# Patient Record
Sex: MALE | Race: WHITE | NOT HISPANIC OR LATINO | Employment: OTHER | URBAN - NONMETROPOLITAN AREA
[De-identification: names, ages, dates, MRNs, and addresses within clinical notes are randomized per-mention and may not be internally consistent; named-entity substitution may affect disease eponyms.]

---

## 2017-01-06 ENCOUNTER — APPOINTMENT (OUTPATIENT)
Dept: CT IMAGING | Facility: HOSPITAL | Age: 82
End: 2017-01-06

## 2017-01-06 ENCOUNTER — HOSPITAL ENCOUNTER (INPATIENT)
Facility: HOSPITAL | Age: 82
LOS: 1 days | Discharge: HOME | End: 2017-01-07
Attending: EMERGENCY MEDICINE | Admitting: INTERNAL MEDICINE

## 2017-01-06 ENCOUNTER — LAB (OUTPATIENT)
Dept: LAB | Facility: HOSPITAL | Age: 82
End: 2017-01-06

## 2017-01-06 ENCOUNTER — TRANSCRIBE ORDERS (OUTPATIENT)
Dept: CT IMAGING | Facility: HOSPITAL | Age: 82
End: 2017-01-06

## 2017-01-06 ENCOUNTER — HOSPITAL ENCOUNTER (OUTPATIENT)
Dept: GENERAL RADIOLOGY | Facility: HOSPITAL | Age: 82
Discharge: HOME | End: 2017-01-06
Admitting: NURSE PRACTITIONER

## 2017-01-06 DIAGNOSIS — R06.02 SHORTNESS OF BREATH: ICD-10-CM

## 2017-01-06 DIAGNOSIS — R53.83 OTHER FATIGUE: Primary | ICD-10-CM

## 2017-01-06 DIAGNOSIS — D53.9 ANEMIA ASSOCIATED WITH NUTRITIONAL DEFICIENCY: Primary | ICD-10-CM

## 2017-01-06 DIAGNOSIS — R53.83 OTHER FATIGUE: ICD-10-CM

## 2017-01-06 LAB
ABO GROUP BLD: NORMAL
ALBUMIN SERPL-MCNC: 4.3 G/DL (ref 3.5–5)
ALBUMIN SERPL-MCNC: 4.3 G/DL (ref 3.5–5)
ALBUMIN/GLOB SERPL: 1.1 G/DL (ref 1.1–2.5)
ALBUMIN/GLOB SERPL: 1.2 G/DL (ref 1.1–2.5)
ALP SERPL-CCNC: 60 U/L (ref 24–120)
ALP SERPL-CCNC: 61 U/L (ref 24–120)
ALT SERPL W P-5'-P-CCNC: 25 U/L (ref 0–54)
ALT SERPL W P-5'-P-CCNC: 32 U/L (ref 0–54)
AMYLASE SERPL-CCNC: 68 U/L (ref 30–110)
ANION GAP SERPL CALCULATED.3IONS-SCNC: 13 MMOL/L (ref 4–13)
ANION GAP SERPL CALCULATED.3IONS-SCNC: 13 MMOL/L (ref 4–13)
ANION GAP SERPL CALCULATED.3IONS-SCNC: 14 MMOL/L (ref 4–13)
APTT PPP: 28.1 SECONDS (ref 24.1–34.8)
AST SERPL-CCNC: 22 U/L (ref 7–45)
AST SERPL-CCNC: 27 U/L (ref 7–45)
AUTO MIXED CELLS #: 0.8 10*3/UL (ref 0.1–2.6)
AUTO MIXED CELLS %: 8.1 % (ref 0.1–24)
BACTERIA UR QL AUTO: ABNORMAL /HPF
BASOPHILS # BLD AUTO: 0.04 10*3/MM3 (ref 0–0.2)
BASOPHILS NFR BLD AUTO: 0.4 % (ref 0–2)
BILIRUB SERPL-MCNC: 0.3 MG/DL (ref 0.1–1)
BILIRUB SERPL-MCNC: 0.3 MG/DL (ref 0.1–1)
BILIRUB UR QL STRIP: NEGATIVE
BLD GP AB SCN SERPL QL: NEGATIVE
BUN BLD-MCNC: 42 MG/DL (ref 5–21)
BUN BLD-MCNC: 44 MG/DL (ref 5–21)
BUN BLD-MCNC: 46 MG/DL (ref 5–21)
BUN/CREAT SERPL: 19.4
BUN/CREAT SERPL: 21.3 (ref 7–25)
BUN/CREAT SERPL: 22.7 (ref 7–25)
CALCIUM SPEC-SCNC: 8.5 MG/DL (ref 8.4–10.4)
CALCIUM SPEC-SCNC: 8.6 MG/DL (ref 8.4–10.4)
CALCIUM SPEC-SCNC: 8.7 MG/DL (ref 8.4–10.4)
CHLORIDE SERPL-SCNC: 105 MMOL/L (ref 98–110)
CHLORIDE SERPL-SCNC: 106 MMOL/L (ref 98–110)
CHLORIDE SERPL-SCNC: 107 MMOL/L (ref 98–110)
CLARITY UR: CLEAR
CO2 SERPL-SCNC: 22 MMOL/L (ref 24–31)
CO2 SERPL-SCNC: 22 MMOL/L (ref 24–31)
CO2 SERPL-SCNC: 23 MMOL/L (ref 24–31)
COLOR UR: YELLOW
CREAT BLD-MCNC: 1.94 MG/DL (ref 0.5–1.4)
CREAT BLD-MCNC: 2.16 MG/DL (ref 0.5–1.4)
CREAT BLD-MCNC: 2.17 MG/DL (ref 0.5–1.4)
CRP SERPL-MCNC: <0.5 MG/DL (ref 0–0.99)
DEPRECATED RDW RBC AUTO: 42 FL (ref 40–54)
EOSINOPHIL # BLD AUTO: 0.04 10*3/MM3 (ref 0–0.7)
EOSINOPHIL NFR BLD AUTO: 0.4 % (ref 0–4)
ERYTHROCYTE [DISTWIDTH] IN BLOOD BY AUTOMATED COUNT: 15.2 % (ref 12–15)
ERYTHROCYTE [DISTWIDTH] IN BLOOD BY AUTOMATED COUNT: 15.9 % (ref 12–15)
FLUAV AG NPH QL: NEGATIVE
FLUBV AG NPH QL IA: NEGATIVE
FOLATE SERPL-MCNC: >20 NG/ML
GFR SERPL CREATININE-BSD FRML MDRD: 29 ML/MIN/1.73
GFR SERPL CREATININE-BSD FRML MDRD: 29 ML/MIN/1.73
GFR SERPL CREATININE-BSD FRML MDRD: 33 ML/MIN/1.73
GLOBULIN UR ELPH-MCNC: 3.6 GM/DL
GLOBULIN UR ELPH-MCNC: 3.8 GM/DL
GLUCOSE BLD-MCNC: 116 MG/DL (ref 70–100)
GLUCOSE BLD-MCNC: 127 MG/DL (ref 70–100)
GLUCOSE BLD-MCNC: 181 MG/DL (ref 70–100)
GLUCOSE BLDC GLUCOMTR-MCNC: 98 MG/DL (ref 70–130)
GLUCOSE UR STRIP-MCNC: NEGATIVE MG/DL
HCT VFR BLD AUTO: 20.5 % (ref 40–52)
HCT VFR BLD AUTO: 21.6 % (ref 40–52)
HGB BLD-MCNC: 6.2 G/DL (ref 14–18)
HGB BLD-MCNC: 6.6 G/DL (ref 14–18)
HGB UR QL STRIP.AUTO: ABNORMAL
HYALINE CASTS UR QL AUTO: ABNORMAL /LPF
IMM GRANULOCYTES # BLD: 0.02 10*3/MM3 (ref 0–0.03)
IMM GRANULOCYTES NFR BLD: 0.2 % (ref 0–5)
INR PPP: 1.11 (ref 0.91–1.09)
IRON 24H UR-MRATE: <10 MCG/DL (ref 42–180)
IRON SATN MFR SERPL: ABNORMAL % (ref 20–45)
KETONES UR QL STRIP: NEGATIVE
LEUKOCYTE ESTERASE UR QL STRIP.AUTO: ABNORMAL
LIPASE SERPL-CCNC: 251 U/L (ref 23–203)
LYMPHOCYTES # BLD AUTO: 1.5 10*3/MM3 (ref 0.72–4.86)
LYMPHOCYTES # BLD AUTO: 2 10*3/MM3 (ref 0.8–7)
LYMPHOCYTES NFR BLD AUTO: 16.5 % (ref 15–45)
LYMPHOCYTES NFR BLD AUTO: 20.8 % (ref 15–45)
MCH RBC QN AUTO: 21.5 PG (ref 28–32)
MCH RBC QN AUTO: 22.1 PG (ref 28–32)
MCHC RBC AUTO-ENTMCNC: 30.2 G/DL (ref 33–36)
MCHC RBC AUTO-ENTMCNC: 30.6 G/DL (ref 33–36)
MCV RBC AUTO: 71.2 FL (ref 82–95)
MCV RBC AUTO: 72.2 FL (ref 82–95)
MONOCYTES # BLD AUTO: 0.53 10*3/MM3 (ref 0.19–1.3)
MONOCYTES NFR BLD AUTO: 5.8 % (ref 4–12)
NEUTROPHILS # BLD AUTO: 6.6 10*3/MM3 (ref 1.5–8.3)
NEUTROPHILS # BLD AUTO: 6.98 10*3/MM3 (ref 1.87–8.4)
NEUTROPHILS NFR BLD AUTO: 71.1 % (ref 39–78)
NEUTROPHILS NFR BLD AUTO: 76.7 % (ref 39–78)
NITRITE UR QL STRIP: NEGATIVE
NT-PROBNP SERPL-MCNC: 327 PG/ML (ref 0–1800)
PH UR STRIP.AUTO: 5.5 [PH] (ref 5–8)
PLATELET # BLD AUTO: 349 10*3/MM3 (ref 130–400)
PLATELET # BLD AUTO: 365 10*3/MM3 (ref 130–400)
PMV BLD AUTO: 10.1 FL (ref 6–12)
PMV BLD AUTO: 8.5 FL (ref 6–12)
POTASSIUM BLD-SCNC: 4.8 MMOL/L (ref 3.5–5.3)
POTASSIUM BLD-SCNC: 5.3 MMOL/L (ref 3.5–5.3)
POTASSIUM BLD-SCNC: 5.6 MMOL/L (ref 3.5–5.3)
PROT SERPL-MCNC: 7.9 G/DL (ref 6.3–8.7)
PROT SERPL-MCNC: 8.1 G/DL (ref 6.3–8.7)
PROT UR QL STRIP: NEGATIVE
PROTHROMBIN TIME: 14.7 SECONDS (ref 11.9–14.6)
RBC # BLD AUTO: 2.88 10*6/MM3 (ref 4.8–5.9)
RBC # BLD AUTO: 2.99 10*6/MM3 (ref 4.2–5.4)
RBC # UR: ABNORMAL /HPF
REF LAB TEST METHOD: ABNORMAL
RETICS #: 0.03 10*6/MM3 (ref 0.02–0.13)
RETICS/RBC NFR AUTO: 0.89 % (ref 0.6–1.8)
RH BLD: POSITIVE
SODIUM BLD-SCNC: 141 MMOL/L (ref 135–145)
SODIUM BLD-SCNC: 141 MMOL/L (ref 135–145)
SODIUM BLD-SCNC: 143 MMOL/L (ref 135–145)
SP GR UR STRIP: 1.01 (ref 1–1.03)
SQUAMOUS #/AREA URNS HPF: ABNORMAL /HPF
T4 FREE SERPL-MCNC: 0.96 NG/DL (ref 0.78–2.19)
TIBC SERPL-MCNC: 437 MCG/DL (ref 225–420)
TROPONIN I SERPL-MCNC: 0.01 NG/ML (ref 0–0.07)
TSH SERPL DL<=0.05 MIU/L-ACNC: 2.35 MIU/ML (ref 0.47–4.68)
UROBILINOGEN UR QL STRIP: ABNORMAL
VIT B12 BLD-MCNC: 315 PG/ML (ref 239–931)
WBC NRBC COR # BLD: 9.11 10*3/MM3 (ref 4.8–10.8)
WBC NRBC COR # BLD: 9.4 10*3/MM3 (ref 4.8–10.8)
WBC UR QL AUTO: ABNORMAL /HPF

## 2017-01-06 PROCEDURE — 86900 BLOOD TYPING SEROLOGIC ABO: CPT

## 2017-01-06 PROCEDURE — P9016 RBC LEUKOCYTES REDUCED: HCPCS

## 2017-01-06 PROCEDURE — 84443 ASSAY THYROID STIM HORMONE: CPT | Performed by: EMERGENCY MEDICINE

## 2017-01-06 PROCEDURE — 85060 BLOOD SMEAR INTERPRETATION: CPT | Performed by: INTERNAL MEDICINE

## 2017-01-06 PROCEDURE — 80053 COMPREHEN METABOLIC PANEL: CPT | Performed by: EMERGENCY MEDICINE

## 2017-01-06 PROCEDURE — 84484 ASSAY OF TROPONIN QUANT: CPT

## 2017-01-06 PROCEDURE — 85025 COMPLETE CBC W/AUTO DIFF WBC: CPT | Performed by: EMERGENCY MEDICINE

## 2017-01-06 PROCEDURE — 83550 IRON BINDING TEST: CPT | Performed by: INTERNAL MEDICINE

## 2017-01-06 PROCEDURE — 82150 ASSAY OF AMYLASE: CPT | Performed by: EMERGENCY MEDICINE

## 2017-01-06 PROCEDURE — 86920 COMPATIBILITY TEST SPIN: CPT

## 2017-01-06 PROCEDURE — 99285 EMERGENCY DEPT VISIT HI MDM: CPT

## 2017-01-06 PROCEDURE — 36430 TRANSFUSION BLD/BLD COMPNT: CPT

## 2017-01-06 PROCEDURE — 85045 AUTOMATED RETICULOCYTE COUNT: CPT | Performed by: INTERNAL MEDICINE

## 2017-01-06 PROCEDURE — 83010 ASSAY OF HAPTOGLOBIN QUANT: CPT | Performed by: INTERNAL MEDICINE

## 2017-01-06 PROCEDURE — 86901 BLOOD TYPING SEROLOGIC RH(D): CPT

## 2017-01-06 PROCEDURE — 83690 ASSAY OF LIPASE: CPT | Performed by: EMERGENCY MEDICINE

## 2017-01-06 PROCEDURE — 82607 VITAMIN B-12: CPT | Performed by: INTERNAL MEDICINE

## 2017-01-06 PROCEDURE — 93010 ELECTROCARDIOGRAM REPORT: CPT | Performed by: INTERNAL MEDICINE

## 2017-01-06 PROCEDURE — 84439 ASSAY OF FREE THYROXINE: CPT | Performed by: EMERGENCY MEDICINE

## 2017-01-06 PROCEDURE — 74176 CT ABD & PELVIS W/O CONTRAST: CPT

## 2017-01-06 PROCEDURE — 82962 GLUCOSE BLOOD TEST: CPT

## 2017-01-06 PROCEDURE — 82746 ASSAY OF FOLIC ACID SERUM: CPT | Performed by: INTERNAL MEDICINE

## 2017-01-06 PROCEDURE — 93005 ELECTROCARDIOGRAM TRACING: CPT | Performed by: EMERGENCY MEDICINE

## 2017-01-06 PROCEDURE — 85610 PROTHROMBIN TIME: CPT | Performed by: EMERGENCY MEDICINE

## 2017-01-06 PROCEDURE — 30233N1 TRANSFUSION OF NONAUTOLOGOUS RED BLOOD CELLS INTO PERIPHERAL VEIN, PERCUTANEOUS APPROACH: ICD-10-PCS | Performed by: INTERNAL MEDICINE

## 2017-01-06 PROCEDURE — 83540 ASSAY OF IRON: CPT | Performed by: INTERNAL MEDICINE

## 2017-01-06 PROCEDURE — 86850 RBC ANTIBODY SCREEN: CPT

## 2017-01-06 PROCEDURE — 85730 THROMBOPLASTIN TIME PARTIAL: CPT | Performed by: EMERGENCY MEDICINE

## 2017-01-06 RX ORDER — DEXTROSE MONOHYDRATE 25 G/50ML
25 INJECTION, SOLUTION INTRAVENOUS AS NEEDED
Status: DISCONTINUED | OUTPATIENT
Start: 2017-01-06 | End: 2017-01-07 | Stop reason: HOSPADM

## 2017-01-06 RX ORDER — NICOTINE POLACRILEX 4 MG
15 LOZENGE BUCCAL AS NEEDED
Status: DISCONTINUED | OUTPATIENT
Start: 2017-01-06 | End: 2017-01-07 | Stop reason: HOSPADM

## 2017-01-06 RX ORDER — SODIUM CHLORIDE 0.9 % (FLUSH) 0.9 %
1-10 SYRINGE (ML) INJECTION AS NEEDED
Status: DISCONTINUED | OUTPATIENT
Start: 2017-01-06 | End: 2017-01-07 | Stop reason: HOSPADM

## 2017-01-06 RX ORDER — PANTOPRAZOLE SODIUM 40 MG/10ML
80 INJECTION, POWDER, LYOPHILIZED, FOR SOLUTION INTRAVENOUS ONCE
Status: COMPLETED | OUTPATIENT
Start: 2017-01-06 | End: 2017-01-06

## 2017-01-06 RX ADMIN — SODIUM CHLORIDE 500 ML: 9 INJECTION, SOLUTION INTRAVENOUS at 22:07

## 2017-01-06 RX ADMIN — PANTOPRAZOLE SODIUM 40 MG: 40 INJECTION, POWDER, FOR SOLUTION INTRAVENOUS at 22:12

## 2017-01-06 RX ADMIN — PANTOPRAZOLE SODIUM 80 MG: 40 INJECTION, POWDER, FOR SOLUTION INTRAVENOUS at 21:52

## 2017-01-06 NOTE — ED PROVIDER NOTES
Subjective   HPI Comments: 87-year-old gentleman presents from facility with significant other at bedside.  He presents from us from a clinic where he was evaluated by a nurse practitioner at which time they noted a hemoglobin of 6 and was requested to be seen in the emergency room.    Apparently for the past several days he feels weak and tired the end of his work day.    No chest pain no shortness of breath has good appetite no nausea no vomiting no diarrhea no abdominal pain.  He has not noticed any blood in the stools.        Patient is a 87 y.o. male presenting with fatigue.   History provided by:  Patient   used: No    Fatigue   Severity:  Severe  Onset quality:  Gradual  Timing:  Constant  Progression:  Worsening  Associated symptoms: fatigue        Review of Systems   Constitutional: Positive for fatigue.   HENT: Negative.    Eyes: Negative.    Respiratory: Negative.    Cardiovascular: Negative.    Gastrointestinal: Negative.    Endocrine: Negative.    Genitourinary: Negative.    Musculoskeletal: Negative.    Skin: Negative.    Allergic/Immunologic: Negative.    Neurological: Negative.    Hematological: Negative.    Psychiatric/Behavioral: Negative.    All other systems reviewed and are negative.      Past Medical History   Diagnosis Date   • Cancer      PROSTATE   • Diabetes mellitus    • Hypertension        No Known Allergies    Past Surgical History   Procedure Laterality Date   • Cholecystectomy     • Prostatectomy         History reviewed. No pertinent family history.    Social History     Social History   • Marital status: Single     Spouse name: N/A   • Number of children: N/A   • Years of education: N/A     Social History Main Topics   • Smoking status: Never Smoker   • Smokeless tobacco: None   • Alcohol use No   • Drug use: No   • Sexual activity: Not Asked     Other Topics Concern   • None     Social History Narrative   • None           Objective   Physical Exam    Constitutional: He appears well-developed and well-nourished.   HENT:   Head: Normocephalic and atraumatic.   Eyes: EOM are normal. Pupils are equal, round, and reactive to light.   Neck: Normal range of motion. Neck supple.   Pulmonary/Chest: Effort normal and breath sounds normal.   Abdominal: Soft. Bowel sounds are normal.   Musculoskeletal: Normal range of motion.   Neurological: He is alert. He has normal reflexes.   Skin: Skin is warm and dry.   Psychiatric: He has a normal mood and affect. His behavior is normal. Judgment and thought content normal.   Nursing note and vitals reviewed.      Procedures         ED Course  ED Course   Comment By Time   Hemoglobin at the clinic was 6.6 hematocrit was 22 Bereket WILLS MD 01/06 1734   D/W with Dr. Gonzalez.  Plan to admit to Hospitalist and will consult in AM. Bereket WILLS MD 01/06 1753   EKG at 1748 sinus rate no PVCs no ectopic beats at a rate of 70/m.  No acute ST changes. Bereket WILLS MD 01/06 1814   Discussed with Dr. Flores patient will be admitted to their service to the ICU I started patient on Protonix IV push followed by IV drip.  Await blood transfusion. Bereket WILLS MD 01/06 1814                  MDM  Number of Diagnoses or Management Options  Anemia associated with nutritional deficiency: new and requires workup     Amount and/or Complexity of Data Reviewed  Clinical lab tests: reviewed and ordered  Tests in the radiology section of CPT®: reviewed and ordered    Risk of Complications, Morbidity, and/or Mortality  Presenting problems: high  Diagnostic procedures: high  Management options: high    Critical Care  Total time providing critical care: 30-74 minutes    Patient Progress  Patient progress: stable      Final diagnoses:   Anemia associated with nutritional deficiency            Bereket WILLS MD  01/06/17 [id number]

## 2017-01-06 NOTE — IP AVS SNAPSHOT
AFTER VISIT SUMMARY             Maikel Mathews           About your hospitalization     You were admitted on:  January 6, 2017 You last received care in the:  [facility]       Procedures & Surgeries         Medications    If you or your caregiver advised us that you are currently taking a medication and that medication is marked below as “Resume”, this simply indicates that we have reviewed those medications to make sure our new therapy recommendations do not interfere.  If you have concerns about medications other than those new ones which we are prescribing today, please consult the physician who prescribed them (or your primary physician).  Our review of your home medications is not meant to indicate that we are directing their use.             Your Medications      START taking these medications     ferrous sulfate 325 (65 FE) MG EC tablet   Take 1 tablet by mouth 3 (Three) Times a Day With Meals.           pneumococcal polysaccharide 23-valent 25 MCG/0.5ML vaccine   Inject 0.5 mL into the shoulder, thigh, or buttocks 1 (One) Time for 1 dose.   Commonly known as:  PNEUMOVAX-23           vitamin C 500 MG tablet   Take 1 tablet by mouth Daily.   Commonly known as:  ASCORBIC ACID             CONTINUE taking these medications     AMLODIPINE BESYLATE PO   Take 10 mg by mouth Daily.   Last time this was given:  1/7/2017  3:17 PM           GLIPIZIDE PO   Take 5 mg by mouth Daily.   Last time this was given:  1/7/2017  3:17 PM           LOSARTAN POTASSIUM PO   Take 100 mg by mouth Daily.   Last time this was given:  1/7/2017  3:17 PM           METFORMIN HCL PO   Take 500 mg by mouth 2 (Two) Times a Day.                Where to Get Your Medications      These medications were sent to [facility] - [place], [place] - [address] - [phone]  - [phone]   [address] [place] [address]     Phone:  [phone]     ferrous sulfate 325 (65 FE) MG EC tablet    pneumococcal  polysaccharide 23-valent 25 MCG/0.5ML vaccine         Information about where to get these medications is not yet available     ! Ask your nurse or doctor about these medications     vitamin C 500 MG tablet                  Your Medications      Your Medication List           Morning Noon Evening Bedtime As Needed    AMLODIPINE BESYLATE PO   Take 10 mg by mouth Daily.                                   ferrous sulfate 325 (65 FE) MG EC tablet   Take 1 tablet by mouth 3 (Three) Times a Day With Meals.                                         GLIPIZIDE PO   Take 5 mg by mouth Daily.                                   LOSARTAN POTASSIUM PO   Take 100 mg by mouth Daily.                                   METFORMIN HCL PO   Take 500 mg by mouth 2 (Two) Times a Day.                                      pneumococcal polysaccharide 23-valent 25 MCG/0.5ML vaccine   Inject 0.5 mL into the shoulder, thigh, or buttocks 1 (One) Time for 1 dose.   Commonly known as:  PNEUMOVAX-23                                vitamin C 500 MG tablet   Take 1 tablet by mouth Daily.   Commonly known as:  ASCORBIC ACID                                            Instructions for After Discharge        Activity Instructions     Activity as Tolerated                 Diet Instructions     Diet: Consistent Carbohydrate, Cardiac; Thin Liquids, No Restrictions       Discharge Diet:   Consistent Carbohydrate  Cardiac      Fluid Consistency:  Thin Liquids, No Restrictions             Discharge References/Attachments     ANEMIA, NONSPECIFIC (ENGLISH)    BLOOD TRANSFUSION, CARE AFTER (ENGLISH)    IRON TABLETS, CAPSULES, EXTENDED-RELEASE TABLETS (ENGLISH)    ASCORBIC ACID, VITAMIN C CAPSULES AND TABLETS, EXTENDED RELEASE (ENGLISH)       Follow-ups for After Discharge        Follow-up Information     Follow up with David Barajas MD Follow up in 1 week(s).    Specialty:  Internal Medicine    Why:  Patient to call for appointment, will need repeat H/H day  before appointment    Contact information:    [address] [facility] [address]  [facility] [address]  [phone]          Follow up with Joaquín Neff MD. Schedule an appointment as soon as possible for a visit in 1 week(s).    Specialty:  Gastroenterology    Contact information:    [facility] [address]  [facility] [address]  [phone]        Referrals and Follow-ups to Schedule     Hemoglobin & Hematocrit, Blood    2017 (Approximate)    Results to Dr Barajas, obtain prior to office visit             [employer] Signup     [employer] allows you to send messages to your doctor, view your test results, renew your prescriptions, schedule appointments, and more. To sign up, go to "[employer]" and click on the Sign Up Now link in the New User? box. Enter your [employer] Activation Code exactly as it appears below along with the last four digits of your Social Security Number and your Date of Birth () to complete the sign-up process. If you do not sign up before the expiration date, you must request a new code.    [employer] Activation Code: [email or web address]  Expires: 2017  6:30 PM    If you have questions, you can email [email or web address] or call [phone] to talk to our [employer] staff. Remember, [employer] is NOT to be used for urgent needs. For medical emergencies, dial 911.           Summary of Your Hospitalization        Reason for Hospitalization     Your primary diagnosis was:  Not on File    Your diagnoses also included:  Anemia Associated With Nutritional Deficiency      Care Providers     Provider Service Role Specialty    Yifan Flores MD -- Attending Provider Hospitalist    Yifan Flores MD -- Consulting Physician  Hospitalist     Jen Gonzalez MD -- Consulting Physician  Gastroenterology       Your Allergies  Date Reviewed: 2017    No active allergies      Pending Labs     Order Current Status    Haptoglobin In process    Peripheral Blood Smear In process    T4  In process      Patient Belongings Returned     Document Return of Belongings Flowsheet     Were the patient bedside belongings sent home?   --   Belongings Retrieved from Security & Sent Home   --    Belongings Sent to Safe   --   Medications Retrieved from Pharmacy & Sent Home   --              More Information      Anemia, Nonspecific  Anemia is a condition in which the concentration of red blood cells or hemoglobin in the blood is below normal. Hemoglobin is a substance in red blood cells that carries oxygen to the tissues of the body. Anemia results in not enough oxygen reaching these tissues.   CAUSES   Common causes of anemia include:   · Excessive bleeding. Bleeding may be internal or external. This includes excessive bleeding from periods (in women) or from the intestine.    · Poor nutrition.    · Chronic kidney, thyroid, and liver disease.   · Bone marrow disorders that decrease red blood cell production.  · Cancer and treatments for cancer.  · HIV, AIDS, and their treatments.  · Spleen problems that increase red blood cell destruction.  · Blood disorders.  · Excess destruction of red blood cells due to infection, medicines, and autoimmune disorders.  SIGNS AND SYMPTOMS   · Minor weakness.    · Dizziness.    · Headache.  · Palpitations.    · Shortness of breath, especially with exercise.    · Paleness.  · Cold sensitivity.  · Indigestion.  · Nausea.  · Difficulty sleeping.  · Difficulty concentrating.  Symptoms may occur suddenly or they may develop slowly.   DIAGNOSIS   Additional blood tests are often needed. These help your health care provider determine the best treatment. Your health care provider will check your stool for blood and look for other causes of blood loss.   TREATMENT   Treatment varies depending on the cause of the anemia. Treatment can include:   · Supplements of iron, vitamin B12, or folic acid.    · Hormone medicines.    · A blood transfusion. This may be needed if blood loss is  severe.    · Hospitalization. This may be needed if there is significant continual blood loss.    · Dietary changes.  · Spleen removal.  HOME CARE INSTRUCTIONS  Keep all follow-up appointments. It often takes many weeks to correct anemia, and having your health care provider check on your condition and your response to treatment is very important.  SEEK IMMEDIATE MEDICAL CARE IF:   · You develop extreme weakness, shortness of breath, or chest pain.    · You become dizzy or have trouble concentrating.  · You develop heavy vaginal bleeding.    · You develop a rash.    · You have bloody or black, tarry stools.    · You faint.    · You vomit up blood.    · You vomit repeatedly.    · You have abdominal pain.  · You have a fever or persistent symptoms for more than 2-3 days.    · You have a fever and your symptoms suddenly get worse.    · You are dehydrated.    MAKE SURE YOU:  · Understand these instructions.  · Will watch your condition.  · Will get help right away if you are not doing well or get worse.     This information is not intended to replace advice given to you by your health care provider. Make sure you discuss any questions you have with your health care provider.     Document Released: 01/25/2006 Document Revised: 08/20/2014 Document Reviewed: 06/13/2014  [employer] Interactive Patient Education ©2016 [employer]          Blood Transfusion, Care After  Refer to this sheet in the next few weeks. These instructions provide you with information about caring for yourself after your procedure. Your health care provider may also give you more specific instructions. Your treatment has been planned according to current medical practices, but problems sometimes occur. Call your health care provider if you have any problems or questions after your procedure.  WHAT TO EXPECT AFTER THE PROCEDURE  After your procedure, it is common to have:  · Bruising and soreness at the IV site.  · Chills or fever.  · Headache.  HOME  CARE INSTRUCTIONS  · Take medicines only as directed by your health care provider. Ask your health care provider if you can take an over-the-counter pain reliever in case you have a fever or headache a day or two after your transfusion.  · Return to your normal activities as directed by your health care provider.  SEEK MEDICAL CARE IF:   · You develop redness or irritation at your IV site.  · You have persistent fever, chills, or headache.  · Your urine is darker than normal.  · Your urine turns pink, red, or brown.    · The white part of your eye turns yellow (jaundice).    · You feel weak after doing your normal activities.    SEEK IMMEDIATE MEDICAL CARE IF:   · You have trouble breathing.  · You have fever and chills along with:    Anxiety.    Chest or back pain.    Flushed skin.    Clammy skin.    A rapid heartbeat.    Nausea.     This information is not intended to replace advice given to you by your health care provider. Make sure you discuss any questions you have with your health care provider.     Document Released: 01/08/2016 Document Reviewed: 01/08/2016  [employer] Interactive Patient Education ©2016 [employer] Inc.          Iron tablets, capsules, extended-release tablets  What is this medicine?  IRON (AHY valente) replaces iron that is essential to healthy red blood cells. Iron is used to treat iron deficiency anemia. Anemia may cause problems like tiredness, shortness of breath, or slowed growth in children. Only take iron if your doctor has told you to. Do not treat yourself with iron if you are feeling tired. Most healthy people get enough iron in their diets, particularly if they eat cereals, meat, poultry, and fish.  This medicine may be used for other purposes; ask your health care provider or pharmacist if you have questions.  What should I tell my health care provider before I take this medicine?  They need to know if you have any of these conditions:  -frequently drink alcohol  -bowel  disease  -hemolytic anemia  -iron overload (hemochromatosis, hemosiderosis)  -liver disease  -problems with swallowing  -stomach ulcer or other stomach problems  -an unusual or allergic reaction to iron, other medicines, foods, dyes, or preservatives  -pregnant or trying to get pregnant  -breast-feeding  How should I use this medicine?  Take this medicine by mouth with a glass of water or fruit juice. Follow the directions on the prescription label. Swallow whole. Do not crush or chew. Take this medicine in an upright or sitting position. Try to take any bedtime doses at least 10 minutes before lying down. You may take this medicine with food. Take your medicine at regular intervals. Do not take your medicine more often than directed. Do not stop taking except on your doctor's advice.  Talk to your pediatrician regarding the use of this medicine in children. While this drug may be prescribed for selected conditions, precautions do apply.  Overdosage: If you think you have taken too much of this medicine contact a poison control center or emergency room at once.  NOTE: This medicine is only for you. Do not share this medicine with others.  What if I miss a dose?  If you miss a dose, take it as soon as you can. If it is almost time for your next dose, take only that dose. Do not take double or extra doses.  What may interact with this medicine?  If you are taking this iron product, you should not take iron in any other medicine or dietary supplement.  This medicine may also interact with the following medications:  -alendronate  -antacids  -cefdinir  -chloramphenicol  -cholestyramine  -deferoxamine  -dimercaprol  -etidronate  -medicines for stomach ulcers or other stomach problems  -pancreatic enzymes  -quinolone antibiotics (examples: Cipro, Floxin, Levaquin, Tequin and others)  -risedronate  -tetracycline antibiotics (examples: doxycycline, tetracycline, minocycline, and others)  -thyroid hormones  This list may not  describe all possible interactions. Give your health care provider a list of all the medicines, herbs, non-prescription drugs, or dietary supplements you use. Also tell them if you smoke, drink alcohol, or use illegal drugs. Some items may interact with your medicine.  What should I watch for while using this medicine?  Use iron supplements only as directed by your health care professional. You will need important blood work while you are taking this medicine. It may take 3 to 6 months of therapy to treat low iron levels. Pregnant women should follow the dose and length of iron treatment as directed by their doctors.  Do not use iron longer than prescribed, and do not take a higher dose than recommended. Long-term use may cause excess iron to build-up in the body.  Do not take iron with antacids. If you need to take an antacid, take it 2 hours after a dose of iron.  What side effects may I notice from receiving this medicine?  Side effects that you should report to your doctor or health care professional as soon as possible:  -allergic reactions like skin rash, itching or hives, swelling of the face, lips, or tongue  -blue lips, nails, or palms  -dark colored stools (this may be due to the iron, but can indicate a more serious condition)  -drowsiness  -pain with or difficulty swallowing  -pale or clammy skin  -seizures  -stomach pain  -unusually weak or tired  -vomiting  -weak, fast, or irregular heartbeat  Side effects that usually do not require medical attention (report to your doctor or health care professional if they continue or are bothersome):  -constipation  -indigestion  -nausea or stomach upset  This list may not describe all possible side effects. Call your doctor for medical advice about side effects. You may report side effects to FDA at 7-140-FDA-4537.  Where should I keep my medicine?  Keep out of the reach of children. Even small amounts of iron can be harmful to a child.  Store at room temperature  between 15 and 30 degrees C (59 and 86 degrees F). Keep container tightly closed. Throw away any unused medicine after the expiration date.  NOTE: This sheet is a summary. It may not cover all possible information. If you have questions about this medicine, talk to your doctor, pharmacist, or health care provider.     © 2016, Elsevier/Gold Standard. (2009-05-05 17:03:41)          Ascorbic Acid, Vitamin C capsules and tablets, extended release  What is this medicine?  ASCORBIC ACID (a SKOR bik AS id) is a naturally occurring form of vitamin C. It is used to treat or prevent low levels of vitamin C and to treat scurvy.  This medicine may be used for other purposes; ask your health care provider or pharmacist if you have questions.  What should I tell my health care provider before I take this medicine?  They need to know if you have any of the following conditions:  -anemia  -diabetes  -glucose-6-phosphate dehydrogenase (G6PD) deficiency  -kidney stones  -low sodium diet  -an unusual or allergic reaction to ascorbic acid, tartrazine, other medicines, foods, dyes, or preservatives  -pregnant or trying to get pregnant  -breast-feeding  How should I use this medicine?  Take this medicine by mouth with a glass of water. Follow the directions on the package or prescription label. Do not cut, crush or chew this medicine. You may take this medicine with or without food. If it upsets your stomach take it with food. Take your medicine at regular intervals. Do not take your medicine more often than directed.  Talk to your pediatrician regarding the use of this medicine in children. While this drug may be prescribed for selected conditions, precautions do apply.  Overdosage: If you think you have taken too much of this medicine contact a poison control center or emergency room at once.  NOTE: This medicine is only for you. Do not share this medicine with others.  What if I miss a dose?  If you miss a dose, take it as soon as you  can. If it is almost time for your next dose, take only that dose. Do not take double or extra doses.  What may interact with this medicine?  -deferoxamine  -iron supplements  This list may not describe all possible interactions. Give your health care provider a list of all the medicines, herbs, non-prescription drugs, or dietary supplements you use. Also tell them if you smoke, drink alcohol, or use illegal drugs. Some items may interact with your medicine.  What should I watch for while using this medicine?  Follow a good diet. Taking a vitamin supplement does not replace the need for a balanced diet. Some foods that have vitamin C naturally are citrus fruits, green peppers, broccoli, cabbage, and tomatoes.  If you are diabetic very high doses of ascorbic acid can interfere with tests for sugar in the urine. Talk to your doctor or yumiko care professional if you check your urine glucose levels.  What side effects may I notice from receiving this medicine?  Side effects that you should report to your doctor or health care professional as soon as possible:  -allergic reactions like skin rash, itching or hives, swelling of the face, lips, or tongue  -breathing problems  -diarrhea with headache or nausea  -flushing or redness of skin  -pain in lower back, side, or stomach  -pain on swallowing  Side effects that usually do not require medical attention (report to your doctor or health care professional if they continue or are bothersome):  -bad taste in the mouth  -stomach upset  This list may not describe all possible side effects. Call your doctor for medical advice about side effects. You may report side effects to FDA at 3-879-FDA-5765.  Where should I keep my medicine?  Keep out of the reach of children.  Store at room temperature between 15 and 30 degrees C (59 and 86 degrees F) or as directed on the package label. Protect from heat and moisture. Throw away any unused medicine after the expiration date.  NOTE:  This sheet is a summary. It may not cover all possible information. If you have questions about this medicine, talk to your doctor, pharmacist, or health care provider.     © 2016, Elsevier/Gold Standard. (2013-04-24 15:12:02)            SYMPTOMS OF A STROKE    Call 911 or have someone take you to the Emergency Department if you have any of the following:    · Sudden numbness or weakness of your face, arm or leg especially on one side of the body  · Sudden confusion, diffiiculty speaking or trouble understanding   · Changes in your vision or loss of sight in one eye  · Sudden severe headache with no known cause  · sudden dizziness, trouble walking, loss of balance or coordination    It is important to seek emergency care right away if you have further stroke symptoms. If you get emergency help quickly, the powerful clot-dissolving medicines can reduce the disabilities caused by a stroke.     For more information:    American Stroke Association  8-922-4-STROKE  www.strokeassociation.org           IF YOU SMOKE OR USE TOBACCO PLEASE READ THE FOLLOWING:    Why is smoking bad for me?  Smoking increases the risk of heart disease, lung disease, vascular disease, stroke, and cancer.     If you smoke, STOP!    If you would like more information on quitting smoking, please visit the [employer] website: [email or web address]   This link will provide additional resources including the QUIT line and the Beat the Pack support groups.     For more information:    American Cancer Society  ([phone]    American Heart Association  1-443.461.4531               YOU ARE THE MOST IMPORTANT FACTOR IN YOUR RECOVERY.     Follow all instructions carefully.     I have reviewed my discharge instructions with my nurse, including the following information, if applicable:     Information about my illness and diagnosis   Follow up appointments (including lab draws)   Wound Care   Equipment  Needs   Medications (new and continuing) along with side effects   Preventative information such as vaccines and smoking cessations   Diet   Pain   I know when to contact my Doctor's office or seek emergency care      I want my nurse to describe the side effects of my medications: YES NO   If the answer is no, I understand the side effects of my medications: YES NO   My nurse described the side effects of my medications in a way that I could understand: YES NO   I have taken my personal belongings and my own medications with me at discharge: YES NO            I have received this information and my questions have been answered. I have discussed any concerns I see with this plan with the nurse or physician. I understand these instructions.    Signature of Patient or Responsible Person: _____________________________________    Date: _________________  Time: __________________    Signature of Healthcare Provider: _______________________________________  Date: _________________  Time: __________________

## 2017-01-06 NOTE — Clinical Note
Level of Care: Critical Care [6]   Diagnosis: Anemia associated with nutritional deficiency [980770]

## 2017-01-07 VITALS
HEIGHT: 69 IN | SYSTOLIC BLOOD PRESSURE: 161 MMHG | DIASTOLIC BLOOD PRESSURE: 71 MMHG | BODY MASS INDEX: 19.91 KG/M2 | HEART RATE: 79 BPM | TEMPERATURE: 97.6 F | RESPIRATION RATE: 16 BRPM | WEIGHT: 134.4 LBS | OXYGEN SATURATION: 98 %

## 2017-01-07 LAB
ALBUMIN SERPL-MCNC: 3.8 G/DL (ref 3.5–5)
ALBUMIN/GLOB SERPL: 1.2 G/DL (ref 1.1–2.5)
ALP SERPL-CCNC: 56 U/L (ref 24–120)
ALT SERPL W P-5'-P-CCNC: 27 U/L (ref 0–54)
ANION GAP SERPL CALCULATED.3IONS-SCNC: 13 MMOL/L (ref 4–13)
AST SERPL-CCNC: 23 U/L (ref 7–45)
BASOPHILS # BLD AUTO: 0.03 10*3/MM3 (ref 0–0.2)
BASOPHILS NFR BLD AUTO: 0.4 % (ref 0–2)
BILIRUB SERPL-MCNC: 1.5 MG/DL (ref 0.1–1)
BUN BLD-MCNC: 39 MG/DL (ref 5–21)
BUN/CREAT SERPL: 21.8 (ref 7–25)
CALCIUM SPEC-SCNC: 8.5 MG/DL (ref 8.4–10.4)
CHLORIDE SERPL-SCNC: 111 MMOL/L (ref 98–110)
CO2 SERPL-SCNC: 21 MMOL/L (ref 24–31)
CREAT BLD-MCNC: 1.79 MG/DL (ref 0.5–1.4)
DEPRECATED RDW RBC AUTO: 53.2 FL (ref 40–54)
EOSINOPHIL # BLD AUTO: 0.12 10*3/MM3 (ref 0–0.7)
EOSINOPHIL NFR BLD AUTO: 1.7 % (ref 0–4)
ERYTHROCYTE [DISTWIDTH] IN BLOOD BY AUTOMATED COUNT: 19.6 % (ref 12–15)
GFR SERPL CREATININE-BSD FRML MDRD: 36 ML/MIN/1.73
GLOBULIN UR ELPH-MCNC: 3.3 GM/DL
GLUCOSE BLD-MCNC: 92 MG/DL (ref 70–100)
GLUCOSE BLDC GLUCOMTR-MCNC: 101 MG/DL (ref 70–130)
GLUCOSE BLDC GLUCOMTR-MCNC: 171 MG/DL (ref 70–130)
HCT VFR BLD AUTO: 25.1 % (ref 40–52)
HGB BLD-MCNC: 8 G/DL (ref 14–18)
IMM GRANULOCYTES # BLD: 0.01 10*3/MM3 (ref 0–0.03)
IMM GRANULOCYTES NFR BLD: 0.1 % (ref 0–5)
LYMPHOCYTES # BLD AUTO: 1.72 10*3/MM3 (ref 0.72–4.86)
LYMPHOCYTES NFR BLD AUTO: 24.5 % (ref 15–45)
MCH RBC QN AUTO: 24 PG (ref 28–32)
MCHC RBC AUTO-ENTMCNC: 31.9 G/DL (ref 33–36)
MCV RBC AUTO: 75.4 FL (ref 82–95)
MONOCYTES # BLD AUTO: 0.66 10*3/MM3 (ref 0.19–1.3)
MONOCYTES NFR BLD AUTO: 9.4 % (ref 4–12)
NEUTROPHILS # BLD AUTO: 4.48 10*3/MM3 (ref 1.87–8.4)
NEUTROPHILS NFR BLD AUTO: 63.9 % (ref 39–78)
PLATELET # BLD AUTO: 285 10*3/MM3 (ref 130–400)
PMV BLD AUTO: 10.1 FL (ref 6–12)
POTASSIUM BLD-SCNC: 4.3 MMOL/L (ref 3.5–5.3)
PROT SERPL-MCNC: 7.1 G/DL (ref 6.3–8.7)
RBC # BLD AUTO: 3.33 10*6/MM3 (ref 4.8–5.9)
SODIUM BLD-SCNC: 145 MMOL/L (ref 135–145)
WBC NRBC COR # BLD: 7.02 10*3/MM3 (ref 4.8–10.8)

## 2017-01-07 PROCEDURE — 99221 1ST HOSP IP/OBS SF/LOW 40: CPT | Performed by: INTERNAL MEDICINE

## 2017-01-07 PROCEDURE — 85025 COMPLETE CBC W/AUTO DIFF WBC: CPT | Performed by: INTERNAL MEDICINE

## 2017-01-07 PROCEDURE — 82962 GLUCOSE BLOOD TEST: CPT

## 2017-01-07 PROCEDURE — 25010000002 HYDRALAZINE PER 20 MG: Performed by: INTERNAL MEDICINE

## 2017-01-07 PROCEDURE — 25010000002 IRON SUCROSE PER 1 MG: Performed by: NURSE PRACTITIONER

## 2017-01-07 PROCEDURE — 80053 COMPREHEN METABOLIC PANEL: CPT | Performed by: INTERNAL MEDICINE

## 2017-01-07 RX ORDER — GLIPIZIDE 5 MG/1
5 TABLET ORAL DAILY
Status: DISCONTINUED | OUTPATIENT
Start: 2017-01-07 | End: 2017-01-07 | Stop reason: HOSPADM

## 2017-01-07 RX ORDER — AMLODIPINE BESYLATE 10 MG/1
10 TABLET ORAL DAILY
Status: DISCONTINUED | OUTPATIENT
Start: 2017-01-07 | End: 2017-01-07 | Stop reason: HOSPADM

## 2017-01-07 RX ORDER — LOSARTAN POTASSIUM 50 MG/1
100 TABLET ORAL DAILY
Status: DISCONTINUED | OUTPATIENT
Start: 2017-01-07 | End: 2017-01-07 | Stop reason: HOSPADM

## 2017-01-07 RX ORDER — SODIUM CHLORIDE 9 MG/ML
75 INJECTION, SOLUTION INTRAVENOUS CONTINUOUS
Status: DISCONTINUED | OUTPATIENT
Start: 2017-01-07 | End: 2017-01-07 | Stop reason: HOSPADM

## 2017-01-07 RX ORDER — ASCORBIC ACID 500 MG
500 TABLET ORAL DAILY
Start: 2017-01-07 | End: 2017-04-20

## 2017-01-07 RX ORDER — PANTOPRAZOLE SODIUM 40 MG/1
40 TABLET, DELAYED RELEASE ORAL
Status: DISCONTINUED | OUTPATIENT
Start: 2017-01-08 | End: 2017-01-07 | Stop reason: HOSPADM

## 2017-01-07 RX ORDER — HYDRALAZINE HYDROCHLORIDE 20 MG/ML
10 INJECTION INTRAMUSCULAR; INTRAVENOUS EVERY 4 HOURS PRN
Status: DISCONTINUED | OUTPATIENT
Start: 2017-01-07 | End: 2017-01-07 | Stop reason: HOSPADM

## 2017-01-07 RX ORDER — LANOLIN ALCOHOL/MO/W.PET/CERES
325 CREAM (GRAM) TOPICAL
Qty: 90 TABLET | Refills: 11 | Status: SHIPPED | OUTPATIENT
Start: 2017-01-07 | End: 2017-08-04 | Stop reason: ALTCHOICE

## 2017-01-07 RX ORDER — PANTOPRAZOLE SODIUM 40 MG/10ML
40 INJECTION, POWDER, LYOPHILIZED, FOR SOLUTION INTRAVENOUS
Status: DISCONTINUED | OUTPATIENT
Start: 2017-01-07 | End: 2017-01-07

## 2017-01-07 RX ADMIN — IRON SUCROSE 300 MG: 20 INJECTION, SOLUTION INTRAVENOUS at 08:39

## 2017-01-07 RX ADMIN — LOSARTAN POTASSIUM 100 MG: 50 TABLET ORAL at 15:17

## 2017-01-07 RX ADMIN — HYDRALAZINE HYDROCHLORIDE 10 MG: 20 INJECTION INTRAMUSCULAR; INTRAVENOUS at 00:51

## 2017-01-07 RX ADMIN — SODIUM CHLORIDE 75 ML/HR: 9 INJECTION, SOLUTION INTRAVENOUS at 08:42

## 2017-01-07 RX ADMIN — HYDRALAZINE HYDROCHLORIDE 10 MG: 20 INJECTION INTRAMUSCULAR; INTRAVENOUS at 06:52

## 2017-01-07 RX ADMIN — GLIPIZIDE 5 MG: 5 TABLET ORAL at 15:17

## 2017-01-07 RX ADMIN — AMLODIPINE BESYLATE 10 MG: 10 TABLET ORAL at 15:17

## 2017-01-07 RX ADMIN — SODIUM CHLORIDE 75 ML/HR: 9 INJECTION, SOLUTION INTRAVENOUS at 14:44

## 2017-01-07 NOTE — H&P
PRIMARY CARE PROVIDER: David Barajas MD    CHIEF COMPLAINT: Weakness.     HISTORY OF PRESENT ILLNESS: Briefly, the patient is a very pleasant 87-year-old gentleman. 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 He notes in the past 3-4 weeks he has been getting more short of air. He notes that after about 5 minutes of farming he has to sit down and rest.  Going up stairs, he becomes fatigued and has some shortness of air. He denies any chest pain. He notes he finally presented to the clinic today and had labs drawn and was found to have hemoglobin of 6.6. He was told to come to the ER for evaluation. The patient does note past medical history of diverticulosis with diverticular bleed 6-7 years ago. He is not on any NSAIDs. He denies use of aspirin consistently. He denies any black stools. He notes when he wipes his stools look normal. He denies any nausea, vomiting, or abdominal pain. He does note that his appetite has been down. His weight has been consistent, which is about 140 pounds, although his wife notes he may have lost some weight recently.     PAST MEDICAL HISTORY:  1.  Type 2 diabetes.   2.  Diverticulosis.   3.  Hypertension.     PAST SURGICAL HISTORY: Prostatectomy.     SOCIAL HISTORY: He is a nonsmoker, nondrinker. No illicit drugs. He has been  3 times. Currently a farmer.    FAMILY HISTORY: His father had lung cancer. Mother had heart issues. Brother had multiple myeloma. Another brother had coronary artery disease.     ALLERGIES: No known drug allergies.     OUTPATIENT MEDICATIONS: Reviewed and reconciled.    REVIEW OF SYSTEMS: Twelve systems reviewed and all negative, excluding those in HPI.     PHYSICAL EXAMINATION:  VITAL SIGNS: Temperature 98.2, pulse 97, blood pressure 139/72, respiratory rate 20, saturations 98% on room air.   GENERAL: Pleasant, jovial, oriented x4. Not toxic. Chronically high functioning appearing.  HEENT: Normocephalic, atraumatic. Pupils equally  round and reactive to light. Poor dentition. No thrush.  NECK: Supple. JVP appropriate. No carotid bruits. No masses.  No meningismus.  CARDIOVASCULAR: Normal rate and rhythm. No murmurs, rubs or gallops.   LUNGS: Clear to auscultation bilaterally. No wheezes, rales or rhonchi.   ABDOMEN: Soft, nontender, nondistended. No rebound or guarding. No hepatosplenomegaly. Bowel sounds present in all four quadrants with no peritoneal signs.   EXTREMITIES: No clubbing, cyanosis or edema.   MUSCULOSKELETAL: Strength 5 out of 5 in bilateral upper and lower extremities. No gross arthritic deformities.  NEUROLOGIC: Cranial nerves II-XII intact. DTRs 2+ in bilateral upper and lower extremities.   SKIN: No bruising or ecchymosis. Has multiple basal keratosis.  LYMPHATIC: No cervical or axillary adenopathy.   PSYCHIATRIC: Appropriate and cooperative.   SKIN: No bruising, ecchymosis or lacerations.     DIAGNOSTIC DATA: Sodium 141, potassium 5.3, chloride 105, CO2 of 22, BUN 46, creatinine 2.16. Hemoglobin 6.2, hematocrit 20.5, platelets 349,000. Glucose 148. Chest x-ray showed minimal linear infiltrate in lung base, likely atelectasis.     PROBLEM LIST:  1.  Acute anemia, unsure of GI loss.   2.  Hyperkalemia.   3.  Probable acute kidney injury.   4.  Chronic diabetes.   5.  Chronic hypertension.       ASSESSMENT: This is a gentleman who is 87 years old, who has had subacute fatigue with minimal weight loss, who presents with anemia. He does not appear toxic and has not had any profound amount of bleeding that he has noticed. This is not similar to his history of diverticular bleed.     PLAN:   1.  Dr. Jen Gonzalez has been notified by the ER. She will see patient in a.m.   2.  We will place the patient on Protonix drip.   3.  Will check iron studies to include a reticulocyte count due to history of bone marrow issues in his family.   4.  We will check stool occult.  5.  We will place patient on SCDs.   6.  We will place patient in  ICU due to advanced age and anemia.   7.  We will transfuse blood.  8.  Will check serial hemoglobins and hematocrits.  9.  Will reassess patient in a.m.         cc:          JOSELO Borjas/[id number]  D:  01/06/2017 19:46:04(Eastern Time)  T:  01/06/2017 22:04:23(Eastern Time)  Voice ID:  [id number]/Document ID:  [id number]

## 2017-01-07 NOTE — PLAN OF CARE
Problem: Nutrition, Imbalanced: Inadequate Oral Intake (Adult)  Goal: Improved Oral Intake  Outcome: Ongoing (interventions implemented as appropriate)  Goal: Prevent Further Weight Loss  Outcome: Ongoing (interventions implemented as appropriate)    Problem: Fall Risk (Adult)  Goal: Identify Related Risk Factors and Signs and Symptoms  Outcome: Ongoing (interventions implemented as appropriate)  Goal: Absence of Falls  Outcome: Ongoing (interventions implemented as appropriate)    Problem: Patient Care Overview (Adult)  Goal: Plan of Care Review  Outcome: Ongoing (interventions implemented as appropriate)    01/07/17 1552   Coping/Psychosocial Response Interventions   Plan Of Care Reviewed With patient;spouse   Patient Care Overview   Progress improving   Outcome Evaluation   Outcome Summary/Follow up Plan Pt. denies pain. Fluid therapy maintained. Continuing to monitor levels. Safety maintained.        Goal: Adult Individualization and Mutuality  Outcome: Ongoing (interventions implemented as appropriate)  Goal: Discharge Needs Assessment  Outcome: Ongoing (interventions implemented as appropriate)

## 2017-01-07 NOTE — PROGRESS NOTES
"    [facility] Medicine Services  INPATIENT PROGRESS NOTE    Length of Stay: 1  Date of Admission: 1/6/2017  Primary Care Physician: David Barajas MD    Subjective   Chief Complaint: \"feeling great\"     HPI   Awake and alert, states \"feeling 100% better\".  No complaints, wants to go home but understands need to stay.  No overt bleeding.  Condition stable.     Review of Systems   All pertinent negatives and positives are as above. All other systems have been reviewed and are negative unless otherwise stated.     Objective    Temp:  [97.7 °F (36.5 °C)-99.2 °F (37.3 °C)] 99.2 °F (37.3 °C)  Heart Rate:  [] 86  Resp:  [18-23] 18  BP: (128-180)/(58-91) 128/60    Physical Exam   Constitutional: He is oriented to person, place, and time. He appears well-developed and well-nourished. No distress.   HENT:   Head: Normocephalic and atraumatic.   Eyes: Conjunctivae and EOM are normal. Pupils are equal, round, and reactive to light. No scleral icterus.   Neck: Normal range of motion. Neck supple. No JVD present. No tracheal deviation present.   Cardiovascular: Normal rate, regular rhythm, normal heart sounds and intact distal pulses.  Exam reveals no gallop.    No murmur heard.  Pulmonary/Chest: Effort normal and breath sounds normal. No respiratory distress. He has no wheezes. He has no rales.   Abdominal: Soft. Bowel sounds are normal. He exhibits no distension. There is no tenderness. There is no guarding.   Musculoskeletal: Normal range of motion. He exhibits no edema.   Neurological: He is alert and oriented to person, place, and time.   No obvious deficits noted.   Skin: Skin is warm and dry. No rash noted. He is not diaphoretic. No erythema. No pallor.   Psychiatric: He has a normal mood and affect. His behavior is normal.   Vitals reviewed.      Results Review:  Recent Results (from the past 12 hour(s))   Protime-INR    Collection Time: 01/06/17  8:08 PM   Result Value Ref " Range    Protime 14.7 (H) 11.9 - 14.6 Seconds    INR 1.11 (H) 0.91 - 1.09   TSH    Collection Time: 01/06/17  8:08 PM   Result Value Ref Range    TSH 2.350 0.470 - 4.680 mIU/mL   T4, Free    Collection Time: 01/06/17  8:08 PM   Result Value Ref Range    Free T4 0.96 0.78 - 2.19 ng/dL   aPTT    Collection Time: 01/06/17  8:08 PM   Result Value Ref Range    PTT 28.1 24.1 - 34.8 seconds   Iron Profile    Collection Time: 01/06/17  8:08 PM   Result Value Ref Range    Iron <10 (L) 42 - 180 mcg/dL    TIBC 437 (H) 225 - 420 mcg/dL    Iron Saturation  20 - 45 %   Vitamin B12    Collection Time: 01/06/17  8:08 PM   Result Value Ref Range    Vitamin B-12 315 239 - 931 pg/mL   Folate    Collection Time: 01/06/17  8:08 PM   Result Value Ref Range    Folate >20.00 >2.75 ng/mL   Basic Metabolic Panel    Collection Time: 01/06/17  8:38 PM   Result Value Ref Range    Glucose 116 (H) 70 - 100 mg/dL    BUN 44 (H) 5 - 21 mg/dL    Creatinine 1.94 (H) 0.50 - 1.40 mg/dL    Sodium 141 135 - 145 mmol/L    Potassium 4.8 3.5 - 5.3 mmol/L    Chloride 106 98 - 110 mmol/L    CO2 22.0 (L) 24.0 - 31.0 mmol/L    Calcium 8.5 8.4 - 10.4 mg/dL    eGFR Non African Amer 33 (L) >60 mL/min/1.73    BUN/Creatinine Ratio 22.7 7.0 - 25.0    Anion Gap 13.0 4.0 - 13.0 mmol/L   POC Glucose Fingerstick    Collection Time: 01/06/17 11:32 PM   Result Value Ref Range    Glucose 98 70 - 130 mg/dL   CBC Auto Differential    Collection Time: 01/07/17  2:32 AM   Result Value Ref Range    WBC 7.02 4.80 - 10.80 10*3/mm3    RBC 3.33 (L) 4.80 - 5.90 10*6/mm3    Hemoglobin 8.0 (L) 14.0 - 18.0 g/dL    Hematocrit 25.1 (L) 40.0 - 52.0 %    MCV 75.4 (L) 82.0 - 95.0 fL    MCH 24.0 (L) 28.0 - 32.0 pg    MCHC 31.9 (L) 33.0 - 36.0 g/dL    RDW 19.6 (H) 12.0 - 15.0 %    RDW-SD 53.2 40.0 - 54.0 fl    MPV 10.1 6.0 - 12.0 fL    Platelets 285 130 - 400 10*3/mm3    Neutrophil % 63.9 39.0 - 78.0 %    Lymphocyte % 24.5 15.0 - 45.0 %    Monocyte % 9.4 4.0 - 12.0 %    Eosinophil % 1.7 0.0 -  4.0 %    Basophil % 0.4 0.0 - 2.0 %    Immature Grans % 0.1 0.0 - 5.0 %    Neutrophils, Absolute 4.48 1.87 - 8.40 10*3/mm3    Lymphocytes, Absolute 1.72 0.72 - 4.86 10*3/mm3    Monocytes, Absolute 0.66 0.19 - 1.30 10*3/mm3    Eosinophils, Absolute 0.12 0.00 - 0.70 10*3/mm3    Basophils, Absolute 0.03 0.00 - 0.20 10*3/mm3    Immature Grans, Absolute 0.01 0.00 - 0.03 10*3/mm3   Comprehensive Metabolic Panel    Collection Time: 01/07/17  2:32 AM   Result Value Ref Range    Glucose 92 70 - 100 mg/dL    BUN 39 (H) 5 - 21 mg/dL    Creatinine 1.79 (H) 0.50 - 1.40 mg/dL    Sodium 145 135 - 145 mmol/L    Potassium 4.3 3.5 - 5.3 mmol/L    Chloride 111 (H) 98 - 110 mmol/L    CO2 21.0 (L) 24.0 - 31.0 mmol/L    Calcium 8.5 8.4 - 10.4 mg/dL    Total Protein 7.1 6.3 - 8.7 g/dL    Albumin 3.80 3.50 - 5.00 g/dL    ALT (SGPT) 27 0 - 54 U/L    AST (SGOT) 23 7 - 45 U/L    Alkaline Phosphatase 56 24 - 120 U/L    Total Bilirubin 1.5 (H) 0.1 - 1.0 mg/dL    eGFR Non African Amer 36 (L) >60 mL/min/1.73    Globulin 3.3 gm/dL    A/G Ratio 1.2 1.1 - 2.5 g/dL    BUN/Creatinine Ratio 21.8 7.0 - 25.0    Anion Gap 13.0 4.0 - 13.0 mmol/L   POC Glucose Fingerstick    Collection Time: 01/07/17  4:34 AM   Result Value Ref Range    Glucose 101 70 - 130 mg/dL       Radiology Data:    Imaging Results (last 24 hours)     Procedure Component Value Units Date/Time    CT Abdomen Pelvis Without Contrast [[id number]] Collected:  01/06/17 1922     Updated:  01/06/17 1930    Narrative:       CT ABDOMEN AND PELVIS without contrast 1/6/2017 6:52 PM CST     HISTORY: Abnormal labs, abdominal pain     COMPARISON: None      DLP: 403 mGy cm     In order to have a CT radiation dose as low as reasonably achievable,  Automated Exposure Control was utilized for adjustment of the mA and/or  KV according to patient size.     TECHNIQUE: Noncontrast enhanced images of the abdomen and pelvis  obtained without oral contrast.      FINDINGS:   The lung bases and base of the heart  are unremarkable.      LIVER: No focal liver lesion.      BILIARY SYSTEM: The gallbladder is surgically absent.      PANCREAS: No focal pancreatic lesion.      SPLEEN: Calcification suggest old granulomatous change.      KIDNEYS AND ADRENALS: Bilateral kidneys and adrenal glands are  unremarkable.  The ureters are decompressed and normal in appearance.     RETROPERITONEUM: No mass, lymphadenopathy or hemorrhage.      GI TRACT: Diverticulosis without definite diverticulitis. Some of the  sigmoid colon herniates into the LEFT inguinal hernia. No evidence of  appendicitis. The appendix is likely absent.     OTHER: Some scattered mesenteric lymph nodes are noted, none of which  meet significance by size criteria. As previously mentioned there is an  inguinal hernia on the LEFT containing a loop of large bowel at its  proximal opening. No acute surrounding soft tissue abnormality on this  examination. Degenerative changes are noted in the spine.          PELVIS: No mass lesion, fluid collection or significant lymphadenopathy  is seen in the pelvis. The urinary bladder is normal in appearance.       Impression:       1. No definite acute intra-abdominal or pelvic process.  2. Small LEFT inguinal hernia with a loop of sigmoid colon extending  into the most proximal aspect of the hernia. No obstruction.  3. Scattered mesenteric lymph nodes are likely reactive. None of these  are significant by size criteria.  4. Degenerative changes and osteopenia in the spine and pelvis.        This report was finalized on 01/06/2017 19:28 by Dr. Jesus Ruiz MD.            Intake/Output Summary (Last 24 hours) at 01/07/17 0755  Last data filed at 01/07/17 0644   Gross per 24 hour   Intake 1035.68 ml   Output    975 ml   Net  60.68 ml       No Known Allergies    Scheduled meds:     insulin lispro 2-7 Units Subcutaneous 4x Daily AC & at Bedtime       PRN meds:  •  dextrose  •  dextrose  •  glucagon (human recombinant)  •  hydrALAZINE  •   pneumococcal polysaccharide 23-valent  •  sodium chloride    Assessment/Plan     Active Problems:    Anemia associated with nutritional deficiency    Assessment:   1. Acute anemia, unsure of GI loss.    2. Hyperkalemia.    3. Probable acute kidney injury.    4. Chronic diabetes.    5. Chronic hypertension.      Plan:  1. GI consulted  2. S/P 2 units PRBC   3. Stool occult blood ordered  4. Sliding scale insulin coverage  5. RN to obtain home med dosages  6. Resume Protonix IV q 24 hours  7. NS at 75ml/hr  8. Venofer 300mg IV daily x 3 days  9. Ok to move to floor     Discharge Plannin-3 days    Hailey Kan, APRN   17   7:55 AM    Patient seen and examined independently. Agree with history and physical. Patient denies any bowel movements. No abdominal pain. No leg swelling.

## 2017-01-07 NOTE — PLAN OF CARE
Problem: Nutrition, Imbalanced: Inadequate Oral Intake (Adult)  Goal: Identify Related Risk Factors and Signs and Symptoms  Outcome: Outcome(s) achieved Date Met:  01/07/17

## 2017-01-07 NOTE — CONSULTS
[facility]  Inpatient Consult Note  Today's date:  01/07/17    Maikel Mathews  8/17/1929           Referring Provider: Yifan Flores MD  Primary Physician: David Barajas MD   Primary Gastroenterologist: Joaquín Neff MD    Date of Admission: 1/6/2017  Date of Service:  01/07/17    Reason for Consultation/Chief Complaint: significant anemia    History of present illness:  this is a very pleasant 87-year-old gentleman who has been in his usual state of health until approximately 3 weeks ago.  That time, he started developing mild shortness of air that became progressive.  He also began feeling weak and fatigued.  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  It is for this reason he sought medical evaluation.  Blood work was done which found him to be anemic.  His hemoglobin was 6.6.    From a symptom point of view the patient denies seeing any melena or hematochezia.  There has been no hematemesis.  He denies any pyrosis, dysphagia, or odynophagia.  He denies any abdominal pain.  He denies any constipation or diarrhea.  There have been no changes in his bowel pattern.    His last endoscopy was done 03/27/2013.  This showed a Schatzki's ring which was dilated up to 54 [place].  His last colonoscopy was done 04/29/2010.  This showed a small descending colon AVM.  He also had internal hemorrhoids and mild diverticulosis.    Past Medical History   Diagnosis Date   • Cancer      PROSTATE   • Diabetes mellitus    • Hypertension        Past Surgical History   Procedure Laterality Date   • Cholecystectomy     • Prostatectomy          No Known Allergies    Prescriptions Prior to Admission   Medication Sig Dispense Refill Last Dose   • AMLODIPINE BESYLATE PO Take  by mouth.   1/5/2017 at Unknown time   • GLIPIZIDE PO Take 5 mg by mouth Daily.   1/5/2017 at Unknown time   • LOSARTAN POTASSIUM PO Take 100 mg by mouth Daily.    1/5/2017 at Unknown time   • METFORMIN HCL PO Take 500 mg by mouth 2 (Two) Times a Day.   1/5/2017 at Unknown time       Hospital Medications (active)       Dose Frequency Start End    dextrose (D50W) solution 25 g 25 g As Needed 1/6/2017     Sig - Route: Infuse 50 mL into a venous catheter As Needed for low blood sugar. - Intravenous    dextrose (GLUTOSE) oral gel 15 g 15 g As Needed 1/6/2017     Sig - Route: Take 15 g by mouth As Needed for low blood sugar. - Oral    glucagon (human recombinant) (GLUCAGEN DIAGNOSTIC) injection 1 mg 1 mg Once As Needed 1/6/2017     Sig - Route: Inject 1 mg under the skin 1 (One) Time As Needed (hypoglycemia). - Subcutaneous    hydrALAZINE (APRESOLINE) injection 10 mg 10 mg Every 4 Hours PRN 1/7/2017     Sig - Route: Infuse 0.5 mL into a venous catheter Every 4 (Four) Hours As Needed for high blood pressure. - Intravenous    insulin lispro (humaLOG) injection 2-7 Units 2-7 Units 4 Times Daily Before Meals & Nightly 1/6/2017     Sig - Route: Inject 2-7 Units under the skin 4 (Four) Times a Day Before Meals & at Bedtime. - Subcutaneous    iron sucrose (VENOFER) 300 mg in sodium chloride 0.9 % 100 mL IVPB 300 mg Every 24 Hours 1/7/2017 1/10/2017    Sig - Route: Infuse 300 mg into a venous catheter Daily. - Intravenous    pantoprazole (PROTONIX) infusion 40 mg/100 mL 0.9% NS IVPB 40 mg Once 1/6/2017 1/6/2017    Sig - Route: Infuse 100 mL into a venous catheter 1 (One) Time. - Intravenous    pantoprazole (PROTONIX) injection 40 mg 40 mg Every Early Morning 1/7/2017     Sig - Route: Infuse 10 mL into a venous catheter Every Morning. - Intravenous    pantoprazole (PROTONIX) injection 80 mg 80 mg Once 1/6/2017 1/6/2017    Sig - Route: Infuse 20 mL into a venous catheter 1 (One) Time. - Intravenous    pneumococcal polysaccharide 23-valent (PNEUMOVAX-23) vaccine 0.5 mL 0.5 mL During Hospitalization 1/6/2017     Sig - Route: Inject 0.5 mL into the shoulder, thigh, or buttocks During  Hospitalization for immunization. - Intramuscular    sodium chloride 0.9 % bolus 500 mL 500 mL Once 1/6/2017 1/6/2017    Sig - Route: Infuse 500 mL into a venous catheter 1 (One) Time. - Intravenous    sodium chloride 0.9 % flush 1-10 mL 1-10 mL As Needed 1/6/2017     Sig - Route: Infuse 1-10 mL into a venous catheter As Needed for line care. - Intravenous    sodium chloride 0.9 % infusion 75 mL/hr Continuous 1/7/2017     Sig - Route: Infuse 75 mL/hr into a venous catheter Continuous. - Intravenous    insulin detemir (LEVEMIR) injection 1 Units (Discontinued) 1 Units Nightly 1/6/2017 1/6/2017    Sig - Route: Inject 1 Units under the skin Every Night. - Subcutaneous    insulin lispro (humaLOG) injection 1 Units (Discontinued) 1 Units Once 1/6/2017 1/6/2017    Sig - Route: Inject 1 Units under the skin 1 (One) Time. - Subcutaneous          Social History   Substance Use Topics   • Smoking status: Never Smoker   • Smokeless tobacco: Not on file   • Alcohol use No        Past Family History:  History reviewed. No pertinent family history.    Review of Systems:  Review of Systems   Constitutional: Positive for fatigue (prior to admission). Negative for unexpected weight change.   HENT: Negative for hearing loss and voice change.    Eyes: Negative for visual disturbance.   Respiratory: Positive for shortness of breath (Prior to admission).    Cardiovascular: Negative for chest pain and palpitations.   Gastrointestinal:        See HPI   Endocrine: Negative for cold intolerance and heat intolerance.   Genitourinary: Negative for dysuria and hematuria.   Musculoskeletal: Negative for joint swelling.   Skin: Negative for rash.   Neurological: Negative for seizures.   Hematological: Does not bruise/bleed easily.   Psychiatric/Behavioral: The patient is not nervous/anxious.        Physical Exam:  Temp:  [97.7 °F (36.5 °C)-99.2 °F (37.3 °C)] 99.2 °F (37.3 °C)  Heart Rate:  [] 92  Resp:  [18-23] 18  BP: (128-180)/(58-91)  153/74  Body mass index is 19.85 kg/(m^2).    Intake/Output Summary (Last 24 hours) at 01/07/17 1241  Last data filed at 01/07/17 1200   Gross per 24 hour   Intake 1035.68 ml   Output   1525 ml   Net -489.32 ml     I/O this shift:  In: -   Out: 550 [Urine:550]  Physical Exam   Constitutional: He is oriented to person, place, and time. He appears well-developed.   He appears younger than his stated age.   HENT:   Head: Normocephalic.   Eyes: EOM are normal. No scleral icterus.   Conjunctiva are slightly pale   Neck: No thyromegaly present.   Cardiovascular: Normal rate and regular rhythm.    Pulmonary/Chest: Breath sounds normal. He exhibits no tenderness.   Abdominal: Soft. Bowel sounds are normal. He exhibits no distension and no mass. There is no tenderness. There is no rebound and no guarding.   Musculoskeletal: Normal range of motion.   Neurological: He is alert and oriented to person, place, and time.   Skin: No rash noted.   Psychiatric: He has a normal mood and affect. His behavior is normal.   Vitals reviewed.      Results Review:  Lab Results (last 24 hours)     Procedure Component Value Units Date/Time    POC Troponin, Rapid [[id number]]  (Normal) Collected:  01/06/17 1748    Specimen:  Blood Updated:  01/06/17 1800     Troponin I 0.01 ng/mL       Serial Number: [id number]    : [id number]       CBC & Differential [[id number]] Collected:  01/06/17 1737    Specimen:  Blood Updated:  01/06/17 1807    Narrative:       The following orders were created for panel order CBC & Differential.  Procedure                               Abnormality         Status                     ---------                               -----------         ------                     CBC Auto Differential[[id number]]         Abnormal            Final result                 Please view results for these tests on the individual orders.    CBC Auto Differential [[id number]]  (Abnormal) Collected:  01/06/17 1737    Specimen:  Blood Updated:   01/06/17 1807     WBC 9.11 10*3/mm3      RBC 2.88 (L) 10*6/mm3      Hemoglobin 6.2 (C) g/dL      Hematocrit 20.5 (C) %      MCV 71.2 (L) fL      MCH 21.5 (L) pg      MCHC 30.2 (L) g/dL      RDW 15.9 (H) %      RDW-SD 42.0 fl      MPV 10.1 fL      Platelets 349 10*3/mm3      Neutrophil % 76.7 %      Lymphocyte % 16.5 %      Monocyte % 5.8 %      Eosinophil % 0.4 %      Basophil % 0.4 %      Immature Grans % 0.2 %      Neutrophils, Absolute 6.98 10*3/mm3      Lymphocytes, Absolute 1.50 10*3/mm3      Monocytes, Absolute 0.53 10*3/mm3      Eosinophils, Absolute 0.04 10*3/mm3      Basophils, Absolute 0.04 10*3/mm3      Immature Grans, Absolute 0.02 10*3/mm3     Comprehensive Metabolic Panel [[id number]]  (Abnormal) Collected:  01/06/17 1737    Specimen:  Blood Updated:  01/06/17 1814     Glucose 181 (H) mg/dL      BUN 46 (H) mg/dL      Creatinine 2.16 (H) mg/dL      Sodium 141 mmol/L      Potassium 5.3 mmol/L      Chloride 105 mmol/L      CO2 22.0 (L) mmol/L      Calcium 8.7 mg/dL      Total Protein 7.9 g/dL      Albumin 4.30 g/dL      ALT (SGPT) 32 U/L      AST (SGOT) 27 U/L      Alkaline Phosphatase 61 U/L      Total Bilirubin 0.3 mg/dL      eGFR Non African Amer 29 (L) mL/min/1.73      Globulin 3.6 gm/dL      A/G Ratio 1.2 g/dL      BUN/Creatinine Ratio 21.3      Anion Gap 14.0 (H) mmol/L     Narrative:       The MDRD GFR formula is only valid for adults with stable renal function between ages 18 and 70.    Amylase [[id number]]  (Normal) Collected:  01/06/17 1737    Specimen:  Blood Updated:  01/06/17 1814     Amylase 68 U/L     Lipase [[id number]]  (Abnormal) Collected:  01/06/17 1737    Specimen:  Blood Updated:  01/06/17 1814     Lipase 251 (H) U/L     Peripheral Blood Smear [[id number]] Collected:  01/06/17 1737    Specimen:  Blood Updated:  01/06/17 1927    Reticulocytes [[id number]]  (Normal) Collected:  01/06/17 1737    Specimen:  Blood Updated:  01/06/17 1930     Reticulocyte % 0.89 %      Reticulocyte Absolute 0.0259  10*6/mm3     Haptoglobin [[id number]] Collected:  01/06/17 2008    Specimen:  Blood Updated:  01/06/17 2014    T4 [[id number]] Collected:  01/06/17 2008    Specimen:  Blood Updated:  01/06/17 2014    Protime-INR [[id number]]  (Abnormal) Collected:  01/06/17 2008    Specimen:  Blood Updated:  01/06/17 2028     Protime 14.7 (H) Seconds      INR 1.11 (H)     aPTT [[id number]]  (Normal) Collected:  01/06/17 2008    Specimen:  Blood Updated:  01/06/17 2028     PTT 28.1 seconds     Basic Metabolic Panel [[id number]]  (Abnormal) Collected:  01/06/17 2038    Specimen:  Blood Updated:  01/06/17 2047     Glucose 116 (H) mg/dL      BUN 44 (H) mg/dL      Creatinine 1.94 (H) mg/dL      Sodium 141 mmol/L      Potassium 4.8 mmol/L      Chloride 106 mmol/L      CO2 22.0 (L) mmol/L      Calcium 8.5 mg/dL      eGFR Non African Amer 33 (L) mL/min/1.73      BUN/Creatinine Ratio 22.7      Anion Gap 13.0 mmol/L     Narrative:       The MDRD GFR formula is only valid for adults with stable renal function between ages 18 and 70.    Iron Profile [[id number]]  (Abnormal) Collected:  01/06/17 2008    Specimen:  Blood Updated:  01/06/17 2053     Iron <10 (L) mcg/dL      TIBC 437 (H) mcg/dL      Iron Saturation -- %       Unable to calculate.       T4, Free [[id number]]  (Normal) Collected:  01/06/17 2008    Specimen:  Blood Updated:  01/06/17 2057     Free T4 0.96 ng/dL     TSH [[id number]]  (Normal) Collected:  01/06/17 2008    Specimen:  Blood Updated:  01/06/17 2111     TSH 2.350 mIU/mL     Vitamin B12 [[id number]]  (Normal) Collected:  01/06/17 2008    Specimen:  Blood Updated:  01/06/17 2146     Vitamin B-12 315 pg/mL     Folate [[id number]] Collected:  01/06/17 2008    Specimen:  Blood Updated:  01/06/17 2146     Folate >20.00 ng/mL     POC Glucose Fingerstick [[id number]]  (Normal) Collected:  01/06/17 2332    Specimen:  Blood Updated:  01/06/17 2346     Glucose 98 mg/dL       : [id number] Rob Andrews SMeter ID: [id number]       CBC & Differential  [[id number]] Collected:  01/07/17 0232    Specimen:  Blood Updated:  01/07/17 0259    Narrative:       The following orders were created for panel order CBC & Differential.  Procedure                               Abnormality         Status                     ---------                               -----------         ------                     CBC Auto Differential[[id number]]         Abnormal            Final result                 Please view results for these tests on the individual orders.    CBC Auto Differential [[id number]]  (Abnormal) Collected:  01/07/17 0232    Specimen:  Blood Updated:  01/07/17 0259     WBC 7.02 10*3/mm3      RBC 3.33 (L) 10*6/mm3      Hemoglobin 8.0 (L) g/dL      Hematocrit 25.1 (L) %      MCV 75.4 (L) fL      MCH 24.0 (L) pg      MCHC 31.9 (L) g/dL      RDW 19.6 (H) %      RDW-SD 53.2 fl      MPV 10.1 fL      Platelets 285 10*3/mm3      Neutrophil % 63.9 %      Lymphocyte % 24.5 %      Monocyte % 9.4 %      Eosinophil % 1.7 %      Basophil % 0.4 %      Immature Grans % 0.1 %      Neutrophils, Absolute 4.48 10*3/mm3      Lymphocytes, Absolute 1.72 10*3/mm3      Monocytes, Absolute 0.66 10*3/mm3      Eosinophils, Absolute 0.12 10*3/mm3      Basophils, Absolute 0.03 10*3/mm3      Immature Grans, Absolute 0.01 10*3/mm3     Comprehensive Metabolic Panel [[id number]]  (Abnormal) Collected:  01/07/17 0232    Specimen:  Blood Updated:  01/07/17 0300     Glucose 92 mg/dL      BUN 39 (H) mg/dL      Creatinine 1.79 (H) mg/dL      Sodium 145 mmol/L      Potassium 4.3 mmol/L      Chloride 111 (H) mmol/L      CO2 21.0 (L) mmol/L      Calcium 8.5 mg/dL      Total Protein 7.1 g/dL      Albumin 3.80 g/dL      ALT (SGPT) 27 U/L      AST (SGOT) 23 U/L      Alkaline Phosphatase 56 U/L      Total Bilirubin 1.5 (H) mg/dL      eGFR Non African Amer 36 (L) mL/min/1.73      Globulin 3.3 gm/dL      A/G Ratio 1.2 g/dL      BUN/Creatinine Ratio 21.8      Anion Gap 13.0 mmol/L     Narrative:       The MDRD GFR formula  is only valid for adults with stable renal function between ages 18 and 70.    POC Glucose Fingerstick [[id number]]  (Normal) Collected:  01/07/17 0434    Specimen:  Blood Updated:  01/07/17 0445     Glucose 101 mg/dL       : [id number] Rob Andrews SMeter ID: [id number]             Radiology Review:  Imaging Results (last 72 hours)     Procedure Component Value Units Date/Time    CT Abdomen Pelvis Without Contrast [[id number]] Collected:  01/06/17 1922     Updated:  01/06/17 1930    Narrative:       CT ABDOMEN AND PELVIS without contrast 1/6/2017 6:52 PM CST     HISTORY: Abnormal labs, abdominal pain     COMPARISON: None      DLP: 403 mGy cm     In order to have a CT radiation dose as low as reasonably achievable,  Automated Exposure Control was utilized for adjustment of the mA and/or  KV according to patient size.     TECHNIQUE: Noncontrast enhanced images of the abdomen and pelvis  obtained without oral contrast.      FINDINGS:   The lung bases and base of the heart are unremarkable.      LIVER: No focal liver lesion.      BILIARY SYSTEM: The gallbladder is surgically absent.      PANCREAS: No focal pancreatic lesion.      SPLEEN: Calcification suggest old granulomatous change.      KIDNEYS AND ADRENALS: Bilateral kidneys and adrenal glands are  unremarkable.  The ureters are decompressed and normal in appearance.     RETROPERITONEUM: No mass, lymphadenopathy or hemorrhage.      GI TRACT: Diverticulosis without definite diverticulitis. Some of the  sigmoid colon herniates into the LEFT inguinal hernia. No evidence of  appendicitis. The appendix is likely absent.     OTHER: Some scattered mesenteric lymph nodes are noted, none of which  meet significance by size criteria. As previously mentioned there is an  inguinal hernia on the LEFT containing a loop of large bowel at its  proximal opening. No acute surrounding soft tissue abnormality on this  examination. Degenerative changes are noted in the spine.           PELVIS: No mass lesion, fluid collection or significant lymphadenopathy  is seen in the pelvis. The urinary bladder is normal in appearance.       Impression:       1. No definite acute intra-abdominal or pelvic process.  2. Small LEFT inguinal hernia with a loop of sigmoid colon extending  into the most proximal aspect of the hernia. No obstruction.  3. Scattered mesenteric lymph nodes are likely reactive. None of these  are significant by size criteria.  4. Degenerative changes and osteopenia in the spine and pelvis.        This report was finalized on 01/06/2017 19:28 by Dr. Jesus Ruiz MD.          Impression/Plan:  Patient Active Problem List   Diagnosis Code   • Anemia associated with nutritional deficiency D53.9     Microcytic iron deficiency anemia  This has become a progressive problem over the last 3 weeks.  He has not had a significant acute GI blood loss, but more of a chronic GI blood loss.  He has a known AVM seen in the ascending colon.  I reviewed photos from his colonoscopy and confirmed this finding.  He denies seeing blood out of any orifice.  I took this opportunity to review colonoscopy once again with him.  After discussion of the risks, benefits, and alternatives he elects to do nothing.  Given his age I feel that this is reasonable.  He very well could have chronic ongoing GI blood loss from the known AVM.  Management is iron replacement therapy and monitoring of hemoglobin some intermittent basis.  Transfusions may be needed intermittently.  He understands that we might be missing an underlying colon cancer.  He makes it very clear to me, however, that he does not wish to have any type of surgery done even if colon cancer were to be found.  He has no symptoms referable to his upper GI tract.    I recommend to continue with a regular diet.  Protonix can be changed to oral.  He can be discharged with outpatient follow-up.  He sees Dr. Neff in our office.  Should he change his mind any  point about proceeding with diagnostic testing, arrangements can be made through our office.     Jen Gonzalez MD  [facility]  01/07/17  12:41 PM    EMR Dragon/Transcription disclaimer:  Much of this encounter note is an electronic transcription/translation of spoken language to printed text. The electronic translation of spoken language may permit erroneous, or at times, nonsensical words or phrases to be inadvertently transcribed; although I have reviewed the note for such errors, some may still exist.

## 2017-01-07 NOTE — PLAN OF CARE
Problem: Nutrition, Imbalanced: Inadequate Oral Intake (Adult)  Goal: Improved Oral Intake  Outcome: Ongoing (interventions implemented as appropriate)  Goal: Prevent Further Weight Loss  Outcome: Ongoing (interventions implemented as appropriate)    Problem: Fall Risk (Adult)  Goal: Identify Related Risk Factors and Signs and Symptoms  Outcome: Ongoing (interventions implemented as appropriate)  Goal: Absence of Falls  Outcome: Ongoing (interventions implemented as appropriate)    Problem: Patient Care Overview (Adult)  Goal: Plan of Care Review  Outcome: Ongoing (interventions implemented as appropriate)  Goal: Adult Individualization and Mutuality  Outcome: Ongoing (interventions implemented as appropriate)  Goal: Discharge Needs Assessment  Outcome: Ongoing (interventions implemented as appropriate)

## 2017-01-07 NOTE — PROGRESS NOTES
Discharge Planning Assessment   Flo     Patient Name: Maikel Mathews  MRN: [id number]  Today's Date: 1/7/2017    Admit Date: 1/6/2017          Discharge Needs Assessment       01/07/17 1147    Living Environment    Lives With spouse    Discharge Needs Assessment    Equipment Currently Used at Home glucometer    Transportation Available car;family or friend will provide    Discharge Planning Comments --   SW will follow pt's progress to assist in discharge disposition.            Discharge Plan     None        Discharge Placement     No information found                Demographic Summary     None            Functional Status     None            Psychosocial     None            Abuse/Neglect     None            Legal     None            Substance Abuse     None            Patient Forms     None          MARY GRACE Argueta

## 2017-01-07 NOTE — DISCHARGE SUMMARY
[facility] Medicine Services  DISCHARGE SUMMARY       Date of Admission: 1/6/2017  Date of Discharge:  1/7/2017  Primary Care Physician: David Barajas MD    Discharge Diagnoses:  Hospital Problem List     Anemia associated with nutritional deficiency          Procedures Performed: None    Pertinent Test Results:   Lab Results (last 72 hours)     Procedure Component Value Units Date/Time    POC Troponin, Rapid [[id number]]  (Normal) Collected:  01/06/17 1748    Specimen:  Blood Updated:  01/06/17 1800     Troponin I 0.01 ng/mL       Serial Number: [id number]    : [id number]       CBC Auto Differential [[id number]]  (Abnormal) Collected:  01/06/17 1737    Specimen:  Blood Updated:  01/06/17 1807     WBC 9.11 10*3/mm3      RBC 2.88 (L) 10*6/mm3      Hemoglobin 6.2 (C) g/dL      Hematocrit 20.5 (C) %      MCV 71.2 (L) fL      MCH 21.5 (L) pg      MCHC 30.2 (L) g/dL      RDW 15.9 (H) %      RDW-SD 42.0 fl      MPV 10.1 fL      Platelets 349 10*3/mm3      Neutrophil % 76.7 %      Lymphocyte % 16.5 %      Monocyte % 5.8 %      Eosinophil % 0.4 %      Basophil % 0.4 %      Immature Grans % 0.2 %      Neutrophils, Absolute 6.98 10*3/mm3      Lymphocytes, Absolute 1.50 10*3/mm3      Monocytes, Absolute 0.53 10*3/mm3      Eosinophils, Absolute 0.04 10*3/mm3      Basophils, Absolute 0.04 10*3/mm3      Immature Grans, Absolute 0.02 10*3/mm3     Comprehensive Metabolic Panel [[id number]]  (Abnormal) Collected:  01/06/17 1737    Specimen:  Blood Updated:  01/06/17 1814     Glucose 181 (H) mg/dL      BUN 46 (H) mg/dL      Creatinine 2.16 (H) mg/dL      Sodium 141 mmol/L      Potassium 5.3 mmol/L      Chloride 105 mmol/L      CO2 22.0 (L) mmol/L      Calcium 8.7 mg/dL      Total Protein 7.9 g/dL      Albumin 4.30 g/dL      ALT (SGPT) 32 U/L      AST (SGOT) 27 U/L      Alkaline Phosphatase 61 U/L      Total Bilirubin 0.3 mg/dL      eGFR Non African Amer 29 (L) mL/min/1.73      Globulin 3.6 gm/dL       A/G Ratio 1.2 g/dL      BUN/Creatinine Ratio 21.3      Anion Gap 14.0 (H) mmol/L     Narrative:       The MDRD GFR formula is only valid for adults with stable renal function between ages 18 and 70.    Amylase [[id number]]  (Normal) Collected:  01/06/17 1737    Specimen:  Blood Updated:  01/06/17 1814     Amylase 68 U/L     Lipase [[id number]]  (Abnormal) Collected:  01/06/17 1737    Specimen:  Blood Updated:  01/06/17 1814     Lipase 251 (H) U/L     Peripheral Blood Smear [[id number]] Collected:  01/06/17 1737    Specimen:  Blood Updated:  01/06/17 1927    Reticulocytes [[id number]]  (Normal) Collected:  01/06/17 1737    Specimen:  Blood Updated:  01/06/17 1930     Reticulocyte % 0.89 %      Reticulocyte Absolute 0.0259 10*6/mm3     Haptoglobin [[id number]] Collected:  01/06/17 2008    Specimen:  Blood Updated:  01/06/17 2014    T4 [[id number]] Collected:  01/06/17 2008    Specimen:  Blood Updated:  01/06/17 2014    Protime-INR [[id number]]  (Abnormal) Collected:  01/06/17 2008    Specimen:  Blood Updated:  01/06/17 2028     Protime 14.7 (H) Seconds      INR 1.11 (H)     aPTT [[id number]]  (Normal) Collected:  01/06/17 2008    Specimen:  Blood Updated:  01/06/17 2028     PTT 28.1 seconds     Basic Metabolic Panel [[id number]]  (Abnormal) Collected:  01/06/17 2038    Specimen:  Blood Updated:  01/06/17 2047     Glucose 116 (H) mg/dL      BUN 44 (H) mg/dL      Creatinine 1.94 (H) mg/dL      Sodium 141 mmol/L      Potassium 4.8 mmol/L      Chloride 106 mmol/L      CO2 22.0 (L) mmol/L      Calcium 8.5 mg/dL      eGFR Non African Amer 33 (L) mL/min/1.73      BUN/Creatinine Ratio 22.7      Anion Gap 13.0 mmol/L     Narrative:       The MDRD GFR formula is only valid for adults with stable renal function between ages 18 and 70.    Iron Profile [[id number]]  (Abnormal) Collected:  01/06/17 2008    Specimen:  Blood Updated:  01/06/17 2053     Iron <10 (L) mcg/dL      TIBC 437 (H) mcg/dL      Iron Saturation -- %       Unable to calculate.        T4, Free [[id number]]  (Normal) Collected:  01/06/17 2008    Specimen:  Blood Updated:  01/06/17 2057     Free T4 0.96 ng/dL     TSH [[id number]]  (Normal) Collected:  01/06/17 2008    Specimen:  Blood Updated:  01/06/17 2111     TSH 2.350 mIU/mL     Vitamin B12 [[id number]]  (Normal) Collected:  01/06/17 2008    Specimen:  Blood Updated:  01/06/17 2146     Vitamin B-12 315 pg/mL     Folate [[id number]] Collected:  01/06/17 2008    Specimen:  Blood Updated:  01/06/17 2146     Folate >20.00 ng/mL     POC Glucose Fingerstick [[id number]]  (Normal) Collected:  01/06/17 2332    Specimen:  Blood Updated:  01/06/17 2346     Glucose 98 mg/dL       : [id number] Rob Andrews SMeter ID: [id number]       CBC Auto Differential [[id number]]  (Abnormal) Collected:  01/07/17 0232    Specimen:  Blood Updated:  01/07/17 0259     WBC 7.02 10*3/mm3      RBC 3.33 (L) 10*6/mm3      Hemoglobin 8.0 (L) g/dL      Hematocrit 25.1 (L) %      MCV 75.4 (L) fL      MCH 24.0 (L) pg      MCHC 31.9 (L) g/dL      RDW 19.6 (H) %      RDW-SD 53.2 fl      MPV 10.1 fL      Platelets 285 10*3/mm3      Neutrophil % 63.9 %      Lymphocyte % 24.5 %      Monocyte % 9.4 %      Eosinophil % 1.7 %      Basophil % 0.4 %      Immature Grans % 0.1 %      Neutrophils, Absolute 4.48 10*3/mm3      Lymphocytes, Absolute 1.72 10*3/mm3      Monocytes, Absolute 0.66 10*3/mm3      Eosinophils, Absolute 0.12 10*3/mm3      Basophils, Absolute 0.03 10*3/mm3      Immature Grans, Absolute 0.01 10*3/mm3     Comprehensive Metabolic Panel [[id number]]  (Abnormal) Collected:  01/07/17 0232    Specimen:  Blood Updated:  01/07/17 0300     Glucose 92 mg/dL      BUN 39 (H) mg/dL      Creatinine 1.79 (H) mg/dL      Sodium 145 mmol/L      Potassium 4.3 mmol/L      Chloride 111 (H) mmol/L      CO2 21.0 (L) mmol/L      Calcium 8.5 mg/dL      Total Protein 7.1 g/dL      Albumin 3.80 g/dL      ALT (SGPT) 27 U/L      AST (SGOT) 23 U/L      Alkaline Phosphatase 56 U/L      Total Bilirubin 1.5  (H) mg/dL      eGFR Non African Amer 36 (L) mL/min/1.73      Globulin 3.3 gm/dL      A/G Ratio 1.2 g/dL      BUN/Creatinine Ratio 21.8      Anion Gap 13.0 mmol/L     Narrative:       The MDRD GFR formula is only valid for adults with stable renal function between ages 18 and 70.    POC Glucose Fingerstick [[id number]]  (Normal) Collected:  01/07/17 0434    Specimen:  Blood Updated:  01/07/17 0445     Glucose 101 mg/dL       : [id number] Rob Andrews SMeter ID: [id number]               Consults: Yaneth Gonzalez MD gastroenterology    Chief Complaint on Day of Discharge: None    Hospital Course  Patient is a 87 y.o. male presented with shortness of breathing and progressive weakness.  Patient states he noticed symptoms starting approximately 3 weeks ago.  He he is a farmer and continues to work daily and due to inability to continuing his normal fashion he felt he needed to be evaluated.  He did present yesterday to the emergency department for evaluation.  His hemoglobin was noted to be 6.6.  He did receive 2 units of packed red blood cells. He also has profound iron deficiency with a level of 10 and iron saturation not measurable. He states he feels like a new man since receiving the blood products. He also received 300 mg of Venofer IV.  The patient was seen and evaluated by Dr. Gonzalze.  She discussed need for colonoscopy as last was in 2010 at that time he had a small descending colon AVM, internal hemorrhoids and mild diverticulosis.  He states he does not want to have another procedure.  She informed him of the possibility of colon cancer causing slow GI bleed and he voices understanding of this, stating at his age he will not treat it did have it.  I did instruct him to follow-up with his primary care physician, Dr. David Barajas next week with repeat evaluation of his hemoglobin and hematocrit.  He also will need to start oral iron replacement, trial of 3 times a day and monitor for GI upset.  I do recommend 2  "more outpatient infusions with Venofer 300 mg.  Patient will be discharged home in stable condition per private vehicle.       Physical Exam on Discharge:  Visit Vitals   • /71 (BP Location: Left arm, Patient Position: Lying)   • Pulse 79   • Temp 97.6 °F (36.4 °C) (Oral)   • Resp 16   • Ht 69\" (175.3 cm)   • Wt 134 lb 6.4 oz (61 kg)   • SpO2 98%   • BMI 19.85 kg/m2     Physical Exam   Constitutional: He is oriented to person, place, and time. He appears well-developed and well-nourished. No distress.   HENT:   Head: Normocephalic and atraumatic.   Eyes: Conjunctivae and EOM are normal. Pupils are equal, round, and reactive to light. No scleral icterus.   Neck: Normal range of motion. Neck supple. No JVD present. No tracheal deviation present.   Cardiovascular: Normal rate, regular rhythm, normal heart sounds and intact distal pulses.  Exam reveals no gallop.    No murmur heard.  Pulmonary/Chest: Effort normal and breath sounds normal. No respiratory distress. He has no wheezes. He has no rales.   Abdominal: Soft. Bowel sounds are normal. He exhibits no distension. There is no tenderness. There is no guarding.   Musculoskeletal: Normal range of motion. He exhibits no edema.   Neurological: He is alert and oriented to person, place, and time.   No obvious deficits noted.   Skin: Skin is warm and dry. No rash noted. He is not diaphoretic. No erythema. No pallor.   Psychiatric: He has a normal mood and affect. His behavior is normal.   Vitals reviewed.      Discharge Disposition:  Home or Self Care    Discharge Medications:   Maikel Mathews   Home Medication Instructions [place]:[id number]    Printed on:01/07/17 8733   Medication Information                      AMLODIPINE BESYLATE PO  Take 10 mg by mouth Daily.             ferrous sulfate 325 (65 FE) MG EC tablet  Take 1 tablet by mouth 3 (Three) Times a Day With Meals.             GLIPIZIDE PO  Take 5 mg by mouth Daily.             LOSARTAN POTASSIUM PO  Take " 100 mg by mouth Daily.             METFORMIN HCL PO  Take 500 mg by mouth 2 (Two) Times a Day.             pneumococcal polysaccharide 23-valent (PNEUMOVAX-23) 25 MCG/0.5ML vaccine  Inject 0.5 mL into the shoulder, thigh, or buttocks 1 (One) Time for 1 dose.             vitamin C (ASCORBIC ACID) 500 MG tablet  Take 1 tablet by mouth Daily.                 Discharge Diet:   Diet Instructions     Diet: Consistent Carbohydrate, Cardiac; Thin Liquids, No Restrictions       Discharge Diet:   Consistent Carbohydrate  Cardiac      Fluid Consistency:  Thin Liquids, No Restrictions                 Discharge Care Plan / Instructions: will need H/H evaluation prior to f/u appointment with PCP    Activity at Discharge:   Activity Instructions     Activity as Tolerated                     Follow-up Appointments: Patient to call for appointment next    Test Results Pending at Discharge: none     Plan discussed with Yifan Flores MD.     Time spent in face-to-face evaluation, chart review, planning and education 45 minutes.    Hailey Kan, CHITO  01/07/17  4:48 PM

## 2017-01-08 LAB
HAPTOGLOB SERPL-MCNC: 181 MG/DL (ref 34–200)
T4 SERPL-MCNC: 5.7 UG/DL (ref 4.5–12)

## 2017-01-08 NOTE — PROGRESS NOTES
Malnutrition Severity Assessment    Patient Name:  Maikel Mathews  YOB: 1929  MRN: [id number]  Admit Date:  1/6/2017    Patient meets criteria for : Mild malnutrition    Comments:  If in agreement please cosign.    Pt reports recent weight loss due to decreased oral intake and weakness. Pt reports weight loss over the past 1-2 weeks, provided pt with Iron rich sources of foods and encourage pt to consume vitamin C to help with absorption of iron. Also encouraged pt to drink nutritional supplement such as Glucerna or Ensure daily 1-2 times.      Malnutrition Type: Acute Illness/Injury Malnutrition    Weight Status         Most Recent Value    BMI  -- [BMI 19.8]    %IBW  Mild (<90%)    Weight Loss  Mod (1-2% / 1 wk)      Energy Intake Status         Most Recent Value    Energy Intake  Mild (<75% / 5d)              Electronically signed by:  Zunilda Salcido  01/07/17 6:38 PM

## 2017-01-09 LAB
BACTERIA SPEC AEROBE CULT: ABNORMAL
CYTOLOGIST CVX/VAG CYTO: NORMAL
PATH INTERP BLD-IMP: NORMAL

## 2017-01-10 LAB
ABO + RH BLD: NORMAL
BH BB BLOOD EXPIRATION DATE: NORMAL
BH BB BLOOD TYPE BARCODE: 6200
BH BB DISPENSE STATUS: NORMAL
BH BB PRODUCT CODE: NORMAL
BH BB UNIT NUMBER: NORMAL
CROSSMATCH INTERPRETATION: NORMAL
UNIT  ABO: NORMAL
UNIT  RH: NORMAL

## 2017-01-12 ENCOUNTER — OFFICE VISIT (OUTPATIENT)
Dept: GASTROENTEROLOGY | Facility: CLINIC | Age: 82
End: 2017-01-12

## 2017-01-12 ENCOUNTER — APPOINTMENT (OUTPATIENT)
Dept: LAB | Facility: HOSPITAL | Age: 82
End: 2017-01-12

## 2017-01-12 ENCOUNTER — TELEPHONE (OUTPATIENT)
Dept: GASTROENTEROLOGY | Facility: CLINIC | Age: 82
End: 2017-01-12

## 2017-01-12 VITALS
WEIGHT: 141 LBS | HEART RATE: 88 BPM | TEMPERATURE: 97.1 F | HEIGHT: 70 IN | SYSTOLIC BLOOD PRESSURE: 146 MMHG | DIASTOLIC BLOOD PRESSURE: 70 MMHG | BODY MASS INDEX: 20.19 KG/M2

## 2017-01-12 DIAGNOSIS — D50.0 IRON DEFICIENCY ANEMIA DUE TO CHRONIC BLOOD LOSS: Primary | ICD-10-CM

## 2017-01-12 LAB
HCT VFR BLD AUTO: 26.7 % (ref 40–52)
HGB BLD-MCNC: 8.2 G/DL (ref 14–18)

## 2017-01-12 PROCEDURE — 99213 OFFICE O/P EST LOW 20 MIN: CPT | Performed by: NURSE PRACTITIONER

## 2017-01-12 PROCEDURE — 85014 HEMATOCRIT: CPT | Performed by: NURSE PRACTITIONER

## 2017-01-12 PROCEDURE — 36415 COLL VENOUS BLD VENIPUNCTURE: CPT | Performed by: NURSE PRACTITIONER

## 2017-01-12 PROCEDURE — 85018 HEMOGLOBIN: CPT | Performed by: NURSE PRACTITIONER

## 2017-01-12 RX ORDER — METFORMIN HYDROCHLORIDE 500 MG/1
TABLET, EXTENDED RELEASE ORAL
Refills: 5 | COMMUNITY
Start: 2016-11-30 | End: 2017-04-20

## 2017-01-12 NOTE — MR AVS SNAPSHOT
Maikel Mathews   2017 10:45 AM   Office Visit    Dept Phone:  [phone]   Encounter #:  [id number]    Provider:  CHITO Michaud   Department:  [facility]                Your Full Care Plan              Your Updated Medication List          This list is accurate as of: 17 11:09 AM.  Always use your most recent med list.                AMLODIPINE BESYLATE PO       ferrous sulfate 325 (65 FE) MG EC tablet   Take 1 tablet by mouth 3 (Three) Times a Day With Meals.       GLIPIZIDE PO       LOSARTAN POTASSIUM PO       * METFORMIN HCL PO       * metFORMIN  MG 24 hr tablet   Commonly known as:  GLUCOPHAGE-XR       vitamin C 500 MG tablet   Commonly known as:  ASCORBIC ACID   Take 1 tablet by mouth Daily.       * Notice:  This list has 2 medication(s) that are the same as other medications prescribed for you. Read the directions carefully, and ask your doctor or other care provider to review them with you.            We Performed the Following     Hemoglobin & Hematocrit, Blood       You Were Diagnosed With        Codes Comments    Iron deficiency anemia due to chronic blood loss    -  Primary ICD-10-CM: D50.0  ICD-9-CM: 280.0       Instructions     None    Patient Instructions History      Upcoming Appointments     Visit Type Date Time Department    OFFICE VISIT 2017 10:45 AM [facility]      MyChart Signup     [facility] Get[employer] allows you to send messages to your doctor, view your test results, renew your prescriptions, schedule appointments, and more. To sign up, go to [employer] and click on the Sign Up Now link in the New User? box. Enter your Get[employer] Activation Code exactly as it appears below along with the last four digits of your Social Security Number and your Date of Birth () to complete the sign-up process. If you do not sign up before the expiration date, you must request a new code.    Getable  "Activation Code: [id number]  Expires: 1/21/2017  6:30 PM    If you have questions, you can email [email or web address] or call [phone] to talk to our MyChart staff. Remember, [employer] is NOT to be used for urgent needs. For medical emergencies, dial 911.               Other Info from Your Visit           Allergies     No Known Allergies      Reason for Visit     GI Problem anemia      Vital Signs     Blood Pressure Pulse Temperature Height Weight Body Mass Index    146/70 (BP Location: Left arm, Patient Position: Sitting, Cuff Size: Adult) 88 97.1 °F (36.2 °C) 70\" (177.8 cm) 141 lb (64 kg) 20.23 kg/m2    Smoking Status                   Never Smoker           Problems and Diagnoses Noted     Iron deficiency anemia due to chronic blood loss    -  Primary        "

## 2017-01-12 NOTE — PROGRESS NOTES
[facility] GASTROENTEROLOGY - OFFICE NOTE    1/12/2017    Maikel Mathews   8/17/1929    Chief Complaint   Patient presents with   • GI Problem     anemia         HISTORY OF PRESENT ILLNESS    Maikel Mathews is a 87 y.o. male presents  with  fe def anemia.  Has had anemia x yrs. Hospitalized 1/6/17 with significant anemia. hgb was 6.2 on admission ,  Got 2 u prbc , hgb 8.0 prior to discharge. He was very weak.  No acitve gi bleeding was noted. No constipation or diarrhea. No brb pr or black stool. No n/v. No abdominal pain. No nsaids, has taken asa in past on prn basis but none recently.  . No fever. No chest pain. Had some sob. Was  Started on fe supplement at that time. Dr Gonzalez/millicent did see him. He did not want to pursue  Colonoscopy exam because would not have surgery if a colon cancer were to be found.  He has no ugi symptoms.  He does have known avm disease, and h/o colon polyps. Last cscope was 4/2010, noting diverticulosis, small avm, internal hemorrhoids. cscope was done then for anemia. Last egd 3/2013, noting incomplete schatzki's ring.  Has never seen a hematologist.     Past Medical History   Diagnosis Date   • AVM (arteriovenous malformation)    • Cancer      PROSTATE   • Colon polyp    • Diabetes mellitus    • Diverticulosis    • Epigastric pain    • GERD (gastroesophageal reflux disease)    • Hematochezia    • Hemorrhoids    • Hyperlipidemia    • Hypertension    • Prostate CA    • Prostate hypertrophy    • Rotator cuff syndrome        Past Surgical History   Procedure Laterality Date   • Cholecystectomy     • Prostatectomy     • Appendectomy     • Prostatectomy     • Colonoscopy  04/29/2010   • Endoscopy  03/27/2013       Outpatient Prescriptions Marked as Taking for the 1/12/17 encounter (Office Visit) with CHITO Michaud   Medication Sig Dispense Refill   • AMLODIPINE BESYLATE PO Take 10 mg by mouth Daily.     • ferrous sulfate 325 (65 FE) MG EC tablet Take 1 tablet by mouth 3 (Three) Times a  Day With Meals. 90 tablet 11   • GLIPIZIDE PO Take 5 mg by mouth Daily.     • LOSARTAN POTASSIUM PO Take 100 mg by mouth Daily.     • METFORMIN HCL PO Take 500 mg by mouth 2 (Two) Times a Day.     • vitamin C (ASCORBIC ACID) 500 MG tablet Take 1 tablet by mouth Daily.         No Known Allergies    Social History     Social History   • Marital status: Single     Spouse name: N/A   • Number of children: N/A   • Years of education: N/A     Occupational History   • Not on file.     Social History Main Topics   • Smoking status: Never Smoker   • Smokeless tobacco: Not on file   • Alcohol use No   • Drug use: No   • Sexual activity: Not on file     Other Topics Concern   • Not on file     Social History Narrative       History reviewed. No pertinent family history.    Review of Systems   Constitutional: Positive for fatigue (improving ). Negative for appetite change, chills, fever and unexpected weight change.   HENT: Negative for sore throat and trouble swallowing.    Respiratory: Negative for cough, chest tightness, shortness of breath and wheezing.    Cardiovascular: Negative for chest pain and palpitations.   Gastrointestinal: Negative for abdominal distention, abdominal pain, anal bleeding, blood in stool, constipation, diarrhea, nausea, rectal pain and vomiting.        As mentioned in hpi   Endocrine: Negative for cold intolerance and heat intolerance.   Genitourinary: Negative for difficulty urinating, dysuria and hematuria.   Musculoskeletal: Negative for arthralgias and back pain.   Skin: Negative for color change and rash.   Neurological: Positive for weakness (improving ). Negative for dizziness, tremors, seizures, syncope, light-headedness and headaches.   Hematological: Negative for adenopathy. Does not bruise/bleed easily.   Psychiatric/Behavioral: Negative for confusion. The patient is not nervous/anxious.        Vitals:    01/12/17 1038   BP: 146/70   BP Location: Left arm   Patient Position: Sitting  "  Cuff Size: Adult   Pulse: 88   Temp: 97.1 °F (36.2 °C)   Weight: 141 lb (64 kg)   Height: 70\" (177.8 cm)      Body mass index is 20.23 kg/(m^2).    Physical Exam    Results for orders placed or performed during the hospital encounter of 01/06/17   Comprehensive Metabolic Panel   Result Value Ref Range    Glucose 181 (H) 70 - 100 mg/dL    BUN 46 (H) 5 - 21 mg/dL    Creatinine 2.16 (H) 0.50 - 1.40 mg/dL    Sodium 141 135 - 145 mmol/L    Potassium 5.3 3.5 - 5.3 mmol/L    Chloride 105 98 - 110 mmol/L    CO2 22.0 (L) 24.0 - 31.0 mmol/L    Calcium 8.7 8.4 - 10.4 mg/dL    Total Protein 7.9 6.3 - 8.7 g/dL    Albumin 4.30 3.50 - 5.00 g/dL    ALT (SGPT) 32 0 - 54 U/L    AST (SGOT) 27 7 - 45 U/L    Alkaline Phosphatase 61 24 - 120 U/L    Total Bilirubin 0.3 0.1 - 1.0 mg/dL    eGFR Non African Amer 29 (L) >60 mL/min/1.73    Globulin 3.6 gm/dL    A/G Ratio 1.2 1.1 - 2.5 g/dL    BUN/Creatinine Ratio 21.3 7.0 - 25.0    Anion Gap 14.0 (H) 4.0 - 13.0 mmol/L   Amylase   Result Value Ref Range    Amylase 68 30 - 110 U/L   Lipase   Result Value Ref Range    Lipase 251 (H) 23 - 203 U/L   CBC Auto Differential   Result Value Ref Range    WBC 9.11 4.80 - 10.80 10*3/mm3    RBC 2.88 (L) 4.80 - 5.90 10*6/mm3    Hemoglobin 6.2 (C) 14.0 - 18.0 g/dL    Hematocrit 20.5 (C) 40.0 - 52.0 %    MCV 71.2 (L) 82.0 - 95.0 fL    MCH 21.5 (L) 28.0 - 32.0 pg    MCHC 30.2 (L) 33.0 - 36.0 g/dL    RDW 15.9 (H) 12.0 - 15.0 %    RDW-SD 42.0 40.0 - 54.0 fl    MPV 10.1 6.0 - 12.0 fL    Platelets 349 130 - 400 10*3/mm3    Neutrophil % 76.7 39.0 - 78.0 %    Lymphocyte % 16.5 15.0 - 45.0 %    Monocyte % 5.8 4.0 - 12.0 %    Eosinophil % 0.4 0.0 - 4.0 %    Basophil % 0.4 0.0 - 2.0 %    Immature Grans % 0.2 0.0 - 5.0 %    Neutrophils, Absolute 6.98 1.87 - 8.40 10*3/mm3    Lymphocytes, Absolute 1.50 0.72 - 4.86 10*3/mm3    Monocytes, Absolute 0.53 0.19 - 1.30 10*3/mm3    Eosinophils, Absolute 0.04 0.00 - 0.70 10*3/mm3    Basophils, Absolute 0.04 0.00 - 0.20 10*3/mm3 "    Immature Grans, Absolute 0.02 0.00 - 0.03 10*3/mm3   Protime-INR   Result Value Ref Range    Protime 14.7 (H) 11.9 - 14.6 Seconds    INR 1.11 (H) 0.91 - 1.09   TSH   Result Value Ref Range    TSH 2.350 0.470 - 4.680 mIU/mL   T4, Free   Result Value Ref Range    Free T4 0.96 0.78 - 2.19 ng/dL   T4   Result Value Ref Range    T4, Total 5.7 4.5 - 12.0 ug/dL   aPTT   Result Value Ref Range    PTT 28.1 24.1 - 34.8 seconds   Iron Profile   Result Value Ref Range    Iron <10 (L) 42 - 180 mcg/dL    TIBC 437 (H) 225 - 420 mcg/dL    Iron Saturation  20 - 45 %   Reticulocytes   Result Value Ref Range    Reticulocyte % 0.89 0.60 - 1.80 %    Reticulocyte Absolute 0.0259 0.0200 - 0.1300 10*6/mm3   Vitamin B12   Result Value Ref Range    Vitamin B-12 315 239 - 931 pg/mL   Folate   Result Value Ref Range    Folate >20.00 >2.75 ng/mL   Haptoglobin   Result Value Ref Range    Haptoglobin 181 34 - 200 mg/dL   Basic Metabolic Panel   Result Value Ref Range    Glucose 116 (H) 70 - 100 mg/dL    BUN 44 (H) 5 - 21 mg/dL    Creatinine 1.94 (H) 0.50 - 1.40 mg/dL    Sodium 141 135 - 145 mmol/L    Potassium 4.8 3.5 - 5.3 mmol/L    Chloride 106 98 - 110 mmol/L    CO2 22.0 (L) 24.0 - 31.0 mmol/L    Calcium 8.5 8.4 - 10.4 mg/dL    eGFR Non African Amer 33 (L) >60 mL/min/1.73    BUN/Creatinine Ratio 22.7 7.0 - 25.0    Anion Gap 13.0 4.0 - 13.0 mmol/L   CBC Auto Differential   Result Value Ref Range    WBC 7.02 4.80 - 10.80 10*3/mm3    RBC 3.33 (L) 4.80 - 5.90 10*6/mm3    Hemoglobin 8.0 (L) 14.0 - 18.0 g/dL    Hematocrit 25.1 (L) 40.0 - 52.0 %    MCV 75.4 (L) 82.0 - 95.0 fL    MCH 24.0 (L) 28.0 - 32.0 pg    MCHC 31.9 (L) 33.0 - 36.0 g/dL    RDW 19.6 (H) 12.0 - 15.0 %    RDW-SD 53.2 40.0 - 54.0 fl    MPV 10.1 6.0 - 12.0 fL    Platelets 285 130 - 400 10*3/mm3    Neutrophil % 63.9 39.0 - 78.0 %    Lymphocyte % 24.5 15.0 - 45.0 %    Monocyte % 9.4 4.0 - 12.0 %    Eosinophil % 1.7 0.0 - 4.0 %    Basophil % 0.4 0.0 - 2.0 %    Immature Grans % 0.1 0.0  - 5.0 %    Neutrophils, Absolute 4.48 1.87 - 8.40 10*3/mm3    Lymphocytes, Absolute 1.72 0.72 - 4.86 10*3/mm3    Monocytes, Absolute 0.66 0.19 - 1.30 10*3/mm3    Eosinophils, Absolute 0.12 0.00 - 0.70 10*3/mm3    Basophils, Absolute 0.03 0.00 - 0.20 10*3/mm3    Immature Grans, Absolute 0.01 0.00 - 0.03 10*3/mm3   Comprehensive Metabolic Panel   Result Value Ref Range    Glucose 92 70 - 100 mg/dL    BUN 39 (H) 5 - 21 mg/dL    Creatinine 1.79 (H) 0.50 - 1.40 mg/dL    Sodium 145 135 - 145 mmol/L    Potassium 4.3 3.5 - 5.3 mmol/L    Chloride 111 (H) 98 - 110 mmol/L    CO2 21.0 (L) 24.0 - 31.0 mmol/L    Calcium 8.5 8.4 - 10.4 mg/dL    Total Protein 7.1 6.3 - 8.7 g/dL    Albumin 3.80 3.50 - 5.00 g/dL    ALT (SGPT) 27 0 - 54 U/L    AST (SGOT) 23 7 - 45 U/L    Alkaline Phosphatase 56 24 - 120 U/L    Total Bilirubin 1.5 (H) 0.1 - 1.0 mg/dL    eGFR Non African Amer 36 (L) >60 mL/min/1.73    Globulin 3.3 gm/dL    A/G Ratio 1.2 1.1 - 2.5 g/dL    BUN/Creatinine Ratio 21.8 7.0 - 25.0    Anion Gap 13.0 4.0 - 13.0 mmol/L   POC Troponin, Rapid   Result Value Ref Range    Troponin I 0.01 0.00 - 0.07 ng/mL   POC Glucose Fingerstick   Result Value Ref Range    Glucose 98 70 - 130 mg/dL   POC Glucose Fingerstick   Result Value Ref Range    Glucose 101 70 - 130 mg/dL   POC Glucose Fingerstick   Result Value Ref Range    Glucose 171 (H) 70 - 130 mg/dL   Type & Screen   Result Value Ref Range    ABO Type A     RH type Positive     Antibody Screen Negative    Prepare RBC, 2 Units   Result Value Ref Range    Product Code N7015E69     Unit Number [id number]     UNIT  ABO A     UNIT  RH POS     CROSSMATCH 1 INTERPRETATION Compatible     Dispense Status RE     Blood Type APOS     Blood Expiration Date 201701192359     Blood Type Barcode 6200     Product Code Z6606H07     Unit Number [id number]     UNIT  ABO A     UNIT  RH POS     CROSSMATCH 1 INTERPRETATION Compatible     Dispense Status PT     Blood Type APOS     Blood Expiration  Date 201701192359     Blood Type Barcode 6200    Prepare RBC, 2 Units   Result Value Ref Range    Product Code D3945W48     Unit Number [id number]     UNIT  ABO A     UNIT  RH POS     CROSSMATCH 1 INTERPRETATION Compatible     Dispense Status RE     Blood Type APOS     Blood Expiration Date 201701192359     Blood Type Barcode 6200     Product Code [id number]     Unit Number [id number]     UNIT  ABO A     UNIT  RH POS     CROSSMATCH 1 INTERPRETATION Compatible     Dispense Status PT     Blood Type APOS     Blood Expiration Date 201701192359     Blood Type Barcode 6200    Peripheral Blood Smear   Result Value Ref Range    Performed by: Cesilia House M.D.     Pathologist Interpretation       Complete blood count and peripheral smear, review:  Severe microcytic anemia  Anisopoikilocytosis  Negative for schistocytes           ASSESSMENT AND PLAN    Maikel was seen today for gi problem.    Diagnoses and all orders for this visit:    Iron deficiency anemia due to chronic blood loss  -     Cancel: CBC & Differential  -     Hemoglobin & Hematocrit, Blood     no sign of active GI bleeding.  Once again we have discussed repeat colonoscopy.  We did discuss that the anemia very well could have been chronic ongoing GI blood loss from the known AVMs.  Also he understands that there could be an underlying colon cancer.  He has made the decision to do nothing at this point other than recheck his hemoglobin.  He states that it is age he would not have any type of surgery done even if a colon cancer were to be found.  He has no signs of upper GI blood loss.  He has no upper GI symptoms.  At this point would recommend he continue taking his iron supplementation daily, transfusions may be needed intermittently, we will recheck hemoglobin today and send results to his PCP.  After this he may continue to have his blood count monitored by his PCP.  He may follow up when necessary APRN.    Body mass index is 20.23 kg/(m^2).          CHITO Banda/transcription disclaimer:  Much of this encounter note is electronic transcription/translation of spoken language to printed text.  The electronic translation of spoken language may be erroneous, or at times, nonsensical words or phrases may be inadvertently transcribed.  Although I have reviewed the note for such errors, some may still exist.

## 2017-01-12 NOTE — TELEPHONE ENCOUNTER
----- Message from CHITO Michaud sent at 1/12/2017  3:35 PM CST -----  Let him know his hemoglobin is now 8.2, recommend he continue his iron supplementation, recommend he follow up with Dr. Barajas to have hemoglobin rechecked/monitored.

## 2017-03-30 ENCOUNTER — HOSPITAL ENCOUNTER (OUTPATIENT)
Facility: HOSPITAL | Age: 82
Setting detail: HOSPITAL OUTPATIENT SURGERY
End: 2017-03-30
Attending: SPECIALIST | Admitting: SPECIALIST

## 2017-03-31 ENCOUNTER — APPOINTMENT (OUTPATIENT)
Dept: PREADMISSION TESTING | Facility: HOSPITAL | Age: 82
End: 2017-03-31

## 2017-04-20 ENCOUNTER — OFFICE VISIT (OUTPATIENT)
Dept: GASTROENTEROLOGY | Facility: CLINIC | Age: 82
End: 2017-04-20

## 2017-04-20 VITALS
DIASTOLIC BLOOD PRESSURE: 60 MMHG | BODY MASS INDEX: 20.19 KG/M2 | TEMPERATURE: 96.6 F | HEART RATE: 68 BPM | SYSTOLIC BLOOD PRESSURE: 134 MMHG | OXYGEN SATURATION: 99 % | HEIGHT: 70 IN | WEIGHT: 141 LBS

## 2017-04-20 DIAGNOSIS — D50.8 OTHER IRON DEFICIENCY ANEMIA: Primary | ICD-10-CM

## 2017-04-20 PROCEDURE — 99213 OFFICE O/P EST LOW 20 MIN: CPT | Performed by: NURSE PRACTITIONER

## 2017-04-20 RX ORDER — AMLODIPINE BESYLATE 10 MG/1
10 TABLET ORAL DAILY
Refills: 5 | COMMUNITY
Start: 2017-03-20

## 2017-04-20 NOTE — PROGRESS NOTES
"[facility] GASTROENTEROLOGY - OFFICE NOTE    4/20/2017    Maikel Mathews   8/17/1929    Primary Physician: David Barajas MD    Chief Complaint   Patient presents with   • Anemia         HISTORY OF PRESENT ILLNESS    Maikel Mathews is a 87 y.o. male presents with iron deficiency anemia.  I saw him in the office 01/12/2017, prior to that Dr. Gonzalez had seen the patient in the hospital for the same reason.  He has not noted any signs of active GI bleeding such as bright red blood per rectum, black stool, or hematemesis.  His last office visit January 2017, iron supplement was on his list , but now he tells me that he was not on the iron supplement.  States they just started on an iron supplement March 2017.  His last hemoglobin 04/11/2017 was 8.2 , 01/12/2017 hemoglobin 8.2.  Prior to discharge 01/07/2017 hemoglobin was 8.0.  I did look up some old records in 2010 his hemoglobin was 11.7.  1999 his hemoglobin was 14.  States that he has been anemic all of his life.  He did receive a blood transfusion while hospitalized January 2017.  Not taking any anticoagulates.  Not taking any aspirin or NSAIDs.  No Nausea or vomiting.  No abdominal pain.  No fevers or chills.  No unintentional weight loss.  His appetite is good.  He saw Dr. Loomis recently and was scheduled for right inguinal hernia surgery 2 weeks ago, however the patient canceled because he was \"too busy working\".  He is a farmer.  He did have a stool checked for occult blood one week ago by his PCP and has not seen the results yet.  He does have known colonic AVM.      Ov  1/12/17 Maikel Mathews is a 87 y.o. male presents with fe def anemia. Has had anemia x yrs per patient history. Hospitalized 1/6/17 with significant anemia. hgb was 6.2 on admission , Got 2 u prbc , hgb 8.0 prior to discharge. He was very weak. No acitve gi bleeding was noted. No constipation or diarrhea. No brb pr or black stool. No n/v. No abdominal pain. No nsaids, has taken asa in " past on prn basis but none recently. . No fever. No chest pain. Had some sob. Was Started on fe supplement at that time. Dr Gonzalez/millicent did see him. He did not want to pursue Colonoscopy exam because would not have surgery if a colon cancer were to be found. He has no ugi symptoms. He does have known avm disease, and h/o colon polyps. Last cscope was 4/2010, noting diverticulosis, small avm, internal hemorrhoids. cscope was done then for anemia.  He was recommend repeat colonoscopy in 5 years.  Last egd 3/2013, noting incomplete schatzki's ring. Has never seen a hematologist.     Past Medical History:   Diagnosis Date   • AVM (arteriovenous malformation)    • Cancer     PROSTATE   • Colon polyp    • Diabetes mellitus    • Diverticulosis    • Epigastric pain    • GERD (gastroesophageal reflux disease)    • Hematochezia    • Hemorrhoids    • Hypertension    • Prostate CA    • Prostate hypertrophy    • Rotator cuff syndrome        Past Surgical History:   Procedure Laterality Date   • APPENDECTOMY     • CHOLECYSTECTOMY     • COLONOSCOPY  04/29/2010   • ENDOSCOPY  03/27/2013   • PROSTATECTOMY     • PROSTATECTOMY         Outpatient Prescriptions Marked as Taking for the 4/20/17 encounter (Office Visit) with CHITO Michaud   Medication Sig Dispense Refill   • amLODIPine (NORVASC) 10 MG tablet   5   • ferrous sulfate 325 (65 FE) MG EC tablet Take 1 tablet by mouth 3 (Three) Times a Day With Meals. 90 tablet 11   • GLIPIZIDE PO Take 5 mg by mouth Daily.     • LOSARTAN POTASSIUM PO Take 100 mg by mouth Daily.         No Known Allergies    Social History     Social History   • Marital status: Single     Spouse name: N/A   • Number of children: N/A   • Years of education: N/A     Occupational History   • Not on file.     Social History Main Topics   • Smoking status: Never Smoker   • Smokeless tobacco: Not on file   • Alcohol use Yes      Comment: daily   • Drug use: No   • Sexual activity: Not on file     Other Topics  "Concern   • Not on file     Social History Narrative       History reviewed. No pertinent family history.    Review of Systems   Constitutional: Negative for appetite change, chills, fatigue, fever and unexpected weight change.   HENT: Negative for sore throat and trouble swallowing.    Eyes: Negative for pain and visual disturbance.   Respiratory: Negative for cough, chest tightness, shortness of breath and wheezing.    Cardiovascular: Negative for chest pain and palpitations.   Gastrointestinal: Negative for abdominal distention, abdominal pain, anal bleeding, blood in stool, constipation, diarrhea, nausea, rectal pain and vomiting.        As mentioned in hpi   Endocrine: Negative for cold intolerance and heat intolerance.   Genitourinary: Negative for difficulty urinating, dysuria and hematuria.   Musculoskeletal: Negative for arthralgias and back pain.   Skin: Negative for color change and rash.   Neurological: Negative for dizziness, tremors, seizures, syncope, light-headedness and headaches.   Hematological: Negative for adenopathy. Does not bruise/bleed easily.   Psychiatric/Behavioral: Negative for confusion. The patient is not nervous/anxious.        Vitals:    04/20/17 1254   BP: 134/60   BP Location: Left arm   Patient Position: Sitting   Cuff Size: Adult   Pulse: 68   Temp: 96.6 °F (35.9 °C)   SpO2: 99%   Weight: 141 lb (64 kg)   Height: 70\" (177.8 cm)      Body mass index is 20.23 kg/(m^2).    Physical Exam   Constitutional: He is oriented to person, place, and time. He appears well-developed and well-nourished. No distress.   HENT:   Head: Normocephalic and atraumatic.   Mouth/Throat: Oropharynx is clear and moist.   Eyes: Pupils are equal, round, and reactive to light. No scleral icterus.   Neck: Normal range of motion. Neck supple. No JVD present. No tracheal deviation present.   Cardiovascular: Normal rate, regular rhythm, normal heart sounds and intact distal pulses.  Exam reveals no gallop and no " friction rub.    No murmur heard.  Pulmonary/Chest: Effort normal. No stridor. No respiratory distress. He has no wheezes. He has no rales.   Abdominal: Soft. Bowel sounds are normal. He exhibits no distension and no mass. There is no tenderness. There is no rebound and no guarding.   Musculoskeletal: Normal range of motion. He exhibits no edema or deformity.   Neurological: He is alert and oriented to person, place, and time.   Skin: Skin is warm and dry. No rash noted.   Psychiatric: He has a normal mood and affect. His behavior is normal.   Vitals reviewed.      Results for orders placed or performed in visit on 01/12/17   Hemoglobin & Hematocrit, Blood   Result Value Ref Range    Hemoglobin 8.2 (L) 14.0 - 18.0 g/dL    Hematocrit 26.7 (L) 40.0 - 52.0 %           ASSESSMENT AND PLAN    Maikel was seen today for anemia.    Diagnoses and all orders for this visit:    Other iron deficiency anemia       no sign of active GI bleeding. Once again we have discussed repeat colonoscopy. We did discuss that the anemia very well could have been chronic ongoing GI blood loss from the known AVMs. Also he understands that there could be an underlying colon cancer. He has made the decision to do nothing at this point other than recheck his hemoglobin.  He does not wish to pursue EGD nor colonoscopy.  He states that it is age he would not have any type of surgery done even if a colon cancer were to be found. He has no signs of upper GI blood loss. He has no upper GI symptoms. At this point would recommend he continue taking his iron supplementation daily, transfusions may be needed intermittently. ontinue to have his blood count monitored by his PCP. He may follow up when necessary APRN.  States that he also had stool checked for occult blood about a week ago, there can ago by Dr. Barajas's office to get the results.  He will call if he changes his mind about having any endoscopic procedures.      Body mass index is 20.23  kg/(m^2).           CHITO Banda Dragon/transcription disclaimer:  Much of this encounter note is electronic transcription/translation of spoken language to printed text.  The electronic translation of spoken language may be erroneous, or at times, nonsensical words or phrases may be inadvertently transcribed.  Although I have reviewed the note for such errors, some may still exist.    Results for orders placed or performed in visit on 01/12/17   Hemoglobin & Hematocrit, Blood   Result Value Ref Range    Hemoglobin 8.2 (L) 14.0 - 18.0 g/dL    Hematocrit 26.7 (L) 40.0 - 52.0 %

## 2017-05-11 ENCOUNTER — LAB (OUTPATIENT)
Dept: LAB | Facility: HOSPITAL | Age: 82
End: 2017-05-11
Attending: INTERNAL MEDICINE

## 2017-05-11 ENCOUNTER — TRANSCRIBE ORDERS (OUTPATIENT)
Dept: ADMINISTRATIVE | Facility: HOSPITAL | Age: 82
End: 2017-05-11

## 2017-05-11 DIAGNOSIS — E11.9 DIABETES MELLITUS WITHOUT COMPLICATION (HCC): ICD-10-CM

## 2017-05-11 DIAGNOSIS — I10 ESSENTIAL (PRIMARY) HYPERTENSION: ICD-10-CM

## 2017-05-11 DIAGNOSIS — D50.9 IRON DEFICIENCY ANEMIA, UNSPECIFIED: Primary | ICD-10-CM

## 2017-05-11 DIAGNOSIS — D53.9 ANEMIA ASSOCIATED WITH NUTRITIONAL DEFICIENCY: ICD-10-CM

## 2017-05-11 LAB
BASOPHILS # BLD AUTO: 0.02 10*3/MM3 (ref 0–0.2)
BASOPHILS NFR BLD AUTO: 0.3 % (ref 0–2)
DEPRECATED RDW RBC AUTO: 52.5 FL (ref 40–54)
EOSINOPHIL # BLD AUTO: 0.13 10*3/MM3 (ref 0–0.7)
EOSINOPHIL NFR BLD AUTO: 2.1 % (ref 0–4)
ERYTHROCYTE [DISTWIDTH] IN BLOOD BY AUTOMATED COUNT: 15.5 % (ref 12–15)
HCT VFR BLD AUTO: 22.6 % (ref 40–52)
HGB BLD-MCNC: 7.4 G/DL (ref 14–18)
IMM GRANULOCYTES # BLD: 0.01 10*3/MM3 (ref 0–0.03)
IMM GRANULOCYTES NFR BLD: 0.2 % (ref 0–5)
LYMPHOCYTES # BLD AUTO: 1.67 10*3/MM3 (ref 0.72–4.86)
LYMPHOCYTES NFR BLD AUTO: 26.6 % (ref 15–45)
MCH RBC QN AUTO: 30.1 PG (ref 28–32)
MCHC RBC AUTO-ENTMCNC: 32.7 G/DL (ref 33–36)
MCV RBC AUTO: 91.9 FL (ref 82–95)
MONOCYTES # BLD AUTO: 0.5 10*3/MM3 (ref 0.19–1.3)
MONOCYTES NFR BLD AUTO: 7.9 % (ref 4–12)
NEUTROPHILS # BLD AUTO: 3.96 10*3/MM3 (ref 1.87–8.4)
NEUTROPHILS NFR BLD AUTO: 62.9 % (ref 39–78)
PLATELET # BLD AUTO: 230 10*3/MM3 (ref 130–400)
PMV BLD AUTO: 8.9 FL (ref 6–12)
RBC # BLD AUTO: 2.46 10*6/MM3 (ref 4.8–5.9)
WBC NRBC COR # BLD: 6.29 10*3/MM3 (ref 4.8–10.8)

## 2017-05-11 PROCEDURE — 85025 COMPLETE CBC W/AUTO DIFF WBC: CPT | Performed by: INTERNAL MEDICINE

## 2017-05-11 PROCEDURE — 36415 COLL VENOUS BLD VENIPUNCTURE: CPT | Performed by: INTERNAL MEDICINE

## 2017-05-15 ENCOUNTER — HOSPITAL ENCOUNTER (OUTPATIENT)
Dept: CARDIOLOGY | Facility: HOSPITAL | Age: 82
Discharge: HOME | End: 2017-05-15
Admitting: INTERNAL MEDICINE

## 2017-05-15 VITALS
HEIGHT: 70 IN | BODY MASS INDEX: 20.47 KG/M2 | TEMPERATURE: 98.1 F | RESPIRATION RATE: 20 BRPM | DIASTOLIC BLOOD PRESSURE: 66 MMHG | OXYGEN SATURATION: 99 % | SYSTOLIC BLOOD PRESSURE: 151 MMHG | HEART RATE: 64 BPM | WEIGHT: 143 LBS

## 2017-05-15 LAB
ABO GROUP BLD: NORMAL
BLD GP AB SCN SERPL QL: NEGATIVE
DEPRECATED RDW RBC AUTO: 52.8 FL (ref 40–54)
ERYTHROCYTE [DISTWIDTH] IN BLOOD BY AUTOMATED COUNT: 15.7 % (ref 12–15)
HCT VFR BLD AUTO: 29.7 % (ref 40–52)
HGB BLD-MCNC: 9.7 G/DL (ref 14–18)
MCH RBC QN AUTO: 29.8 PG (ref 28–32)
MCHC RBC AUTO-ENTMCNC: 32.7 G/DL (ref 33–36)
MCV RBC AUTO: 91.4 FL (ref 82–95)
PLATELET # BLD AUTO: 211 10*3/MM3 (ref 130–400)
PMV BLD AUTO: 9.7 FL (ref 6–12)
RBC # BLD AUTO: 3.25 10*6/MM3 (ref 4.8–5.9)
RH BLD: POSITIVE
WBC NRBC COR # BLD: 6.31 10*3/MM3 (ref 4.8–10.8)

## 2017-05-15 PROCEDURE — 36415 COLL VENOUS BLD VENIPUNCTURE: CPT

## 2017-05-15 PROCEDURE — 36430 TRANSFUSION BLD/BLD COMPNT: CPT

## 2017-05-15 PROCEDURE — 86901 BLOOD TYPING SEROLOGIC RH(D): CPT | Performed by: INTERNAL MEDICINE

## 2017-05-15 PROCEDURE — 86900 BLOOD TYPING SEROLOGIC ABO: CPT

## 2017-05-15 PROCEDURE — P9016 RBC LEUKOCYTES REDUCED: HCPCS

## 2017-05-15 PROCEDURE — 85027 COMPLETE CBC AUTOMATED: CPT | Performed by: INTERNAL MEDICINE

## 2017-05-15 PROCEDURE — 86923 COMPATIBILITY TEST ELECTRIC: CPT

## 2017-05-15 PROCEDURE — 86900 BLOOD TYPING SEROLOGIC ABO: CPT | Performed by: INTERNAL MEDICINE

## 2017-05-15 PROCEDURE — 86850 RBC ANTIBODY SCREEN: CPT | Performed by: INTERNAL MEDICINE

## 2017-05-16 LAB
ABO + RH BLD: NORMAL
ABO + RH BLD: NORMAL
BH BB BLOOD EXPIRATION DATE: NORMAL
BH BB BLOOD EXPIRATION DATE: NORMAL
BH BB BLOOD TYPE BARCODE: 6200
BH BB BLOOD TYPE BARCODE: 6200
BH BB DISPENSE STATUS: NORMAL
BH BB DISPENSE STATUS: NORMAL
BH BB PRODUCT CODE: NORMAL
BH BB PRODUCT CODE: NORMAL
BH BB UNIT NUMBER: NORMAL
BH BB UNIT NUMBER: NORMAL
UNIT  ABO: NORMAL
UNIT  ABO: NORMAL
UNIT  RH: NORMAL
UNIT  RH: NORMAL

## 2017-07-15 ENCOUNTER — LAB (OUTPATIENT)
Dept: LAB | Facility: HOSPITAL | Age: 82
End: 2017-07-15
Attending: INTERNAL MEDICINE

## 2017-07-15 DIAGNOSIS — D64.9 ANEMIA, UNSPECIFIED TYPE: ICD-10-CM

## 2017-07-15 LAB
ABO GROUP BLD: NORMAL
BLD GP AB SCN SERPL QL: NEGATIVE
RH BLD: POSITIVE

## 2017-07-15 PROCEDURE — 86901 BLOOD TYPING SEROLOGIC RH(D): CPT | Performed by: INTERNAL MEDICINE

## 2017-07-15 PROCEDURE — 86920 COMPATIBILITY TEST SPIN: CPT

## 2017-07-15 PROCEDURE — 86850 RBC ANTIBODY SCREEN: CPT | Performed by: INTERNAL MEDICINE

## 2017-07-15 PROCEDURE — 86900 BLOOD TYPING SEROLOGIC ABO: CPT | Performed by: INTERNAL MEDICINE

## 2017-07-17 ENCOUNTER — INFUSION (OUTPATIENT)
Dept: ONCOLOGY | Facility: HOSPITAL | Age: 82
End: 2017-07-17

## 2017-07-17 VITALS
BODY MASS INDEX: 20.04 KG/M2 | SYSTOLIC BLOOD PRESSURE: 136 MMHG | RESPIRATION RATE: 18 BRPM | DIASTOLIC BLOOD PRESSURE: 59 MMHG | HEART RATE: 67 BPM | WEIGHT: 140 LBS | HEIGHT: 70 IN | OXYGEN SATURATION: 98 % | TEMPERATURE: 97.9 F

## 2017-07-17 DIAGNOSIS — D64.9 ANEMIA, UNSPECIFIED TYPE: Primary | ICD-10-CM

## 2017-07-17 LAB
BASOPHILS # BLD AUTO: 0.05 10*3/MM3 (ref 0–0.2)
BASOPHILS NFR BLD AUTO: 0.6 % (ref 0–2)
DEPRECATED RDW RBC AUTO: 52.7 FL (ref 40–54)
EOSINOPHIL # BLD AUTO: 0.18 10*3/MM3 (ref 0–0.7)
EOSINOPHIL NFR BLD AUTO: 2.2 % (ref 0–4)
ERYTHROCYTE [DISTWIDTH] IN BLOOD BY AUTOMATED COUNT: 15.5 % (ref 12–15)
HCT VFR BLD AUTO: 32.6 % (ref 40–52)
HGB BLD-MCNC: 10.5 G/DL (ref 14–18)
IMM GRANULOCYTES # BLD: 0.02 10*3/MM3 (ref 0–0.03)
IMM GRANULOCYTES NFR BLD: 0.2 % (ref 0–5)
LYMPHOCYTES # BLD AUTO: 1.99 10*3/MM3 (ref 0.72–4.86)
LYMPHOCYTES NFR BLD AUTO: 24.1 % (ref 15–45)
MCH RBC QN AUTO: 30 PG (ref 28–32)
MCHC RBC AUTO-ENTMCNC: 32.2 G/DL (ref 33–36)
MCV RBC AUTO: 93.1 FL (ref 82–95)
MONOCYTES # BLD AUTO: 0.63 10*3/MM3 (ref 0.19–1.3)
MONOCYTES NFR BLD AUTO: 7.6 % (ref 4–12)
NEUTROPHILS # BLD AUTO: 5.39 10*3/MM3 (ref 1.87–8.4)
NEUTROPHILS NFR BLD AUTO: 65.3 % (ref 39–78)
PLATELET # BLD AUTO: 269 10*3/MM3 (ref 130–400)
PMV BLD AUTO: 9.5 FL (ref 6–12)
RBC # BLD AUTO: 3.5 10*6/MM3 (ref 4.8–5.9)
WBC NRBC COR # BLD: 8.26 10*3/MM3 (ref 4.8–10.8)

## 2017-07-17 PROCEDURE — 36415 COLL VENOUS BLD VENIPUNCTURE: CPT

## 2017-07-17 PROCEDURE — 86900 BLOOD TYPING SEROLOGIC ABO: CPT

## 2017-07-17 PROCEDURE — P9016 RBC LEUKOCYTES REDUCED: HCPCS

## 2017-07-17 PROCEDURE — 36430 TRANSFUSION BLD/BLD COMPNT: CPT

## 2017-07-17 PROCEDURE — 85025 COMPLETE CBC W/AUTO DIFF WBC: CPT | Performed by: INTERNAL MEDICINE

## 2017-07-17 RX ORDER — SODIUM CHLORIDE 9 MG/ML
250 INJECTION, SOLUTION INTRAVENOUS AS NEEDED
Status: DISCONTINUED | OUTPATIENT
Start: 2017-07-17 | End: 2017-07-17 | Stop reason: HOSPADM

## 2017-07-18 LAB
ABO + RH BLD: NORMAL
ABO + RH BLD: NORMAL
BH BB BLOOD EXPIRATION DATE: NORMAL
BH BB BLOOD EXPIRATION DATE: NORMAL
BH BB BLOOD TYPE BARCODE: 6200
BH BB BLOOD TYPE BARCODE: 6200
BH BB DISPENSE STATUS: NORMAL
BH BB DISPENSE STATUS: NORMAL
BH BB PRODUCT CODE: NORMAL
BH BB PRODUCT CODE: NORMAL
BH BB UNIT NUMBER: NORMAL
BH BB UNIT NUMBER: NORMAL
CROSSMATCH INTERPRETATION: NORMAL
CROSSMATCH INTERPRETATION: NORMAL
UNIT  ABO: NORMAL
UNIT  ABO: NORMAL
UNIT  RH: NORMAL
UNIT  RH: NORMAL

## 2017-07-26 ENCOUNTER — LAB (OUTPATIENT)
Dept: ONCOLOGY | Facility: CLINIC | Age: 82
End: 2017-07-26

## 2017-07-26 ENCOUNTER — CONSULT (OUTPATIENT)
Dept: ONCOLOGY | Facility: CLINIC | Age: 82
End: 2017-07-26

## 2017-07-26 VITALS
OXYGEN SATURATION: 96 % | RESPIRATION RATE: 16 BRPM | BODY MASS INDEX: 19.94 KG/M2 | WEIGHT: 139.3 LBS | SYSTOLIC BLOOD PRESSURE: 128 MMHG | HEIGHT: 70 IN | HEART RATE: 76 BPM | TEMPERATURE: 97.1 F | DIASTOLIC BLOOD PRESSURE: 70 MMHG

## 2017-07-26 DIAGNOSIS — D50.0 IRON DEFICIENCY ANEMIA DUE TO CHRONIC BLOOD LOSS: ICD-10-CM

## 2017-07-26 DIAGNOSIS — D53.9 ANEMIA ASSOCIATED WITH NUTRITIONAL DEFICIENCY: Primary | ICD-10-CM

## 2017-07-26 DIAGNOSIS — D53.9 UNSPECIFIED DEFICIENCY ANEMIA: Primary | ICD-10-CM

## 2017-07-26 DIAGNOSIS — D50.0 IRON DEFICIENCY ANEMIA DUE TO CHRONIC BLOOD LOSS: Primary | ICD-10-CM

## 2017-07-26 LAB
ALBUMIN SERPL-MCNC: 3.8 G/DL (ref 3.5–5)
ALBUMIN/GLOB SERPL: 1.2 G/DL
ALP SERPL-CCNC: 57 U/L (ref 38–126)
ALT SERPL W P-5'-P-CCNC: 20 U/L (ref 21–72)
ANION GAP SERPL CALCULATED.3IONS-SCNC: 10 MMOL/L
AST SERPL-CCNC: 23 U/L (ref 5–40)
AUTO MIXED CELLS #: 0.4 10*3/MM3 (ref 0.1–1.5)
AUTO MIXED CELLS %: 6 % (ref 0.2–15.1)
BILIRUB SERPL-MCNC: 0.3 MG/DL (ref 0.2–1.3)
BUN BLD-MCNC: 47 MG/DL (ref 9–21)
BUN/CREAT SERPL: 23.5 (ref 7–25)
CALCIUM SPEC-SCNC: 8.5 MG/DL (ref 8.4–10.2)
CHLORIDE SERPL-SCNC: 117 MMOL/L (ref 98–107)
CO2 SERPL-SCNC: 15 MMOL/L (ref 22–30)
CREAT BLD-MCNC: 2 MG/DL (ref 0.8–1.5)
ERYTHROCYTE [DISTWIDTH] IN BLOOD BY AUTOMATED COUNT: 15.1 % (ref 11.5–14.5)
GFR SERPL CREATININE-BSD FRML MDRD: 32 ML/MIN/1.73
GLOBULIN UR ELPH-MCNC: 3.1 GM/DL
GLUCOSE BLD-MCNC: 218 MG/DL (ref 75–110)
HCT VFR BLD AUTO: 31 % (ref 42–52)
HGB BLD-MCNC: 9.7 G/DL (ref 14–18)
IRON SATN MFR SERPL: 17 % (ref 20–45)
IRON SERPL-MCNC: 57 MCG/DL (ref 42–180)
LYMPHOCYTES # BLD AUTO: 1.6 10*3/MM3 (ref 0.8–7)
LYMPHOCYTES NFR BLD AUTO: 25.3 % (ref 10–58.5)
MCH RBC QN AUTO: 30.7 PG (ref 27–31)
MCHC RBC AUTO-ENTMCNC: 31.3 G/DL (ref 33–37)
MCV RBC AUTO: 98 FL (ref 80–94)
NEUTROPHILS # BLD AUTO: 4.3 10*3/MM3 (ref 2–7.8)
NEUTROPHILS NFR BLD AUTO: 68.7 % (ref 37–92)
PLATELET # BLD AUTO: 274 10*3/MM3 (ref 130–400)
PMV BLD AUTO: 7.7 FL (ref 6–12)
POTASSIUM BLD-SCNC: 5.2 MMOL/L (ref 3.6–5)
PROT SERPL-MCNC: 6.9 G/DL (ref 6.3–8.2)
RBC # BLD AUTO: 3.16 10*6/MM3 (ref 4.7–6.1)
SODIUM BLD-SCNC: 142 MMOL/L (ref 137–145)
TIBC SERPL-MCNC: 341 MCG/DL (ref 225–420)
UIBC SERPL-MCNC: 284 MCG/DL
WBC NRBC COR # BLD: 6.3 10*3/MM3 (ref 4.8–10.8)

## 2017-07-26 PROCEDURE — 36415 COLL VENOUS BLD VENIPUNCTURE: CPT | Performed by: INTERNAL MEDICINE

## 2017-07-26 PROCEDURE — 80053 COMPREHEN METABOLIC PANEL: CPT | Performed by: INTERNAL MEDICINE

## 2017-07-26 PROCEDURE — 99205 OFFICE O/P NEW HI 60 MIN: CPT | Performed by: INTERNAL MEDICINE

## 2017-07-26 PROCEDURE — 85025 COMPLETE CBC W/AUTO DIFF WBC: CPT | Performed by: INTERNAL MEDICINE

## 2017-07-26 RX ORDER — GLIPIZIDE 5 MG/1
5 TABLET, FILM COATED, EXTENDED RELEASE ORAL DAILY
Refills: 2 | COMMUNITY
Start: 2017-06-28

## 2017-07-26 RX ORDER — LOSARTAN POTASSIUM 100 MG/1
100 TABLET ORAL DAILY
COMMUNITY
Start: 2017-07-12

## 2017-07-26 NOTE — PROGRESS NOTES
[facility]  HEMATOLOGY & ONCOLOGY        Subjective     VISIT DIAGNOSIS:   Encounter Diagnosis   Name Primary?   • Iron deficiency anemia due to chronic blood loss Yes       REASON FOR VISIT:     Chief Complaint   Patient presents with   • Anemia     I am losing blood        HEMATOLOGY / ONCOLOGY HISTORY:    No history exists.   A 70-year-old man history of AV and, Schatzki ring status post dilation in 2013, who presented to head in 01/07/2017 with weakness ongoing for 2 weeks I was found to have a hemoglobin of 6.  He denies any evidence of bright red blood per rectum denies black tarry stool, denies hematochezia urinary air, denies hemoptysis sees, denies hematochezia.  Further workup in the hospital showed that his anemia was due to iron deficiency.  Prior blood smear showed severe microcytic anemia negative for schistocytes no report of atypical cells.  INR was less than 10, TIBC 437, percent saturation could not be calculated, folate wall was greater than 20, B12 was 315.  He was transfused 2 units of blood with adequate elevation in hemoglobin.  Suspect sequentially lauro has been transfused with additional blood for a total of 6 units per patient report.    During that hospitalization he was seen by a gastroenterologist Dr. Jen Gonzalez who had recommended colonoscopy and EGD patient was not willing to get one done at that point.    [No treatment plan]  Cancer Staging Information:  No matching staging information was found for the patient.      INTERVAL HISTORY  Patient ID: Maikel Mathews is a 87 y.o. year old male   At today's visit patient says he feel better  in terms of energy overall. he has been taking ferrous sulfate 3 times a day.  He reports direct stool since starting ferrous sulfate.  Prior to that he denies dark stool he say his stool is normal brown color.  he continues to have loss of appetite, denies diarrhea or constipation.  His weight has been stable.  Denies nausea or  vomiting .  Denies abdominal pain.  He denies use of NSAIDs.      Review of Systems   Constitutional: Positive for appetite change. Negative for activity change, chills, fatigue, fever and unexpected weight change.   HENT: Negative.    Eyes: Negative.    Respiratory: Negative.    Cardiovascular: Negative.    Gastrointestinal: Negative.    Endocrine: Negative.    Genitourinary: Negative.    Musculoskeletal: Negative.    Skin: Positive for color change and pallor. Negative for rash and wound.   Neurological: Positive for weakness. Negative for dizziness, tremors, seizures, light-headedness, numbness and headaches.   Hematological: Negative for adenopathy. Does not bruise/bleed easily.   Psychiatric/Behavioral: Positive for agitation and sleep disturbance. The patient is nervous/anxious and is hyperactive.             Medications:    Current Outpatient Prescriptions   Medication Sig Dispense Refill   • amLODIPine (NORVASC) 10 MG tablet   5   • ferrous sulfate 325 (65 FE) MG EC tablet Take 1 tablet by mouth 3 (Three) Times a Day With Meals. 90 tablet 11   • glipiZIDE (GLUCOTROL) 5 MG ER tablet   2   • losartan (COZAAR) 100 MG tablet        No current facility-administered medications for this visit.        ALLERGIES:  No Known Allergies    Objective      Vitals:    07/26/17 0833   BP: 128/70   Pulse: 76   Resp: 16   Temp: 97.1 °F (36.2 °C)   SpO2: 96%       Current Status 7/26/2017   ECOG score 2       General Appearance: Patient is awake, alert, oriented and in no acute distress. Patient is welldeveloped, wellnourished, and appears stated age.  HEENT: Normocephalic. Sclerae clear, conjunctiva pink, extraocular movements intact, pupils, round, reactive to light and  accommodation. Mouth and throat are clear with moist oral mucosa.  NECK: Supple, no jugular venous distention, thyroid not enlarged.  LYMPH: No cervical, supraclavicular, axillary, or inguinal lymphadenopathy.  CHEST: Equal bilateral expansion, AP  diameter  normal, resonant percussion note  LUNGS: Good air movement, no rales, rhonchi, rubs or wheezes with auscultation  CARDIO: Regular sinus rhythm, systolic ejection murmur on the right 2/6.  gallops or rubs.  ABDOMEN: Nondistended, soft, No tenderness, no guarding, no rebound, No hepatosplenomegaly. No abdominal masses. Bowel sounds positive. No hernia  GENITALIA: Not examined.  BREASTS: Not examined.  MUSKEL: No joint swelling, decreased motion, or inflammation  EXTREMS: No edema, clubbing, cyanosis, No varicose veins.  NEURO: Grossly nonfocal, Gait is coordinated and smooth, Cognition is preserved.  SKIN: No rashes, no ecchymoses, no petechia.  PSYCH: Oriented to time, place and person. Memory is preserved. Mood and affect appear normal      RECENT LABS:  Orders Only on 07/26/2017   Component Date Value Ref Range Status   • Glucose 07/26/2017 218* 75 - 110 mg/dL Final   • BUN 07/26/2017 47* 9 - 21 mg/dL Final   • Creatinine 07/26/2017 2.00* 0.80 - 1.50 mg/dL Final   • Sodium 07/26/2017 142  137 - 145 mmol/L Final   • Potassium 07/26/2017 5.2* 3.6 - 5.0 mmol/L Final   • Chloride 07/26/2017 117* 98 - 107 mmol/L Final   • CO2 07/26/2017 15.0* 22.0 - 30.0 mmol/L Final   • Calcium 07/26/2017 8.5  8.4 - 10.2 mg/dL Final   • Total Protein 07/26/2017 6.9  6.3 - 8.2 g/dL Final   • Albumin 07/26/2017 3.80  3.50 - 5.00 g/dL Final   • ALT (SGPT) 07/26/2017 20* 21 - 72 U/L Final   • AST (SGOT) 07/26/2017 23  5 - 40 U/L Final   • Alkaline Phosphatase 07/26/2017 57  38 - 126 U/L Final   • Total Bilirubin 07/26/2017 0.3  0.2 - 1.3 mg/dL Final   • eGFR Non African Amer 07/26/2017 32* >60 mL/min/1.73 Final   • Globulin 07/26/2017 3.1  gm/dL Final   • A/G Ratio 07/26/2017 1.2  g/dL Final   • BUN/Creatinine Ratio 07/26/2017 23.5  7.0 - 25.0 Final   • Anion Gap 07/26/2017 10.0  mmol/L Final   • WBC 07/26/2017 6.30  4.80 - 10.80 10*3/mm3 Final   • RBC 07/26/2017 3.16* 4.70 - 6.10 10*6/mm3 Final   • Hemoglobin 07/26/2017 9.7* 14.0 - 18.0  g/dL Final   • Hematocrit 07/26/2017 31.0* 42.0 - 52.0 % Final   • MCV 07/26/2017 98.0* 80.0 - 94.0 fL Final   • MCH 07/26/2017 30.7  27.0 - 31.0 pg Final   • MCHC 07/26/2017 31.3* 33.0 - 37.0 g/dL Final   • RDW 07/26/2017 15.1* 11.5 - 14.5 % Final   • MPV 07/26/2017 7.7  6.0 - 12.0 fL Final   • Platelets 07/26/2017 274  130 - 400 10*3/mm3 Final   • Neutrophil % 07/26/2017 68.7  37.0 - 92.0 % Final   • Lymphocyte % 07/26/2017 25.3  10.0 - 58.5 % Final   • Auto Mixed Cells % 07/26/2017 6.0  0.2 - 15.1 % Final   • Neutrophils, Absolute 07/26/2017 4.30  2.00 - 7.80 10*3/mm3 Final   • Lymphocytes, Absolute 07/26/2017 1.60  0.80 - 7.00 10*3/mm3 Final   • Auto Mixed Cells # 07/26/2017 0.40  0.10 - 1.50 10*3/mm3 Final       RADIOLOGY:  No results found.         Assessment/Plan     A 70-year-old man history of AVM, Schatzki ring, presented with anemia early January 2017 with a hemoglobin of 6.6, workup showed  iron deficiency anemia he is status post 6 units of blood here for treatment recommendation.  Peripheral blood smear consistent with anemia secondary to iron deficiency.    I went into detail discussion with patient and wife on how we manage  anemia secondary to blood loss.  Given his history of AVM I will recommend colonoscopy and upper endoscopy.  Patient is willing to have these procedures done on.  In the meantime we will replace with IV Venofer.  I will check CBC & iron panel in 2 months and reevaluate for repeat iron infusion.     Time Spent: 60 minutes; greater than  50% of time was spent in patient counseling and care coordination.     Keara Mehta MD    7/26/2017    9:49 AM

## 2017-07-26 NOTE — ASSESSMENT & PLAN NOTE
A 70-year-old man history of AVM presented with anemia early January 2017 with a hemoglobin of 6.6, workup showed  iron deficiency anemia he is status post 6 units of blood here for treatment recommendation.  Peripheral blood smear consistent with anemia secondary to iron deficiency.    I went into detail discussion with patient and wife on how we manage  anemia secondary to blood loss.  Given his history of AVM I will recommend colonoscopy and upper endoscopy.  Patient is willing to have these procedures done on.  In the meantime we will replace with IV Venofer.  I will check CBC & iron panel in 2 months and reevaluate for repeat iron infusion.

## 2017-07-27 LAB — FERRITIN SERPL-MCNC: 14.7 NG/ML (ref 17.9–464)

## 2017-07-27 RX ORDER — SODIUM CHLORIDE 9 MG/ML
250 INJECTION, SOLUTION INTRAVENOUS ONCE
Status: CANCELLED | OUTPATIENT
Start: 2017-08-18

## 2017-07-27 RX ORDER — DIPHENHYDRAMINE HYDROCHLORIDE 50 MG/ML
50 INJECTION INTRAMUSCULAR; INTRAVENOUS AS NEEDED
Status: CANCELLED | OUTPATIENT
Start: 2017-08-15

## 2017-07-27 RX ORDER — DIPHENHYDRAMINE HYDROCHLORIDE 50 MG/ML
50 INJECTION INTRAMUSCULAR; INTRAVENOUS AS NEEDED
Status: CANCELLED | OUTPATIENT
Start: 2017-08-11

## 2017-07-27 RX ORDER — SODIUM CHLORIDE 9 MG/ML
250 INJECTION, SOLUTION INTRAVENOUS ONCE
Status: CANCELLED | OUTPATIENT
Start: 2017-08-15

## 2017-07-27 RX ORDER — FAMOTIDINE 10 MG/ML
20 INJECTION, SOLUTION INTRAVENOUS AS NEEDED
Status: CANCELLED | OUTPATIENT
Start: 2017-07-27

## 2017-07-27 RX ORDER — FAMOTIDINE 10 MG/ML
20 INJECTION, SOLUTION INTRAVENOUS AS NEEDED
Status: CANCELLED | OUTPATIENT
Start: 2017-08-08

## 2017-07-27 RX ORDER — DIPHENHYDRAMINE HYDROCHLORIDE 50 MG/ML
50 INJECTION INTRAMUSCULAR; INTRAVENOUS AS NEEDED
Status: CANCELLED | OUTPATIENT
Start: 2017-07-27

## 2017-07-27 RX ORDER — DIPHENHYDRAMINE HYDROCHLORIDE 50 MG/ML
50 INJECTION INTRAMUSCULAR; INTRAVENOUS AS NEEDED
Status: CANCELLED | OUTPATIENT
Start: 2017-08-04

## 2017-07-27 RX ORDER — FAMOTIDINE 10 MG/ML
20 INJECTION, SOLUTION INTRAVENOUS AS NEEDED
Status: CANCELLED | OUTPATIENT
Start: 2017-08-18

## 2017-07-27 RX ORDER — FAMOTIDINE 10 MG/ML
20 INJECTION, SOLUTION INTRAVENOUS AS NEEDED
Status: CANCELLED | OUTPATIENT
Start: 2017-08-04

## 2017-07-27 RX ORDER — FAMOTIDINE 10 MG/ML
20 INJECTION, SOLUTION INTRAVENOUS AS NEEDED
Status: CANCELLED | OUTPATIENT
Start: 2017-08-01

## 2017-07-27 RX ORDER — DIPHENHYDRAMINE HYDROCHLORIDE 50 MG/ML
50 INJECTION INTRAMUSCULAR; INTRAVENOUS AS NEEDED
Status: CANCELLED | OUTPATIENT
Start: 2017-08-01

## 2017-07-27 RX ORDER — SODIUM CHLORIDE 9 MG/ML
250 INJECTION, SOLUTION INTRAVENOUS ONCE
Status: CANCELLED | OUTPATIENT
Start: 2017-07-27

## 2017-07-27 RX ORDER — DIPHENHYDRAMINE HYDROCHLORIDE 50 MG/ML
50 INJECTION INTRAMUSCULAR; INTRAVENOUS AS NEEDED
Status: CANCELLED | OUTPATIENT
Start: 2017-08-18

## 2017-07-27 RX ORDER — SODIUM CHLORIDE 9 MG/ML
250 INJECTION, SOLUTION INTRAVENOUS ONCE
Status: CANCELLED | OUTPATIENT
Start: 2017-08-08

## 2017-07-27 RX ORDER — SODIUM CHLORIDE 9 MG/ML
250 INJECTION, SOLUTION INTRAVENOUS ONCE
Status: CANCELLED | OUTPATIENT
Start: 2017-08-04

## 2017-07-27 RX ORDER — DIPHENHYDRAMINE HYDROCHLORIDE 50 MG/ML
50 INJECTION INTRAMUSCULAR; INTRAVENOUS AS NEEDED
Status: CANCELLED | OUTPATIENT
Start: 2017-08-08

## 2017-07-27 RX ORDER — SODIUM CHLORIDE 9 MG/ML
250 INJECTION, SOLUTION INTRAVENOUS ONCE
Status: CANCELLED | OUTPATIENT
Start: 2017-08-01

## 2017-07-27 RX ORDER — FAMOTIDINE 10 MG/ML
20 INJECTION, SOLUTION INTRAVENOUS AS NEEDED
Status: CANCELLED | OUTPATIENT
Start: 2017-08-15

## 2017-07-27 RX ORDER — FAMOTIDINE 10 MG/ML
20 INJECTION, SOLUTION INTRAVENOUS AS NEEDED
Status: CANCELLED | OUTPATIENT
Start: 2017-08-11

## 2017-07-27 RX ORDER — SODIUM CHLORIDE 9 MG/ML
250 INJECTION, SOLUTION INTRAVENOUS ONCE
Status: CANCELLED | OUTPATIENT
Start: 2017-08-11

## 2017-07-28 ENCOUNTER — INFUSION (OUTPATIENT)
Dept: ONCOLOGY | Facility: HOSPITAL | Age: 82
End: 2017-07-28

## 2017-07-28 VITALS
DIASTOLIC BLOOD PRESSURE: 51 MMHG | HEART RATE: 60 BPM | SYSTOLIC BLOOD PRESSURE: 135 MMHG | OXYGEN SATURATION: 98 % | WEIGHT: 140 LBS | TEMPERATURE: 97.1 F | RESPIRATION RATE: 18 BRPM | HEIGHT: 70 IN | BODY MASS INDEX: 20.04 KG/M2

## 2017-07-28 DIAGNOSIS — D50.0 IRON DEFICIENCY ANEMIA DUE TO CHRONIC BLOOD LOSS: Primary | ICD-10-CM

## 2017-07-28 PROCEDURE — 25010000002 IRON SUCROSE PER 1 MG: Performed by: INTERNAL MEDICINE

## 2017-07-28 PROCEDURE — 96365 THER/PROPH/DIAG IV INF INIT: CPT

## 2017-07-28 RX ORDER — SODIUM CHLORIDE 9 MG/ML
250 INJECTION, SOLUTION INTRAVENOUS ONCE
Status: COMPLETED | OUTPATIENT
Start: 2017-07-28 | End: 2017-07-28

## 2017-07-28 RX ADMIN — SODIUM CHLORIDE 250 ML: 9 INJECTION, SOLUTION INTRAVENOUS at 09:38

## 2017-07-28 RX ADMIN — IRON SUCROSE 200 MG: 20 INJECTION, SOLUTION INTRAVENOUS at 09:38

## 2017-08-01 ENCOUNTER — INFUSION (OUTPATIENT)
Dept: ONCOLOGY | Facility: HOSPITAL | Age: 82
End: 2017-08-01

## 2017-08-01 VITALS
SYSTOLIC BLOOD PRESSURE: 130 MMHG | TEMPERATURE: 97.8 F | DIASTOLIC BLOOD PRESSURE: 52 MMHG | OXYGEN SATURATION: 99 % | RESPIRATION RATE: 18 BRPM | HEART RATE: 61 BPM

## 2017-08-01 DIAGNOSIS — D50.0 IRON DEFICIENCY ANEMIA DUE TO CHRONIC BLOOD LOSS: Primary | ICD-10-CM

## 2017-08-01 PROCEDURE — 25010000002 IRON SUCROSE PER 1 MG: Performed by: INTERNAL MEDICINE

## 2017-08-01 PROCEDURE — 96365 THER/PROPH/DIAG IV INF INIT: CPT

## 2017-08-01 RX ORDER — FAMOTIDINE 10 MG/ML
20 INJECTION, SOLUTION INTRAVENOUS AS NEEDED
Status: DISCONTINUED | OUTPATIENT
Start: 2017-08-01 | End: 2017-08-01 | Stop reason: HOSPADM

## 2017-08-01 RX ORDER — SODIUM CHLORIDE 9 MG/ML
250 INJECTION, SOLUTION INTRAVENOUS ONCE
Status: COMPLETED | OUTPATIENT
Start: 2017-08-01 | End: 2017-08-01

## 2017-08-01 RX ORDER — DIPHENHYDRAMINE HYDROCHLORIDE 50 MG/ML
50 INJECTION INTRAMUSCULAR; INTRAVENOUS AS NEEDED
Status: DISCONTINUED | OUTPATIENT
Start: 2017-08-01 | End: 2017-08-01 | Stop reason: HOSPADM

## 2017-08-01 RX ADMIN — IRON SUCROSE 200 MG: 20 INJECTION, SOLUTION INTRAVENOUS at 10:17

## 2017-08-01 RX ADMIN — SODIUM CHLORIDE 250 ML: 9 INJECTION, SOLUTION INTRAVENOUS at 10:15

## 2017-08-04 ENCOUNTER — OFFICE VISIT (OUTPATIENT)
Dept: GASTROENTEROLOGY | Facility: CLINIC | Age: 82
End: 2017-08-04

## 2017-08-04 ENCOUNTER — INFUSION (OUTPATIENT)
Dept: ONCOLOGY | Facility: HOSPITAL | Age: 82
End: 2017-08-04

## 2017-08-04 VITALS
DIASTOLIC BLOOD PRESSURE: 60 MMHG | SYSTOLIC BLOOD PRESSURE: 131 MMHG | BODY MASS INDEX: 22.29 KG/M2 | WEIGHT: 142 LBS | OXYGEN SATURATION: 99 % | HEART RATE: 58 BPM | RESPIRATION RATE: 18 BRPM | HEIGHT: 67 IN | TEMPERATURE: 98 F

## 2017-08-04 VITALS
HEART RATE: 66 BPM | WEIGHT: 142 LBS | HEIGHT: 67 IN | SYSTOLIC BLOOD PRESSURE: 122 MMHG | TEMPERATURE: 96.4 F | BODY MASS INDEX: 22.29 KG/M2 | DIASTOLIC BLOOD PRESSURE: 56 MMHG | OXYGEN SATURATION: 99 %

## 2017-08-04 DIAGNOSIS — D50.0 IRON DEFICIENCY ANEMIA DUE TO CHRONIC BLOOD LOSS: Primary | ICD-10-CM

## 2017-08-04 DIAGNOSIS — Z86.010 HX OF COLONIC POLYP: ICD-10-CM

## 2017-08-04 DIAGNOSIS — Q27.30 AVM (ARTERIOVENOUS MALFORMATION): ICD-10-CM

## 2017-08-04 PROCEDURE — 25010000002 IRON SUCROSE PER 1 MG: Performed by: INTERNAL MEDICINE

## 2017-08-04 PROCEDURE — 96365 THER/PROPH/DIAG IV INF INIT: CPT

## 2017-08-04 PROCEDURE — 99213 OFFICE O/P EST LOW 20 MIN: CPT | Performed by: NURSE PRACTITIONER

## 2017-08-04 RX ORDER — FAMOTIDINE 10 MG/ML
20 INJECTION, SOLUTION INTRAVENOUS AS NEEDED
Status: DISCONTINUED | OUTPATIENT
Start: 2017-08-04 | End: 2017-08-08 | Stop reason: HOSPADM

## 2017-08-04 RX ORDER — SODIUM CHLORIDE 9 MG/ML
250 INJECTION, SOLUTION INTRAVENOUS ONCE
Status: COMPLETED | OUTPATIENT
Start: 2017-08-04 | End: 2017-08-04

## 2017-08-04 RX ORDER — DIPHENHYDRAMINE HYDROCHLORIDE 50 MG/ML
50 INJECTION INTRAMUSCULAR; INTRAVENOUS AS NEEDED
Status: DISCONTINUED | OUTPATIENT
Start: 2017-08-04 | End: 2017-08-08 | Stop reason: HOSPADM

## 2017-08-04 RX ADMIN — SODIUM CHLORIDE 250 ML: 9 INJECTION, SOLUTION INTRAVENOUS at 10:54

## 2017-08-04 RX ADMIN — IRON SUCROSE 200 MG: 20 INJECTION, SOLUTION INTRAVENOUS at 11:03

## 2017-08-04 NOTE — PROGRESS NOTES
"[facility] GASTROENTEROLOGY - OFFICE NOTE    8/4/2017    Maikel Mathews   8/17/1929    Primary Physician: David Barajas MD    Chief Complaint   Patient presents with   • Anemia         HISTORY OF PRESENT ILLNESS    Maikel Mathews is a 87 y.o. male presents  with Iron deficiency anemia.  He has been anemic for several years per the patient history.  I have seen him on 2 occasions in the office this year see below.  He has been seeing a new hematologist here at [facility] \"Dr. KATHLEEN\".  He recently saw \"Dr. KATHLEEN\" July 2017.  He is being treated with when necessary transfusions and IV iron.  States that he had a stool checked for blood in the last couple of months but never got her results.  He denies bright red blood per rectum.  States his stools are dark but is on iron.  He denies any hematemesis.  Denies nausea or vomiting.  Denies abdominal pain.  Denies unintentional weight loss.  No reflux symptoms.  No history of peptic ulcer disease.  He denies taking aspirin or NSAIDs.  No fevers or chills. Appetite is good.  He was to have inguinal hernia surgery in the last several months but declined surgery.  He still works out in his garden, states busy.  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  Last EGD was 2013.  Last colonoscopy was 2010.  He does have history of colon polyps.  There is no family history of colon cancer or colon polyps.  He has declined GI workup previously however is in agreement at this time.  Last CBC that can find was from 07/26/2017, hemoglobin was 9.7 with MCV 98.  Prior blood smear showed severe microcytic anemia negative for schistocytes.       4/20/17  Maikel Mathews is a 87 y.o. male presents with iron deficiency anemia.  I saw him in the office 01/12/2017, prior to that Dr. Gonzalez had seen the patient in the hospital for the same reason.  He has not noted any signs of active GI bleeding such as bright red blood per rectum, black stool, or hematemesis.  His last office visit January 2017, iron " "supplement was on his list , but now he tells me that he was not on the iron supplement.  States they just started on an iron supplement March 2017.  His last hemoglobin 04/11/2017 was 8.2 , 01/12/2017 hemoglobin 8.2.  Prior to discharge 01/07/2017 hemoglobin was 8.0.  I did look up some old records in 2010 his hemoglobin was 11.7.  1999 his hemoglobin was 14.  States that he has been anemic all of his life.  He did receive a blood transfusion while hospitalized January 2017.  Not taking any anticoagulates.  Not taking any aspirin or NSAIDs.  No Nausea or vomiting.  No abdominal pain.  No fevers or chills.  No unintentional weight loss.  His appetite is good.  He saw Dr. Loomis recently and was scheduled for right inguinal hernia surgery 2 weeks ago, however the patient canceled because he was \"too busy working\".  He is a farmer.  He did have a stool checked for occult blood one week ago by his PCP and has not seen the results yet.  He does have known colonic AVM.        Ov  1/12/17 Maikel Mathews is a 87 y.o. male presents with fe def anemia. Has had anemia x yrs per patient history. Hospitalized 1/6/17 with significant anemia. hgb was 6.2 on admission , Got 2 u prbc , hgb 8.0 prior to discharge. He was very weak. No acitve gi bleeding was noted. No constipation or diarrhea. No brb pr or black stool. No n/v. No abdominal pain. No nsaids, has taken asa in past on prn basis but none recently. . No fever. No chest pain. Had some sob. Was Started on fe supplement at that time. Dr Gonzalez/millicent did see him. He did not want to pursue Colonoscopy exam because would not have surgery if a colon cancer were to be found. He has no ugi symptoms. He does have known avm disease, and h/o colon polyps. Last cscope was 4/2010, noting diverticulosis, small avm, internal hemorrhoids. cscope was done then for anemia.  He was recommend repeat colonoscopy in 5 years.  Last egd 3/2013, noting incomplete schatzki's ring. Has never seen a " hematologist.        Past Medical History:   Diagnosis Date   • AVM (arteriovenous malformation)    • Cancer     PROSTATE   • Colon polyp    • Diabetes mellitus    • Diverticulosis    • GERD (gastroesophageal reflux disease)    • Hemorrhoids    • Hypertension    • Iron deficiency anemia due to chronic blood loss 7/26/2017   • Prostate CA    • Prostate hypertrophy    • Rotator cuff syndrome        Past Surgical History:   Procedure Laterality Date   • APPENDECTOMY     • CHOLECYSTECTOMY     • COLONOSCOPY  04/29/2010   • ENDOSCOPY  03/27/2013   • PROSTATECTOMY     • PROSTATECTOMY         Outpatient Prescriptions Marked as Taking for the 8/4/17 encounter (Office Visit) with CHITO Michaud   Medication Sig Dispense Refill   • amLODIPine (NORVASC) 10 MG tablet   5   • glipiZIDE (GLUCOTROL) 5 MG ER tablet   2   • IRON SUCROSE IV Infuse  into a venous catheter. Tue, fri     • losartan (COZAAR) 100 MG tablet          No Known Allergies    Social History     Social History   • Marital status:      Spouse name: N/A   • Number of children: N/A   • Years of education: N/A     Occupational History   • Not on file.     Social History Main Topics   • Smoking status: Never Smoker   • Smokeless tobacco: Never Used   • Alcohol use No   • Drug use: No   • Sexual activity: Not on file     Other Topics Concern   • Not on file     Social History Narrative       Family History   Problem Relation Age of Onset   • Colon cancer Neg Hx    • Colon polyps Neg Hx        Review of Systems   Constitutional: Negative for appetite change, chills, fatigue, fever and unexpected weight change.   HENT: Negative for sore throat and trouble swallowing.    Respiratory: Negative for cough, chest tightness, shortness of breath and wheezing.    Cardiovascular: Negative for chest pain and palpitations.   Gastrointestinal: Negative for abdominal distention, abdominal pain, anal bleeding, blood in stool, constipation, diarrhea, nausea, rectal pain and  "vomiting.        As mentioned in hpi   Genitourinary: Negative for difficulty urinating, dysuria and hematuria.   Musculoskeletal: Negative for arthralgias and back pain.   Skin: Negative for color change and rash.   Neurological: Negative for dizziness, syncope, light-headedness and headaches.   Psychiatric/Behavioral: Negative for confusion. The patient is not nervous/anxious.        Vitals:    08/04/17 0926   BP: 122/56   BP Location: Left arm   Patient Position: Sitting   Cuff Size: Adult   Pulse: 66   Temp: 96.4 °F (35.8 °C)   SpO2: 99%   Weight: 142 lb (64.4 kg)   Height: 67\" (170.2 cm)      Body mass index is 22.24 kg/(m^2).    Physical Exam   Constitutional: He appears well-developed and well-nourished. No distress.   HENT:   Head: Normocephalic and atraumatic.   Eyes: EOM are normal. No scleral icterus.   Neck: Neck supple. No JVD present.   Cardiovascular: Normal rate and regular rhythm.    Pulmonary/Chest: Effort normal and breath sounds normal. No stridor.   Abdominal: Soft. Bowel sounds are normal. He exhibits no distension and no mass. There is no tenderness. There is no rebound and no guarding.   Musculoskeletal: He exhibits no edema.   Neurological: He is alert.   Skin: Skin is warm and dry. No rash noted.   Psychiatric: He has a normal mood and affect. His behavior is normal.   Vitals reviewed.      Results for orders placed or performed in visit on 07/26/17   Iron Profile   Result Value Ref Range    TIBC 341 225 - 420 mcg/dL    UIBC 284 mcg/dL    Iron 57 42 - 180 mcg/dL    Iron Saturation 17 (L) 20 - 45 %   Ferritin   Result Value Ref Range    Ferritin 14.70 (L) 17.90 - 464.00 ng/mL           ASSESSMENT AND PLAN    Maikel was seen today for anemia.    Diagnoses and all orders for this visit:    Iron deficiency anemia due to chronic blood loss  -     Case Request; Standing  -     Case Request    Hx of colonic polyp    AVM (arteriovenous malformation)  Comments:  known     Other orders  -     " Implement Anesthesia Orders Day of Procedure; Standing  -     Obtain Informed Consent; Standing  -     polyethylene glycol (GOLYTELY) 236 G solution; Take 4,000 mL by mouth 1 (One) Time for 1 dose. Take as directed per instruction sheet.    No sign of active GI bleeding.  As discussed in the past anemia very well could have been chronic ongoing GI blood loss from the known AVMs, but also want to make sure nothing worrisome.  We will request stool for occult blood results that were obtained by Dr. Barajas's office.  Recommend continue iron supplementation.  ER if worsening symptoms.  Continue follow-up with hematology as well as PCP.     ESOPHAGOGASTRODUODENOSCOPY WITH ANESTHESIA (N/A), COLONOSCOPY WITH ANESTHESIA (N/A)   Risk, benefits, and alternatives of endoscopy were explained in full.  They understand that there is a risk of bleeding, perforation, and infection.  The risk of perforation goes up with esophageal dilation.  Other options to evaluate UGI complaints could involve barium swallow or UGI series, but these would be diagnostic tests only.  Patient was given time to ask questions.      I answered them to their satisfaction and they are agreeable to proceedingAll risks, benefits, alternatives, and indications of colonoscopy procedure have been discussed with the patient. Risks to include perforation of the colon requiring possible surgery or colostomy, risk of bleeding from biopsies or removal of colon tissue, possibility of missing a colon polyp or cancer, or adverse drug reaction.  Benefits to include the diagnosis and management of disease of the colon and rectum. Alternatives to include barium enema, radiographic evaluation, lab testing or no intervention. Pt verbalizes understanding and agrees.         Body mass index is 22.24 kg/(m^2).        CHITO Banda    EMR Dragon/transcription disclaimer:  Much of this encounter note is electronic transcription/translation of spoken language to printed  text.  The electronic translation of spoken language may be erroneous, or at times, nonsensical words or phrases may be inadvertently transcribed.  Although I have reviewed the note for such errors, some may still exist.    Results for orders placed or performed in visit on 07/26/17   Iron Profile   Result Value Ref Range    TIBC 341 225 - 420 mcg/dL    UIBC 284 mcg/dL    Iron 57 42 - 180 mcg/dL    Iron Saturation 17 (L) 20 - 45 %   Ferritin   Result Value Ref Range    Ferritin 14.70 (L) 17.90 - 464.00 ng/mL

## 2017-08-08 ENCOUNTER — INFUSION (OUTPATIENT)
Dept: ONCOLOGY | Facility: HOSPITAL | Age: 82
End: 2017-08-08

## 2017-08-08 VITALS
HEART RATE: 59 BPM | DIASTOLIC BLOOD PRESSURE: 56 MMHG | BODY MASS INDEX: 21.97 KG/M2 | RESPIRATION RATE: 18 BRPM | HEIGHT: 67 IN | SYSTOLIC BLOOD PRESSURE: 131 MMHG | OXYGEN SATURATION: 98 % | TEMPERATURE: 97.7 F | WEIGHT: 140 LBS

## 2017-08-08 DIAGNOSIS — D50.0 IRON DEFICIENCY ANEMIA DUE TO CHRONIC BLOOD LOSS: Primary | ICD-10-CM

## 2017-08-08 PROCEDURE — 96365 THER/PROPH/DIAG IV INF INIT: CPT

## 2017-08-08 PROCEDURE — 25010000002 IRON SUCROSE PER 1 MG: Performed by: INTERNAL MEDICINE

## 2017-08-08 RX ORDER — FAMOTIDINE 10 MG/ML
20 INJECTION, SOLUTION INTRAVENOUS AS NEEDED
Status: DISCONTINUED | OUTPATIENT
Start: 2017-08-08 | End: 2017-08-08 | Stop reason: HOSPADM

## 2017-08-08 RX ORDER — SODIUM CHLORIDE 9 MG/ML
250 INJECTION, SOLUTION INTRAVENOUS ONCE
Status: COMPLETED | OUTPATIENT
Start: 2017-08-08 | End: 2017-08-08

## 2017-08-08 RX ORDER — DIPHENHYDRAMINE HYDROCHLORIDE 50 MG/ML
50 INJECTION INTRAMUSCULAR; INTRAVENOUS AS NEEDED
Status: DISCONTINUED | OUTPATIENT
Start: 2017-08-08 | End: 2017-08-08 | Stop reason: HOSPADM

## 2017-08-08 RX ADMIN — IRON SUCROSE 200 MG: 20 INJECTION, SOLUTION INTRAVENOUS at 10:23

## 2017-08-08 RX ADMIN — SODIUM CHLORIDE 250 ML: 9 INJECTION, SOLUTION INTRAVENOUS at 10:23

## 2017-08-08 NOTE — PATIENT INSTRUCTIONS
Iron Sucrose injection  What is this medicine?  IRON SUCROSE (DOUG claros) is an iron complex. Iron is used to make healthy red blood cells, which carry oxygen and nutrients throughout the body. This medicine is used to treat iron deficiency anemia in people with chronic kidney disease.  This medicine may be used for other purposes; ask your health care provider or pharmacist if you have questions.  COMMON BRAND NAME(S): Venofer  What should I tell my health care provider before I take this medicine?  They need to know if you have any of these conditions:  -anemia not caused by low iron levels  -heart disease  -high levels of iron in the blood  -kidney disease  -liver disease  -an unusual or allergic reaction to iron, other medicines, foods, dyes, or preservatives  -pregnant or trying to get pregnant  -breast-feeding  How should I use this medicine?  This medicine is for infusion into a vein. It is given by a health care professional in a hospital or clinic setting.  Talk to your pediatrician regarding the use of this medicine in children. While this drug may be prescribed for children as young as 2 years for selected conditions, precautions do apply.  Overdosage: If you think you have taken too much of this medicine contact a poison control center or emergency room at once.  NOTE: This medicine is only for you. Do not share this medicine with others.  What if I miss a dose?  It is important not to miss your dose. Call your doctor or health care professional if you are unable to keep an appointment.  What may interact with this medicine?  Do not take this medicine with any of the following medications:  -deferoxamine  -dimercaprol  -other iron products  This medicine may also interact with the following medications:  -chloramphenicol  -deferasirox  This list may not describe all possible interactions. Give your health care provider a list of all the medicines, herbs, non-prescription drugs, or dietary  supplements you use. Also tell them if you smoke, drink alcohol, or use illegal drugs. Some items may interact with your medicine.  What should I watch for while using this medicine?  Visit your doctor or healthcare professional regularly. Tell your doctor or healthcare professional if your symptoms do not start to get better or if they get worse. You may need blood work done while you are taking this medicine.  You may need to follow a special diet. Talk to your doctor. Foods that contain iron include: whole grains/cereals, dried fruits, beans, or peas, leafy green vegetables, and organ meats (liver, kidney).  What side effects may I notice from receiving this medicine?  Side effects that you should report to your doctor or health care professional as soon as possible:  -allergic reactions like skin rash, itching or hives, swelling of the face, lips, or tongue  -breathing problems  -changes in blood pressure  -cough  -fast, irregular heartbeat  -feeling faint or lightheaded, falls  -fever or chills  -flushing, sweating, or hot feelings  -joint or muscle aches/pains  -seizures  -swelling of the ankles or feet  -unusually weak or tired  Side effects that usually do not require medical attention (report to your doctor or health care professional if they continue or are bothersome):  -diarrhea  -feeling achy  -headache  -irritation at site where injected  -nausea, vomiting  -stomach upset  -tiredness  This list may not describe all possible side effects. Call your doctor for medical advice about side effects. You may report side effects to FDA at 4-593-FDA-4491.  Where should I keep my medicine?  This drug is given in a hospital or clinic and will not be stored at home.  NOTE: This sheet is a summary. It may not cover all possible information. If you have questions about this medicine, talk to your doctor, pharmacist, or health care provider.     © 2017, [employer]. (2012-09-27 17:14:35)

## 2017-08-11 ENCOUNTER — TELEPHONE (OUTPATIENT)
Dept: GASTROENTEROLOGY | Facility: CLINIC | Age: 82
End: 2017-08-11

## 2017-08-11 ENCOUNTER — INFUSION (OUTPATIENT)
Dept: ONCOLOGY | Facility: HOSPITAL | Age: 82
End: 2017-08-11

## 2017-08-11 VITALS
DIASTOLIC BLOOD PRESSURE: 52 MMHG | SYSTOLIC BLOOD PRESSURE: 117 MMHG | HEART RATE: 64 BPM | WEIGHT: 140 LBS | TEMPERATURE: 97.5 F | BODY MASS INDEX: 21.97 KG/M2 | RESPIRATION RATE: 18 BRPM | HEIGHT: 67 IN | OXYGEN SATURATION: 98 %

## 2017-08-11 DIAGNOSIS — D50.0 IRON DEFICIENCY ANEMIA DUE TO CHRONIC BLOOD LOSS: Primary | ICD-10-CM

## 2017-08-11 PROCEDURE — 96365 THER/PROPH/DIAG IV INF INIT: CPT

## 2017-08-11 PROCEDURE — 25010000002 IRON SUCROSE PER 1 MG: Performed by: INTERNAL MEDICINE

## 2017-08-11 RX ORDER — FAMOTIDINE 10 MG/ML
20 INJECTION, SOLUTION INTRAVENOUS AS NEEDED
Status: DISCONTINUED | OUTPATIENT
Start: 2017-08-11 | End: 2017-08-11 | Stop reason: HOSPADM

## 2017-08-11 RX ORDER — SODIUM CHLORIDE 9 MG/ML
250 INJECTION, SOLUTION INTRAVENOUS ONCE
Status: COMPLETED | OUTPATIENT
Start: 2017-08-11 | End: 2017-08-11

## 2017-08-11 RX ORDER — DIPHENHYDRAMINE HYDROCHLORIDE 50 MG/ML
50 INJECTION INTRAMUSCULAR; INTRAVENOUS AS NEEDED
Status: DISCONTINUED | OUTPATIENT
Start: 2017-08-11 | End: 2017-08-11 | Stop reason: HOSPADM

## 2017-08-11 RX ADMIN — SODIUM CHLORIDE 250 ML: 9 INJECTION, SOLUTION INTRAVENOUS at 09:53

## 2017-08-11 RX ADMIN — IRON SUCROSE 200 MG: 20 INJECTION, SOLUTION INTRAVENOUS at 09:53

## 2017-08-11 NOTE — TELEPHONE ENCOUNTER
Spoke with wife.  PT is now taking iron infusions x 2 a week at .  Since he has started, he sees some blood when he wipes.  Wife thought you/Dr. Neff needed to know this.  I recommended to her, if he starts bleeding he needs to go to the ER or a walk in clinic.  She said they would.  I told her you would call her on Monday.

## 2017-08-11 NOTE — TELEPHONE ENCOUNTER
I called with no answer, I can call her back on Monday.  If you have time this afternoon could you give her a call back just to kind of get an idea what she is needing.

## 2017-08-14 DIAGNOSIS — K62.5 BRBPR (BRIGHT RED BLOOD PER RECTUM): Primary | ICD-10-CM

## 2017-08-14 NOTE — TELEPHONE ENCOUNTER
"I called and spoke with his wife.  She states that he is passing some red blood with bowel movement, very small amount.  This is been going on over the last 10 days.  He is moving his bowels every day.  I told her that he be is passing a lot of blood then he needs to go the emergency room.  We also did discuss signs and symptoms of anemia and \"losing too much blood\".  She verbalizes understanding.  I am going to order a repeat CBC.  Also can we get him moved up for his colonoscopy please  "

## 2017-08-15 ENCOUNTER — TELEPHONE (OUTPATIENT)
Dept: GASTROENTEROLOGY | Facility: CLINIC | Age: 82
End: 2017-08-15

## 2017-08-15 ENCOUNTER — INFUSION (OUTPATIENT)
Dept: ONCOLOGY | Facility: HOSPITAL | Age: 82
End: 2017-08-15

## 2017-08-15 VITALS
TEMPERATURE: 97 F | BODY MASS INDEX: 21.97 KG/M2 | WEIGHT: 140 LBS | RESPIRATION RATE: 18 BRPM | DIASTOLIC BLOOD PRESSURE: 55 MMHG | OXYGEN SATURATION: 99 % | HEART RATE: 64 BPM | SYSTOLIC BLOOD PRESSURE: 128 MMHG | HEIGHT: 67 IN

## 2017-08-15 DIAGNOSIS — D50.0 IRON DEFICIENCY ANEMIA DUE TO CHRONIC BLOOD LOSS: Primary | ICD-10-CM

## 2017-08-15 PROCEDURE — 96365 THER/PROPH/DIAG IV INF INIT: CPT

## 2017-08-15 PROCEDURE — 25010000002 IRON SUCROSE PER 1 MG: Performed by: INTERNAL MEDICINE

## 2017-08-15 RX ORDER — SODIUM CHLORIDE 9 MG/ML
250 INJECTION, SOLUTION INTRAVENOUS ONCE
Status: COMPLETED | OUTPATIENT
Start: 2017-08-15 | End: 2017-08-15

## 2017-08-15 RX ORDER — FAMOTIDINE 10 MG/ML
20 INJECTION, SOLUTION INTRAVENOUS AS NEEDED
Status: DISCONTINUED | OUTPATIENT
Start: 2017-08-15 | End: 2017-08-15 | Stop reason: HOSPADM

## 2017-08-15 RX ORDER — DIPHENHYDRAMINE HYDROCHLORIDE 50 MG/ML
50 INJECTION INTRAMUSCULAR; INTRAVENOUS AS NEEDED
Status: DISCONTINUED | OUTPATIENT
Start: 2017-08-15 | End: 2017-08-15 | Stop reason: HOSPADM

## 2017-08-15 RX ADMIN — SODIUM CHLORIDE 250 ML: 9 INJECTION, SOLUTION INTRAVENOUS at 09:59

## 2017-08-15 RX ADMIN — IRON SUCROSE 200 MG: 20 INJECTION, SOLUTION INTRAVENOUS at 10:02

## 2017-08-15 NOTE — TELEPHONE ENCOUNTER
Mr. Mathews had lab test done 08/14/2017 ordered by Dr. Barajas's office, they have faxed to our office as well.  Hemoglobin is 7.9 , platelets 268, white blood count 6.2, BUN 43, creatinine 2.35, potassium 5.4.  He still continues to have a small amount of bright red blood from the rectum with bowel movement his wife tells me.  This has been going on for several days, see my previous note.  On 07/26/2017 his hemoglobin was 9.7.  He is scheduled for EGD and colonoscopy on 09/05/2017.  At this point with his hemoglobin of 7.9 and him having rectal bleeding (however only a small amount per patient's wife), I recommend that he go to the emergency room for evaluation.  I did discuss with patient's wife worst-case narrow bleeding to the point of serous complications even death.  She verbalizes understanding.  She is going to call Dr. Barajas's office now to discuss.

## 2017-08-17 ENCOUNTER — TELEPHONE (OUTPATIENT)
Dept: GASTROENTEROLOGY | Facility: CLINIC | Age: 82
End: 2017-08-17

## 2017-08-17 NOTE — TELEPHONE ENCOUNTER
I did call and speak with Mrs. Mathews again today.  Mr. Mathews has only been passing just a scant amount of bright red blood on his tissue with bowel movement per his wife words.  I did also call and talk to Adan Parikh nurse practitioner with Dr. Barajas.  We discussed that his hemoglobin is 7.9.  Adan is going to get him back in the office and get a recheck of his CBC tomorrow..  Please see my previous note from 2 days ago.  I discussed with the patient's wife that if he is bleeding and with hemoglobin being low he needs go the emergency room,  they verbalizes understanding.   he is scheduled for EGD as well as colonoscopy August 25.  He is to call with any questions concerns or problems in the meantime.

## 2017-08-18 ENCOUNTER — INFUSION (OUTPATIENT)
Dept: ONCOLOGY | Facility: HOSPITAL | Age: 82
End: 2017-08-18

## 2017-08-18 VITALS
SYSTOLIC BLOOD PRESSURE: 126 MMHG | WEIGHT: 140 LBS | HEART RATE: 63 BPM | BODY MASS INDEX: 21.97 KG/M2 | HEIGHT: 67 IN | OXYGEN SATURATION: 99 % | DIASTOLIC BLOOD PRESSURE: 60 MMHG | TEMPERATURE: 97.4 F | RESPIRATION RATE: 16 BRPM

## 2017-08-18 DIAGNOSIS — D50.0 IRON DEFICIENCY ANEMIA DUE TO CHRONIC BLOOD LOSS: Primary | ICD-10-CM

## 2017-08-18 PROCEDURE — 25010000002 IRON SUCROSE PER 1 MG: Performed by: INTERNAL MEDICINE

## 2017-08-18 PROCEDURE — 96365 THER/PROPH/DIAG IV INF INIT: CPT

## 2017-08-18 RX ORDER — SODIUM CHLORIDE 9 MG/ML
250 INJECTION, SOLUTION INTRAVENOUS ONCE
Status: COMPLETED | OUTPATIENT
Start: 2017-08-18 | End: 2017-08-18

## 2017-08-18 RX ORDER — DIPHENHYDRAMINE HYDROCHLORIDE 50 MG/ML
50 INJECTION INTRAMUSCULAR; INTRAVENOUS AS NEEDED
Status: DISCONTINUED | OUTPATIENT
Start: 2017-08-18 | End: 2017-08-18 | Stop reason: HOSPADM

## 2017-08-18 RX ORDER — FAMOTIDINE 10 MG/ML
20 INJECTION, SOLUTION INTRAVENOUS AS NEEDED
Status: DISCONTINUED | OUTPATIENT
Start: 2017-08-18 | End: 2017-08-18 | Stop reason: HOSPADM

## 2017-08-18 RX ADMIN — IRON SUCROSE 200 MG: 20 INJECTION, SOLUTION INTRAVENOUS at 09:59

## 2017-08-18 RX ADMIN — SODIUM CHLORIDE 250 ML: 9 INJECTION, SOLUTION INTRAVENOUS at 09:55

## 2017-08-23 ENCOUNTER — INFUSION (OUTPATIENT)
Dept: ONCOLOGY | Facility: HOSPITAL | Age: 82
End: 2017-08-23

## 2017-08-23 VITALS
OXYGEN SATURATION: 98 % | TEMPERATURE: 97.2 F | BODY MASS INDEX: 21.66 KG/M2 | DIASTOLIC BLOOD PRESSURE: 57 MMHG | HEIGHT: 67 IN | WEIGHT: 138 LBS | RESPIRATION RATE: 16 BRPM | SYSTOLIC BLOOD PRESSURE: 126 MMHG | HEART RATE: 63 BPM

## 2017-08-23 DIAGNOSIS — D50.0 IRON DEFICIENCY ANEMIA DUE TO CHRONIC BLOOD LOSS: Primary | ICD-10-CM

## 2017-08-23 DIAGNOSIS — D50.0 IRON DEFICIENCY ANEMIA DUE TO CHRONIC BLOOD LOSS: ICD-10-CM

## 2017-08-23 PROCEDURE — 96365 THER/PROPH/DIAG IV INF INIT: CPT

## 2017-08-23 PROCEDURE — 25010000002 IRON SUCROSE PER 1 MG: Performed by: INTERNAL MEDICINE

## 2017-08-23 RX ORDER — SODIUM CHLORIDE 9 MG/ML
250 INJECTION, SOLUTION INTRAVENOUS ONCE
Status: CANCELLED | OUTPATIENT
Start: 2017-09-13

## 2017-08-23 RX ORDER — DIPHENHYDRAMINE HYDROCHLORIDE 50 MG/ML
50 INJECTION INTRAMUSCULAR; INTRAVENOUS AS NEEDED
Status: CANCELLED | OUTPATIENT
Start: 2017-09-02

## 2017-08-23 RX ORDER — SODIUM CHLORIDE 9 MG/ML
250 INJECTION, SOLUTION INTRAVENOUS ONCE
Status: CANCELLED | OUTPATIENT
Start: 2017-09-02

## 2017-08-23 RX ORDER — FAMOTIDINE 10 MG/ML
20 INJECTION, SOLUTION INTRAVENOUS AS NEEDED
Status: CANCELLED | OUTPATIENT
Start: 2017-09-02

## 2017-08-23 RX ORDER — FAMOTIDINE 10 MG/ML
20 INJECTION, SOLUTION INTRAVENOUS AS NEEDED
Status: DISCONTINUED | OUTPATIENT
Start: 2017-08-23 | End: 2017-08-23 | Stop reason: HOSPADM

## 2017-08-23 RX ORDER — SODIUM CHLORIDE 9 MG/ML
250 INJECTION, SOLUTION INTRAVENOUS ONCE
Status: CANCELLED | OUTPATIENT
Start: 2017-08-25

## 2017-08-23 RX ORDER — FAMOTIDINE 10 MG/ML
20 INJECTION, SOLUTION INTRAVENOUS AS NEEDED
Status: CANCELLED | OUTPATIENT
Start: 2017-09-06

## 2017-08-23 RX ORDER — DIPHENHYDRAMINE HYDROCHLORIDE 50 MG/ML
50 INJECTION INTRAMUSCULAR; INTRAVENOUS AS NEEDED
Status: CANCELLED | OUTPATIENT
Start: 2017-09-06

## 2017-08-23 RX ORDER — FAMOTIDINE 10 MG/ML
20 INJECTION, SOLUTION INTRAVENOUS AS NEEDED
Status: CANCELLED | OUTPATIENT
Start: 2017-08-23

## 2017-08-23 RX ORDER — FAMOTIDINE 10 MG/ML
20 INJECTION, SOLUTION INTRAVENOUS AS NEEDED
Status: CANCELLED | OUTPATIENT
Start: 2017-08-30

## 2017-08-23 RX ORDER — FAMOTIDINE 10 MG/ML
20 INJECTION, SOLUTION INTRAVENOUS AS NEEDED
Status: CANCELLED | OUTPATIENT
Start: 2017-09-09

## 2017-08-23 RX ORDER — DIPHENHYDRAMINE HYDROCHLORIDE 50 MG/ML
50 INJECTION INTRAMUSCULAR; INTRAVENOUS AS NEEDED
Status: CANCELLED | OUTPATIENT
Start: 2017-08-30

## 2017-08-23 RX ORDER — SODIUM CHLORIDE 9 MG/ML
250 INJECTION, SOLUTION INTRAVENOUS ONCE
Status: CANCELLED | OUTPATIENT
Start: 2017-09-06

## 2017-08-23 RX ORDER — FAMOTIDINE 10 MG/ML
20 INJECTION, SOLUTION INTRAVENOUS AS NEEDED
Status: CANCELLED | OUTPATIENT
Start: 2017-10-26

## 2017-08-23 RX ORDER — DIPHENHYDRAMINE HYDROCHLORIDE 50 MG/ML
50 INJECTION INTRAMUSCULAR; INTRAVENOUS AS NEEDED
Status: CANCELLED | OUTPATIENT
Start: 2017-10-31

## 2017-08-23 RX ORDER — DIPHENHYDRAMINE HYDROCHLORIDE 50 MG/ML
50 INJECTION INTRAMUSCULAR; INTRAVENOUS AS NEEDED
Status: CANCELLED | OUTPATIENT
Start: 2017-09-13

## 2017-08-23 RX ORDER — DIPHENHYDRAMINE HYDROCHLORIDE 50 MG/ML
50 INJECTION INTRAMUSCULAR; INTRAVENOUS AS NEEDED
Status: CANCELLED | OUTPATIENT
Start: 2017-09-28

## 2017-08-23 RX ORDER — FAMOTIDINE 10 MG/ML
20 INJECTION, SOLUTION INTRAVENOUS AS NEEDED
Status: CANCELLED | OUTPATIENT
Start: 2017-08-25

## 2017-08-23 RX ORDER — DIPHENHYDRAMINE HYDROCHLORIDE 50 MG/ML
50 INJECTION INTRAMUSCULAR; INTRAVENOUS AS NEEDED
Status: CANCELLED | OUTPATIENT
Start: 2017-08-25

## 2017-08-23 RX ORDER — SODIUM CHLORIDE 9 MG/ML
250 INJECTION, SOLUTION INTRAVENOUS ONCE
Status: CANCELLED | OUTPATIENT
Start: 2017-09-28

## 2017-08-23 RX ORDER — SODIUM CHLORIDE 9 MG/ML
250 INJECTION, SOLUTION INTRAVENOUS ONCE
Status: CANCELLED | OUTPATIENT
Start: 2017-08-30

## 2017-08-23 RX ORDER — DIPHENHYDRAMINE HYDROCHLORIDE 50 MG/ML
50 INJECTION INTRAMUSCULAR; INTRAVENOUS AS NEEDED
Status: CANCELLED | OUTPATIENT
Start: 2017-09-09

## 2017-08-23 RX ORDER — SODIUM CHLORIDE 9 MG/ML
250 INJECTION, SOLUTION INTRAVENOUS ONCE
Status: CANCELLED | OUTPATIENT
Start: 2017-08-23

## 2017-08-23 RX ORDER — SODIUM CHLORIDE 9 MG/ML
250 INJECTION, SOLUTION INTRAVENOUS ONCE
Status: CANCELLED | OUTPATIENT
Start: 2017-09-09

## 2017-08-23 RX ORDER — FAMOTIDINE 10 MG/ML
20 INJECTION, SOLUTION INTRAVENOUS AS NEEDED
Status: CANCELLED | OUTPATIENT
Start: 2017-10-31

## 2017-08-23 RX ORDER — DIPHENHYDRAMINE HYDROCHLORIDE 50 MG/ML
50 INJECTION INTRAMUSCULAR; INTRAVENOUS AS NEEDED
Status: CANCELLED | OUTPATIENT
Start: 2017-08-23

## 2017-08-23 RX ORDER — FAMOTIDINE 10 MG/ML
20 INJECTION, SOLUTION INTRAVENOUS AS NEEDED
Status: CANCELLED | OUTPATIENT
Start: 2017-09-28

## 2017-08-23 RX ORDER — SODIUM CHLORIDE 9 MG/ML
250 INJECTION, SOLUTION INTRAVENOUS ONCE
Status: CANCELLED | OUTPATIENT
Start: 2017-10-26

## 2017-08-23 RX ORDER — DIPHENHYDRAMINE HYDROCHLORIDE 50 MG/ML
50 INJECTION INTRAMUSCULAR; INTRAVENOUS AS NEEDED
Status: DISCONTINUED | OUTPATIENT
Start: 2017-08-23 | End: 2017-08-23 | Stop reason: HOSPADM

## 2017-08-23 RX ORDER — FAMOTIDINE 10 MG/ML
20 INJECTION, SOLUTION INTRAVENOUS AS NEEDED
Status: CANCELLED | OUTPATIENT
Start: 2017-09-13

## 2017-08-23 RX ORDER — SODIUM CHLORIDE 9 MG/ML
250 INJECTION, SOLUTION INTRAVENOUS ONCE
Status: CANCELLED | OUTPATIENT
Start: 2017-10-31

## 2017-08-23 RX ORDER — DIPHENHYDRAMINE HYDROCHLORIDE 50 MG/ML
50 INJECTION INTRAMUSCULAR; INTRAVENOUS AS NEEDED
Status: CANCELLED | OUTPATIENT
Start: 2017-10-26

## 2017-08-23 RX ORDER — SODIUM CHLORIDE 9 MG/ML
250 INJECTION, SOLUTION INTRAVENOUS ONCE
Status: COMPLETED | OUTPATIENT
Start: 2017-08-23 | End: 2017-08-23

## 2017-08-23 RX ADMIN — SODIUM CHLORIDE 250 ML: 9 INJECTION, SOLUTION INTRAVENOUS at 11:38

## 2017-08-23 RX ADMIN — IRON SUCROSE 200 MG: 20 INJECTION, SOLUTION INTRAVENOUS at 11:39

## 2017-08-25 ENCOUNTER — ANESTHESIA EVENT (OUTPATIENT)
Dept: GASTROENTEROLOGY | Facility: HOSPITAL | Age: 82
End: 2017-08-25

## 2017-08-25 ENCOUNTER — HOSPITAL ENCOUNTER (OUTPATIENT)
Facility: HOSPITAL | Age: 82
Setting detail: HOSPITAL OUTPATIENT SURGERY
Discharge: HOME | End: 2017-08-25
Attending: INTERNAL MEDICINE | Admitting: INTERNAL MEDICINE

## 2017-08-25 ENCOUNTER — ANESTHESIA (OUTPATIENT)
Dept: GASTROENTEROLOGY | Facility: HOSPITAL | Age: 82
End: 2017-08-25

## 2017-08-25 VITALS
DIASTOLIC BLOOD PRESSURE: 78 MMHG | BODY MASS INDEX: 20.72 KG/M2 | WEIGHT: 132 LBS | SYSTOLIC BLOOD PRESSURE: 130 MMHG | OXYGEN SATURATION: 95 % | HEART RATE: 67 BPM | TEMPERATURE: 96.9 F | RESPIRATION RATE: 17 BRPM | HEIGHT: 67 IN

## 2017-08-25 DIAGNOSIS — D50.0 IRON DEFICIENCY ANEMIA DUE TO CHRONIC BLOOD LOSS: ICD-10-CM

## 2017-08-25 PROCEDURE — 88305 TISSUE EXAM BY PATHOLOGIST: CPT | Performed by: INTERNAL MEDICINE

## 2017-08-25 PROCEDURE — 25010000002 PROPOFOL 10 MG/ML EMULSION: Performed by: NURSE ANESTHETIST, CERTIFIED REGISTERED

## 2017-08-25 RX ORDER — SODIUM CHLORIDE 9 MG/ML
500 INJECTION, SOLUTION INTRAVENOUS CONTINUOUS PRN
Status: DISCONTINUED | OUTPATIENT
Start: 2017-08-25 | End: 2017-08-25 | Stop reason: HOSPADM

## 2017-08-25 RX ORDER — LIDOCAINE HYDROCHLORIDE 20 MG/ML
INJECTION, SOLUTION INFILTRATION; PERINEURAL AS NEEDED
Status: DISCONTINUED | OUTPATIENT
Start: 2017-08-25 | End: 2017-08-25 | Stop reason: SURG

## 2017-08-25 RX ORDER — PROPOFOL 10 MG/ML
VIAL (ML) INTRAVENOUS AS NEEDED
Status: DISCONTINUED | OUTPATIENT
Start: 2017-08-25 | End: 2017-08-25 | Stop reason: SURG

## 2017-08-25 RX ORDER — LIDOCAINE HYDROCHLORIDE 10 MG/ML
0.5 INJECTION, SOLUTION INFILTRATION; PERINEURAL ONCE AS NEEDED
Status: DISCONTINUED | OUTPATIENT
Start: 2017-08-25 | End: 2017-08-25

## 2017-08-25 RX ORDER — SODIUM CHLORIDE 0.9 % (FLUSH) 0.9 %
3 SYRINGE (ML) INJECTION AS NEEDED
Status: DISCONTINUED | OUTPATIENT
Start: 2017-08-25 | End: 2017-08-25 | Stop reason: HOSPADM

## 2017-08-25 RX ORDER — ONDANSETRON 2 MG/ML
4 INJECTION INTRAMUSCULAR; INTRAVENOUS ONCE AS NEEDED
Status: DISCONTINUED | OUTPATIENT
Start: 2017-08-25 | End: 2017-08-25 | Stop reason: HOSPADM

## 2017-08-25 RX ADMIN — PROPOFOL 200 MG: 10 INJECTION, EMULSION INTRAVENOUS at 13:27

## 2017-08-25 RX ADMIN — LIDOCAINE HYDROCHLORIDE 100 MG: 20 INJECTION, SOLUTION INFILTRATION; PERINEURAL at 13:21

## 2017-08-25 RX ADMIN — PROPOFOL 200 MG: 10 INJECTION, EMULSION INTRAVENOUS at 13:21

## 2017-08-25 RX ADMIN — SODIUM CHLORIDE 500 ML: 9 INJECTION, SOLUTION INTRAVENOUS at 10:30

## 2017-08-25 RX ADMIN — SODIUM CHLORIDE: 9 INJECTION, SOLUTION INTRAVENOUS at 13:17

## 2017-08-25 NOTE — H&P (VIEW-ONLY)
"[facility] GASTROENTEROLOGY - OFFICE NOTE    8/4/2017    Maikel Mathews   8/17/1929    Primary Physician: David Barajas MD    Chief Complaint   Patient presents with   • Anemia         HISTORY OF PRESENT ILLNESS    Maikel Mathews is a 87 y.o. male presents  with Iron deficiency anemia.  He has been anemic for several years per the patient history.  I have seen him on 2 occasions in the office this year see below.  He has been seeing a new hematologist here at [facility] \"Dr. KATHLEEN\".  He recently saw \"Dr. KATHLEEN\" July 2017.  He is being treated with when necessary transfusions and IV iron.  States that he had a stool checked for blood in the last couple of months but never got her results.  He denies bright red blood per rectum.  States his stools are dark but is on iron.  He denies any hematemesis.  Denies nausea or vomiting.  Denies abdominal pain.  Denies unintentional weight loss.  No reflux symptoms.  No history of peptic ulcer disease.  He denies taking aspirin or NSAIDs.  No fevers or chills. Appetite is good.  He was to have inguinal hernia surgery in the last several months but declined surgery.  He still works out in his garden, states busy.  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  Last EGD was 2013.  Last colonoscopy was 2010.  He does have history of colon polyps.  There is no family history of colon cancer or colon polyps.  He has declined GI workup previously however is in agreement at this time.  Last CBC that can find was from 07/26/2017, hemoglobin was 9.7 with MCV 98.  Prior blood smear showed severe microcytic anemia negative for schistocytes.       4/20/17  Maikel Mathews is a 87 y.o. male presents with iron deficiency anemia.  I saw him in the office 01/12/2017, prior to that Dr. Gonzalez had seen the patient in the hospital for the same reason.  He has not noted any signs of active GI bleeding such as bright red blood per rectum, black stool, or hematemesis.  His last office visit January 2017, iron " "supplement was on his list , but now he tells me that he was not on the iron supplement.  States they just started on an iron supplement March 2017.  His last hemoglobin 04/11/2017 was 8.2 , 01/12/2017 hemoglobin 8.2.  Prior to discharge 01/07/2017 hemoglobin was 8.0.  I did look up some old records in 2010 his hemoglobin was 11.7.  1999 his hemoglobin was 14.  States that he has been anemic all of his life.  He did receive a blood transfusion while hospitalized January 2017.  Not taking any anticoagulates.  Not taking any aspirin or NSAIDs.  No Nausea or vomiting.  No abdominal pain.  No fevers or chills.  No unintentional weight loss.  His appetite is good.  He saw Dr. Loomis recently and was scheduled for right inguinal hernia surgery 2 weeks ago, however the patient canceled because he was \"too busy working\".  He is a farmer.  He did have a stool checked for occult blood one week ago by his PCP and has not seen the results yet.  He does have known colonic AVM.        Ov  1/12/17 Maikel Mathews is a 87 y.o. male presents with fe def anemia. Has had anemia x yrs per patient history. Hospitalized 1/6/17 with significant anemia. hgb was 6.2 on admission , Got 2 u prbc , hgb 8.0 prior to discharge. He was very weak. No acitve gi bleeding was noted. No constipation or diarrhea. No brb pr or black stool. No n/v. No abdominal pain. No nsaids, has taken asa in past on prn basis but none recently. . No fever. No chest pain. Had some sob. Was Started on fe supplement at that time. Dr Gonzalez/millicent did see him. He did not want to pursue Colonoscopy exam because would not have surgery if a colon cancer were to be found. He has no ugi symptoms. He does have known avm disease, and h/o colon polyps. Last cscope was 4/2010, noting diverticulosis, small avm, internal hemorrhoids. cscope was done then for anemia.  He was recommend repeat colonoscopy in 5 years.  Last egd 3/2013, noting incomplete schatzki's ring. Has never seen a " hematologist.        Past Medical History:   Diagnosis Date   • AVM (arteriovenous malformation)    • Cancer     PROSTATE   • Colon polyp    • Diabetes mellitus    • Diverticulosis    • GERD (gastroesophageal reflux disease)    • Hemorrhoids    • Hypertension    • Iron deficiency anemia due to chronic blood loss 7/26/2017   • Prostate CA    • Prostate hypertrophy    • Rotator cuff syndrome        Past Surgical History:   Procedure Laterality Date   • APPENDECTOMY     • CHOLECYSTECTOMY     • COLONOSCOPY  04/29/2010   • ENDOSCOPY  03/27/2013   • PROSTATECTOMY     • PROSTATECTOMY         Outpatient Prescriptions Marked as Taking for the 8/4/17 encounter (Office Visit) with CHITO Michaud   Medication Sig Dispense Refill   • amLODIPine (NORVASC) 10 MG tablet   5   • glipiZIDE (GLUCOTROL) 5 MG ER tablet   2   • IRON SUCROSE IV Infuse  into a venous catheter. Tue, fri     • losartan (COZAAR) 100 MG tablet          No Known Allergies    Social History     Social History   • Marital status:      Spouse name: N/A   • Number of children: N/A   • Years of education: N/A     Occupational History   • Not on file.     Social History Main Topics   • Smoking status: Never Smoker   • Smokeless tobacco: Never Used   • Alcohol use No   • Drug use: No   • Sexual activity: Not on file     Other Topics Concern   • Not on file     Social History Narrative       Family History   Problem Relation Age of Onset   • Colon cancer Neg Hx    • Colon polyps Neg Hx        Review of Systems   Constitutional: Negative for appetite change, chills, fatigue, fever and unexpected weight change.   HENT: Negative for sore throat and trouble swallowing.    Respiratory: Negative for cough, chest tightness, shortness of breath and wheezing.    Cardiovascular: Negative for chest pain and palpitations.   Gastrointestinal: Negative for abdominal distention, abdominal pain, anal bleeding, blood in stool, constipation, diarrhea, nausea, rectal pain and  "vomiting.        As mentioned in hpi   Genitourinary: Negative for difficulty urinating, dysuria and hematuria.   Musculoskeletal: Negative for arthralgias and back pain.   Skin: Negative for color change and rash.   Neurological: Negative for dizziness, syncope, light-headedness and headaches.   Psychiatric/Behavioral: Negative for confusion. The patient is not nervous/anxious.        Vitals:    08/04/17 0926   BP: 122/56   BP Location: Left arm   Patient Position: Sitting   Cuff Size: Adult   Pulse: 66   Temp: 96.4 °F (35.8 °C)   SpO2: 99%   Weight: 142 lb (64.4 kg)   Height: 67\" (170.2 cm)      Body mass index is 22.24 kg/(m^2).    Physical Exam   Constitutional: He appears well-developed and well-nourished. No distress.   HENT:   Head: Normocephalic and atraumatic.   Eyes: EOM are normal. No scleral icterus.   Neck: Neck supple. No JVD present.   Cardiovascular: Normal rate and regular rhythm.    Pulmonary/Chest: Effort normal and breath sounds normal. No stridor.   Abdominal: Soft. Bowel sounds are normal. He exhibits no distension and no mass. There is no tenderness. There is no rebound and no guarding.   Musculoskeletal: He exhibits no edema.   Neurological: He is alert.   Skin: Skin is warm and dry. No rash noted.   Psychiatric: He has a normal mood and affect. His behavior is normal.   Vitals reviewed.      Results for orders placed or performed in visit on 07/26/17   Iron Profile   Result Value Ref Range    TIBC 341 225 - 420 mcg/dL    UIBC 284 mcg/dL    Iron 57 42 - 180 mcg/dL    Iron Saturation 17 (L) 20 - 45 %   Ferritin   Result Value Ref Range    Ferritin 14.70 (L) 17.90 - 464.00 ng/mL           ASSESSMENT AND PLAN    Maikel was seen today for anemia.    Diagnoses and all orders for this visit:    Iron deficiency anemia due to chronic blood loss  -     Case Request; Standing  -     Case Request    Hx of colonic polyp    AVM (arteriovenous malformation)  Comments:  known     Other orders  -     " Implement Anesthesia Orders Day of Procedure; Standing  -     Obtain Informed Consent; Standing  -     polyethylene glycol (GOLYTELY) 236 G solution; Take 4,000 mL by mouth 1 (One) Time for 1 dose. Take as directed per instruction sheet.    No sign of active GI bleeding.  As discussed in the past anemia very well could have been chronic ongoing GI blood loss from the known AVMs, but also want to make sure nothing worrisome.  We will request stool for occult blood results that were obtained by Dr. Barajas's office.  Recommend continue iron supplementation.  ER if worsening symptoms.  Continue follow-up with hematology as well as PCP.     ESOPHAGOGASTRODUODENOSCOPY WITH ANESTHESIA (N/A), COLONOSCOPY WITH ANESTHESIA (N/A)   Risk, benefits, and alternatives of endoscopy were explained in full.  They understand that there is a risk of bleeding, perforation, and infection.  The risk of perforation goes up with esophageal dilation.  Other options to evaluate UGI complaints could involve barium swallow or UGI series, but these would be diagnostic tests only.  Patient was given time to ask questions.      I answered them to their satisfaction and they are agreeable to proceedingAll risks, benefits, alternatives, and indications of colonoscopy procedure have been discussed with the patient. Risks to include perforation of the colon requiring possible surgery or colostomy, risk of bleeding from biopsies or removal of colon tissue, possibility of missing a colon polyp or cancer, or adverse drug reaction.  Benefits to include the diagnosis and management of disease of the colon and rectum. Alternatives to include barium enema, radiographic evaluation, lab testing or no intervention. Pt verbalizes understanding and agrees.         Body mass index is 22.24 kg/(m^2).        CHITO Banda    EMR Dragon/transcription disclaimer:  Much of this encounter note is electronic transcription/translation of spoken language to printed  text.  The electronic translation of spoken language may be erroneous, or at times, nonsensical words or phrases may be inadvertently transcribed.  Although I have reviewed the note for such errors, some may still exist.    Results for orders placed or performed in visit on 07/26/17   Iron Profile   Result Value Ref Range    TIBC 341 225 - 420 mcg/dL    UIBC 284 mcg/dL    Iron 57 42 - 180 mcg/dL    Iron Saturation 17 (L) 20 - 45 %   Ferritin   Result Value Ref Range    Ferritin 14.70 (L) 17.90 - 464.00 ng/mL

## 2017-08-25 NOTE — ANESTHESIA POSTPROCEDURE EVALUATION
"Patient: Maikel Mathews    Procedure Summary     Date Anesthesia Start Anesthesia Stop Room / Location    08/25/17 1317 [id number] [facility] ENDOSCOPY 2 / [facility]COPY       Procedure Diagnosis Surgeon Provider    ESOPHAGOGASTRODUODENOSCOPY WITH ANESTHESIA (N/A Esophagus); COLONOSCOPY WITH ANESTHESIA (N/A ) Iron deficiency anemia due to chronic blood loss  (Iron deficiency anemia due to chronic blood loss [D50.0]) MD Sadi Fox CRNA          Anesthesia Type: general  Last vitals  BP        Temp        Pulse       Resp        SpO2          Post Anesthesia Care and Evaluation    Patient location during evaluation: PACU  Patient participation: complete - patient participated  Level of consciousness: awake and alert  Pain score: 1  Pain management: adequate  Airway patency: patent  Anesthetic complications: No anesthetic complications    Cardiovascular status: acceptable  Respiratory status: room air  Hydration status: acceptable    Blood pressure 133/76, pulse 88, temperature 96.9 °F (36.1 °C), temperature source Temporal Artery , resp. rate 20, height 67\" (170.2 cm), weight 132 lb (59.9 kg), SpO2 97 %.    Blood pressure 133/76, pulse 88, temperature 96.9 °F (36.1 °C), temperature source Temporal Artery , resp. rate 20, height 67\" (170.2 cm), weight 132 lb (59.9 kg), SpO2 97 %.      "

## 2017-08-25 NOTE — PLAN OF CARE
Problem: Patient Care Overview (Adult)  Goal: Plan of Care Review  Outcome: Ongoing (interventions implemented as appropriate)    08/25/17 1323   Coping/Psychosocial Response Interventions   Plan Of Care Reviewed With patient   Patient Care Overview   Progress no change   Outcome Evaluation   Outcome Summary/Follow up Plan tolerating well         Problem: GI Endoscopy (Adult)  Goal: Signs and Symptoms of Listed Potential Problems Will be Absent or Manageable (GI Endoscopy)  Outcome: Ongoing (interventions implemented as appropriate)

## 2017-08-25 NOTE — PLAN OF CARE
Problem: Patient Care Overview (Adult)  Goal: Adult Individualization and Mutuality  Outcome: Ongoing (interventions implemented as appropriate)    08/25/17 1005   Individualization   Patient Specific Preferences none

## 2017-08-25 NOTE — PLAN OF CARE
Problem: Patient Care Overview (Adult)  Goal: Plan of Care Review  Outcome: Outcome(s) achieved Date Met:  08/25/17 08/25/17 0940   Coping/Psychosocial Response Interventions   Plan Of Care Reviewed With patient;spouse   Patient Care Overview   Progress improving   Outcome Evaluation   Outcome Summary/Follow up Plan discharge criteria met

## 2017-08-25 NOTE — PLAN OF CARE
Problem: GI Endoscopy (Adult)  Goal: Signs and Symptoms of Listed Potential Problems Will be Absent or Manageable (GI Endoscopy)  Outcome: Outcome(s) achieved Date Met:  08/25/17 08/25/17 1350   GI Endoscopy   Problems Assessed (GI Endoscopy) all   Problems Present (GI Endoscopy) none

## 2017-08-25 NOTE — ANESTHESIA PREPROCEDURE EVALUATION
Anesthesia Evaluation     Patient summary reviewed   no history of anesthetic complications:         Airway   Mallampati: II  TM distance: >3 FB  Neck ROM: full  no difficulty expected  Dental          Pulmonary    (-) COPD, asthma, sleep apnea, not a smoker  Cardiovascular   Exercise tolerance: good (4-7 METS)    (+) hypertension,   (-) angina      Neuro/Psych  (-) seizures, TIA, CVA  GI/Hepatic/Renal/Endo    (+)  GERD, diabetes mellitus,   (-) liver disease    Musculoskeletal     Abdominal    Substance History      OB/GYN          Other      history of cancer (prostate)                                  Anesthesia Plan    ASA 2     general   total IV anesthesia  intravenous induction   Anesthetic plan and risks discussed with patient.

## 2017-08-28 LAB
CYTO UR: NORMAL
LAB AP CASE REPORT: NORMAL
LAB AP CLINICAL INFORMATION: NORMAL
LAB AP INTRADEPARTMENTAL CONSULT: NORMAL
Lab: NORMAL
PATH REPORT.FINAL DX SPEC: NORMAL
PATH REPORT.GROSS SPEC: NORMAL

## 2017-08-29 ENCOUNTER — INFUSION (OUTPATIENT)
Dept: ONCOLOGY | Facility: HOSPITAL | Age: 82
End: 2017-08-29

## 2017-08-29 VITALS
WEIGHT: 138 LBS | SYSTOLIC BLOOD PRESSURE: 114 MMHG | RESPIRATION RATE: 18 BRPM | TEMPERATURE: 98.1 F | HEIGHT: 67 IN | BODY MASS INDEX: 21.66 KG/M2 | OXYGEN SATURATION: 97 % | DIASTOLIC BLOOD PRESSURE: 58 MMHG | HEART RATE: 69 BPM

## 2017-08-29 DIAGNOSIS — D50.0 IRON DEFICIENCY ANEMIA DUE TO CHRONIC BLOOD LOSS: Primary | ICD-10-CM

## 2017-08-29 PROCEDURE — 96365 THER/PROPH/DIAG IV INF INIT: CPT

## 2017-08-29 PROCEDURE — 25010000002 IRON SUCROSE PER 1 MG: Performed by: INTERNAL MEDICINE

## 2017-08-29 RX ORDER — DIPHENHYDRAMINE HYDROCHLORIDE 50 MG/ML
50 INJECTION INTRAMUSCULAR; INTRAVENOUS AS NEEDED
Status: DISCONTINUED | OUTPATIENT
Start: 2017-08-29 | End: 2017-08-29 | Stop reason: HOSPADM

## 2017-08-29 RX ORDER — SODIUM CHLORIDE 9 MG/ML
250 INJECTION, SOLUTION INTRAVENOUS ONCE
Status: COMPLETED | OUTPATIENT
Start: 2017-08-29 | End: 2017-08-29

## 2017-08-29 RX ORDER — FAMOTIDINE 10 MG/ML
20 INJECTION, SOLUTION INTRAVENOUS AS NEEDED
Status: DISCONTINUED | OUTPATIENT
Start: 2017-08-29 | End: 2017-08-29 | Stop reason: HOSPADM

## 2017-08-29 RX ADMIN — IRON SUCROSE 200 MG: 20 INJECTION, SOLUTION INTRAVENOUS at 14:06

## 2017-08-29 RX ADMIN — SODIUM CHLORIDE 250 ML: 9 INJECTION, SOLUTION INTRAVENOUS at 13:59

## 2017-08-30 ENCOUNTER — APPOINTMENT (OUTPATIENT)
Dept: ONCOLOGY | Facility: HOSPITAL | Age: 82
End: 2017-08-30

## 2017-09-01 ENCOUNTER — INFUSION (OUTPATIENT)
Dept: ONCOLOGY | Facility: HOSPITAL | Age: 82
End: 2017-09-01

## 2017-09-01 VITALS
HEIGHT: 67 IN | DIASTOLIC BLOOD PRESSURE: 58 MMHG | RESPIRATION RATE: 18 BRPM | OXYGEN SATURATION: 99 % | SYSTOLIC BLOOD PRESSURE: 128 MMHG | WEIGHT: 137 LBS | BODY MASS INDEX: 21.5 KG/M2 | HEART RATE: 64 BPM | TEMPERATURE: 97.5 F

## 2017-09-01 DIAGNOSIS — D50.0 IRON DEFICIENCY ANEMIA DUE TO CHRONIC BLOOD LOSS: Primary | ICD-10-CM

## 2017-09-01 PROCEDURE — 96365 THER/PROPH/DIAG IV INF INIT: CPT

## 2017-09-01 PROCEDURE — 25010000002 IRON SUCROSE PER 1 MG: Performed by: INTERNAL MEDICINE

## 2017-09-01 RX ORDER — SODIUM CHLORIDE 9 MG/ML
250 INJECTION, SOLUTION INTRAVENOUS ONCE
Status: COMPLETED | OUTPATIENT
Start: 2017-09-01 | End: 2017-09-01

## 2017-09-01 RX ORDER — FAMOTIDINE 10 MG/ML
20 INJECTION, SOLUTION INTRAVENOUS AS NEEDED
Status: DISCONTINUED | OUTPATIENT
Start: 2017-09-01 | End: 2017-09-01 | Stop reason: HOSPADM

## 2017-09-01 RX ORDER — DIPHENHYDRAMINE HYDROCHLORIDE 50 MG/ML
50 INJECTION INTRAMUSCULAR; INTRAVENOUS AS NEEDED
Status: DISCONTINUED | OUTPATIENT
Start: 2017-09-01 | End: 2017-09-01 | Stop reason: HOSPADM

## 2017-09-01 RX ADMIN — SODIUM CHLORIDE 250 ML: 9 INJECTION, SOLUTION INTRAVENOUS at 09:09

## 2017-09-01 RX ADMIN — IRON SUCROSE 200 MG: 20 INJECTION, SOLUTION INTRAVENOUS at 09:09

## 2017-09-05 ENCOUNTER — INFUSION (OUTPATIENT)
Dept: ONCOLOGY | Facility: HOSPITAL | Age: 82
End: 2017-09-05

## 2017-09-05 VITALS
OXYGEN SATURATION: 98 % | HEIGHT: 67 IN | WEIGHT: 138 LBS | DIASTOLIC BLOOD PRESSURE: 63 MMHG | RESPIRATION RATE: 18 BRPM | TEMPERATURE: 97.6 F | SYSTOLIC BLOOD PRESSURE: 138 MMHG | BODY MASS INDEX: 21.66 KG/M2 | HEART RATE: 67 BPM

## 2017-09-05 DIAGNOSIS — D50.0 IRON DEFICIENCY ANEMIA DUE TO CHRONIC BLOOD LOSS: Primary | ICD-10-CM

## 2017-09-05 PROCEDURE — 25010000002 IRON SUCROSE PER 1 MG: Performed by: INTERNAL MEDICINE

## 2017-09-05 PROCEDURE — 96365 THER/PROPH/DIAG IV INF INIT: CPT

## 2017-09-05 RX ORDER — DIPHENHYDRAMINE HYDROCHLORIDE 50 MG/ML
50 INJECTION INTRAMUSCULAR; INTRAVENOUS AS NEEDED
Status: DISCONTINUED | OUTPATIENT
Start: 2017-09-05 | End: 2017-09-05 | Stop reason: HOSPADM

## 2017-09-05 RX ORDER — FAMOTIDINE 10 MG/ML
20 INJECTION, SOLUTION INTRAVENOUS AS NEEDED
Status: DISCONTINUED | OUTPATIENT
Start: 2017-09-05 | End: 2017-09-05 | Stop reason: HOSPADM

## 2017-09-05 RX ORDER — SODIUM CHLORIDE 9 MG/ML
250 INJECTION, SOLUTION INTRAVENOUS ONCE
Status: COMPLETED | OUTPATIENT
Start: 2017-09-05 | End: 2017-09-05

## 2017-09-05 RX ADMIN — IRON SUCROSE 200 MG: 20 INJECTION, SOLUTION INTRAVENOUS at 09:42

## 2017-09-05 RX ADMIN — SODIUM CHLORIDE 250 ML: 9 INJECTION, SOLUTION INTRAVENOUS at 09:39

## 2017-09-06 ENCOUNTER — APPOINTMENT (OUTPATIENT)
Dept: ONCOLOGY | Facility: HOSPITAL | Age: 82
End: 2017-09-06

## 2017-09-06 ENCOUNTER — INFUSION (OUTPATIENT)
Dept: ONCOLOGY | Facility: HOSPITAL | Age: 82
End: 2017-09-06
Attending: INTERNAL MEDICINE

## 2017-09-06 VITALS
OXYGEN SATURATION: 98 % | SYSTOLIC BLOOD PRESSURE: 141 MMHG | RESPIRATION RATE: 18 BRPM | TEMPERATURE: 97 F | WEIGHT: 137.6 LBS | HEART RATE: 67 BPM | BODY MASS INDEX: 21.6 KG/M2 | HEIGHT: 67 IN | DIASTOLIC BLOOD PRESSURE: 66 MMHG

## 2017-09-06 DIAGNOSIS — D50.0 IRON DEFICIENCY ANEMIA DUE TO CHRONIC BLOOD LOSS: Primary | ICD-10-CM

## 2017-09-06 PROCEDURE — 25010000002 IRON SUCROSE PER 1 MG: Performed by: INTERNAL MEDICINE

## 2017-09-06 PROCEDURE — 96365 THER/PROPH/DIAG IV INF INIT: CPT

## 2017-09-06 RX ORDER — DIPHENHYDRAMINE HYDROCHLORIDE 50 MG/ML
50 INJECTION INTRAMUSCULAR; INTRAVENOUS AS NEEDED
Status: DISCONTINUED | OUTPATIENT
Start: 2017-09-06 | End: 2017-09-06 | Stop reason: HOSPADM

## 2017-09-06 RX ORDER — SODIUM CHLORIDE 9 MG/ML
250 INJECTION, SOLUTION INTRAVENOUS ONCE
Status: COMPLETED | OUTPATIENT
Start: 2017-09-06 | End: 2017-09-06

## 2017-09-06 RX ORDER — FAMOTIDINE 10 MG/ML
20 INJECTION, SOLUTION INTRAVENOUS AS NEEDED
Status: DISCONTINUED | OUTPATIENT
Start: 2017-09-06 | End: 2017-09-06 | Stop reason: HOSPADM

## 2017-09-06 RX ADMIN — SODIUM CHLORIDE 250 ML: 9 INJECTION, SOLUTION INTRAVENOUS at 09:27

## 2017-09-06 RX ADMIN — IRON SUCROSE 200 MG: 20 INJECTION, SOLUTION INTRAVENOUS at 09:32

## 2017-09-08 ENCOUNTER — INFUSION (OUTPATIENT)
Dept: ONCOLOGY | Facility: HOSPITAL | Age: 82
End: 2017-09-08

## 2017-09-08 VITALS
HEIGHT: 67 IN | SYSTOLIC BLOOD PRESSURE: 114 MMHG | HEART RATE: 62 BPM | BODY MASS INDEX: 21.5 KG/M2 | TEMPERATURE: 97 F | WEIGHT: 137 LBS | DIASTOLIC BLOOD PRESSURE: 46 MMHG | OXYGEN SATURATION: 97 % | RESPIRATION RATE: 18 BRPM

## 2017-09-08 DIAGNOSIS — D50.0 IRON DEFICIENCY ANEMIA DUE TO CHRONIC BLOOD LOSS: Primary | ICD-10-CM

## 2017-09-08 PROCEDURE — 96365 THER/PROPH/DIAG IV INF INIT: CPT

## 2017-09-08 PROCEDURE — 25010000002 IRON SUCROSE PER 1 MG: Performed by: INTERNAL MEDICINE

## 2017-09-08 RX ORDER — FAMOTIDINE 10 MG/ML
20 INJECTION, SOLUTION INTRAVENOUS AS NEEDED
Status: DISCONTINUED | OUTPATIENT
Start: 2017-09-08 | End: 2017-09-08 | Stop reason: HOSPADM

## 2017-09-08 RX ORDER — DIPHENHYDRAMINE HYDROCHLORIDE 50 MG/ML
50 INJECTION INTRAMUSCULAR; INTRAVENOUS AS NEEDED
Status: DISCONTINUED | OUTPATIENT
Start: 2017-09-08 | End: 2017-09-08 | Stop reason: HOSPADM

## 2017-09-08 RX ORDER — SODIUM CHLORIDE 9 MG/ML
250 INJECTION, SOLUTION INTRAVENOUS ONCE
Status: COMPLETED | OUTPATIENT
Start: 2017-09-08 | End: 2017-09-08

## 2017-09-08 RX ADMIN — SODIUM CHLORIDE 250 ML: 9 INJECTION, SOLUTION INTRAVENOUS at 09:37

## 2017-09-08 RX ADMIN — IRON SUCROSE 200 MG: 20 INJECTION, SOLUTION INTRAVENOUS at 09:37

## 2017-09-11 ENCOUNTER — APPOINTMENT (OUTPATIENT)
Dept: PREADMISSION TESTING | Facility: HOSPITAL | Age: 82
End: 2017-09-11

## 2017-09-11 VITALS
WEIGHT: 139.4 LBS | RESPIRATION RATE: 17 BRPM | OXYGEN SATURATION: 100 % | HEIGHT: 67 IN | DIASTOLIC BLOOD PRESSURE: 59 MMHG | BODY MASS INDEX: 21.88 KG/M2 | SYSTOLIC BLOOD PRESSURE: 140 MMHG | HEART RATE: 83 BPM

## 2017-09-11 LAB
ALBUMIN SERPL-MCNC: 3.7 G/DL (ref 3.5–5)
ALBUMIN/GLOB SERPL: 1.4 G/DL (ref 1.1–2.5)
ALP SERPL-CCNC: 49 U/L (ref 24–120)
ALT SERPL W P-5'-P-CCNC: 30 U/L (ref 0–54)
ANION GAP SERPL CALCULATED.3IONS-SCNC: 9 MMOL/L (ref 4–13)
AST SERPL-CCNC: 28 U/L (ref 7–45)
BASOPHILS # BLD AUTO: 0.03 10*3/MM3 (ref 0–0.2)
BASOPHILS NFR BLD AUTO: 0.5 % (ref 0–2)
BILIRUB SERPL-MCNC: 0.3 MG/DL (ref 0.1–1)
BUN BLD-MCNC: 39 MG/DL (ref 5–21)
BUN/CREAT SERPL: 23.1 (ref 7–25)
CALCIUM SPEC-SCNC: 8.4 MG/DL (ref 8.4–10.4)
CHLORIDE SERPL-SCNC: 113 MMOL/L (ref 98–110)
CO2 SERPL-SCNC: 20 MMOL/L (ref 24–31)
CREAT BLD-MCNC: 1.69 MG/DL (ref 0.5–1.4)
DEPRECATED RDW RBC AUTO: 55.6 FL (ref 40–54)
EOSINOPHIL # BLD AUTO: 0.1 10*3/MM3 (ref 0–0.7)
EOSINOPHIL NFR BLD AUTO: 1.5 % (ref 0–4)
ERYTHROCYTE [DISTWIDTH] IN BLOOD BY AUTOMATED COUNT: 15.3 % (ref 12–15)
GFR SERPL CREATININE-BSD FRML MDRD: 38 ML/MIN/1.73
GLOBULIN UR ELPH-MCNC: 2.7 GM/DL
GLUCOSE BLD-MCNC: 159 MG/DL (ref 70–100)
HCT VFR BLD AUTO: 26.9 % (ref 40–52)
HGB BLD-MCNC: 8.5 G/DL (ref 14–18)
IMM GRANULOCYTES # BLD: 0.01 10*3/MM3 (ref 0–0.03)
IMM GRANULOCYTES NFR BLD: 0.2 % (ref 0–5)
LYMPHOCYTES # BLD AUTO: 1.31 10*3/MM3 (ref 0.72–4.86)
LYMPHOCYTES NFR BLD AUTO: 19.9 % (ref 15–45)
MCH RBC QN AUTO: 31.3 PG (ref 28–32)
MCHC RBC AUTO-ENTMCNC: 31.6 G/DL (ref 33–36)
MCV RBC AUTO: 98.9 FL (ref 82–95)
MONOCYTES # BLD AUTO: 0.38 10*3/MM3 (ref 0.19–1.3)
MONOCYTES NFR BLD AUTO: 5.8 % (ref 4–12)
NEUTROPHILS # BLD AUTO: 4.75 10*3/MM3 (ref 1.87–8.4)
NEUTROPHILS NFR BLD AUTO: 72.1 % (ref 39–78)
NRBC BLD MANUAL-RTO: 0 /100 WBC (ref 0–0)
PLATELET # BLD AUTO: 261 10*3/MM3 (ref 130–400)
PMV BLD AUTO: 9.6 FL (ref 6–12)
POTASSIUM BLD-SCNC: 5.1 MMOL/L (ref 3.5–5.3)
PROT SERPL-MCNC: 6.4 G/DL (ref 6.3–8.7)
RBC # BLD AUTO: 2.72 10*6/MM3 (ref 4.8–5.9)
SODIUM BLD-SCNC: 142 MMOL/L (ref 135–145)
WBC NRBC COR # BLD: 6.58 10*3/MM3 (ref 4.8–10.8)

## 2017-09-11 PROCEDURE — 36415 COLL VENOUS BLD VENIPUNCTURE: CPT

## 2017-09-11 PROCEDURE — 93005 ELECTROCARDIOGRAM TRACING: CPT

## 2017-09-11 PROCEDURE — 93010 ELECTROCARDIOGRAM REPORT: CPT | Performed by: INTERNAL MEDICINE

## 2017-09-11 PROCEDURE — 85025 COMPLETE CBC W/AUTO DIFF WBC: CPT | Performed by: SPECIALIST

## 2017-09-11 PROCEDURE — 80053 COMPREHEN METABOLIC PANEL: CPT | Performed by: SPECIALIST

## 2017-09-11 NOTE — DISCHARGE INSTRUCTIONS
DAY OF SURGERY INSTRUCTIONS        YOUR SURGEON: DR GUS MITTAL    PROCEDURE: LAPAROSCOPIC ASSISTED RIGHT COLECTOMY    DATE OF SURGERY: September 18, 2017    ARRIVAL TIME: AS DIRECTED BY OFFICE    DAY OF SURGERY TAKE ONLY THESE MEDICATIONS: AMLODIPINE          BEFORE YOU COME TO THE HOSPITAL  (Pre-op instructions)  • Do not eat, drink, smoke or chew gum after midnight the night before surgery.  This also includes no mints.  • Morning of surgery take only the medicines you have been instructed with a sip of water unless otherwise instructed  by your physician.  • Do not shave, wear makeup or dark nail polish.  • Remove all jewelry including rings.  • Leave anything you consider valuable at home.  • Leave your suitcase in the car until after your surgery.  • Bring the following with you if applicable:  o Picture ID and insurance, Medicare or Medicaid cards  o Co-pay/deductible required by insurance (cash, check, credit card)  o Copy of advance directive, living will or power-of- documents if not brought to PAT  o CPAP or BIPAP mask and tubing  o Relaxation aids (MP3 player, book, magazine)  • On the day of surgery check in at registration located at the main entrance of the hospital.       Outpatient Surgery Guidelines, Adult  Outpatient procedures are those for which the person having the procedure is allowed to go home the same day as the procedure. Various procedures are done on an outpatient basis. You should follow some general guidelines if you will be having an outpatient procedure.  LET YOUR HEALTH CARE PROVIDER KNOW ABOUT:  · Any allergies you have.  · All medicines you are taking, including vitamins, herbs, eye drops, creams, and over-the-counter medicines.  · Previous problems you or members of your family have had with the use of anesthetics.  · Any blood disorders you have.  · Previous surgeries you have had.  · Medical conditions you have.  RISKS AND COMPLICATIONS  Your health care provider  will discuss possible risks and complications with you before surgery. Common risks and complications include:    · Problems due to the use of anesthetics.  · Blood loss and replacement (does not apply to minor surgical procedures).  · Temporary increase in pain due to surgery.  · Uncorrected pain or problems that the surgery was meant to correct.  · Infection.  · New damage.  BEFORE THE PROCEDURE  · Ask your health care provider about changing or stopping your regular medicines. You may need to stop taking certain medicines in the days or weeks before the procedure.  · Stop smoking at least 2 weeks before surgery. This lowers your risk for complications during and after surgery. Ask your health care provider for help with this if needed.  · Eat your usual meals and a light supper the day before surgery. Continue fluid intake. Do not drink alcohol.  · Do not eat or drink after midnight the night before your surgery.   · Arrange for someone to take you home and to stay with you for 24 hours after the procedure. Medicine given for your procedure may affect your ability to drive or to care for yourself.  · Call your health care provider's office if you develop an illness or problem that may prevent you from safely having your procedure.  AFTER THE PROCEDURE  After surgery, you will be taken to a recovery area, where your progress will be monitored. If there are no complications, you will be allowed to go home when you are awake, stable, and taking fluids well. You may have numbness around the surgical site. Healing will take some time. You will have tenderness at the surgical site and may have some swelling and bruising. You may also have some nausea.  HOME CARE INSTRUCTIONS  · Do not drive for 24 hours, or as directed by your health care provider. Do not drive while taking prescription pain medicines.  · Do not drink alcohol for 24 hours.  · Do not make important decisions or sign legal documents for 24 hours.  · You  may resume a normal diet and activities as directed.  · Do not lift anything heavier than 10 pounds (4.5 kg) or play contact sports until your health care provider says it is okay.  · Change your bandages (dressings) as directed.  · Only take over-the-counter or prescription medicines as directed by your health care provider.  · Follow up with your health care provider as directed.  SEEK MEDICAL CARE IF:  · You have increased bleeding (more than a small spot) from the surgical site.  · You have redness, swelling, or increasing pain in the wound.  · You see pus coming from the wound.  · You have a fever.  · You notice a bad smell coming from the wound or dressing.  · You feel lightheaded or faint.  · You develop a rash.  · You have trouble breathing.  · You develop allergies.  MAKE SURE YOU:  · Understand these instructions.  · Will watch your condition.  · Will get help right away if you are not doing well or get worse.     This information is not intended to replace advice given to you by your health care provider. Make sure you discuss any questions you have with your health care provider.     Document Released: 09/12/2002 Document Revised: 05/03/2016 Document Reviewed: 05/22/2014  [employer] Interactive Patient Education ©2016 [employer]       Fall Prevention in Hospitals, Adult  As a hospital patient, your condition and the treatments you receive can increase your risk for falls. Some additional risk factors for falls in a hospital include:  · Being in an unfamiliar environment.  · Being on bed rest.  · Your surgery.  · Taking certain medicines.  · Your tubing requirements, such as intravenous (IV) therapy or catheters.  It is important that you learn how to decrease fall risks while at the hospital. Below are important tips that can help prevent falls.  SAFETY TIPS FOR PREVENTING FALLS  Talk about your risk of falling.  · Ask your health care provider why you are at risk for falling. Is it your medicine,  illness, tubing placement, or something else?  · Make a plan with your health care provider to keep you safe from falls.  · Ask your health care provider or pharmacist about side effects of your medicines. Some medicines can make you dizzy or affect your coordination.  Ask for help.  · Ask for help before getting out of bed. You may need to press your call button.  · Ask for assistance in getting safely to the toilet.  · Ask for a walker or cane to be put at your bedside. Ask that most of the side rails on your bed be placed up before your health care provider leaves the room.  · Ask family or friends to sit with you.  · Ask for things that are out of your reach, such as your glasses, hearing aids, telephone, bedside table, or call button.  Follow these tips to avoid falling:  · Stay lying or seated, rather than standing, while waiting for help.  · Wear rubber-soled slippers or shoes whenever you walk in the hospital.  · Avoid quick, sudden movements.  ¨ Change positions slowly.  ¨ Sit on the side of your bed before standing.  ¨ Stand up slowly and wait before you start to walk.  · Let your health care provider know if there is a spill on the floor.  · Pay careful attention to the medical equipment, electrical cords, and tubes around you.  · When you need help, use your call button by your bed or in the bathroom. Wait for one of your health care providers to help you.  · If you feel dizzy or unsure of your footing, return to bed and wait for assistance.  · Avoid being distracted by the TV, telephone, or another person in your room.  · Do not lean or support yourself on rolling objects, such as IV poles or bedside tables.     This information is not intended to replace advice given to you by your health care provider. Make sure you discuss any questions you have with your health care provider.     Document Released: 12/15/2001 Document Revised: 01/08/2016 Document Reviewed: 08/25/2013  [employer] Patient  Education ©2016 [employer]       Surgical Site Infections FAQs  What is a Surgical Site Infection (SSI)?  A surgical site infection is an infection that occurs after surgery in the part of the body where the surgery took place. Most patients who have surgery do not develop an infection. However, infections develop in about 1 to 3 out of every 100 patients who have surgery.  Some of the common symptoms of a surgical site infection are:  · Redness and pain around the area where you had surgery  · Drainage of cloudy fluid from your surgical wound  · Fever  Can SSIs be treated?  Yes. Most surgical site infections can be treated with antibiotics. The antibiotic given to you depends on the bacteria (germs) causing the infection. Sometimes patients with SSIs also need another surgery to treat the infection.  What are some of the things that hospitals are doing to prevent SSIs?  To prevent SSIs, doctors, nurses, and other healthcare providers:  · Clean their hands and arms up to their elbows with an antiseptic agent just before the surgery.  · Clean their hands with soap and water or an alcohol-based hand rub before and after caring for each patient.  · May remove some of your hair immediately before your surgery using electric clippers if the hair is in the same area where the procedure will occur. They should not shave you with a razor.  · Wear special hair covers, masks, gowns, and gloves during surgery to keep the surgery area clean.  · Give you antibiotics before your surgery starts. In most cases, you should get antibiotics within 60 minutes before the surgery starts and the antibiotics should be stopped within 24 hours after surgery.  · Clean the skin at the site of your surgery with a special soap that kills germs.  What can I do to help prevent SSIs?  Before your surgery:  · Tell your doctor about other medical problems you may have. Health problems such as allergies, diabetes, and obesity could affect your  surgery and your treatment.  · Quit smoking. Patients who smoke get more infections. Talk to your doctor about how you can quit before your surgery.  · Do not shave near where you will have surgery. Shaving with a razor can irritate your skin and make it easier to develop an infection.  At the time of your surgery:  · Speak up if someone tries to shave you with a razor before surgery. Ask why you need to be shaved and talk with your surgeon if you have any concerns.  · Ask if you will get antibiotics before surgery.  After your surgery:  · Make sure that your healthcare providers clean their hands before examining you, either with soap and water or an alcohol-based hand rub.  · If you do not see your providers clean their hands, please ask them to do so.  · Family and friends who visit you should not touch the surgical wound or dressings.  · Family and friends should clean their hands with soap and water or an alcohol-based hand rub before and after visiting you. If you do not see them clean their hands, ask them to clean their hands.  What do I need to do when I go home from the hospital?  · Before you go home, your doctor or nurse should explain everything you need to know about taking care of your wound. Make sure you understand how to care for your wound before you leave the hospital.  · Always clean your hands before and after caring for your wound.  · Before you go home, make sure you know who to contact if you have questions or problems after you get home.  · If you have any symptoms of an infection, such as redness and pain at the surgery site, drainage, or fever, call your doctor immediately.  If you have additional questions, please ask your doctor or nurse.  Developed and co-sponsored by The Society for Healthcare Epidemiology of [place] (SHEA); Infectious Diseases Society of [place] (IDSA); American Hospital Association; Association for Professionals in Infection Control and Epidemiology (APIC); [facility]  for Disease Control and Prevention (CDC); and The Joint Commission.     This information is not intended to replace advice given to you by your health care provider. Make sure you discuss any questions you have with your health care provider.     Document Released: 12/23/2014 Document Revised: 01/08/2016 Document Reviewed: 03/02/2016  [employer] Interactive Patient Education ©2016 [employer]       [facility]  CHG 4% Patient Instruction Sheet    Preparing the Skin Before Surgery  Preparing or “prepping” skin before surgery can reduce the risk of infection at the surgical site. To make the process easier,[facility] has chosen 4% Chlorhexidine Gluconate (CHG) antiseptic solution.   The steps below outline the prepping process and should be carefully followed.                                                                                                                                                      Prep the skin at the following time(s):                                                      We recommend you shower the night before surgery, and again the morning of surgery with the 4% CHG antiseptic solution using half of the bottle and a cloth each time.  Dress in clean clothes/sleepwear after showering.  See instructions below for application.          Do not apply any lotions or moisturizers.       Do not shave the area to be prepped for at least 2 days prior to surgery.    Clipping the hair may be done immediately prior to your surgery at the hospital    if needed.    Directions:  Thoroughly rinse your body with water.  Apply 4% CHG to a cloth and wash skin gently, paying special attention to the operative site.  Rinse again thoroughly.  Once you have begun using this product do not apply anything else to your skin. If itching or redness persists, rinse affected areas and discontinue use.    When using this product:  • Keep out of eyes, ears, and mouth.  • If solution should contact these areas, rinse  out promptly and thoroughly with water.  • For external use only.  • Do not use in genital area, on your face or head.      PATIENT/FAMILY/RESPONSIBLE PARTY VERBALIZES UNDERSTANDING OF ABOVE EDUCATION.  COPY OF PAIN SCALE GIVEN AND REVIEWED WITH VERBALIZED UNDERSTANDING.

## 2017-09-13 ENCOUNTER — INFUSION (OUTPATIENT)
Dept: ONCOLOGY | Facility: HOSPITAL | Age: 82
End: 2017-09-13
Attending: INTERNAL MEDICINE

## 2017-09-13 VITALS
HEART RATE: 68 BPM | RESPIRATION RATE: 20 BRPM | TEMPERATURE: 98 F | OXYGEN SATURATION: 94 % | DIASTOLIC BLOOD PRESSURE: 54 MMHG | SYSTOLIC BLOOD PRESSURE: 109 MMHG

## 2017-09-13 DIAGNOSIS — D50.0 IRON DEFICIENCY ANEMIA DUE TO CHRONIC BLOOD LOSS: Primary | ICD-10-CM

## 2017-09-13 PROCEDURE — 96365 THER/PROPH/DIAG IV INF INIT: CPT

## 2017-09-13 PROCEDURE — 25010000002 IRON SUCROSE PER 1 MG: Performed by: INTERNAL MEDICINE

## 2017-09-13 RX ORDER — SODIUM CHLORIDE 9 MG/ML
250 INJECTION, SOLUTION INTRAVENOUS ONCE
Status: COMPLETED | OUTPATIENT
Start: 2017-09-13 | End: 2017-09-13

## 2017-09-13 RX ORDER — FAMOTIDINE 10 MG/ML
20 INJECTION, SOLUTION INTRAVENOUS AS NEEDED
Status: DISCONTINUED | OUTPATIENT
Start: 2017-09-13 | End: 2017-09-13 | Stop reason: HOSPADM

## 2017-09-13 RX ORDER — DIPHENHYDRAMINE HYDROCHLORIDE 50 MG/ML
50 INJECTION INTRAMUSCULAR; INTRAVENOUS AS NEEDED
Status: DISCONTINUED | OUTPATIENT
Start: 2017-09-13 | End: 2017-09-13 | Stop reason: HOSPADM

## 2017-09-13 RX ADMIN — IRON SUCROSE 200 MG: 20 INJECTION, SOLUTION INTRAVENOUS at 13:40

## 2017-09-13 RX ADMIN — SODIUM CHLORIDE 250 ML: 9 INJECTION, SOLUTION INTRAVENOUS at 13:40

## 2017-09-18 ENCOUNTER — HOSPITAL ENCOUNTER (INPATIENT)
Facility: HOSPITAL | Age: 82
LOS: 4 days | Discharge: HOME | End: 2017-09-22
Attending: SPECIALIST | Admitting: SPECIALIST

## 2017-09-18 ENCOUNTER — ANESTHESIA (OUTPATIENT)
Dept: PERIOP | Facility: HOSPITAL | Age: 82
End: 2017-09-18

## 2017-09-18 ENCOUNTER — ANESTHESIA EVENT (OUTPATIENT)
Dept: PERIOP | Facility: HOSPITAL | Age: 82
End: 2017-09-18

## 2017-09-18 DIAGNOSIS — C18.2 CANCER OF RIGHT COLON (HCC): ICD-10-CM

## 2017-09-18 LAB
ABO GROUP BLD: NORMAL
BLD GP AB SCN SERPL QL: NEGATIVE
GLUCOSE BLDC GLUCOMTR-MCNC: 138 MG/DL (ref 70–130)
GLUCOSE BLDC GLUCOMTR-MCNC: 199 MG/DL (ref 70–130)
RH BLD: POSITIVE

## 2017-09-18 PROCEDURE — 82962 GLUCOSE BLOOD TEST: CPT

## 2017-09-18 PROCEDURE — C1765 ADHESION BARRIER: HCPCS | Performed by: SPECIALIST

## 2017-09-18 PROCEDURE — 25010000002 ONDANSETRON PER 1 MG: Performed by: SPECIALIST

## 2017-09-18 PROCEDURE — 25010000002 PROPOFOL 10 MG/ML EMULSION: Performed by: NURSE ANESTHETIST, CERTIFIED REGISTERED

## 2017-09-18 PROCEDURE — 0DNW0ZZ RELEASE PERITONEUM, OPEN APPROACH: ICD-10-PCS | Performed by: SPECIALIST

## 2017-09-18 PROCEDURE — 25010000002 MORPHINE PER 10 MG: Performed by: SPECIALIST

## 2017-09-18 PROCEDURE — 25010000002 CEFOXITIN: Performed by: SPECIALIST

## 2017-09-18 PROCEDURE — 25010000002 HYDROMORPHONE PER 4 MG: Performed by: ANESTHESIOLOGY

## 2017-09-18 PROCEDURE — 86901 BLOOD TYPING SEROLOGIC RH(D): CPT | Performed by: SPECIALIST

## 2017-09-18 PROCEDURE — 52281 CYSTOSCOPY AND TREATMENT: CPT | Performed by: UROLOGY

## 2017-09-18 PROCEDURE — 25010000002 SUCCINYLCHOLINE PER 20 MG: Performed by: NURSE ANESTHETIST, CERTIFIED REGISTERED

## 2017-09-18 PROCEDURE — 25010000002 MIDAZOLAM PER 1 MG: Performed by: ANESTHESIOLOGY

## 2017-09-18 PROCEDURE — 25010000002 FENTANYL CITRATE (PF) 250 MCG/5ML SOLUTION: Performed by: NURSE ANESTHETIST, CERTIFIED REGISTERED

## 2017-09-18 PROCEDURE — 25010000002 NEOSTIGMINE PER 0.5 MG: Performed by: NURSE ANESTHETIST, CERTIFIED REGISTERED

## 2017-09-18 PROCEDURE — 0DTF0ZZ RESECTION OF RIGHT LARGE INTESTINE, OPEN APPROACH: ICD-10-PCS | Performed by: SPECIALIST

## 2017-09-18 PROCEDURE — 88307 TISSUE EXAM BY PATHOLOGIST: CPT | Performed by: SPECIALIST

## 2017-09-18 PROCEDURE — 86850 RBC ANTIBODY SCREEN: CPT | Performed by: SPECIALIST

## 2017-09-18 PROCEDURE — 0T9B80Z DRAINAGE OF BLADDER WITH DRAINAGE DEVICE, VIA NATURAL OR ARTIFICIAL OPENING ENDOSCOPIC: ICD-10-PCS | Performed by: UROLOGY

## 2017-09-18 PROCEDURE — 86900 BLOOD TYPING SEROLOGIC ABO: CPT | Performed by: SPECIALIST

## 2017-09-18 PROCEDURE — 0T7D8ZZ DILATION OF URETHRA, VIA NATURAL OR ARTIFICIAL OPENING ENDOSCOPIC: ICD-10-PCS | Performed by: UROLOGY

## 2017-09-18 RX ORDER — SODIUM CHLORIDE, SODIUM LACTATE, POTASSIUM CHLORIDE, CALCIUM CHLORIDE 600; 310; 30; 20 MG/100ML; MG/100ML; MG/100ML; MG/100ML
1000 INJECTION, SOLUTION INTRAVENOUS CONTINUOUS
Status: DISPENSED | OUTPATIENT
Start: 2017-09-18 | End: 2017-09-19

## 2017-09-18 RX ORDER — FLUMAZENIL 0.1 MG/ML
0.2 INJECTION INTRAVENOUS AS NEEDED
Status: DISCONTINUED | OUTPATIENT
Start: 2017-09-18 | End: 2017-09-18 | Stop reason: HOSPADM

## 2017-09-18 RX ORDER — ALVIMOPAN 12 MG/1
12 CAPSULE ORAL ONCE
Status: COMPLETED | OUTPATIENT
Start: 2017-09-18 | End: 2017-09-18

## 2017-09-18 RX ORDER — METOCLOPRAMIDE HYDROCHLORIDE 5 MG/ML
5 INJECTION INTRAMUSCULAR; INTRAVENOUS
Status: DISCONTINUED | OUTPATIENT
Start: 2017-09-18 | End: 2017-09-18 | Stop reason: HOSPADM

## 2017-09-18 RX ORDER — LOSARTAN POTASSIUM 25 MG/1
25 TABLET ORAL
Status: DISCONTINUED | OUTPATIENT
Start: 2017-09-19 | End: 2017-09-22 | Stop reason: HOSPADM

## 2017-09-18 RX ORDER — BUPIVACAINE HYDROCHLORIDE 5 MG/ML
INJECTION, SOLUTION EPIDURAL; INTRACAUDAL AS NEEDED
Status: DISCONTINUED | OUTPATIENT
Start: 2017-09-18 | End: 2017-09-18 | Stop reason: HOSPADM

## 2017-09-18 RX ORDER — GLIPIZIDE 2.5 MG/1
2.5 TABLET, EXTENDED RELEASE ORAL DAILY
Status: DISCONTINUED | OUTPATIENT
Start: 2017-09-19 | End: 2017-09-22 | Stop reason: HOSPADM

## 2017-09-18 RX ORDER — ROCURONIUM BROMIDE 10 MG/ML
INJECTION, SOLUTION INTRAVENOUS AS NEEDED
Status: DISCONTINUED | OUTPATIENT
Start: 2017-09-18 | End: 2017-09-18 | Stop reason: SURG

## 2017-09-18 RX ORDER — AMLODIPINE BESYLATE 5 MG/1
2.5 TABLET ORAL
Status: DISCONTINUED | OUTPATIENT
Start: 2017-09-19 | End: 2017-09-22 | Stop reason: HOSPADM

## 2017-09-18 RX ORDER — IPRATROPIUM BROMIDE AND ALBUTEROL SULFATE 2.5; .5 MG/3ML; MG/3ML
3 SOLUTION RESPIRATORY (INHALATION) ONCE AS NEEDED
Status: DISCONTINUED | OUTPATIENT
Start: 2017-09-18 | End: 2017-09-18 | Stop reason: HOSPADM

## 2017-09-18 RX ORDER — DEXTROSE, SODIUM CHLORIDE, SODIUM LACTATE, POTASSIUM CHLORIDE, AND CALCIUM CHLORIDE 5; .6; .31; .03; .02 G/100ML; G/100ML; G/100ML; G/100ML; G/100ML
100 INJECTION, SOLUTION INTRAVENOUS CONTINUOUS
Status: DISCONTINUED | OUTPATIENT
Start: 2017-09-18 | End: 2017-09-19

## 2017-09-18 RX ORDER — FENTANYL CITRATE 50 UG/ML
INJECTION, SOLUTION INTRAMUSCULAR; INTRAVENOUS AS NEEDED
Status: DISCONTINUED | OUTPATIENT
Start: 2017-09-18 | End: 2017-09-18 | Stop reason: SURG

## 2017-09-18 RX ORDER — MORPHINE SULFATE 2 MG/ML
2 INJECTION, SOLUTION INTRAMUSCULAR; INTRAVENOUS AS NEEDED
Status: DISCONTINUED | OUTPATIENT
Start: 2017-09-18 | End: 2017-09-18 | Stop reason: HOSPADM

## 2017-09-18 RX ORDER — MEPERIDINE HYDROCHLORIDE 25 MG/ML
12.5 INJECTION INTRAMUSCULAR; INTRAVENOUS; SUBCUTANEOUS
Status: DISCONTINUED | OUTPATIENT
Start: 2017-09-18 | End: 2017-09-18 | Stop reason: HOSPADM

## 2017-09-18 RX ORDER — PROPOFOL 10 MG/ML
VIAL (ML) INTRAVENOUS AS NEEDED
Status: DISCONTINUED | OUTPATIENT
Start: 2017-09-18 | End: 2017-09-18 | Stop reason: SURG

## 2017-09-18 RX ORDER — MIDAZOLAM HYDROCHLORIDE 1 MG/ML
1 INJECTION INTRAMUSCULAR; INTRAVENOUS
Status: DISCONTINUED | OUTPATIENT
Start: 2017-09-18 | End: 2017-09-18 | Stop reason: HOSPADM

## 2017-09-18 RX ORDER — SODIUM CHLORIDE 0.9 % (FLUSH) 0.9 %
3 SYRINGE (ML) INJECTION AS NEEDED
Status: DISCONTINUED | OUTPATIENT
Start: 2017-09-18 | End: 2017-09-18 | Stop reason: HOSPADM

## 2017-09-18 RX ORDER — SODIUM CHLORIDE 0.9 % (FLUSH) 0.9 %
1-10 SYRINGE (ML) INJECTION AS NEEDED
Status: DISCONTINUED | OUTPATIENT
Start: 2017-09-18 | End: 2017-09-18 | Stop reason: HOSPADM

## 2017-09-18 RX ORDER — FAMOTIDINE 10 MG/ML
10 INJECTION, SOLUTION INTRAVENOUS 2 TIMES DAILY
Status: DISCONTINUED | OUTPATIENT
Start: 2017-09-18 | End: 2017-09-22 | Stop reason: HOSPADM

## 2017-09-18 RX ORDER — GLYCOPYRROLATE 0.2 MG/ML
INJECTION INTRAMUSCULAR; INTRAVENOUS AS NEEDED
Status: DISCONTINUED | OUTPATIENT
Start: 2017-09-18 | End: 2017-09-18 | Stop reason: SURG

## 2017-09-18 RX ORDER — MORPHINE SULFATE IN 0.9 % NACL 50 MG/50ML
PATIENT CONTROLLED ANALGESIA SYRINGE INTRAVENOUS CONTINUOUS
Status: DISCONTINUED | OUTPATIENT
Start: 2017-09-18 | End: 2017-09-21

## 2017-09-18 RX ORDER — LIDOCAINE HYDROCHLORIDE 20 MG/ML
INJECTION, SOLUTION INFILTRATION; PERINEURAL AS NEEDED
Status: DISCONTINUED | OUTPATIENT
Start: 2017-09-18 | End: 2017-09-18 | Stop reason: SURG

## 2017-09-18 RX ORDER — HYDRALAZINE HYDROCHLORIDE 20 MG/ML
5 INJECTION INTRAMUSCULAR; INTRAVENOUS
Status: DISCONTINUED | OUTPATIENT
Start: 2017-09-18 | End: 2017-09-18 | Stop reason: HOSPADM

## 2017-09-18 RX ORDER — SODIUM CHLORIDE 9 MG/ML
INJECTION, SOLUTION INTRAVENOUS AS NEEDED
Status: DISCONTINUED | OUTPATIENT
Start: 2017-09-18 | End: 2017-09-18 | Stop reason: HOSPADM

## 2017-09-18 RX ORDER — HYDROCODONE BITARTRATE AND ACETAMINOPHEN 7.5; 325 MG/1; MG/1
1 TABLET ORAL EVERY 4 HOURS PRN
Status: DISCONTINUED | OUTPATIENT
Start: 2017-09-18 | End: 2017-09-21

## 2017-09-18 RX ORDER — SUCCINYLCHOLINE CHLORIDE 20 MG/ML
INJECTION INTRAMUSCULAR; INTRAVENOUS AS NEEDED
Status: DISCONTINUED | OUTPATIENT
Start: 2017-09-18 | End: 2017-09-18 | Stop reason: SURG

## 2017-09-18 RX ORDER — NALOXONE HCL 0.4 MG/ML
0.04 VIAL (ML) INJECTION AS NEEDED
Status: DISCONTINUED | OUTPATIENT
Start: 2017-09-18 | End: 2017-09-18 | Stop reason: HOSPADM

## 2017-09-18 RX ORDER — MAGNESIUM HYDROXIDE 1200 MG/15ML
LIQUID ORAL AS NEEDED
Status: DISCONTINUED | OUTPATIENT
Start: 2017-09-18 | End: 2017-09-18 | Stop reason: HOSPADM

## 2017-09-18 RX ORDER — PHENYLEPHRINE HCL IN 0.9% NACL 0.8MG/10ML
SYRINGE (ML) INTRAVENOUS AS NEEDED
Status: DISCONTINUED | OUTPATIENT
Start: 2017-09-18 | End: 2017-09-18 | Stop reason: SURG

## 2017-09-18 RX ORDER — ALVIMOPAN 12 MG/1
12 CAPSULE ORAL 2 TIMES DAILY
Status: DISCONTINUED | OUTPATIENT
Start: 2017-09-19 | End: 2017-09-22 | Stop reason: HOSPADM

## 2017-09-18 RX ORDER — SODIUM CHLORIDE, SODIUM LACTATE, POTASSIUM CHLORIDE, CALCIUM CHLORIDE 600; 310; 30; 20 MG/100ML; MG/100ML; MG/100ML; MG/100ML
50 INJECTION, SOLUTION INTRAVENOUS CONTINUOUS
Status: DISCONTINUED | OUTPATIENT
Start: 2017-09-18 | End: 2017-09-21

## 2017-09-18 RX ORDER — NALOXONE HCL 0.4 MG/ML
0.1 VIAL (ML) INJECTION
Status: DISCONTINUED | OUTPATIENT
Start: 2017-09-18 | End: 2017-09-22 | Stop reason: HOSPADM

## 2017-09-18 RX ORDER — LABETALOL HYDROCHLORIDE 5 MG/ML
5 INJECTION, SOLUTION INTRAVENOUS
Status: DISCONTINUED | OUTPATIENT
Start: 2017-09-18 | End: 2017-09-18 | Stop reason: HOSPADM

## 2017-09-18 RX ORDER — ONDANSETRON 2 MG/ML
4 INJECTION INTRAMUSCULAR; INTRAVENOUS EVERY 6 HOURS PRN
Status: DISCONTINUED | OUTPATIENT
Start: 2017-09-18 | End: 2017-09-22 | Stop reason: HOSPADM

## 2017-09-18 RX ORDER — ONDANSETRON 2 MG/ML
4 INJECTION INTRAMUSCULAR; INTRAVENOUS AS NEEDED
Status: DISCONTINUED | OUTPATIENT
Start: 2017-09-18 | End: 2017-09-18 | Stop reason: HOSPADM

## 2017-09-18 RX ORDER — MIDAZOLAM HYDROCHLORIDE 1 MG/ML
2 INJECTION INTRAMUSCULAR; INTRAVENOUS
Status: DISCONTINUED | OUTPATIENT
Start: 2017-09-18 | End: 2017-09-18 | Stop reason: HOSPADM

## 2017-09-18 RX ADMIN — HYDROMORPHONE HYDROCHLORIDE 0.5 MG: 1 INJECTION, SOLUTION INTRAMUSCULAR; INTRAVENOUS; SUBCUTANEOUS at 10:14

## 2017-09-18 RX ADMIN — Medication 160 MCG: at 08:13

## 2017-09-18 RX ADMIN — LIDOCAINE HYDROCHLORIDE 0.5 ML: 10 INJECTION, SOLUTION EPIDURAL; INFILTRATION; INTRACAUDAL; PERINEURAL at 06:08

## 2017-09-18 RX ADMIN — Medication 3 MG: at 09:42

## 2017-09-18 RX ADMIN — FENTANYL CITRATE 100 MCG: 50 INJECTION INTRAMUSCULAR; INTRAVENOUS at 08:40

## 2017-09-18 RX ADMIN — HYDROMORPHONE HYDROCHLORIDE 0.5 MG: 1 INJECTION, SOLUTION INTRAMUSCULAR; INTRAVENOUS; SUBCUTANEOUS at 10:35

## 2017-09-18 RX ADMIN — FAMOTIDINE 10 MG: 10 INJECTION, SOLUTION INTRAVENOUS at 16:24

## 2017-09-18 RX ADMIN — LIDOCAINE HYDROCHLORIDE 50 MG: 20 INJECTION, SOLUTION INFILTRATION; PERINEURAL at 07:52

## 2017-09-18 RX ADMIN — FENTANYL CITRATE 100 MCG: 50 INJECTION INTRAMUSCULAR; INTRAVENOUS at 09:07

## 2017-09-18 RX ADMIN — ROCURONIUM BROMIDE 25 MG: 10 INJECTION INTRAVENOUS at 08:04

## 2017-09-18 RX ADMIN — SUCCINYLCHOLINE CHLORIDE 100 MG: 20 INJECTION, SOLUTION INTRAMUSCULAR; INTRAVENOUS at 07:52

## 2017-09-18 RX ADMIN — CEFOXITIN 2 G: 2 INJECTION, POWDER, FOR SOLUTION INTRAVENOUS at 07:57

## 2017-09-18 RX ADMIN — MIDAZOLAM HYDROCHLORIDE 1 MG: 1 INJECTION, SOLUTION INTRAMUSCULAR; INTRAVENOUS at 07:11

## 2017-09-18 RX ADMIN — Medication 160 MCG: at 08:03

## 2017-09-18 RX ADMIN — Medication: at 12:12

## 2017-09-18 RX ADMIN — FENTANYL CITRATE 50 MCG: 50 INJECTION INTRAMUSCULAR; INTRAVENOUS at 08:45

## 2017-09-18 RX ADMIN — SODIUM CHLORIDE, POTASSIUM CHLORIDE, SODIUM LACTATE AND CALCIUM CHLORIDE 1000 ML: 600; 310; 30; 20 INJECTION, SOLUTION INTRAVENOUS at 06:09

## 2017-09-18 RX ADMIN — SODIUM CHLORIDE, SODIUM LACTATE, POTASSIUM CHLORIDE, CALCIUM CHLORIDE AND DEXTROSE MONOHYDRATE 100 ML/HR: 5; 600; 310; 30; 20 INJECTION, SOLUTION INTRAVENOUS at 23:08

## 2017-09-18 RX ADMIN — PROPOFOL 150 MG: 10 INJECTION, EMULSION INTRAVENOUS at 07:52

## 2017-09-18 RX ADMIN — ROCURONIUM BROMIDE 20 MG: 10 INJECTION INTRAVENOUS at 08:40

## 2017-09-18 RX ADMIN — LIDOCAINE HYDROCHLORIDE 0.5 ML: 10 INJECTION, SOLUTION EPIDURAL; INFILTRATION; INTRACAUDAL; PERINEURAL at 06:06

## 2017-09-18 RX ADMIN — FENTANYL CITRATE 50 MCG: 50 INJECTION INTRAMUSCULAR; INTRAVENOUS at 09:25

## 2017-09-18 RX ADMIN — ONDANSETRON 4 MG: 2 INJECTION INTRAMUSCULAR; INTRAVENOUS at 16:23

## 2017-09-18 RX ADMIN — GLYCOPYRROLATE 0.4 MG: 0.2 INJECTION, SOLUTION INTRAMUSCULAR; INTRAVENOUS at 09:42

## 2017-09-18 RX ADMIN — FENTANYL CITRATE 100 MCG: 50 INJECTION INTRAMUSCULAR; INTRAVENOUS at 07:52

## 2017-09-18 RX ADMIN — SODIUM CHLORIDE, SODIUM LACTATE, POTASSIUM CHLORIDE, CALCIUM CHLORIDE AND DEXTROSE MONOHYDRATE 100 ML/HR: 5; 600; 310; 30; 20 INJECTION, SOLUTION INTRAVENOUS at 12:10

## 2017-09-18 RX ADMIN — ALVIMOPAN 12 MG: 12 CAPSULE ORAL at 06:13

## 2017-09-18 RX ADMIN — ROCURONIUM BROMIDE 5 MG: 10 INJECTION INTRAVENOUS at 07:52

## 2017-09-18 RX ADMIN — SODIUM CHLORIDE, POTASSIUM CHLORIDE, SODIUM LACTATE AND CALCIUM CHLORIDE 1000 ML: 600; 310; 30; 20 INJECTION, SOLUTION INTRAVENOUS at 06:08

## 2017-09-18 RX ADMIN — FENTANYL CITRATE 100 MCG: 50 INJECTION INTRAMUSCULAR; INTRAVENOUS at 08:58

## 2017-09-18 RX ADMIN — HYDROMORPHONE HYDROCHLORIDE 0.5 MG: 1 INJECTION, SOLUTION INTRAMUSCULAR; INTRAVENOUS; SUBCUTANEOUS at 10:12

## 2017-09-18 RX ADMIN — MIDAZOLAM HYDROCHLORIDE 1 MG: 1 INJECTION, SOLUTION INTRAMUSCULAR; INTRAVENOUS at 07:33

## 2017-09-18 NOTE — ANESTHESIA POSTPROCEDURE EVALUATION
Patient: Maikel Mathews    Procedure Summary     Date Anesthesia Start Anesthesia Stop Room / Location    09/18/17 0750 1003 [facility] 04 / [facility]       Procedure Diagnosis Surgeon Provider    LAPAROSCOPIC ASSISTED RIGHT COLECTOMY (N/A Abdomen); URETHRA DILITATION with dominguez catheter insertion (N/A ) (COLON CANCER) Caroline Loomis MD; MD Lor Hernandez CRNA          Anesthesia Type: general  Last vitals  BP   138/75 (09/18/17 1100)    Temp   97.7 °F (36.5 °C) (09/18/17 1100)    Pulse   88 (09/18/17 1100)   Resp   12 (09/18/17 1100)    SpO2   95 % (09/18/17 1100)      Post Anesthesia Care and Evaluation      Comments: Patient discharged from PACU prior to anesthesia evaluation based on Ben Score.  For details, see RN note.     /75  Pulse 88  Temp 97.7 °F (36.5 °C) (Temporal Artery )   Resp 12  SpO2 95%

## 2017-09-19 LAB
ANION GAP SERPL CALCULATED.3IONS-SCNC: 11 MMOL/L (ref 4–13)
BASOPHILS # BLD AUTO: 0.01 10*3/MM3 (ref 0–0.2)
BASOPHILS NFR BLD AUTO: 0.1 % (ref 0–2)
BUN BLD-MCNC: 48 MG/DL (ref 5–21)
BUN/CREAT SERPL: 19.4 (ref 7–25)
CALCIUM SPEC-SCNC: 8.2 MG/DL (ref 8.4–10.4)
CHLORIDE SERPL-SCNC: 109 MMOL/L (ref 98–110)
CO2 SERPL-SCNC: 18 MMOL/L (ref 24–31)
CREAT BLD-MCNC: 2.48 MG/DL (ref 0.5–1.4)
DEPRECATED RDW RBC AUTO: 53.3 FL (ref 40–54)
EOSINOPHIL # BLD AUTO: 0 10*3/MM3 (ref 0–0.7)
EOSINOPHIL NFR BLD AUTO: 0 % (ref 0–4)
ERYTHROCYTE [DISTWIDTH] IN BLOOD BY AUTOMATED COUNT: 14.9 % (ref 12–15)
GFR SERPL CREATININE-BSD FRML MDRD: 25 ML/MIN/1.73
GLUCOSE BLD-MCNC: 419 MG/DL (ref 70–100)
GLUCOSE BLDC GLUCOMTR-MCNC: 137 MG/DL (ref 70–130)
GLUCOSE BLDC GLUCOMTR-MCNC: 146 MG/DL (ref 70–130)
GLUCOSE BLDC GLUCOMTR-MCNC: 248 MG/DL (ref 70–130)
GLUCOSE BLDC GLUCOMTR-MCNC: 321 MG/DL (ref 70–130)
GLUCOSE BLDC GLUCOMTR-MCNC: 378 MG/DL (ref 70–130)
HCT VFR BLD AUTO: 26.9 % (ref 40–52)
HGB BLD-MCNC: 8.6 G/DL (ref 14–18)
IMM GRANULOCYTES # BLD: 0.03 10*3/MM3 (ref 0–0.03)
IMM GRANULOCYTES NFR BLD: 0.3 % (ref 0–5)
LYMPHOCYTES # BLD AUTO: 0.62 10*3/MM3 (ref 0.72–4.86)
LYMPHOCYTES NFR BLD AUTO: 5.4 % (ref 15–45)
MCH RBC QN AUTO: 31.3 PG (ref 28–32)
MCHC RBC AUTO-ENTMCNC: 32 G/DL (ref 33–36)
MCV RBC AUTO: 97.8 FL (ref 82–95)
MONOCYTES # BLD AUTO: 0.74 10*3/MM3 (ref 0.19–1.3)
MONOCYTES NFR BLD AUTO: 6.5 % (ref 4–12)
NEUTROPHILS # BLD AUTO: 10.07 10*3/MM3 (ref 1.87–8.4)
NEUTROPHILS NFR BLD AUTO: 87.7 % (ref 39–78)
PLATELET # BLD AUTO: 303 10*3/MM3 (ref 130–400)
PMV BLD AUTO: 9.9 FL (ref 6–12)
POTASSIUM BLD-SCNC: 5.4 MMOL/L (ref 3.5–5.3)
RBC # BLD AUTO: 2.75 10*6/MM3 (ref 4.8–5.9)
SODIUM BLD-SCNC: 138 MMOL/L (ref 135–145)
WBC NRBC COR # BLD: 11.47 10*3/MM3 (ref 4.8–10.8)

## 2017-09-19 PROCEDURE — 63710000001 INSULIN DETEMIR PER 5 UNITS: Performed by: FAMILY MEDICINE

## 2017-09-19 PROCEDURE — 25010000002 ENOXAPARIN PER 10 MG: Performed by: SPECIALIST

## 2017-09-19 PROCEDURE — 85025 COMPLETE CBC W/AUTO DIFF WBC: CPT | Performed by: SPECIALIST

## 2017-09-19 PROCEDURE — 82962 GLUCOSE BLOOD TEST: CPT

## 2017-09-19 PROCEDURE — 80048 BASIC METABOLIC PNL TOTAL CA: CPT | Performed by: SPECIALIST

## 2017-09-19 PROCEDURE — 63710000001 INSULIN LISPRO (HUMAN) PER 5 UNITS: Performed by: SPECIALIST

## 2017-09-19 RX ORDER — SODIUM POLYSTYRENE SULFONATE 15 G/60ML
15 SUSPENSION ORAL; RECTAL ONCE
Status: COMPLETED | OUTPATIENT
Start: 2017-09-19 | End: 2017-09-19

## 2017-09-19 RX ORDER — NICOTINE POLACRILEX 4 MG
15 LOZENGE BUCCAL
Status: DISCONTINUED | OUTPATIENT
Start: 2017-09-19 | End: 2017-09-22 | Stop reason: HOSPADM

## 2017-09-19 RX ORDER — DEXTROSE MONOHYDRATE 25 G/50ML
25 INJECTION, SOLUTION INTRAVENOUS
Status: DISCONTINUED | OUTPATIENT
Start: 2017-09-19 | End: 2017-09-22 | Stop reason: HOSPADM

## 2017-09-19 RX ADMIN — FAMOTIDINE 10 MG: 10 INJECTION, SOLUTION INTRAVENOUS at 09:28

## 2017-09-19 RX ADMIN — LOSARTAN POTASSIUM 25 MG: 25 TABLET, FILM COATED ORAL at 09:24

## 2017-09-19 RX ADMIN — ALVIMOPAN 12 MG: 12 CAPSULE ORAL at 18:10

## 2017-09-19 RX ADMIN — SODIUM CHLORIDE, POTASSIUM CHLORIDE, SODIUM LACTATE AND CALCIUM CHLORIDE 100 ML/HR: 600; 310; 30; 20 INJECTION, SOLUTION INTRAVENOUS at 07:43

## 2017-09-19 RX ADMIN — INSULIN DETEMIR 5 UNITS: 100 INJECTION, SOLUTION SUBCUTANEOUS at 20:33

## 2017-09-19 RX ADMIN — INSULIN LISPRO 5 UNITS: 100 INJECTION, SOLUTION INTRAVENOUS; SUBCUTANEOUS at 07:45

## 2017-09-19 RX ADMIN — AMLODIPINE BESYLATE 2.5 MG: 5 TABLET ORAL at 09:24

## 2017-09-19 RX ADMIN — INSULIN DETEMIR 5 UNITS: 100 INJECTION, SOLUTION SUBCUTANEOUS at 14:12

## 2017-09-19 RX ADMIN — GLIPIZIDE 2.5 MG: 2.5 TABLET, FILM COATED, EXTENDED RELEASE ORAL at 09:24

## 2017-09-19 RX ADMIN — ENOXAPARIN SODIUM 30 MG: 30 INJECTION SUBCUTANEOUS at 09:27

## 2017-09-19 RX ADMIN — SODIUM CHLORIDE, POTASSIUM CHLORIDE, SODIUM LACTATE AND CALCIUM CHLORIDE 100 ML/HR: 600; 310; 30; 20 INJECTION, SOLUTION INTRAVENOUS at 18:09

## 2017-09-19 RX ADMIN — FAMOTIDINE 10 MG: 10 INJECTION, SOLUTION INTRAVENOUS at 18:10

## 2017-09-19 RX ADMIN — ALVIMOPAN 12 MG: 12 CAPSULE ORAL at 09:24

## 2017-09-19 RX ADMIN — INSULIN LISPRO 3 UNITS: 100 INJECTION, SOLUTION INTRAVENOUS; SUBCUTANEOUS at 11:45

## 2017-09-19 RX ADMIN — SODIUM POLYSTYRENE SULFONATE 15 G: 15 SUSPENSION ORAL; RECTAL at 14:11

## 2017-09-20 ENCOUNTER — APPOINTMENT (OUTPATIENT)
Dept: ONCOLOGY | Facility: HOSPITAL | Age: 82
End: 2017-09-20
Attending: INTERNAL MEDICINE

## 2017-09-20 LAB
ANION GAP SERPL CALCULATED.3IONS-SCNC: 8 MMOL/L (ref 4–13)
BASOPHILS # BLD AUTO: 0.01 10*3/MM3 (ref 0–0.2)
BASOPHILS NFR BLD AUTO: 0.1 % (ref 0–2)
BUN BLD-MCNC: 44 MG/DL (ref 5–21)
BUN/CREAT SERPL: 19 (ref 7–25)
CALCIUM SPEC-SCNC: 8.2 MG/DL (ref 8.4–10.4)
CHLORIDE SERPL-SCNC: 111 MMOL/L (ref 98–110)
CO2 SERPL-SCNC: 20 MMOL/L (ref 24–31)
CREAT BLD-MCNC: 2.31 MG/DL (ref 0.5–1.4)
DEPRECATED RDW RBC AUTO: 54.9 FL (ref 40–54)
EOSINOPHIL # BLD AUTO: 0 10*3/MM3 (ref 0–0.7)
EOSINOPHIL NFR BLD AUTO: 0 % (ref 0–4)
ERYTHROCYTE [DISTWIDTH] IN BLOOD BY AUTOMATED COUNT: 15.4 % (ref 12–15)
GFR SERPL CREATININE-BSD FRML MDRD: 27 ML/MIN/1.73
GLUCOSE BLD-MCNC: 81 MG/DL (ref 70–100)
GLUCOSE BLDC GLUCOMTR-MCNC: 156 MG/DL (ref 70–130)
GLUCOSE BLDC GLUCOMTR-MCNC: 73 MG/DL (ref 70–130)
GLUCOSE BLDC GLUCOMTR-MCNC: 84 MG/DL (ref 70–130)
GLUCOSE BLDC GLUCOMTR-MCNC: 92 MG/DL (ref 70–130)
HBA1C MFR BLD: 4.8 %
HCT VFR BLD AUTO: 26.1 % (ref 40–52)
HGB BLD-MCNC: 8.4 G/DL (ref 14–18)
IMM GRANULOCYTES # BLD: 0.04 10*3/MM3 (ref 0–0.03)
IMM GRANULOCYTES NFR BLD: 0.4 % (ref 0–5)
LYMPHOCYTES # BLD AUTO: 0.5 10*3/MM3 (ref 0.72–4.86)
LYMPHOCYTES NFR BLD AUTO: 4.4 % (ref 15–45)
MAGNESIUM SERPL-MCNC: 2.4 MG/DL (ref 1.4–2.2)
MCH RBC QN AUTO: 31.2 PG (ref 28–32)
MCHC RBC AUTO-ENTMCNC: 32.2 G/DL (ref 33–36)
MCV RBC AUTO: 97 FL (ref 82–95)
MONOCYTES # BLD AUTO: 0.41 10*3/MM3 (ref 0.19–1.3)
MONOCYTES NFR BLD AUTO: 3.6 % (ref 4–12)
NEUTROPHILS # BLD AUTO: 10.32 10*3/MM3 (ref 1.87–8.4)
NEUTROPHILS NFR BLD AUTO: 91.5 % (ref 39–78)
PLATELET # BLD AUTO: 261 10*3/MM3 (ref 130–400)
PMV BLD AUTO: 9.6 FL (ref 6–12)
POTASSIUM BLD-SCNC: 4.4 MMOL/L (ref 3.5–5.3)
RBC # BLD AUTO: 2.69 10*6/MM3 (ref 4.8–5.9)
SODIUM BLD-SCNC: 139 MMOL/L (ref 135–145)
WBC NRBC COR # BLD: 11.28 10*3/MM3 (ref 4.8–10.8)

## 2017-09-20 PROCEDURE — 85025 COMPLETE CBC W/AUTO DIFF WBC: CPT | Performed by: SPECIALIST

## 2017-09-20 PROCEDURE — 80048 BASIC METABOLIC PNL TOTAL CA: CPT | Performed by: SPECIALIST

## 2017-09-20 PROCEDURE — 83735 ASSAY OF MAGNESIUM: CPT | Performed by: FAMILY MEDICINE

## 2017-09-20 PROCEDURE — 82962 GLUCOSE BLOOD TEST: CPT

## 2017-09-20 PROCEDURE — 83036 HEMOGLOBIN GLYCOSYLATED A1C: CPT | Performed by: FAMILY MEDICINE

## 2017-09-20 PROCEDURE — 25010000002 ENOXAPARIN PER 10 MG: Performed by: SPECIALIST

## 2017-09-20 PROCEDURE — 63710000001 INSULIN LISPRO (HUMAN) PER 5 UNITS: Performed by: SPECIALIST

## 2017-09-20 PROCEDURE — 63710000001 INSULIN DETEMIR PER 5 UNITS: Performed by: FAMILY MEDICINE

## 2017-09-20 RX ADMIN — SODIUM CHLORIDE, POTASSIUM CHLORIDE, SODIUM LACTATE AND CALCIUM CHLORIDE 100 ML/HR: 600; 310; 30; 20 INJECTION, SOLUTION INTRAVENOUS at 03:57

## 2017-09-20 RX ADMIN — FAMOTIDINE 10 MG: 10 INJECTION, SOLUTION INTRAVENOUS at 09:59

## 2017-09-20 RX ADMIN — ALVIMOPAN 12 MG: 12 CAPSULE ORAL at 09:59

## 2017-09-20 RX ADMIN — FAMOTIDINE 10 MG: 10 INJECTION, SOLUTION INTRAVENOUS at 18:24

## 2017-09-20 RX ADMIN — AMLODIPINE BESYLATE 2.5 MG: 5 TABLET ORAL at 09:59

## 2017-09-20 RX ADMIN — GLIPIZIDE 2.5 MG: 2.5 TABLET, FILM COATED, EXTENDED RELEASE ORAL at 09:58

## 2017-09-20 RX ADMIN — SODIUM CHLORIDE, POTASSIUM CHLORIDE, SODIUM LACTATE AND CALCIUM CHLORIDE 50 ML/HR: 600; 310; 30; 20 INJECTION, SOLUTION INTRAVENOUS at 18:24

## 2017-09-20 RX ADMIN — ENOXAPARIN SODIUM 30 MG: 30 INJECTION SUBCUTANEOUS at 09:58

## 2017-09-20 RX ADMIN — INSULIN DETEMIR 5 UNITS: 100 INJECTION, SOLUTION SUBCUTANEOUS at 09:57

## 2017-09-20 RX ADMIN — INSULIN DETEMIR 5 UNITS: 100 INJECTION, SOLUTION SUBCUTANEOUS at 21:54

## 2017-09-20 RX ADMIN — ALVIMOPAN 12 MG: 12 CAPSULE ORAL at 18:24

## 2017-09-20 RX ADMIN — LOSARTAN POTASSIUM 25 MG: 25 TABLET, FILM COATED ORAL at 09:59

## 2017-09-20 RX ADMIN — INSULIN LISPRO 2 UNITS: 100 INJECTION, SOLUTION INTRAVENOUS; SUBCUTANEOUS at 12:55

## 2017-09-21 LAB
ANION GAP SERPL CALCULATED.3IONS-SCNC: 6 MMOL/L (ref 4–13)
BASOPHILS # BLD AUTO: 0.02 10*3/MM3 (ref 0–0.2)
BASOPHILS NFR BLD AUTO: 0.2 % (ref 0–2)
BUN BLD-MCNC: 41 MG/DL (ref 5–21)
BUN/CREAT SERPL: 20 (ref 7–25)
CALCIUM SPEC-SCNC: 7.9 MG/DL (ref 8.4–10.4)
CEA SERPL-MCNC: 0.83 NG/ML (ref 0–5)
CHLORIDE SERPL-SCNC: 113 MMOL/L (ref 98–110)
CO2 SERPL-SCNC: 21 MMOL/L (ref 24–31)
CREAT BLD-MCNC: 2.05 MG/DL (ref 0.5–1.4)
DEPRECATED RDW RBC AUTO: 54 FL (ref 40–54)
EOSINOPHIL # BLD AUTO: 0.06 10*3/MM3 (ref 0–0.7)
EOSINOPHIL NFR BLD AUTO: 0.7 % (ref 0–4)
ERYTHROCYTE [DISTWIDTH] IN BLOOD BY AUTOMATED COUNT: 15.1 % (ref 12–15)
GFR SERPL CREATININE-BSD FRML MDRD: 31 ML/MIN/1.73
GLUCOSE BLD-MCNC: 56 MG/DL (ref 70–100)
GLUCOSE BLDC GLUCOMTR-MCNC: 117 MG/DL (ref 70–130)
GLUCOSE BLDC GLUCOMTR-MCNC: 172 MG/DL (ref 70–130)
GLUCOSE BLDC GLUCOMTR-MCNC: 264 MG/DL (ref 70–130)
GLUCOSE BLDC GLUCOMTR-MCNC: 58 MG/DL (ref 70–130)
GLUCOSE BLDC GLUCOMTR-MCNC: 82 MG/DL (ref 70–130)
HCT VFR BLD AUTO: 24.3 % (ref 40–52)
HGB BLD-MCNC: 7.9 G/DL (ref 14–18)
IMM GRANULOCYTES # BLD: 0.02 10*3/MM3 (ref 0–0.03)
IMM GRANULOCYTES NFR BLD: 0.2 % (ref 0–5)
LYMPHOCYTES # BLD AUTO: 1.19 10*3/MM3 (ref 0.72–4.86)
LYMPHOCYTES NFR BLD AUTO: 14.7 % (ref 15–45)
MCH RBC QN AUTO: 31.6 PG (ref 28–32)
MCHC RBC AUTO-ENTMCNC: 32.5 G/DL (ref 33–36)
MCV RBC AUTO: 97.2 FL (ref 82–95)
MONOCYTES # BLD AUTO: 0.54 10*3/MM3 (ref 0.19–1.3)
MONOCYTES NFR BLD AUTO: 6.7 % (ref 4–12)
NEUTROPHILS # BLD AUTO: 6.27 10*3/MM3 (ref 1.87–8.4)
NEUTROPHILS NFR BLD AUTO: 77.5 % (ref 39–78)
PLATELET # BLD AUTO: 229 10*3/MM3 (ref 130–400)
PMV BLD AUTO: 9.3 FL (ref 6–12)
POTASSIUM BLD-SCNC: 3.9 MMOL/L (ref 3.5–5.3)
RBC # BLD AUTO: 2.5 10*6/MM3 (ref 4.8–5.9)
SODIUM BLD-SCNC: 140 MMOL/L (ref 135–145)
WBC NRBC COR # BLD: 8.1 10*3/MM3 (ref 4.8–10.8)

## 2017-09-21 PROCEDURE — 86301 IMMUNOASSAY TUMOR CA 19-9: CPT | Performed by: NURSE PRACTITIONER

## 2017-09-21 PROCEDURE — 82378 CARCINOEMBRYONIC ANTIGEN: CPT | Performed by: NURSE PRACTITIONER

## 2017-09-21 PROCEDURE — 30233N1 TRANSFUSION OF NONAUTOLOGOUS RED BLOOD CELLS INTO PERIPHERAL VEIN, PERCUTANEOUS APPROACH: ICD-10-PCS | Performed by: SPECIALIST

## 2017-09-21 PROCEDURE — 25010000002 ENOXAPARIN PER 10 MG: Performed by: SPECIALIST

## 2017-09-21 PROCEDURE — P9016 RBC LEUKOCYTES REDUCED: HCPCS

## 2017-09-21 PROCEDURE — 82962 GLUCOSE BLOOD TEST: CPT

## 2017-09-21 PROCEDURE — 63710000001 INSULIN LISPRO (HUMAN) PER 5 UNITS: Performed by: SPECIALIST

## 2017-09-21 PROCEDURE — 86923 COMPATIBILITY TEST ELECTRIC: CPT

## 2017-09-21 PROCEDURE — 25010000002 DIPHENHYDRAMINE PER 50 MG: Performed by: NURSE PRACTITIONER

## 2017-09-21 PROCEDURE — 86900 BLOOD TYPING SEROLOGIC ABO: CPT

## 2017-09-21 PROCEDURE — 80048 BASIC METABOLIC PNL TOTAL CA: CPT | Performed by: SPECIALIST

## 2017-09-21 PROCEDURE — 25010000002 IRON SUCROSE PER 1 MG: Performed by: FAMILY MEDICINE

## 2017-09-21 PROCEDURE — 36430 TRANSFUSION BLD/BLD COMPNT: CPT

## 2017-09-21 PROCEDURE — 85025 COMPLETE CBC W/AUTO DIFF WBC: CPT | Performed by: SPECIALIST

## 2017-09-21 PROCEDURE — 25010000002 METHYLPREDNISOLONE PER 125 MG: Performed by: NURSE PRACTITIONER

## 2017-09-21 RX ORDER — METHYLPREDNISOLONE SODIUM SUCCINATE 125 MG/2ML
125 INJECTION, POWDER, LYOPHILIZED, FOR SOLUTION INTRAMUSCULAR; INTRAVENOUS ONCE
Status: COMPLETED | OUTPATIENT
Start: 2017-09-21 | End: 2017-09-21

## 2017-09-21 RX ORDER — DIPHENHYDRAMINE HYDROCHLORIDE 50 MG/ML
25 INJECTION INTRAMUSCULAR; INTRAVENOUS ONCE
Status: COMPLETED | OUTPATIENT
Start: 2017-09-21 | End: 2017-09-21

## 2017-09-21 RX ORDER — HYDROCODONE BITARTRATE AND ACETAMINOPHEN 7.5; 325 MG/1; MG/1
1 TABLET ORAL EVERY 6 HOURS PRN
Status: DISCONTINUED | OUTPATIENT
Start: 2017-09-21 | End: 2017-09-22 | Stop reason: HOSPADM

## 2017-09-21 RX ORDER — ACETAMINOPHEN 325 MG/1
650 TABLET ORAL ONCE
Status: COMPLETED | OUTPATIENT
Start: 2017-09-21 | End: 2017-09-21

## 2017-09-21 RX ORDER — HYDROCODONE BITARTRATE AND ACETAMINOPHEN 7.5; 325 MG/1; MG/1
1 TABLET ORAL EVERY 4 HOURS PRN
Status: DISCONTINUED | OUTPATIENT
Start: 2017-09-21 | End: 2017-09-22 | Stop reason: HOSPADM

## 2017-09-21 RX ADMIN — FAMOTIDINE 10 MG: 10 INJECTION, SOLUTION INTRAVENOUS at 17:33

## 2017-09-21 RX ADMIN — IRON SUCROSE 300 MG: 20 INJECTION, SOLUTION INTRAVENOUS at 14:19

## 2017-09-21 RX ADMIN — LOSARTAN POTASSIUM 25 MG: 25 TABLET, FILM COATED ORAL at 09:27

## 2017-09-21 RX ADMIN — ALVIMOPAN 12 MG: 12 CAPSULE ORAL at 09:20

## 2017-09-21 RX ADMIN — ALVIMOPAN 12 MG: 12 CAPSULE ORAL at 17:33

## 2017-09-21 RX ADMIN — ENOXAPARIN SODIUM 30 MG: 30 INJECTION SUBCUTANEOUS at 09:26

## 2017-09-21 RX ADMIN — INSULIN LISPRO 4 UNITS: 100 INJECTION, SOLUTION INTRAVENOUS; SUBCUTANEOUS at 20:41

## 2017-09-21 RX ADMIN — GLIPIZIDE 2.5 MG: 2.5 TABLET, FILM COATED, EXTENDED RELEASE ORAL at 09:20

## 2017-09-21 RX ADMIN — ACETAMINOPHEN 650 MG: 325 TABLET ORAL at 10:07

## 2017-09-21 RX ADMIN — METHYLPREDNISOLONE SODIUM SUCCINATE 125 MG: 125 INJECTION, POWDER, FOR SOLUTION INTRAMUSCULAR; INTRAVENOUS at 10:07

## 2017-09-21 RX ADMIN — DIPHENHYDRAMINE HYDROCHLORIDE 25 MG: 50 INJECTION, SOLUTION INTRAMUSCULAR; INTRAVENOUS at 10:07

## 2017-09-21 RX ADMIN — AMLODIPINE BESYLATE 2.5 MG: 5 TABLET ORAL at 09:20

## 2017-09-21 RX ADMIN — FAMOTIDINE 10 MG: 10 INJECTION, SOLUTION INTRAVENOUS at 09:26

## 2017-09-22 VITALS
HEIGHT: 67 IN | TEMPERATURE: 98.6 F | BODY MASS INDEX: 21.91 KG/M2 | RESPIRATION RATE: 16 BRPM | WEIGHT: 139.6 LBS | SYSTOLIC BLOOD PRESSURE: 147 MMHG | OXYGEN SATURATION: 97 % | DIASTOLIC BLOOD PRESSURE: 63 MMHG | HEART RATE: 67 BPM

## 2017-09-22 LAB
ABO + RH BLD: NORMAL
ABO + RH BLD: NORMAL
ANION GAP SERPL CALCULATED.3IONS-SCNC: 8 MMOL/L (ref 4–13)
BASOPHILS # BLD AUTO: 0 10*3/MM3 (ref 0–0.2)
BASOPHILS NFR BLD AUTO: 0 % (ref 0–2)
BH BB BLOOD EXPIRATION DATE: NORMAL
BH BB BLOOD EXPIRATION DATE: NORMAL
BH BB BLOOD TYPE BARCODE: 6200
BH BB BLOOD TYPE BARCODE: 6200
BH BB DISPENSE STATUS: NORMAL
BH BB DISPENSE STATUS: NORMAL
BH BB PRODUCT CODE: NORMAL
BH BB PRODUCT CODE: NORMAL
BH BB UNIT NUMBER: NORMAL
BH BB UNIT NUMBER: NORMAL
BUN BLD-MCNC: 45 MG/DL (ref 5–21)
BUN/CREAT SERPL: 23.9 (ref 7–25)
CALCIUM SPEC-SCNC: 7.6 MG/DL (ref 8.4–10.4)
CANCER AG19-9 SERPL-ACNC: 2 U/ML (ref 0–35)
CHLORIDE SERPL-SCNC: 112 MMOL/L (ref 98–110)
CO2 SERPL-SCNC: 20 MMOL/L (ref 24–31)
CREAT BLD-MCNC: 1.88 MG/DL (ref 0.5–1.4)
DEPRECATED RDW RBC AUTO: 54 FL (ref 40–54)
EOSINOPHIL # BLD AUTO: 0 10*3/MM3 (ref 0–0.7)
EOSINOPHIL NFR BLD AUTO: 0 % (ref 0–4)
ERYTHROCYTE [DISTWIDTH] IN BLOOD BY AUTOMATED COUNT: 15.8 % (ref 12–15)
GFR SERPL CREATININE-BSD FRML MDRD: 34 ML/MIN/1.73
GLUCOSE BLD-MCNC: 126 MG/DL (ref 70–100)
GLUCOSE BLDC GLUCOMTR-MCNC: 117 MG/DL (ref 70–130)
GLUCOSE BLDC GLUCOMTR-MCNC: 118 MG/DL (ref 70–130)
HCT VFR BLD AUTO: 24.6 % (ref 40–52)
HGB BLD-MCNC: 8.3 G/DL (ref 14–18)
IMM GRANULOCYTES # BLD: 0.01 10*3/MM3 (ref 0–0.03)
IMM GRANULOCYTES NFR BLD: 0.1 % (ref 0–5)
LYMPHOCYTES # BLD AUTO: 0.79 10*3/MM3 (ref 0.72–4.86)
LYMPHOCYTES NFR BLD AUTO: 9.7 % (ref 15–45)
MCH RBC QN AUTO: 31.4 PG (ref 28–32)
MCHC RBC AUTO-ENTMCNC: 33.7 G/DL (ref 33–36)
MCV RBC AUTO: 93.2 FL (ref 82–95)
MONOCYTES # BLD AUTO: 0.38 10*3/MM3 (ref 0.19–1.3)
MONOCYTES NFR BLD AUTO: 4.6 % (ref 4–12)
NEUTROPHILS # BLD AUTO: 7 10*3/MM3 (ref 1.87–8.4)
NEUTROPHILS NFR BLD AUTO: 85.6 % (ref 39–78)
PLATELET # BLD AUTO: 208 10*3/MM3 (ref 130–400)
PMV BLD AUTO: 9.6 FL (ref 6–12)
POTASSIUM BLD-SCNC: 4.4 MMOL/L (ref 3.5–5.3)
RBC # BLD AUTO: 2.64 10*6/MM3 (ref 4.8–5.9)
SODIUM BLD-SCNC: 140 MMOL/L (ref 135–145)
UNIT  ABO: NORMAL
UNIT  ABO: NORMAL
UNIT  RH: NORMAL
UNIT  RH: NORMAL
WBC NRBC COR # BLD: 8.18 10*3/MM3 (ref 4.8–10.8)

## 2017-09-22 PROCEDURE — 25010000002 ENOXAPARIN PER 10 MG: Performed by: SPECIALIST

## 2017-09-22 PROCEDURE — 82962 GLUCOSE BLOOD TEST: CPT

## 2017-09-22 PROCEDURE — 80048 BASIC METABOLIC PNL TOTAL CA: CPT | Performed by: SPECIALIST

## 2017-09-22 PROCEDURE — 85025 COMPLETE CBC W/AUTO DIFF WBC: CPT | Performed by: SPECIALIST

## 2017-09-22 PROCEDURE — 25010000002 IRON SUCROSE PER 1 MG: Performed by: FAMILY MEDICINE

## 2017-09-22 RX ORDER — HYDROCODONE BITARTRATE AND ACETAMINOPHEN 7.5; 325 MG/1; MG/1
1-2 TABLET ORAL EVERY 4 HOURS PRN
Qty: 30 TABLET | Refills: 0 | Status: SHIPPED | OUTPATIENT
Start: 2017-09-22 | End: 2019-03-27

## 2017-09-22 RX ADMIN — LOSARTAN POTASSIUM 25 MG: 25 TABLET, FILM COATED ORAL at 08:28

## 2017-09-22 RX ADMIN — FAMOTIDINE 10 MG: 10 INJECTION, SOLUTION INTRAVENOUS at 08:28

## 2017-09-22 RX ADMIN — ENOXAPARIN SODIUM 30 MG: 30 INJECTION SUBCUTANEOUS at 08:28

## 2017-09-22 RX ADMIN — IRON SUCROSE 300 MG: 20 INJECTION, SOLUTION INTRAVENOUS at 13:26

## 2017-09-22 RX ADMIN — GLIPIZIDE 2.5 MG: 2.5 TABLET, FILM COATED, EXTENDED RELEASE ORAL at 08:28

## 2017-09-22 RX ADMIN — ALVIMOPAN 12 MG: 12 CAPSULE ORAL at 08:28

## 2017-09-22 RX ADMIN — AMLODIPINE BESYLATE 2.5 MG: 5 TABLET ORAL at 08:28

## 2017-09-28 ENCOUNTER — INFUSION (OUTPATIENT)
Dept: ONCOLOGY | Facility: HOSPITAL | Age: 82
End: 2017-09-28
Attending: INTERNAL MEDICINE

## 2017-09-28 ENCOUNTER — OFFICE VISIT (OUTPATIENT)
Dept: ONCOLOGY | Facility: CLINIC | Age: 82
End: 2017-09-28

## 2017-09-28 ENCOUNTER — LAB (OUTPATIENT)
Dept: LAB | Facility: HOSPITAL | Age: 82
End: 2017-09-28

## 2017-09-28 VITALS
RESPIRATION RATE: 16 BRPM | HEART RATE: 88 BPM | WEIGHT: 133.1 LBS | TEMPERATURE: 97.6 F | OXYGEN SATURATION: 96 % | BODY MASS INDEX: 20.89 KG/M2 | DIASTOLIC BLOOD PRESSURE: 62 MMHG | SYSTOLIC BLOOD PRESSURE: 122 MMHG | HEIGHT: 67 IN

## 2017-09-28 VITALS
WEIGHT: 133 LBS | BODY MASS INDEX: 20.88 KG/M2 | SYSTOLIC BLOOD PRESSURE: 140 MMHG | TEMPERATURE: 97.8 F | OXYGEN SATURATION: 99 % | HEIGHT: 67 IN | DIASTOLIC BLOOD PRESSURE: 61 MMHG | HEART RATE: 80 BPM | RESPIRATION RATE: 20 BRPM

## 2017-09-28 DIAGNOSIS — D53.9 ANEMIA ASSOCIATED WITH NUTRITIONAL DEFICIENCY: Primary | ICD-10-CM

## 2017-09-28 DIAGNOSIS — C18.2 MALIGNANT NEOPLASM OF ASCENDING COLON (HCC): Primary | ICD-10-CM

## 2017-09-28 DIAGNOSIS — D50.0 IRON DEFICIENCY ANEMIA DUE TO CHRONIC BLOOD LOSS: Primary | ICD-10-CM

## 2017-09-28 DIAGNOSIS — D50.0 IRON DEFICIENCY ANEMIA DUE TO CHRONIC BLOOD LOSS: ICD-10-CM

## 2017-09-28 LAB
ALBUMIN SERPL-MCNC: 3.4 G/DL (ref 3.5–5)
ALBUMIN/GLOB SERPL: 1.2 G/DL (ref 1.1–2.5)
ALP SERPL-CCNC: 55 U/L (ref 24–120)
ALT SERPL W P-5'-P-CCNC: 44 U/L (ref 0–54)
ANION GAP SERPL CALCULATED.3IONS-SCNC: 11 MMOL/L (ref 4–13)
AST SERPL-CCNC: 35 U/L (ref 7–45)
BASOPHILS # BLD AUTO: 0.02 10*3/MM3 (ref 0–0.2)
BASOPHILS NFR BLD AUTO: 0.2 % (ref 0–2)
BILIRUB SERPL-MCNC: 0.3 MG/DL (ref 0.1–1)
BUN BLD-MCNC: 32 MG/DL (ref 5–21)
BUN/CREAT SERPL: 19.5 (ref 7–25)
CALCIUM SPEC-SCNC: 7.8 MG/DL (ref 8.4–10.4)
CHLORIDE SERPL-SCNC: 110 MMOL/L (ref 98–110)
CO2 SERPL-SCNC: 22 MMOL/L (ref 24–31)
CREAT BLD-MCNC: 1.64 MG/DL (ref 0.5–1.4)
DEPRECATED RDW RBC AUTO: 55.6 FL (ref 40–54)
EOSINOPHIL # BLD AUTO: 0.27 10*3/MM3 (ref 0–0.7)
EOSINOPHIL NFR BLD AUTO: 3.1 % (ref 0–4)
ERYTHROCYTE [DISTWIDTH] IN BLOOD BY AUTOMATED COUNT: 15.6 % (ref 12–15)
GFR SERPL CREATININE-BSD FRML MDRD: 40 ML/MIN/1.73
GLOBULIN UR ELPH-MCNC: 2.9 GM/DL
GLUCOSE BLD-MCNC: 130 MG/DL (ref 70–100)
HCT VFR BLD AUTO: 30.7 % (ref 40–52)
HGB BLD-MCNC: 9.9 G/DL (ref 14–18)
HOLD SPECIMEN: NORMAL
IMM GRANULOCYTES # BLD: 0.04 10*3/MM3 (ref 0–0.03)
IMM GRANULOCYTES NFR BLD: 0.5 % (ref 0–5)
IRON 24H UR-MRATE: 31 MCG/DL (ref 42–180)
IRON SATN MFR SERPL: 12 % (ref 20–45)
LYMPHOCYTES # BLD AUTO: 0.96 10*3/MM3 (ref 0.72–4.86)
LYMPHOCYTES NFR BLD AUTO: 11 % (ref 15–45)
MCH RBC QN AUTO: 31.4 PG (ref 28–32)
MCHC RBC AUTO-ENTMCNC: 32.2 G/DL (ref 33–36)
MCV RBC AUTO: 97.5 FL (ref 82–95)
MONOCYTES # BLD AUTO: 0.58 10*3/MM3 (ref 0.19–1.3)
MONOCYTES NFR BLD AUTO: 6.7 % (ref 4–12)
NEUTROPHILS # BLD AUTO: 6.82 10*3/MM3 (ref 1.87–8.4)
NEUTROPHILS NFR BLD AUTO: 78.5 % (ref 39–78)
PLATELET # BLD AUTO: 314 10*3/MM3 (ref 130–400)
PMV BLD AUTO: 9.1 FL (ref 6–12)
POTASSIUM BLD-SCNC: 4.5 MMOL/L (ref 3.5–5.3)
PROT SERPL-MCNC: 6.3 G/DL (ref 6.3–8.7)
RBC # BLD AUTO: 3.15 10*6/MM3 (ref 4.8–5.9)
SODIUM BLD-SCNC: 143 MMOL/L (ref 135–145)
TIBC SERPL-MCNC: 255 MCG/DL (ref 225–420)
WBC NRBC COR # BLD: 8.69 10*3/MM3 (ref 4.8–10.8)

## 2017-09-28 PROCEDURE — 25010000002 IRON SUCROSE PER 1 MG: Performed by: INTERNAL MEDICINE

## 2017-09-28 PROCEDURE — 96365 THER/PROPH/DIAG IV INF INIT: CPT

## 2017-09-28 PROCEDURE — 99214 OFFICE O/P EST MOD 30 MIN: CPT | Performed by: INTERNAL MEDICINE

## 2017-09-28 PROCEDURE — 83550 IRON BINDING TEST: CPT

## 2017-09-28 PROCEDURE — 36415 COLL VENOUS BLD VENIPUNCTURE: CPT

## 2017-09-28 PROCEDURE — 83540 ASSAY OF IRON: CPT

## 2017-09-28 RX ORDER — NEOMYCIN SULFATE 500 MG/1
TABLET ORAL
COMMUNITY
Start: 2017-08-31 | End: 2019-03-27

## 2017-09-28 RX ORDER — POLYETHYLENE GLYCOL-3350, SODIUM CHLORIDE, POTASSIUM CHLORIDE AND SODIUM BICARBONATE 420; 11.2; 5.72; 1.48 G/438.4G; G/438.4G; G/438.4G; G/438.4G
POWDER, FOR SOLUTION ORAL
COMMUNITY
Start: 2017-08-04 | End: 2019-03-27

## 2017-09-28 RX ORDER — SODIUM CHLORIDE 9 MG/ML
250 INJECTION, SOLUTION INTRAVENOUS ONCE
Status: COMPLETED | OUTPATIENT
Start: 2017-09-28 | End: 2017-09-28

## 2017-09-28 RX ORDER — DIPHENHYDRAMINE HYDROCHLORIDE 50 MG/ML
50 INJECTION INTRAMUSCULAR; INTRAVENOUS AS NEEDED
Status: DISCONTINUED | OUTPATIENT
Start: 2017-09-28 | End: 2017-09-28 | Stop reason: HOSPADM

## 2017-09-28 RX ORDER — IBUPROFEN 800 MG/1
TABLET ORAL
COMMUNITY
Start: 2017-08-07 | End: 2017-11-17

## 2017-09-28 RX ORDER — FAMOTIDINE 10 MG/ML
20 INJECTION, SOLUTION INTRAVENOUS AS NEEDED
Status: DISCONTINUED | OUTPATIENT
Start: 2017-09-28 | End: 2017-09-28 | Stop reason: HOSPADM

## 2017-09-28 RX ADMIN — IRON SUCROSE 200 MG: 20 INJECTION, SOLUTION INTRAVENOUS at 09:40

## 2017-09-28 RX ADMIN — SODIUM CHLORIDE 250 ML: 9 INJECTION, SOLUTION INTRAVENOUS at 09:39

## 2017-09-28 NOTE — PROGRESS NOTES
[facility]  HEMATOLOGY & ONCOLOGY    Cancer Staging Information:  Cancer of right colon    Staging form: Colon and Rectum, AJCC V7    - Clinical stage from 9/28/2017: No stage assigned - Unsigned    - Pathologic stage from 9/28/2017: Stage IIA (T3, N0, cM0) - Signed by Keara Mehta MD on 9/28/2017      Subjective     VISIT DIAGNOSIS:   Encounter Diagnosis   Name Primary?   • Malignant neoplasm of ascending colon Yes       REASON FOR VISIT:     Chief Complaint   Patient presents with   • Colon adenocarcinoma     Discuss therapy        HEMATOLOGY / ONCOLOGY HISTORY:    No history exists.           INTERVAL HISTORY  Patient ID: Maikel Mathews is a 88 y.o. year old male with resent diagnosis of colorectal adenocarcinoma on the right side he underwent hemicolectomy on 09/18/2017 , tumor was a pT3 N0.  He initially presented with iron deficiency and anemia and underwent colonoscopy on 08/25/2017 and he showed a polypoid non-obstructing large mass in the base of the cecum that measured five mm.  On 09/18/2017 he underwent hemicolectomy by Dr. Hu Velazquez.  On that admission he also received a unit of blood, he had some kidney failure that resolved prior to discharge.  At today's received he feels a lot better, though his energy is not were it used to be.  Slowly improving day by day.  He is eating very well, is moving his bowels, denies any fever or chills, nausea or vomiting.    Past Medical History:   Past Medical History:   Diagnosis Date   • Arthritis    • AVM (arteriovenous malformation)    • Cancer     PROSTATE   • Cataract    • Colon polyp    • Diabetes mellitus    • Diverticulosis    • GERD (gastroesophageal reflux disease)    • Hemorrhoids    • History of transfusion    • Hypertension    • Iron deficiency anemia due to chronic blood loss 7/26/2017   • Prostate CA    • Prostate hypertrophy    • Rotator cuff syndrome      Past Surgical History:   Past Surgical History:   Procedure  Laterality Date   • APPENDECTOMY     • CHOLECYSTECTOMY     • COLON RESECTION N/A 9/18/2017    Procedure: LAPAROSCOPIC ASSISTED RIGHT COLECTOMY;  Surgeon: Caroline Loomis MD;  Location: [facility] OR;  Service:    • COLONOSCOPY  04/29/2010   • COLONOSCOPY N/A 8/25/2017    Procedure: COLONOSCOPY WITH ANESTHESIA;  Surgeon: Joaquín Neff MD;  Location: [facility] ENDOSCOPY;  Service:    • ENDOSCOPY  03/27/2013   • ENDOSCOPY N/A 8/25/2017    Procedure: ESOPHAGOGASTRODUODENOSCOPY WITH ANESTHESIA;  Surgeon: Joaquín Neff MD;  Location: [facility] ENDOSCOPY;  Service:    • EYE SURGERY Left     CATARACT   • INTERVENTIONAL RADIOLOGY PROCEDURE N/A 9/18/2017    Procedure: URETHRA DILITATION with dominguez catheter insertion;  Surgeon: Mamadou Paez MD;  Location: [facility] OR;  Service:    • PROSTATECTOMY     • PROSTATECTOMY       Social History:   Social History     Social History   • Marital status:      Spouse name: N/A   • Number of children: N/A   • Years of education: N/A     Occupational History   • Not on file.     Social History Main Topics   • Smoking status: Never Smoker   • Smokeless tobacco: Never Used   • Alcohol use No   • Drug use: No   • Sexual activity: Defer     Other Topics Concern   • Not on file     Social History Narrative     Family History:   Family History   Problem Relation Age of Onset   • Colon cancer Neg Hx    • Colon polyps Neg Hx        Review of Systems   Constitutional: Positive for activity change and fatigue.   HENT: Negative.    Eyes: Negative.    Respiratory: Negative.    Cardiovascular: Negative.    Gastrointestinal: Negative.    Endocrine: Negative.    Genitourinary: Negative.    Musculoskeletal: Negative.    Skin: Negative.    Allergic/Immunologic: Negative.    Neurological: Negative.    Hematological: Negative.    Psychiatric/Behavioral: Negative.         Performance Status:  Asymptomatic    Medications:    Current Outpatient Prescriptions   Medication Sig Dispense Refill   •  "amLODIPine (NORVASC) 10 MG tablet   5   • GAVILYTE-N WITH FLAVOR PACK 420 g solution      • glipiZIDE (GLUCOTROL) 5 MG ER tablet Daily.  2   • HYDROcodone-acetaminophen (NORCO) 7.5-325 MG per tablet Take 1-2 tablets by mouth Every 4 (Four) Hours As Needed for Moderate Pain  (Pain). 30 tablet 0   • ibuprofen (ADVIL,MOTRIN) 800 MG tablet      • IRON SUCROSE IV Infuse  into a venous catheter. Tue, fri     • losartan (COZAAR) 100 MG tablet      • neomycin (MYCIFRADIN) 500 MG tablet        No current facility-administered medications for this visit.        ALLERGIES:  No Known Allergies    Objective      Vitals:    09/28/17 0813   BP: 122/62   Pulse: 88   Resp: 16   Temp: 97.6 °F (36.4 °C)   TempSrc: Tympanic   SpO2: 96%   Weight: 133 lb 1.6 oz (60.4 kg)   Height: 67\" (170.2 cm)         Current Status 9/28/2017   ECOG score 1         Physical Exam    General Appearance: Frail looking.  Patient is awake, alert, oriented and in no acute distress. Patient is welldeveloped, wellnourished, and appears stated age.  HEENT: Normocephalic. Sclerae clear, conjunctiva pink, extraocular movements intact, pupils, round, reactive to light and  accommodation. Mouth and throat are clear with moist oral mucosa.  NECK: Supple, no jugular venous distention, thyroid not enlarged.  LYMPH: No cervical, supraclavicular, axillary, or inguinal lymphadenopathy.  CHEST: Equal bilateral expansion, AP  diameter normal, resonant percussion note  LUNGS: Good air movement, no rales, rhonchi, rubs or wheezes with auscultation  CARDIO: Regular sinus rhythm, no murmurs, gallops or rubs.  ABDOMEN: Midline staples intact, no redness, no warmth, no fluctuance or sign of infection.  Nondistended, soft, No tenderness, no guarding, no rebound, No hepatosplenomegaly. No abdominal masses. Bowel sounds positive. No hernia  GENITALIA: Not examined.  BREASTS: Not examined.  MUSKEL: No joint swelling, decreased motion, or inflammation  EXTREMS: No edema, clubbing, " cyanosis, No varicose veins.  NEURO: Grossly nonfocal, Gait is coordinated and smooth, Cognition is preserved.  SKIN: No rashes, no ecchymoses, no petechia.  PSYCH: Oriented to time, place and person. Memory is preserved. Mood and affect appear normal  RECENT LABS:  Lab on 09/28/2017   Component Date Value Ref Range Status   • Iron 09/28/2017 31* 42 - 180 mcg/dL Final   • TIBC 09/28/2017 255  225 - 420 mcg/dL Final   • Iron Saturation 09/28/2017 12* 20 - 45 % Final   • Extra Tube 09/28/2017 Hold for add-ons.   Final    Auto resulted.   Orders Only on 09/28/2017   Component Date Value Ref Range Status   • Glucose 09/28/2017 130* 70 - 100 mg/dL Final   • BUN 09/28/2017 32* 5 - 21 mg/dL Final   • Creatinine 09/28/2017 1.64* 0.50 - 1.40 mg/dL Final   • Sodium 09/28/2017 143  135 - 145 mmol/L Final   • Potassium 09/28/2017 4.5  3.5 - 5.3 mmol/L Final   • Chloride 09/28/2017 110  98 - 110 mmol/L Final   • CO2 09/28/2017 22.0* 24.0 - 31.0 mmol/L Final   • Calcium 09/28/2017 7.8* 8.4 - 10.4 mg/dL Final   • Total Protein 09/28/2017 6.3  6.3 - 8.7 g/dL Final   • Albumin 09/28/2017 3.40* 3.50 - 5.00 g/dL Final   • ALT (SGPT) 09/28/2017 44  0 - 54 U/L Final   • AST (SGOT) 09/28/2017 35  7 - 45 U/L Final   • Alkaline Phosphatase 09/28/2017 55  24 - 120 U/L Final   • Total Bilirubin 09/28/2017 0.3  0.1 - 1.0 mg/dL Final   • eGFR Non African Amer 09/28/2017 40* >60 mL/min/1.73 Final   • Globulin 09/28/2017 2.9  gm/dL Final   • A/G Ratio 09/28/2017 1.2  1.1 - 2.5 g/dL Final   • BUN/Creatinine Ratio 09/28/2017 19.5  7.0 - 25.0 Final   • Anion Gap 09/28/2017 11.0  4.0 - 13.0 mmol/L Final   • WBC 09/28/2017 8.69  4.80 - 10.80 10*3/mm3 Final   • RBC 09/28/2017 3.15* 4.80 - 5.90 10*6/mm3 Final   • Hemoglobin 09/28/2017 9.9* 14.0 - 18.0 g/dL Final   • Hematocrit 09/28/2017 30.7* 40.0 - 52.0 % Final   • MCV 09/28/2017 97.5* 82.0 - 95.0 fL Final   • MCH 09/28/2017 31.4  28.0 - 32.0 pg Final   • MCHC 09/28/2017 32.2* 33.0 - 36.0 g/dL Final    • RDW 09/28/2017 15.6* 12.0 - 15.0 % Final   • RDW-SD 09/28/2017 55.6* 40.0 - 54.0 fl Final   • MPV 09/28/2017 9.1  6.0 - 12.0 fL Final   • Platelets 09/28/2017 314  130 - 400 10*3/mm3 Final   • Neutrophil % 09/28/2017 78.5* 39.0 - 78.0 % Final   • Lymphocyte % 09/28/2017 11.0* 15.0 - 45.0 % Final   • Monocyte % 09/28/2017 6.7  4.0 - 12.0 % Final   • Eosinophil % 09/28/2017 3.1  0.0 - 4.0 % Final   • Basophil % 09/28/2017 0.2  0.0 - 2.0 % Final   • Immature Grans % 09/28/2017 0.5  0.0 - 5.0 % Final   • Neutrophils, Absolute 09/28/2017 6.82  1.87 - 8.40 10*3/mm3 Final   • Lymphocytes, Absolute 09/28/2017 0.96  0.72 - 4.86 10*3/mm3 Final   • Monocytes, Absolute 09/28/2017 0.58  0.19 - 1.30 10*3/mm3 Final   • Eosinophils, Absolute 09/28/2017 0.27  0.00 - 0.70 10*3/mm3 Final   • Basophils, Absolute 09/28/2017 0.02  0.00 - 0.20 10*3/mm3 Final   • Immature Grans, Absolute 09/28/2017 0.04* 0.00 - 0.03 10*3/mm3 Final   Admission on 09/18/2017, Discharged on 09/22/2017   Component Date Value Ref Range Status   • ABO Type 09/18/2017 A   Final   • RH type 09/18/2017 Positive   Final   • Antibody Screen 09/18/2017 Negative   Final   • Glucose 09/18/2017 138* 70 - 130 mg/dL Final    : [id number] Dayron Carrasco ID: [id number]   • Case Report 09/18/2017    Final                    Value:Surgical Pathology Report                         Case: [id number]                                  Authorizing Provider:  Caroline Loomis MD         Collected:           09/18/2017 09:42 AM          Ordering Location:     [facility] OR  Received:            09/18/2017 01:16 PM          Pathologist:           Garrick Britt MD                                                    Specimen:    Large Intestine, Right / Ascending Colon, Right Colon.                                    • Final Diagnosis 09/18/2017    Final                    Value:This result contains rich text formatting which cannot be displayed here.   •  Synoptic Checklist 09/18/2017    Final                    Value:COLON AND RECTUM: Resection, Including Transanal Disk Excision of Rectal Neoplasms  (Colon Res - All Specimens)                                                                                    SPECIMEN                               Specimen:    Terminal ileum                               Specimen:    Cecum                               Specimen:    Ascending colon                               Procedure:    Right hemicolectomy                                                        TUMOR                               Primary Tumor Site:    Cecum                               Histologic Type:    Adenocarcinoma                               Histologic Grade:    Low-grade (well differentiated to moderately differentiated)                               Tumor Size:    Greatest dimension (cm): 5.5  cm                               Tumor Deposits:    Not identified                                 Site(s) of Direct Extent of Tumor:    Cecum                                 Microscopic Tumor Extension:    Tumor invades through the muscularis propria into the subserosal adipose tissue or the nonperitonealized pericolic or perirectal soft tissues but does not extend to the serosal surface                                 Macroscopic Tumor Perforation:    Not Identified                                 Lymph-Vascular Invasion:    Not identified                                   Intratumoral Lymphocytic Response (tumor-infiltrating lymphocytes):    Mild to moderate (0-2 per high-power X400 field)                                 Perineural Invasion:    Not identified                                                        MARGINS                               Margins:    For Resection Specimens Only                                 Proximal Margin:                                       Proximal Margin:    Uninvolved by invasive carcinoma                                  Distal Margin:                                       Distal Margin:    Uninvolved by invasive carcinoma                                 Circumferential (Radial) Margin:    Uninvolved by invasive carcinoma                                   Distance of Tumor from Margin (required only for rectal tumors):    Specify (cm): 8.4 cm                                 Mesenteric Margin:    Uninvolved by invasive carcinoma                                                        LYMPH NODES                               Regional Lymph Nodes:                                     Number of Lymph Nodes Examined:    Specify number:  15                                 Number of Lymph Nodes Involved:    Specify number: 0                                                        STAGE (pTNM, AJCC 7th ed.)                               Primary Tumor (pT):    pT3: Tumor invades through the muscularis propria into pericolorectal tissues                               Regional Lymph Nodes (pN):    pN0: No regional lymph node metastasis                                                        ADDITIONAL FINDINGS                               Additional Pathologic Findings:    None identified     • Gross Description 09/18/2017    Final                    Value:This result contains rich text formatting which cannot be displayed here.   • Microscopic Description 09/18/2017    Final                    Value:This result contains rich text formatting which cannot be displayed here.   • Glucose 09/18/2017 199* 70 - 130 mg/dL Final    : [id number] Roberta Mckinney ID: [id number]   • Glucose 09/19/2017 419* 70 - 100 mg/dL Final   • BUN 09/19/2017 48* 5 - 21 mg/dL Final   • Creatinine 09/19/2017 2.48* 0.50 - 1.40 mg/dL Final   • Sodium 09/19/2017 138  135 - 145 mmol/L Final   • Potassium 09/19/2017 5.4* 3.5 - 5.3 mmol/L Final   • Chloride 09/19/2017 109  98 - 110 mmol/L Final   • CO2 09/19/2017 18.0* 24.0 - 31.0 mmol/L Final   • Calcium 09/19/2017  8.2* 8.4 - 10.4 mg/dL Final   • eGFR Non African Amer 09/19/2017 25* >60 mL/min/1.73 Final   • BUN/Creatinine Ratio 09/19/2017 19.4  7.0 - 25.0 Final   • Anion Gap 09/19/2017 11.0  4.0 - 13.0 mmol/L Final   • WBC 09/19/2017 11.47* 4.80 - 10.80 10*3/mm3 Final   • RBC 09/19/2017 2.75* 4.80 - 5.90 10*6/mm3 Final   • Hemoglobin 09/19/2017 8.6* 14.0 - 18.0 g/dL Final   • Hematocrit 09/19/2017 26.9* 40.0 - 52.0 % Final   • MCV 09/19/2017 97.8* 82.0 - 95.0 fL Final   • MCH 09/19/2017 31.3  28.0 - 32.0 pg Final   • MCHC 09/19/2017 32.0* 33.0 - 36.0 g/dL Final   • RDW 09/19/2017 14.9  12.0 - 15.0 % Final   • RDW-SD 09/19/2017 53.3  40.0 - 54.0 fl Final   • MPV 09/19/2017 9.9  6.0 - 12.0 fL Final   • Platelets 09/19/2017 303  130 - 400 10*3/mm3 Final   • Neutrophil % 09/19/2017 87.7* 39.0 - 78.0 % Final   • Lymphocyte % 09/19/2017 5.4* 15.0 - 45.0 % Final   • Monocyte % 09/19/2017 6.5  4.0 - 12.0 % Final   • Eosinophil % 09/19/2017 0.0  0.0 - 4.0 % Final   • Basophil % 09/19/2017 0.1  0.0 - 2.0 % Final   • Immature Grans % 09/19/2017 0.3  0.0 - 5.0 % Final   • Neutrophils, Absolute 09/19/2017 10.07* 1.87 - 8.40 10*3/mm3 Final   • Lymphocytes, Absolute 09/19/2017 0.62* 0.72 - 4.86 10*3/mm3 Final   • Monocytes, Absolute 09/19/2017 0.74  0.19 - 1.30 10*3/mm3 Final   • Eosinophils, Absolute 09/19/2017 0.00  0.00 - 0.70 10*3/mm3 Final   • Basophils, Absolute 09/19/2017 0.01  0.00 - 0.20 10*3/mm3 Final   • Immature Grans, Absolute 09/19/2017 0.03  0.00 - 0.03 10*3/mm3 Final   • Glucose 09/19/2017 378* 70 - 130 mg/dL Final    : [id number] Faisal StephanieMeter ID: [id number]   • Glucose 09/19/2017 321* 70 - 130 mg/dL Final    : [id number] Faisal StephanieMeter ID: [id number]   • Glucose 09/19/2017 248* 70 - 130 mg/dL Final    : [id number] Marty Castaneda ID: [id number]   • Glucose 09/19/2017 146* 70 - 130 mg/dL Final    : [id number] [facility] ID: [id number]   • Glucose 09/19/2017 137* 70 - 130 mg/dL Final     : [id number] [facility]: [id number]   • Glucose 09/20/2017 81  70 - 100 mg/dL Final   • BUN 09/20/2017 44* 5 - 21 mg/dL Final   • Creatinine 09/20/2017 2.31* 0.50 - 1.40 mg/dL Final   • Sodium 09/20/2017 139  135 - 145 mmol/L Final   • Potassium 09/20/2017 4.4  3.5 - 5.3 mmol/L Final   • Chloride 09/20/2017 111* 98 - 110 mmol/L Final   • CO2 09/20/2017 20.0* 24.0 - 31.0 mmol/L Final   • Calcium 09/20/2017 8.2* 8.4 - 10.4 mg/dL Final   • eGFR Non African Amer 09/20/2017 27* >60 mL/min/1.73 Final   • BUN/Creatinine Ratio 09/20/2017 19.0  7.0 - 25.0 Final   • Anion Gap 09/20/2017 8.0  4.0 - 13.0 mmol/L Final   • Hemoglobin A1C 09/20/2017 4.8  % Final   • Magnesium 09/20/2017 2.4* 1.4 - 2.2 mg/dL Final   • WBC 09/20/2017 11.28* 4.80 - 10.80 10*3/mm3 Final   • RBC 09/20/2017 2.69* 4.80 - 5.90 10*6/mm3 Final   • Hemoglobin 09/20/2017 8.4* 14.0 - 18.0 g/dL Final   • Hematocrit 09/20/2017 26.1* 40.0 - 52.0 % Final   • MCV 09/20/2017 97.0* 82.0 - 95.0 fL Final   • MCH 09/20/2017 31.2  28.0 - 32.0 pg Final   • MCHC 09/20/2017 32.2* 33.0 - 36.0 g/dL Final   • RDW 09/20/2017 15.4* 12.0 - 15.0 % Final   • RDW-SD 09/20/2017 54.9* 40.0 - 54.0 fl Final   • MPV 09/20/2017 9.6  6.0 - 12.0 fL Final   • Platelets 09/20/2017 261  130 - 400 10*3/mm3 Final   • Neutrophil % 09/20/2017 91.5* 39.0 - 78.0 % Final   • Lymphocyte % 09/20/2017 4.4* 15.0 - 45.0 % Final   • Monocyte % 09/20/2017 3.6* 4.0 - 12.0 % Final   • Eosinophil % 09/20/2017 0.0  0.0 - 4.0 % Final   • Basophil % 09/20/2017 0.1  0.0 - 2.0 % Final   • Immature Grans % 09/20/2017 0.4  0.0 - 5.0 % Final   • Neutrophils, Absolute 09/20/2017 10.32* 1.87 - 8.40 10*3/mm3 Final   • Lymphocytes, Absolute 09/20/2017 0.50* 0.72 - 4.86 10*3/mm3 Final   • Monocytes, Absolute 09/20/2017 0.41  0.19 - 1.30 10*3/mm3 Final   • Eosinophils, Absolute 09/20/2017 0.00  0.00 - 0.70 10*3/mm3 Final   • Basophils, Absolute 09/20/2017 0.01  0.00 - 0.20 10*3/mm3 Final   • Immature Grans,  Absolute 09/20/2017 0.04* 0.00 - 0.03 10*3/mm3 Final   • Glucose 09/20/2017 92  70 - 130 mg/dL Final    : [id number] Marty SaraMeter ID: [id number]   • Glucose 09/20/2017 156* 70 - 130 mg/dL Final    : [id number] Marty SaraMeter ID: [id number]   • Glucose 09/20/2017 84  70 - 130 mg/dL Final    : [id number] Marty SaraMeter ID: [id number]   • Glucose 09/20/2017 73  70 - 130 mg/dL Final    : [id number] Carolyne TeresaMeter ID: [id number]   • Glucose 09/21/2017 56* 70 - 100 mg/dL Final   • BUN 09/21/2017 41* 5 - 21 mg/dL Final   • Creatinine 09/21/2017 2.05* 0.50 - 1.40 mg/dL Final   • Sodium 09/21/2017 140  135 - 145 mmol/L Final   • Potassium 09/21/2017 3.9  3.5 - 5.3 mmol/L Final   • Chloride 09/21/2017 113* 98 - 110 mmol/L Final   • CO2 09/21/2017 21.0* 24.0 - 31.0 mmol/L Final   • Calcium 09/21/2017 7.9* 8.4 - 10.4 mg/dL Final   • eGFR Non African Amer 09/21/2017 31* >60 mL/min/1.73 Final   • BUN/Creatinine Ratio 09/21/2017 20.0  7.0 - 25.0 Final   • Anion Gap 09/21/2017 6.0  4.0 - 13.0 mmol/L Final   • WBC 09/21/2017 8.10  4.80 - 10.80 10*3/mm3 Final   • RBC 09/21/2017 2.50* 4.80 - 5.90 10*6/mm3 Final   • Hemoglobin 09/21/2017 7.9* 14.0 - 18.0 g/dL Final   • Hematocrit 09/21/2017 24.3* 40.0 - 52.0 % Final   • MCV 09/21/2017 97.2* 82.0 - 95.0 fL Final   • MCH 09/21/2017 31.6  28.0 - 32.0 pg Final   • MCHC 09/21/2017 32.5* 33.0 - 36.0 g/dL Final   • RDW 09/21/2017 15.1* 12.0 - 15.0 % Final   • RDW-SD 09/21/2017 54.0  40.0 - 54.0 fl Final   • MPV 09/21/2017 9.3  6.0 - 12.0 fL Final   • Platelets 09/21/2017 229  130 - 400 10*3/mm3 Final   • Neutrophil % 09/21/2017 77.5  39.0 - 78.0 % Final   • Lymphocyte % 09/21/2017 14.7* 15.0 - 45.0 % Final   • Monocyte % 09/21/2017 6.7  4.0 - 12.0 % Final   • Eosinophil % 09/21/2017 0.7  0.0 - 4.0 % Final   • Basophil % 09/21/2017 0.2  0.0 - 2.0 % Final   • Immature Grans % 09/21/2017 0.2  0.0 - 5.0 % Final   • Neutrophils, Absolute 09/21/2017 6.27  1.87 - 8.40  10*3/mm3 Final   • Lymphocytes, Absolute 09/21/2017 1.19  0.72 - 4.86 10*3/mm3 Final   • Monocytes, Absolute 09/21/2017 0.54  0.19 - 1.30 10*3/mm3 Final   • Eosinophils, Absolute 09/21/2017 0.06  0.00 - 0.70 10*3/mm3 Final   • Basophils, Absolute 09/21/2017 0.02  0.00 - 0.20 10*3/mm3 Final   • Immature Grans, Absolute 09/21/2017 0.02  0.00 - 0.03 10*3/mm3 Final   • Glucose 09/21/2017 58* 70 - 130 mg/dL Final    : [id number] Brocket CaraMeter ID: [id number]   • Glucose 09/21/2017 82  70 - 130 mg/dL Final    : [id number] Brocket CaraMeter ID: [id number]   • Product Code 09/22/2017 [id number]   Final   • Unit Number 09/22/2017 [id number]   Final   • UNIT  ABO 09/22/2017 A   Final   • UNIT  RH 09/22/2017 POS   Final   • Dispense Status 09/22/2017 RE   Final   • Blood Type 09/22/2017 APOS   Final   • Blood Expiration Date 09/22/2017 [id number]   Final   • Blood Type Barcode 09/22/2017 6200   Final   • Product Code 09/22/2017 [id number]   Final   • Unit Number 09/22/2017 [id number]   Final   • UNIT  ABO 09/22/2017 A   Final   • UNIT  RH 09/22/2017 POS   Final   • Dispense Status 09/22/2017 PT   Final   • Blood Type 09/22/2017 APOS   Final   • Blood Expiration Date 09/22/2017 [id number]   Final   • Blood Type Barcode 09/22/2017 6200   Final   • CEA 09/21/2017 0.83  0.00 - 5.00 ng/mL Final   • CA 19-9 09/21/2017 2  0 - 35 U/mL Final    Roche ECLIA methodology   • Glucose 09/21/2017 117  70 - 130 mg/dL Final    : [id number] Blane CaraMeter ID: [id number]   • Glucose 09/21/2017 172* 70 - 130 mg/dL Final    : [id number] Blane CaraMeter ID: [id number]   • Glucose 09/21/2017 264* 70 - 130 mg/dL Final    : [id number] Cruz Osborne ID: [id number]   • Glucose 09/22/2017 126* 70 - 100 mg/dL Final   • BUN 09/22/2017 45* 5 - 21 mg/dL Final   • Creatinine 09/22/2017 1.88* 0.50 - 1.40 mg/dL Final   • Sodium 09/22/2017 140  135 - 145 mmol/L Final   • Potassium 09/22/2017 4.4  3.5 - 5.3 mmol/L Final    • Chloride 09/22/2017 112* 98 - 110 mmol/L Final   • CO2 09/22/2017 20.0* 24.0 - 31.0 mmol/L Final   • Calcium 09/22/2017 7.6* 8.4 - 10.4 mg/dL Final   • eGFR Non African Amer 09/22/2017 34* >60 mL/min/1.73 Final   • BUN/Creatinine Ratio 09/22/2017 23.9  7.0 - 25.0 Final   • Anion Gap 09/22/2017 8.0  4.0 - 13.0 mmol/L Final   • WBC 09/22/2017 8.18  4.80 - 10.80 10*3/mm3 Final   • RBC 09/22/2017 2.64* 4.80 - 5.90 10*6/mm3 Final   • Hemoglobin 09/22/2017 8.3* 14.0 - 18.0 g/dL Final   • Hematocrit 09/22/2017 24.6* 40.0 - 52.0 % Final   • MCV 09/22/2017 93.2  82.0 - 95.0 fL Final   • MCH 09/22/2017 31.4  28.0 - 32.0 pg Final   • MCHC 09/22/2017 33.7  33.0 - 36.0 g/dL Final   • RDW 09/22/2017 15.8* 12.0 - 15.0 % Final   • RDW-SD 09/22/2017 54.0  40.0 - 54.0 fl Final   • MPV 09/22/2017 9.6  6.0 - 12.0 fL Final   • Platelets 09/22/2017 208  130 - 400 10*3/mm3 Final   • Neutrophil % 09/22/2017 85.6* 39.0 - 78.0 % Final   • Lymphocyte % 09/22/2017 9.7* 15.0 - 45.0 % Final   • Monocyte % 09/22/2017 4.6  4.0 - 12.0 % Final   • Eosinophil % 09/22/2017 0.0  0.0 - 4.0 % Final   • Basophil % 09/22/2017 0.0  0.0 - 2.0 % Final   • Immature Grans % 09/22/2017 0.1  0.0 - 5.0 % Final   • Neutrophils, Absolute 09/22/2017 7.00  1.87 - 8.40 10*3/mm3 Final   • Lymphocytes, Absolute 09/22/2017 0.79  0.72 - 4.86 10*3/mm3 Final   • Monocytes, Absolute 09/22/2017 0.38  0.19 - 1.30 10*3/mm3 Final   • Eosinophils, Absolute 09/22/2017 0.00  0.00 - 0.70 10*3/mm3 Final   • Basophils, Absolute 09/22/2017 0.00  0.00 - 0.20 10*3/mm3 Final   • Immature Grans, Absolute 09/22/2017 0.01  0.00 - 0.03 10*3/mm3 Final   • Glucose 09/22/2017 118  70 - 130 mg/dL Final    : [id number] PolyPid CaraMeter ID: [id number]   • Glucose 09/22/2017 117  70 - 130 mg/dL Final    : [id number] PolyPid CaraMeter ID: [id number]       RADIOLOGY:  No results found.         Assessment/Plan  Maikel Mathews is a 88 y.o. year old male general diagnosis of colon  adenocarcinoma status post surgical resection tumor 5.5 in greatest dimension low-grade moderately differentiated, 0 of 15 lymph nodes negative for metastases, pT3 N 0 M0 stage II a disease.  He is here to discuss treatment.    Patient Active Problem List   Diagnosis   • Anemia associated with nutritional deficiency   • Iron deficiency anemia due to chronic blood loss   • Cancer of right colon          1.Stage IIA right-sided colon adenocarcinoma: I went into detail discussion with patient and his wife on his pathology and stage of his colorectal cancer.  We do not know the results of his MSI.  However he still has  favorable risk features; it is moderately differentiated, right-sided, 0 of 15 lymph nodes uninvolved, negative for lymphovascular  invasion, negative for perineural invasion, margins are negative.  Given these variables my preference is to observe him and not give chemotherapy.  This is also recommended by NCCN guideline.  We will also obtain  MSI for documentation.  The patient and his wife understands and are willing to go with observation.  Chemotherapy is not needed in his situation.  We will complete staging with a CT of the chest.  RTC in one month.  Next colonoscopy a year from last.    2.  Iron deficiency anemia due to GI loss: We will continue IV iron infusion.    3.  GERD: Continue omeprazole.        Keara Mehta MD    9/28/2017    2:31 PM

## 2017-10-02 ENCOUNTER — APPOINTMENT (OUTPATIENT)
Dept: ONCOLOGY | Facility: HOSPITAL | Age: 82
End: 2017-10-02
Attending: INTERNAL MEDICINE

## 2017-10-02 ENCOUNTER — HOSPITAL ENCOUNTER (OUTPATIENT)
Dept: CT IMAGING | Facility: HOSPITAL | Age: 82
Discharge: HOME | End: 2017-10-02
Attending: INTERNAL MEDICINE | Admitting: INTERNAL MEDICINE

## 2017-10-02 DIAGNOSIS — C18.2 MALIGNANT NEOPLASM OF ASCENDING COLON (HCC): ICD-10-CM

## 2017-10-02 LAB
CREAT BLD-MCNC: 1.91 MG/DL (ref 0.5–1.4)
CREAT BLDA-MCNC: 2 MG/DL (ref 0.6–1.3)
GFR SERPL CREATININE-BSD FRML MDRD: 33 ML/MIN/1.73

## 2017-10-02 PROCEDURE — 82565 ASSAY OF CREATININE: CPT | Performed by: INTERNAL MEDICINE

## 2017-10-02 PROCEDURE — 82565 ASSAY OF CREATININE: CPT

## 2017-10-02 PROCEDURE — 71250 CT THORAX DX C-: CPT

## 2017-10-03 LAB
CYTO UR: NORMAL
LAB AP CASE REPORT: NORMAL
LAB AP SYNOPTIC CHECKLIST: NORMAL
Lab: NORMAL
PATH REPORT.FINAL DX SPEC: NORMAL
PATH REPORT.GROSS SPEC: NORMAL

## 2017-10-09 ENCOUNTER — HOSPITAL ENCOUNTER (OUTPATIENT)
Dept: NUCLEAR MEDICINE | Facility: HOSPITAL | Age: 82
Discharge: HOME | End: 2017-10-09
Attending: INTERNAL MEDICINE

## 2017-10-09 DIAGNOSIS — C18.2 MALIGNANT NEOPLASM OF ASCENDING COLON (HCC): ICD-10-CM

## 2017-10-09 PROCEDURE — 78306 BONE IMAGING WHOLE BODY: CPT

## 2017-10-09 PROCEDURE — A9561 TC99M OXIDRONATE: HCPCS | Performed by: INTERNAL MEDICINE

## 2017-10-09 PROCEDURE — 0 TECHNETIUM OXIDRONATE KIT: Performed by: INTERNAL MEDICINE

## 2017-10-09 RX ADMIN — TECHNETIUM TC 99M OXIDRONATE 1 DOSE: 3.15 INJECTION, POWDER, LYOPHILIZED, FOR SOLUTION INTRAVENOUS at 12:15

## 2017-10-10 ENCOUNTER — TELEPHONE (OUTPATIENT)
Dept: ONCOLOGY | Facility: CLINIC | Age: 82
End: 2017-10-10

## 2017-10-10 NOTE — TELEPHONE ENCOUNTER
----- Message from Keara Mehta MD sent at 10/10/2017  1:43 PM CDT -----  Please contact patient with result. Normal

## 2017-10-20 ENCOUNTER — TELEPHONE (OUTPATIENT)
Dept: ONCOLOGY | Facility: CLINIC | Age: 82
End: 2017-10-20

## 2017-10-20 ENCOUNTER — OFFICE VISIT (OUTPATIENT)
Dept: ONCOLOGY | Facility: CLINIC | Age: 82
End: 2017-10-20

## 2017-10-20 ENCOUNTER — LAB (OUTPATIENT)
Dept: LAB | Facility: HOSPITAL | Age: 82
End: 2017-10-20

## 2017-10-20 VITALS
DIASTOLIC BLOOD PRESSURE: 84 MMHG | HEIGHT: 67 IN | WEIGHT: 131.5 LBS | RESPIRATION RATE: 18 BRPM | OXYGEN SATURATION: 97 % | BODY MASS INDEX: 20.64 KG/M2 | HEART RATE: 86 BPM | SYSTOLIC BLOOD PRESSURE: 132 MMHG | TEMPERATURE: 97.9 F

## 2017-10-20 DIAGNOSIS — D50.8 OTHER IRON DEFICIENCY ANEMIA: ICD-10-CM

## 2017-10-20 DIAGNOSIS — C18.2 MALIGNANT NEOPLASM OF ASCENDING COLON (HCC): Primary | ICD-10-CM

## 2017-10-20 DIAGNOSIS — D53.9 ANEMIA ASSOCIATED WITH NUTRITIONAL DEFICIENCY: Primary | ICD-10-CM

## 2017-10-20 DIAGNOSIS — R42 DIZZINESS: Primary | ICD-10-CM

## 2017-10-20 LAB
ALBUMIN SERPL-MCNC: 3.9 G/DL (ref 3.5–5)
ALBUMIN/GLOB SERPL: 1.2 G/DL (ref 1.1–2.5)
ALP SERPL-CCNC: 57 U/L (ref 24–120)
ALT SERPL W P-5'-P-CCNC: 36 U/L (ref 0–54)
ANION GAP SERPL CALCULATED.3IONS-SCNC: 9 MMOL/L (ref 4–13)
AST SERPL-CCNC: 30 U/L (ref 7–45)
AUTO MIXED CELLS #: 0.5 10*3/MM3 (ref 0.1–2.6)
AUTO MIXED CELLS %: 8.2 % (ref 0.1–24)
BILIRUB SERPL-MCNC: 0.2 MG/DL (ref 0.1–1)
BUN BLD-MCNC: 35 MG/DL (ref 5–21)
BUN/CREAT SERPL: 19.3
CALCIUM SPEC-SCNC: 8.8 MG/DL (ref 8.4–10.4)
CHLORIDE SERPL-SCNC: 111 MMOL/L (ref 98–110)
CO2 SERPL-SCNC: 22 MMOL/L (ref 24–31)
CREAT BLD-MCNC: 1.81 MG/DL (ref 0.5–1.4)
ERYTHROCYTE [DISTWIDTH] IN BLOOD BY AUTOMATED COUNT: 14.7 % (ref 12–15)
GFR SERPL CREATININE-BSD FRML MDRD: 36 ML/MIN/1.73
GLOBULIN UR ELPH-MCNC: 3.2 GM/DL
GLUCOSE BLD-MCNC: 205 MG/DL (ref 70–100)
HCT VFR BLD AUTO: 33 % (ref 40–52)
HGB BLD-MCNC: 11 G/DL (ref 14–18)
HOLD SPECIMEN: NORMAL
LYMPHOCYTES # BLD AUTO: 1.5 10*3/MM3 (ref 0.8–7)
LYMPHOCYTES NFR BLD AUTO: 25.7 % (ref 15–45)
MCH RBC QN AUTO: 31.7 PG (ref 28–32)
MCHC RBC AUTO-ENTMCNC: 33.3 G/DL (ref 33–36)
MCV RBC AUTO: 95.1 FL (ref 82–95)
NEUTROPHILS # BLD AUTO: 3.8 10*3/MM3 (ref 1.5–8.3)
NEUTROPHILS NFR BLD AUTO: 66.1 % (ref 39–78)
PLATELET # BLD AUTO: 235 10*3/MM3 (ref 130–400)
PMV BLD AUTO: 8.1 FL (ref 6–12)
POTASSIUM BLD-SCNC: 4.9 MMOL/L (ref 3.5–5.3)
PROT SERPL-MCNC: 7.1 G/DL (ref 6.3–8.7)
RBC # BLD AUTO: 3.47 10*6/MM3 (ref 4.2–5.4)
SODIUM BLD-SCNC: 142 MMOL/L (ref 135–145)
WBC NRBC COR # BLD: 5.8 10*3/MM3 (ref 4.8–10.8)

## 2017-10-20 PROCEDURE — 99213 OFFICE O/P EST LOW 20 MIN: CPT | Performed by: INTERNAL MEDICINE

## 2017-10-20 PROCEDURE — 80053 COMPREHEN METABOLIC PANEL: CPT | Performed by: INTERNAL MEDICINE

## 2017-10-20 PROCEDURE — 85025 COMPLETE CBC W/AUTO DIFF WBC: CPT | Performed by: INTERNAL MEDICINE

## 2017-10-20 PROCEDURE — 36415 COLL VENOUS BLD VENIPUNCTURE: CPT

## 2017-10-20 NOTE — PROGRESS NOTES
[facility]  HEMATOLOGY & ONCOLOGY    Cancer Staging Information:  Cancer of right colon    Staging form: Colon and Rectum, AJCC V7    - Clinical stage from 9/28/2017: No stage assigned - Unsigned    - Pathologic stage from 9/28/2017: Stage IIA (T3, N0, cM0) - Signed by Keara Mehta MD on 9/28/2017      Subjective     VISIT DIAGNOSIS:   Encounter Diagnoses   Name Primary?   • Dizziness Yes   • Other iron deficiency anemia        REASON FOR VISIT:     Chief Complaint   Patient presents with   • Follow-up     Dizziness, recieved call from patient wife with concerns that  has had episodes of dizziness, syncope which began yesterday am. She  feels that his iron is low and will need transfusions. Denies unusual bleeding        HEMATOLOGY / ONCOLOGY HISTORY:    No history exists.           INTERVAL HISTORY  Patient ID: Maikel Mathews is a 88 y.o. year old male with new diagnosis of stage II colon cancer low risk. Wife called that patient fell one time and has been complaining of being dizzy. Pt reports getting up from bed and falling due to dizziness. He did not hit his head but broke it with his hand. He reports drinking enough fluid. The next day, he sat at the edge of the bed for a while before getting up but was still dizzy but not as much. Denies nausea or vomiting or focal weakness. They both had a cold which has resolved.    Past Medical History:   Past Medical History:   Diagnosis Date   • Arthritis    • AVM (arteriovenous malformation)    • Cancer     PROSTATE   • Cataract    • Colon polyp    • Diabetes mellitus    • Diverticulosis    • Dizzinesses 10/20/2017   • GERD (gastroesophageal reflux disease)    • Hemorrhoids    • History of transfusion    • Hypertension    • Iron deficiency anemia due to chronic blood loss 7/26/2017   • Prostate CA    • Prostate hypertrophy    • Rotator cuff syndrome      Past Surgical History:   Past Surgical History:   Procedure Laterality Date    • APPENDECTOMY     • CHOLECYSTECTOMY     • COLON RESECTION N/A 9/18/2017    Procedure: LAPAROSCOPIC ASSISTED RIGHT COLECTOMY;  Surgeon: Caroline Loomis MD;  Location: [facility] OR;  Service:    • COLONOSCOPY  04/29/2010   • COLONOSCOPY N/A 8/25/2017    Procedure: COLONOSCOPY WITH ANESTHESIA;  Surgeon: Joaquín Neff MD;  Location: [facility] ENDOSCOPY;  Service:    • ENDOSCOPY  03/27/2013   • ENDOSCOPY N/A 8/25/2017    Procedure: ESOPHAGOGASTRODUODENOSCOPY WITH ANESTHESIA;  Surgeon: Joaquín Neff MD;  Location: [facility] ENDOSCOPY;  Service:    • EYE SURGERY Left     CATARACT   • INTERVENTIONAL RADIOLOGY PROCEDURE N/A 9/18/2017    Procedure: URETHRA DILITATION with dominguez catheter insertion;  Surgeon: Mamadou Paez MD;  Location: [facility] OR;  Service:    • PROSTATECTOMY     • PROSTATECTOMY       Social History:   Social History     Social History   • Marital status:      Spouse name: N/A   • Number of children: N/A   • Years of education: N/A     Occupational History   • Not on file.     Social History Main Topics   • Smoking status: Never Smoker   • Smokeless tobacco: Never Used   • Alcohol use No   • Drug use: No   • Sexual activity: Defer     Other Topics Concern   • Not on file     Social History Narrative     Family History:   Family History   Problem Relation Age of Onset   • Colon cancer Neg Hx    • Colon polyps Neg Hx        Review of Systems   HENT: Negative.    Eyes: Negative.    Respiratory: Negative.    Cardiovascular: Negative.    Gastrointestinal: Negative.    Endocrine: Negative.    Genitourinary: Negative.    Musculoskeletal: Negative.    Skin: Negative.    Allergic/Immunologic: Negative.    Neurological: Positive for dizziness, weakness and light-headedness.   Hematological: Negative.    Psychiatric/Behavioral: Negative.         Performance Status:  Asymptomatic    Medications:    Current Outpatient Prescriptions   Medication Sig Dispense Refill   • amLODIPine (NORVASC) 10 MG tablet   5  "  • glipiZIDE (GLUCOTROL) 5 MG ER tablet Daily.  2   • losartan (COZAAR) 100 MG tablet      • GAVILYTE-N WITH FLAVOR PACK 420 g solution      • HYDROcodone-acetaminophen (NORCO) 7.5-325 MG per tablet Take 1-2 tablets by mouth Every 4 (Four) Hours As Needed for Moderate Pain  (Pain). 30 tablet 0   • ibuprofen (ADVIL,MOTRIN) 800 MG tablet      • IRON SUCROSE IV Infuse  into a venous catheter. Tue, fri     • neomycin (MYCIFRADIN) 500 MG tablet        No current facility-administered medications for this visit.        ALLERGIES:  No Known Allergies    Objective      Vitals:    10/20/17 1050   BP: 132/84  Comment: 110/64 Standing  Pulse 68   Pulse: 86   Resp: 18   Temp: 97.9 °F (36.6 °C)   TempSrc: Tympanic   SpO2: 97%   Weight: 131 lb 8 oz (59.6 kg)   Height: 67\" (170.2 cm)         Current Status 10/20/2017   ECOG score 2         Physical Exam  General Appearance: Patient is awake, alert, oriented and in no acute distress. Patient is welldeveloped, wellnourished, and appears stated age.  HEENT: Normocephalic. Sclerae clear, conjunctiva pink, extraocular movements intact, pupils, round, reactive to light and  accommodation. Mouth and throat are clear with moist oral mucosa.  NECK: Supple, no jugular venous distention, thyroid not enlarged.  LYMPH: No cervical, supraclavicular, axillary, or inguinal lymphadenopathy.  CHEST: Equal bilateral expansion, AP  diameter normal, resonant percussion note  LUNGS: Good air movement, no rales, rhonchi, rubs or wheezes with auscultation  CARDIO: Regular sinus rhythm, no murmurs, gallops or rubs.  ABDOMEN: Nondistended, soft, No tenderness, no guarding, no rebound, No hepatosplenomegaly. No abdominal masses. Bowel sounds positive. No hernia  GENITALIA: Not examined.  BREASTS: Not examined.  MUSKEL: No joint swelling, decreased motion, or inflammation  EXTREMS: No edema, clubbing, cyanosis, No varicose veins.  NEURO: Grossly nonfocal, Gait is coordinated and smooth, Cognition is " preserved.  SKIN: No rashes, no ecchymoses, no petechia.  PSYCH: Oriented to time, place and person. Memory is preserved. Mood and affect appear normal  RECENT LABS:  Lab on 10/20/2017   Component Date Value Ref Range Status   • Extra Tube 10/20/2017 Hold for add-ons.   Final    Auto resulted.   Orders Only on 10/20/2017   Component Date Value Ref Range Status   • Glucose 10/20/2017 205* 70 - 100 mg/dL Final   • BUN 10/20/2017 35* 5 - 21 mg/dL Final   • Creatinine 10/20/2017 1.81* 0.50 - 1.40 mg/dL Final   • Sodium 10/20/2017 142  135 - 145 mmol/L Final   • Potassium 10/20/2017 4.9  3.5 - 5.3 mmol/L Final   • Chloride 10/20/2017 111* 98 - 110 mmol/L Final   • CO2 10/20/2017 22.0* 24.0 - 31.0 mmol/L Final   • Calcium 10/20/2017 8.8  8.4 - 10.4 mg/dL Final   • Total Protein 10/20/2017 7.1  6.3 - 8.7 g/dL Final   • Albumin 10/20/2017 3.90  3.50 - 5.00 g/dL Final   • ALT (SGPT) 10/20/2017 36  0 - 54 U/L Final   • AST (SGOT) 10/20/2017 30  7 - 45 U/L Final   • Alkaline Phosphatase 10/20/2017 57  24 - 120 U/L Final   • Total Bilirubin 10/20/2017 0.2  0.1 - 1.0 mg/dL Final   • eGFR Non  Amer 10/20/2017 36* >60 mL/min/1.73 Final   • Globulin 10/20/2017 3.2  gm/dL Final   • A/G Ratio 10/20/2017 1.2  1.1 - 2.5 g/dL Final   • BUN/Creatinine Ratio 10/20/2017 19.3    Final   • Anion Gap 10/20/2017 9.0  4.0 - 13.0 mmol/L Final   • WBC 10/20/2017 5.80  4.80 - 10.80 10*3/mm3 Final   • RBC 10/20/2017 3.47* 4.20 - 5.40 10*6/mm3 Final   • Hemoglobin 10/20/2017 11.0* 14.0 - 18.0 g/dL Final   • Hematocrit 10/20/2017 33.0* 40.0 - 52.0 % Final   • MCV 10/20/2017 95.1* 82.0 - 95.0 fL Final   • MCH 10/20/2017 31.7  28.0 - 32.0 pg Final   • MCHC 10/20/2017 33.3  33.0 - 36.0 g/dL Final   • RDW 10/20/2017 14.7  12.0 - 15.0 % Final   • MPV 10/20/2017 8.1  6.0 - 12.0 fL Final   • Platelets 10/20/2017 235  130 - 400 10*3/mm3 Final   • Neutrophil % 10/20/2017 66.1  39.0 - 78.0 % Final   • Lymphocyte % 10/20/2017 25.7  15.0 - 45.0 % Final    • Auto Mixed Cells % 10/20/2017 8.2  0.1 - 24.0 % Final   • Neutrophils, Absolute 10/20/2017 3.80  1.50 - 8.30 10*3/mm3 Final   • Lymphocytes, Absolute 10/20/2017 1.50  0.80 - 7.00 10*3/mm3 Final   • Auto Mixed Cells # 10/20/2017 0.50  0.10 - 2.60 10*3/mm3 Final       RADIOLOGY:  Ct Chest Without Contrast    Result Date: 10/2/2017  Narrative: EXAMINATION: CT CHEST WO CONTRAST- 10/2/2017 3:03 PM CDT  HISTORY: Staging for colon cancer  COMPARISON: None  DOSE: 190 mGy-cm  TECHNIQUE: Sequential imaging was performed from the thoracic inlet through the upper abdomen without the use of IV contrast.  Sagittal and coronal reformations were made from the original source data and reviewed. Automated exposure control was also utilized to decrease patient radiation dose.  FINDINGS: The left thyroid lobe appears enlarged with multiple calcifications and extends into the superior mediastinum, with mass effect upon the trachea. Trachea remains patent. The esophagus appears grossly normal.  There is no appreciable axillary lymphadenopathy. Multiple nonpathologically enlarged lymph nodes are seen in the mediastinum, measuring up to 10 mm in short axis in the pretracheal region.  The heart appears normal in size. There is no pericardial or pleural effusion. Calcifications are seen within the LAD, left circumflex, and right coronary arteries. The ascending thoracic aorta is ectatic, measuring up to 3.7 cm in diameter. There are atherosclerotic calcifications of the aorta and its branch vessels. The main pulmonary artery appears normal in caliber.  A calcified granuloma is seen in the left upper lobe. A small noncalcified nodule is seen in the medial left upper lobe which measures approximately 3 mm in size (axial image 23). An area of groundglass nodularity in the right lower lobe measures approximately 7 mm in size (axial image 45). A small nodule in the medial right upper lobe measures approximately 2 mm in size (axial image 20).  A calcified granuloma is seen in the right upper lobe. A small cavitary nodule is seen in the right lower lobe which measures approximately 5 mm in size (axial image 46).  Review of the visualized portion of the upper abdomen demonstrates no acute findings.  Review of the visualized osseous structures demonstrates degenerative changes of the spine. Numerous sclerotic areas are seen within the bones, the largest of which is in the T11 vertebral body.         Impression: 1. A few tiny noncalcified pulmonary nodules are present as detailed above, for which short interval follow-up in 3-6 months is recommended. 2. Sclerotic areas in the spine, which could represent metastatic disease. Consider nuclear medicine whole body bone scan for further evaluation. 3. Atherosclerosis of the aorta and coronary arteries.  This report was finalized on 10/02/2017 15:24 by Dr. Davian Montes De Oca MD.    Nm Bone Scan Whole Body    Result Date: 10/9/2017  Narrative: NM BONE SCAN WHOLE BODY- 10/9/2017 11:33 AM CDT  HISTORY: f/o on sclerotic lesions noted on CT chest.; C18.2-Malignant neoplasm of ascending colon  COMPARISON: This compared to CT of the chest 10/02/2017 and MR of the lumbar spine 10/14/2015.  RADIOPHARMACEUTICAL: 19.3 mCi Tc99m HDP  TECHNIQUE: Anterior and posterior whole-body images are acquired approximately two hours postinjection.  FINDINGS: Skull: Unremarkable.   Spine: Mild increased uptake is noted in the lower thoracic spine. When compared to the CT scan there is loss height of the disc space at its reactive sclerotic change of the opposing vertebral body endplates. This most likely is degenerative. An additional sclerotic lesion is present on the prior MR was described as atypical hemangioma.  Thorax: No focal area of increased uptake.  Abdomen/pelvis: Physiologic activity is noted in the bilateral kidneys and urinary bladder.  Extremities: Symmetric distribution in the shoulders and knees is noted. Degenerative change  in the feet is present..       Impression: 1. The uptake in the thoracic spine most likely is degenerative in etiology..  This report was finalized on 10/09/2017 16:18 by Dr. Henrik Alvarado MD.           Assessment/Plan  Maikel Mathews is a 88 y.o. year old male with diagnosis of colon adenocarcinoma status post surgical resection tumor 5.5 in greatest dimension low-grade moderately differentiated, 0 of 15 lymph nodes negative for metastases, pT3 N 0 M0 stage II a disease who presents today with dizziness on standing.    Patient Active Problem List   Diagnosis   • Anemia associated with nutritional deficiency   • Iron deficiency anemia due to chronic blood loss   • Cancer of right colon   • Dizzinesses          1.Dizziness: PE exam unrevealing. Blood pr okay, no evidence of fluid in the ear.  Could be orthostatic versus neurogenetic versus  Cardiac versus BPPV versus brain tumor. Does not think brain metastasis given no metastasis on body scans. Advised adequate fluid intake. Labs okay. If continue see PCP for work-up of carotids or he can call us and we will scan his brain.    2.Stage II crc: no evidence of metastasis, good risk features. Microsatellite instability not detected. Chemotherapy not indicated. Will continue surveillance per NCCN guideline.    3. Iron deficiency anemia due to GI loss: We will continue IV iron infusion.     4.  GERD: Continue omeprazole.          Keara Mehta MD    10/20/2017    11:59 AM

## 2017-10-20 NOTE — TELEPHONE ENCOUNTER
Returned call to patient and spoke to wife, she states her  is very weak, and dizzy, weakness has had a syncope episode yesterday, and the dizziness symptoms began yesterday. She is concerned that his iron is low once more, she feels IV iron will help with his problems. He finished his last IV Iron infusion late Sept 2017. He does have apt moustapha with Dr KATHLEEN on Thursday this week Oct 26/17, but feels she needs to be seen sooner.  Discussed with Dr KATHLEEN. She wishes to have patient come in to the office this am for eval of his symptoms along with lab work. Wife v/u of our conversation and will bring patient in for eval with Dr KATHLEEN this am and discuss possible options to help with his problems.

## 2017-10-26 ENCOUNTER — LAB (OUTPATIENT)
Dept: LAB | Facility: HOSPITAL | Age: 82
End: 2017-10-26
Attending: INTERNAL MEDICINE

## 2017-10-26 ENCOUNTER — INFUSION (OUTPATIENT)
Dept: ONCOLOGY | Facility: HOSPITAL | Age: 82
End: 2017-10-26
Attending: INTERNAL MEDICINE

## 2017-10-26 ENCOUNTER — OFFICE VISIT (OUTPATIENT)
Dept: ONCOLOGY | Facility: CLINIC | Age: 82
End: 2017-10-26

## 2017-10-26 VITALS
DIASTOLIC BLOOD PRESSURE: 74 MMHG | OXYGEN SATURATION: 96 % | WEIGHT: 134.5 LBS | BODY MASS INDEX: 21.11 KG/M2 | HEIGHT: 67 IN | SYSTOLIC BLOOD PRESSURE: 128 MMHG | HEART RATE: 84 BPM | TEMPERATURE: 96.9 F | RESPIRATION RATE: 16 BRPM

## 2017-10-26 VITALS
BODY MASS INDEX: 21.03 KG/M2 | HEIGHT: 67 IN | DIASTOLIC BLOOD PRESSURE: 60 MMHG | TEMPERATURE: 97.7 F | WEIGHT: 134 LBS | RESPIRATION RATE: 18 BRPM | OXYGEN SATURATION: 98 % | SYSTOLIC BLOOD PRESSURE: 127 MMHG | HEART RATE: 64 BPM

## 2017-10-26 DIAGNOSIS — C18.2 MALIGNANT NEOPLASM OF ASCENDING COLON (HCC): ICD-10-CM

## 2017-10-26 DIAGNOSIS — D50.0 IRON DEFICIENCY ANEMIA DUE TO CHRONIC BLOOD LOSS: ICD-10-CM

## 2017-10-26 DIAGNOSIS — D50.0 IRON DEFICIENCY ANEMIA DUE TO CHRONIC BLOOD LOSS: Primary | ICD-10-CM

## 2017-10-26 LAB
ACANTHOCYTES BLD QL SMEAR: NORMAL
ALBUMIN SERPL-MCNC: 3.8 G/DL (ref 3.5–5)
ALBUMIN/GLOB SERPL: 1.3 G/DL (ref 1.1–2.5)
ALP SERPL-CCNC: 57 U/L (ref 24–120)
ALT SERPL W P-5'-P-CCNC: 36 U/L (ref 0–54)
ANION GAP SERPL CALCULATED.3IONS-SCNC: 8 MMOL/L (ref 4–13)
AST SERPL-CCNC: 28 U/L (ref 7–45)
BASOPHILS NFR BLD AUTO: 1 % (ref 0–2)
BILIRUB SERPL-MCNC: 0.2 MG/DL (ref 0.1–1)
BUN BLD-MCNC: 34 MG/DL (ref 5–21)
BUN/CREAT SERPL: 19.9
BURR CELLS BLD QL SMEAR: NORMAL
CALCIUM SPEC-SCNC: 8.7 MG/DL (ref 8.4–10.4)
CEA SERPL-MCNC: 1.92 NG/ML (ref 0–5)
CHLORIDE SERPL-SCNC: 111 MMOL/L (ref 98–110)
CO2 SERPL-SCNC: 21 MMOL/L (ref 24–31)
CREAT BLD-MCNC: 1.71 MG/DL (ref 0.5–1.4)
EOSINOPHIL NFR BLD MANUAL: 1 % (ref 0–4)
ERYTHROCYTE [DISTWIDTH] IN BLOOD BY AUTOMATED COUNT: 14.6 % (ref 12–15)
GFR SERPL CREATININE-BSD FRML MDRD: 38 ML/MIN/1.73
GLOBULIN UR ELPH-MCNC: 3 GM/DL
GLUCOSE BLD-MCNC: 167 MG/DL (ref 70–100)
HCT VFR BLD AUTO: 32.8 % (ref 40–52)
HGB BLD-MCNC: 11 G/DL (ref 14–18)
LYMPHOCYTES # BLD MANUAL: NORMAL 10*3/MM3 (ref 0.72–4.86)
LYMPHOCYTES NFR BLD MANUAL: 17 % (ref 15–45)
LYMPHOCYTES NFR BLD MANUAL: 6 % (ref 4–12)
MCH RBC QN AUTO: 31.8 PG (ref 28–32)
MCHC RBC AUTO-ENTMCNC: 33.5 G/DL (ref 33–36)
MCV RBC AUTO: 94.8 FL (ref 82–95)
NEUTROPHILS # BLD AUTO: NORMAL 10*3/MM3 (ref 1.87–8.4)
NEUTROPHILS NFR BLD MANUAL: 68 % (ref 39–78)
NEUTS BAND NFR BLD MANUAL: 3 % (ref 0–10)
PLATELET # BLD AUTO: 226 10*3/MM3 (ref 130–400)
PMV BLD AUTO: 8.4 FL (ref 6–12)
POIKILOCYTOSIS BLD QL SMEAR: NORMAL
POTASSIUM BLD-SCNC: 4.9 MMOL/L (ref 3.5–5.3)
PROT SERPL-MCNC: 6.8 G/DL (ref 6.3–8.7)
RBC # BLD AUTO: 3.46 10*6/MM3 (ref 4.2–5.4)
SMALL PLATELETS BLD QL SMEAR: ADEQUATE
SODIUM BLD-SCNC: 140 MMOL/L (ref 135–145)
VARIANT LYMPHS NFR BLD MANUAL: 4 % (ref 0–5)
WBC MORPH BLD: NORMAL
WBC NRBC COR # BLD: 5.6 10*3/MM3 (ref 4.8–10.8)

## 2017-10-26 PROCEDURE — 80053 COMPREHEN METABOLIC PANEL: CPT

## 2017-10-26 PROCEDURE — 25010000002 IRON SUCROSE PER 1 MG: Performed by: INTERNAL MEDICINE

## 2017-10-26 PROCEDURE — 85027 COMPLETE CBC AUTOMATED: CPT

## 2017-10-26 PROCEDURE — 82378 CARCINOEMBRYONIC ANTIGEN: CPT

## 2017-10-26 PROCEDURE — 96365 THER/PROPH/DIAG IV INF INIT: CPT

## 2017-10-26 PROCEDURE — 85007 BL SMEAR W/DIFF WBC COUNT: CPT

## 2017-10-26 PROCEDURE — 36415 COLL VENOUS BLD VENIPUNCTURE: CPT

## 2017-10-26 PROCEDURE — 99214 OFFICE O/P EST MOD 30 MIN: CPT | Performed by: INTERNAL MEDICINE

## 2017-10-26 RX ORDER — SODIUM CHLORIDE 9 MG/ML
250 INJECTION, SOLUTION INTRAVENOUS ONCE
Status: COMPLETED | OUTPATIENT
Start: 2017-10-26 | End: 2017-10-26

## 2017-10-26 RX ORDER — FAMOTIDINE 10 MG/ML
20 INJECTION, SOLUTION INTRAVENOUS AS NEEDED
Status: CANCELLED | OUTPATIENT
Start: 2017-11-17

## 2017-10-26 RX ORDER — DIPHENHYDRAMINE HYDROCHLORIDE 50 MG/ML
50 INJECTION INTRAMUSCULAR; INTRAVENOUS AS NEEDED
Status: CANCELLED | OUTPATIENT
Start: 2017-11-17

## 2017-10-26 RX ORDER — FAMOTIDINE 10 MG/ML
20 INJECTION, SOLUTION INTRAVENOUS AS NEEDED
Status: CANCELLED | OUTPATIENT
Start: 2017-11-14

## 2017-10-26 RX ORDER — SODIUM CHLORIDE 9 MG/ML
250 INJECTION, SOLUTION INTRAVENOUS ONCE
Status: CANCELLED | OUTPATIENT
Start: 2017-11-10

## 2017-10-26 RX ORDER — DIPHENHYDRAMINE HYDROCHLORIDE 50 MG/ML
50 INJECTION INTRAMUSCULAR; INTRAVENOUS AS NEEDED
Status: CANCELLED | OUTPATIENT
Start: 2017-11-21

## 2017-10-26 RX ORDER — FAMOTIDINE 10 MG/ML
20 INJECTION, SOLUTION INTRAVENOUS AS NEEDED
Status: CANCELLED | OUTPATIENT
Start: 2017-11-07

## 2017-10-26 RX ORDER — SODIUM CHLORIDE 9 MG/ML
250 INJECTION, SOLUTION INTRAVENOUS ONCE
Status: CANCELLED | OUTPATIENT
Start: 2017-11-14

## 2017-10-26 RX ORDER — FAMOTIDINE 10 MG/ML
20 INJECTION, SOLUTION INTRAVENOUS AS NEEDED
Status: CANCELLED | OUTPATIENT
Start: 2017-11-03

## 2017-10-26 RX ORDER — DIPHENHYDRAMINE HYDROCHLORIDE 50 MG/ML
50 INJECTION INTRAMUSCULAR; INTRAVENOUS AS NEEDED
Status: CANCELLED | OUTPATIENT
Start: 2017-11-03

## 2017-10-26 RX ORDER — FAMOTIDINE 10 MG/ML
20 INJECTION, SOLUTION INTRAVENOUS AS NEEDED
Status: CANCELLED | OUTPATIENT
Start: 2017-11-21

## 2017-10-26 RX ORDER — SODIUM CHLORIDE 9 MG/ML
250 INJECTION, SOLUTION INTRAVENOUS ONCE
Status: CANCELLED | OUTPATIENT
Start: 2017-11-21

## 2017-10-26 RX ORDER — FAMOTIDINE 10 MG/ML
20 INJECTION, SOLUTION INTRAVENOUS AS NEEDED
Status: CANCELLED | OUTPATIENT
Start: 2017-11-10

## 2017-10-26 RX ORDER — DIPHENHYDRAMINE HYDROCHLORIDE 50 MG/ML
50 INJECTION INTRAMUSCULAR; INTRAVENOUS AS NEEDED
Status: CANCELLED | OUTPATIENT
Start: 2017-11-14

## 2017-10-26 RX ORDER — DIPHENHYDRAMINE HYDROCHLORIDE 50 MG/ML
50 INJECTION INTRAMUSCULAR; INTRAVENOUS AS NEEDED
Status: CANCELLED | OUTPATIENT
Start: 2017-11-07

## 2017-10-26 RX ORDER — SODIUM CHLORIDE 9 MG/ML
250 INJECTION, SOLUTION INTRAVENOUS ONCE
Status: CANCELLED | OUTPATIENT
Start: 2017-11-17

## 2017-10-26 RX ORDER — FAMOTIDINE 10 MG/ML
20 INJECTION, SOLUTION INTRAVENOUS AS NEEDED
Status: DISCONTINUED | OUTPATIENT
Start: 2017-10-26 | End: 2017-10-26 | Stop reason: HOSPADM

## 2017-10-26 RX ORDER — DIPHENHYDRAMINE HYDROCHLORIDE 50 MG/ML
50 INJECTION INTRAMUSCULAR; INTRAVENOUS AS NEEDED
Status: CANCELLED | OUTPATIENT
Start: 2017-11-10

## 2017-10-26 RX ORDER — DIPHENHYDRAMINE HYDROCHLORIDE 50 MG/ML
50 INJECTION INTRAMUSCULAR; INTRAVENOUS AS NEEDED
Status: DISCONTINUED | OUTPATIENT
Start: 2017-10-26 | End: 2017-10-26 | Stop reason: HOSPADM

## 2017-10-26 RX ORDER — SODIUM CHLORIDE 9 MG/ML
250 INJECTION, SOLUTION INTRAVENOUS ONCE
Status: CANCELLED | OUTPATIENT
Start: 2017-11-03

## 2017-10-26 RX ORDER — SODIUM CHLORIDE 9 MG/ML
250 INJECTION, SOLUTION INTRAVENOUS ONCE
Status: CANCELLED | OUTPATIENT
Start: 2017-11-07

## 2017-10-26 RX ADMIN — SODIUM CHLORIDE 250 ML: 9 INJECTION, SOLUTION INTRAVENOUS at 09:57

## 2017-10-26 RX ADMIN — IRON SUCROSE 200 MG: 20 INJECTION, SOLUTION INTRAVENOUS at 10:14

## 2017-10-26 NOTE — PROGRESS NOTES
[facility]  HEMATOLOGY & ONCOLOGY    Cancer Staging Information:  Cancer of right colon    Staging form: Colon and Rectum, AJCC V7    - Clinical stage from 9/28/2017: No stage assigned - Unsigned    - Pathologic stage from 9/28/2017: Stage IIA (T3, N0, cM0) - Signed by Keara Mehta MD on 9/28/2017      Subjective     VISIT DIAGNOSIS:   Encounter Diagnoses   Name Primary?   • Iron deficiency anemia due to chronic blood loss Yes   • Malignant neoplasm of ascending colon        REASON FOR VISIT:     Chief Complaint   Patient presents with   • Colon Cancer     review imaging   • Anemia     review labs        HEMATOLOGY / ONCOLOGY HISTORY:    No history exists.           INTERVAL HISTORY  Patient ID: Maikel Mathews is a 88 y.o. year old male with stage II a crc PROSPER currently on observation. He is doing great. His only problem is diarrhea since the surgery which is manageable. His appetite is good. He eats lots of vegetables.    Past Medical History:   Past Medical History:   Diagnosis Date   • Arthritis    • AVM (arteriovenous malformation)    • Cancer     PROSTATE   • Cataract    • Colon polyp    • Diabetes mellitus    • Diverticulosis    • Dizzinesses 10/20/2017   • GERD (gastroesophageal reflux disease)    • Hemorrhoids    • History of transfusion    • Hypertension    • Iron deficiency anemia due to chronic blood loss 7/26/2017   • Prostate CA    • Prostate hypertrophy    • Rotator cuff syndrome      Past Surgical History:   Past Surgical History:   Procedure Laterality Date   • APPENDECTOMY     • CHOLECYSTECTOMY     • COLON RESECTION N/A 9/18/2017    Procedure: LAPAROSCOPIC ASSISTED RIGHT COLECTOMY;  Surgeon: Caroline Loomis MD;  Location: [facility] OR;  Service:    • COLONOSCOPY  04/29/2010   • COLONOSCOPY N/A 8/25/2017    Procedure: COLONOSCOPY WITH ANESTHESIA;  Surgeon: Joaquín Neff MD;  Location: [facility] ENDOSCOPY;  Service:    • ENDOSCOPY  03/27/2013   • ENDOSCOPY N/A 8/25/2017     Procedure: ESOPHAGOGASTRODUODENOSCOPY WITH ANESTHESIA;  Surgeon: Joaquín Neff MD;  Location: [facility] ENDOSCOPY;  Service:    • EYE SURGERY Left     CATARACT   • INTERVENTIONAL RADIOLOGY PROCEDURE N/A 9/18/2017    Procedure: URETHRA DILITATION with dominguez catheter insertion;  Surgeon: Mamadou Paez MD;  Location: [facility] OR;  Service:    • PROSTATECTOMY     • PROSTATECTOMY       Social History:   Social History     Social History   • Marital status:      Spouse name: N/A   • Number of children: N/A   • Years of education: N/A     Occupational History   • Not on file.     Social History Main Topics   • Smoking status: Never Smoker   • Smokeless tobacco: Never Used   • Alcohol use No   • Drug use: No   • Sexual activity: Defer     Other Topics Concern   • Not on file     Social History Narrative     Family History:   Family History   Problem Relation Age of Onset   • Colon cancer Neg Hx    • Colon polyps Neg Hx        Review of Systems   Constitutional: Positive for fatigue.   HENT: Negative.    Eyes: Negative.    Respiratory: Negative.    Cardiovascular: Negative.    Gastrointestinal: Positive for diarrhea.   Endocrine: Negative.    Genitourinary: Negative.    Musculoskeletal: Negative.    Skin: Negative.    Allergic/Immunologic: Negative.    Neurological: Negative for dizziness, weakness and light-headedness.   Hematological: Negative.    Psychiatric/Behavioral: Negative.         Performance Status:  Asymptomatic    Medications:    Current Outpatient Prescriptions   Medication Sig Dispense Refill   • amLODIPine (NORVASC) 10 MG tablet   5   • GAVILYTE-N WITH FLAVOR PACK 420 g solution      • glipiZIDE (GLUCOTROL) 5 MG ER tablet Daily.  2   • HYDROcodone-acetaminophen (NORCO) 7.5-325 MG per tablet Take 1-2 tablets by mouth Every 4 (Four) Hours As Needed for Moderate Pain  (Pain). 30 tablet 0   • ibuprofen (ADVIL,MOTRIN) 800 MG tablet      • IRON SUCROSE IV Infuse  into a venous catheter. Tue, fri    "  • losartan (COZAAR) 100 MG tablet      • neomycin (MYCIFRADIN) 500 MG tablet        No current facility-administered medications for this visit.        ALLERGIES:  No Known Allergies    Objective      Vitals:    10/26/17 0912   BP: 128/74   Pulse: 84   Resp: 16   Temp: 96.9 °F (36.1 °C)   TempSrc: Tympanic   SpO2: 96%   Weight: 134 lb 8 oz (61 kg)   Height: 67\" (170.2 cm)         Current Status 10/26/2017   ECOG score 1         Physical Exam    General Appearance: Patient is awake, alert, oriented and in no acute distress. Patient is welldeveloped, wellnourished, and appears stated age.  HEENT: Normocephalic. Sclerae clear, conjunctiva pink, extraocular movements intact, pupils, round, reactive to light and  accommodation. Mouth and throat are clear with moist oral mucosa.  NECK: Supple, no jugular venous distention, thyroid not enlarged.  LYMPH: No cervical, supraclavicular, axillary, or inguinal lymphadenopathy.  CHEST: Equal bilateral expansion, AP  diameter normal, resonant percussion note  LUNGS: Good air movement, no rales, rhonchi, rubs or wheezes with auscultation  CARDIO: Regular sinus rhythm, no murmurs, gallops or rubs.  ABDOMEN: Nondistended, soft, No tenderness, no guarding, no rebound, No hepatosplenomegaly. No abdominal masses. Bowel sounds positive. No hernia  GENITALIA: Not examined.  BREASTS: Not examined.  MUSKEL: No joint swelling, decreased motion, or inflammation  EXTREMS: No edema, clubbing, cyanosis, No varicose veins.  NEURO: Grossly nonfocal, Gait is coordinated and smooth, Cognition is preserved.  SKIN: No rashes, no ecchymoses, no petechia.  PSYCH: Oriented to time, place and person. Memory is preserved. Mood and affect appear normal  RECENT LABS:  Lab on 10/26/2017   Component Date Value Ref Range Status   • WBC 10/26/2017 5.60  4.80 - 10.80 10*3/mm3 Final   • RBC 10/26/2017 3.46* 4.20 - 5.40 10*6/mm3 Final   • Hemoglobin 10/26/2017 11.0* 14.0 - 18.0 g/dL Final   • Hematocrit " 10/26/2017 32.8* 40.0 - 52.0 % Final   • MCV 10/26/2017 94.8  82.0 - 95.0 fL Final   • MCH 10/26/2017 31.8  28.0 - 32.0 pg Final   • MCHC 10/26/2017 33.5  33.0 - 36.0 g/dL Final   • RDW 10/26/2017 14.6  12.0 - 15.0 % Final   • MPV 10/26/2017 8.4  6.0 - 12.0 fL Final   • Platelets 10/26/2017 226  130 - 400 10*3/mm3 Final   Lab on 10/20/2017   Component Date Value Ref Range Status   • Extra Tube 10/20/2017 Hold for add-ons.   Final    Auto resulted.   Orders Only on 10/20/2017   Component Date Value Ref Range Status   • Glucose 10/20/2017 205* 70 - 100 mg/dL Final   • BUN 10/20/2017 35* 5 - 21 mg/dL Final   • Creatinine 10/20/2017 1.81* 0.50 - 1.40 mg/dL Final   • Sodium 10/20/2017 142  135 - 145 mmol/L Final   • Potassium 10/20/2017 4.9  3.5 - 5.3 mmol/L Final   • Chloride 10/20/2017 111* 98 - 110 mmol/L Final   • CO2 10/20/2017 22.0* 24.0 - 31.0 mmol/L Final   • Calcium 10/20/2017 8.8  8.4 - 10.4 mg/dL Final   • Total Protein 10/20/2017 7.1  6.3 - 8.7 g/dL Final   • Albumin 10/20/2017 3.90  3.50 - 5.00 g/dL Final   • ALT (SGPT) 10/20/2017 36  0 - 54 U/L Final   • AST (SGOT) 10/20/2017 30  7 - 45 U/L Final   • Alkaline Phosphatase 10/20/2017 57  24 - 120 U/L Final   • Total Bilirubin 10/20/2017 0.2  0.1 - 1.0 mg/dL Final   • eGFR Non  Amer 10/20/2017 36* >60 mL/min/1.73 Final   • Globulin 10/20/2017 3.2  gm/dL Final   • A/G Ratio 10/20/2017 1.2  1.1 - 2.5 g/dL Final   • BUN/Creatinine Ratio 10/20/2017 19.3    Final   • Anion Gap 10/20/2017 9.0  4.0 - 13.0 mmol/L Final   • WBC 10/20/2017 5.80  4.80 - 10.80 10*3/mm3 Final   • RBC 10/20/2017 3.47* 4.20 - 5.40 10*6/mm3 Final   • Hemoglobin 10/20/2017 11.0* 14.0 - 18.0 g/dL Final   • Hematocrit 10/20/2017 33.0* 40.0 - 52.0 % Final   • MCV 10/20/2017 95.1* 82.0 - 95.0 fL Final   • MCH 10/20/2017 31.7  28.0 - 32.0 pg Final   • MCHC 10/20/2017 33.3  33.0 - 36.0 g/dL Final   • RDW 10/20/2017 14.7  12.0 - 15.0 % Final   • MPV 10/20/2017 8.1  6.0 - 12.0 fL Final   •  Platelets 10/20/2017 235  130 - 400 10*3/mm3 Final   • Neutrophil % 10/20/2017 66.1  39.0 - 78.0 % Final   • Lymphocyte % 10/20/2017 25.7  15.0 - 45.0 % Final   • Auto Mixed Cells % 10/20/2017 8.2  0.1 - 24.0 % Final   • Neutrophils, Absolute 10/20/2017 3.80  1.50 - 8.30 10*3/mm3 Final   • Lymphocytes, Absolute 10/20/2017 1.50  0.80 - 7.00 10*3/mm3 Final   • Auto Mixed Cells # 10/20/2017 0.50  0.10 - 2.60 10*3/mm3 Final       RADIOLOGY:  Ct Chest Without Contrast    Result Date: 10/2/2017  Narrative: EXAMINATION: CT CHEST WO CONTRAST- 10/2/2017 3:03 PM CDT  HISTORY: Staging for colon cancer  COMPARISON: None  DOSE: 190 mGy-cm  TECHNIQUE: Sequential imaging was performed from the thoracic inlet through the upper abdomen without the use of IV contrast.  Sagittal and coronal reformations were made from the original source data and reviewed. Automated exposure control was also utilized to decrease patient radiation dose.  FINDINGS: The left thyroid lobe appears enlarged with multiple calcifications and extends into the superior mediastinum, with mass effect upon the trachea. Trachea remains patent. The esophagus appears grossly normal.  There is no appreciable axillary lymphadenopathy. Multiple nonpathologically enlarged lymph nodes are seen in the mediastinum, measuring up to 10 mm in short axis in the pretracheal region.  The heart appears normal in size. There is no pericardial or pleural effusion. Calcifications are seen within the LAD, left circumflex, and right coronary arteries. The ascending thoracic aorta is ectatic, measuring up to 3.7 cm in diameter. There are atherosclerotic calcifications of the aorta and its branch vessels. The main pulmonary artery appears normal in caliber.  A calcified granuloma is seen in the left upper lobe. A small noncalcified nodule is seen in the medial left upper lobe which measures approximately 3 mm in size (axial image 23). An area of groundglass nodularity in the right lower  lobe measures approximately 7 mm in size (axial image 45). A small nodule in the medial right upper lobe measures approximately 2 mm in size (axial image 20). A calcified granuloma is seen in the right upper lobe. A small cavitary nodule is seen in the right lower lobe which measures approximately 5 mm in size (axial image 46).  Review of the visualized portion of the upper abdomen demonstrates no acute findings.  Review of the visualized osseous structures demonstrates degenerative changes of the spine. Numerous sclerotic areas are seen within the bones, the largest of which is in the T11 vertebral body.         Impression: 1. A few tiny noncalcified pulmonary nodules are present as detailed above, for which short interval follow-up in 3-6 months is recommended. 2. Sclerotic areas in the spine, which could represent metastatic disease. Consider nuclear medicine whole body bone scan for further evaluation. 3. Atherosclerosis of the aorta and coronary arteries.  This report was finalized on 10/02/2017 15:24 by Dr. Davian Montes De Oca MD.    Nm Bone Scan Whole Body    Result Date: 10/9/2017  Narrative: NM BONE SCAN WHOLE BODY- 10/9/2017 11:33 AM CDT  HISTORY: f/o on sclerotic lesions noted on CT chest.; C18.2-Malignant neoplasm of ascending colon  COMPARISON: This compared to CT of the chest 10/02/2017 and MR of the lumbar spine 10/14/2015.  RADIOPHARMACEUTICAL: 19.3 mCi Tc99m HDP  TECHNIQUE: Anterior and posterior whole-body images are acquired approximately two hours postinjection.  FINDINGS: Skull: Unremarkable.   Spine: Mild increased uptake is noted in the lower thoracic spine. When compared to the CT scan there is loss height of the disc space at its reactive sclerotic change of the opposing vertebral body endplates. This most likely is degenerative. An additional sclerotic lesion is present on the prior MR was described as atypical hemangioma.  Thorax: No focal area of increased uptake.  Abdomen/pelvis: Physiologic  activity is noted in the bilateral kidneys and urinary bladder.  Extremities: Symmetric distribution in the shoulders and knees is noted. Degenerative change in the feet is present..       Impression: 1. The uptake in the thoracic spine most likely is degenerative in etiology..  This report was finalized on 10/09/2017 16:18 by Dr. Henrik Alvarado MD.           Assessment/Plan  Maikel Mathews is a 88 y.o. year old male Maikel Mathews is a 88 y.o. year old male with diagnosis of colon adenocarcinoma status post surgical resection tumor 5.5 in greatest dimension low-grade moderately differentiated, 0 of 15 lymph nodes negative for metastases, pT3 N 0 M0 stage II a disease who presents today with dizziness on standing.    Patient Active Problem List   Diagnosis   • Anemia associated with nutritional deficiency   • Iron deficiency anemia due to chronic blood loss   • Cancer of right colon   • Dizzinesses          1.Stage IIA right-sided colon adenocarcinoma:Patient currently on observation.He has  favorable risk features; it is moderately differentiated, right-sided, 0 of 15 lymph nodes uninvolved, negative for lymphovascular  invasion, negative for perineural invasion, margins are negative.  Given these variables my preference is to observe him and not give chemotherapy.  This is also recommended by NCCN guideline.  His tumor was PROSPER. The patient and his wife understands and are willing to go with observation.  Chemotherapy is not needed in his situation. I reviewed the images that were done to complete staging, CT chest negative for malignancy but had some nodules that needed to be followed in 3-6 months. There were areas of concern in the spine that was negative with a bone scan. Next colonoscopy a year from last. CT chest /abdomen/pelvis in 6months per NCCN guideline.      2.  Iron deficiency anemia due to GI loss: s/p 8 doses of iv venofer. I reviewd his cbc with him and wife, Hg 11, We will continue IV iron infusion  for additional 8 doses so he could have some for storage.     3.  GERD: Continue omeprazole.    4.Dizziness: Resolved    5. Lung nodule on CT chest: Repeat CT chest in 6months.    6. Diarrhea: Manageable, will monitor for now. Advised high fiber food.         Keara Mehta MD    10/26/2017    9:36 AM

## 2017-10-31 ENCOUNTER — INFUSION (OUTPATIENT)
Dept: ONCOLOGY | Facility: HOSPITAL | Age: 82
End: 2017-10-31

## 2017-10-31 VITALS
OXYGEN SATURATION: 100 % | WEIGHT: 137 LBS | TEMPERATURE: 97.7 F | BODY MASS INDEX: 21.5 KG/M2 | DIASTOLIC BLOOD PRESSURE: 68 MMHG | RESPIRATION RATE: 18 BRPM | HEART RATE: 72 BPM | SYSTOLIC BLOOD PRESSURE: 137 MMHG | HEIGHT: 67 IN

## 2017-10-31 DIAGNOSIS — D50.0 IRON DEFICIENCY ANEMIA DUE TO CHRONIC BLOOD LOSS: Primary | ICD-10-CM

## 2017-10-31 PROCEDURE — 25010000002 IRON SUCROSE PER 1 MG: Performed by: INTERNAL MEDICINE

## 2017-10-31 PROCEDURE — 96365 THER/PROPH/DIAG IV INF INIT: CPT

## 2017-10-31 RX ORDER — FAMOTIDINE 10 MG/ML
20 INJECTION, SOLUTION INTRAVENOUS AS NEEDED
Status: DISCONTINUED | OUTPATIENT
Start: 2017-10-31 | End: 2017-10-31 | Stop reason: HOSPADM

## 2017-10-31 RX ORDER — SODIUM CHLORIDE 9 MG/ML
250 INJECTION, SOLUTION INTRAVENOUS ONCE
Status: COMPLETED | OUTPATIENT
Start: 2017-10-31 | End: 2017-10-31

## 2017-10-31 RX ORDER — DIPHENHYDRAMINE HYDROCHLORIDE 50 MG/ML
50 INJECTION INTRAMUSCULAR; INTRAVENOUS AS NEEDED
Status: DISCONTINUED | OUTPATIENT
Start: 2017-10-31 | End: 2017-10-31 | Stop reason: HOSPADM

## 2017-10-31 RX ADMIN — IRON SUCROSE 200 MG: 20 INJECTION, SOLUTION INTRAVENOUS at 10:07

## 2017-10-31 RX ADMIN — SODIUM CHLORIDE 250 ML: 0.9 INJECTION, SOLUTION INTRAVENOUS at 09:59

## 2017-11-03 ENCOUNTER — INFUSION (OUTPATIENT)
Dept: ONCOLOGY | Facility: HOSPITAL | Age: 82
End: 2017-11-03

## 2017-11-03 VITALS
OXYGEN SATURATION: 99 % | WEIGHT: 138.4 LBS | TEMPERATURE: 96.9 F | DIASTOLIC BLOOD PRESSURE: 66 MMHG | RESPIRATION RATE: 18 BRPM | HEART RATE: 71 BPM | BODY MASS INDEX: 20.5 KG/M2 | HEIGHT: 69 IN | SYSTOLIC BLOOD PRESSURE: 127 MMHG

## 2017-11-03 DIAGNOSIS — D50.0 IRON DEFICIENCY ANEMIA DUE TO CHRONIC BLOOD LOSS: Primary | ICD-10-CM

## 2017-11-03 PROCEDURE — 96365 THER/PROPH/DIAG IV INF INIT: CPT

## 2017-11-03 PROCEDURE — 25010000002 IRON SUCROSE PER 1 MG: Performed by: INTERNAL MEDICINE

## 2017-11-03 RX ORDER — FAMOTIDINE 10 MG/ML
20 INJECTION, SOLUTION INTRAVENOUS AS NEEDED
Status: DISCONTINUED | OUTPATIENT
Start: 2017-11-03 | End: 2017-11-03 | Stop reason: HOSPADM

## 2017-11-03 RX ORDER — DIPHENHYDRAMINE HYDROCHLORIDE 50 MG/ML
50 INJECTION INTRAMUSCULAR; INTRAVENOUS AS NEEDED
Status: DISCONTINUED | OUTPATIENT
Start: 2017-11-03 | End: 2017-11-03 | Stop reason: HOSPADM

## 2017-11-03 RX ORDER — SODIUM CHLORIDE 9 MG/ML
250 INJECTION, SOLUTION INTRAVENOUS ONCE
Status: COMPLETED | OUTPATIENT
Start: 2017-11-03 | End: 2017-11-03

## 2017-11-03 RX ADMIN — SODIUM CHLORIDE 250 ML: 0.9 INJECTION, SOLUTION INTRAVENOUS at 10:05

## 2017-11-03 RX ADMIN — IRON SUCROSE 200 MG: 20 INJECTION, SOLUTION INTRAVENOUS at 10:05

## 2017-11-07 ENCOUNTER — INFUSION (OUTPATIENT)
Dept: ONCOLOGY | Facility: HOSPITAL | Age: 82
End: 2017-11-07

## 2017-11-07 VITALS
OXYGEN SATURATION: 98 % | TEMPERATURE: 97.3 F | SYSTOLIC BLOOD PRESSURE: 145 MMHG | HEIGHT: 69 IN | DIASTOLIC BLOOD PRESSURE: 62 MMHG | WEIGHT: 136 LBS | BODY MASS INDEX: 20.14 KG/M2 | RESPIRATION RATE: 18 BRPM | HEART RATE: 63 BPM

## 2017-11-07 DIAGNOSIS — D50.0 IRON DEFICIENCY ANEMIA DUE TO CHRONIC BLOOD LOSS: Primary | ICD-10-CM

## 2017-11-07 PROCEDURE — 25010000002 IRON SUCROSE PER 1 MG: Performed by: INTERNAL MEDICINE

## 2017-11-07 PROCEDURE — 96365 THER/PROPH/DIAG IV INF INIT: CPT

## 2017-11-07 RX ORDER — FAMOTIDINE 10 MG/ML
20 INJECTION, SOLUTION INTRAVENOUS AS NEEDED
Status: DISCONTINUED | OUTPATIENT
Start: 2017-11-07 | End: 2017-11-07 | Stop reason: HOSPADM

## 2017-11-07 RX ORDER — DIPHENHYDRAMINE HYDROCHLORIDE 50 MG/ML
50 INJECTION INTRAMUSCULAR; INTRAVENOUS AS NEEDED
Status: DISCONTINUED | OUTPATIENT
Start: 2017-11-07 | End: 2017-11-07 | Stop reason: HOSPADM

## 2017-11-07 RX ORDER — SODIUM CHLORIDE 9 MG/ML
250 INJECTION, SOLUTION INTRAVENOUS ONCE
Status: COMPLETED | OUTPATIENT
Start: 2017-11-07 | End: 2017-11-07

## 2017-11-07 RX ADMIN — SODIUM CHLORIDE 250 ML: 0.9 INJECTION, SOLUTION INTRAVENOUS at 10:10

## 2017-11-07 RX ADMIN — IRON SUCROSE 200 MG: 20 INJECTION, SOLUTION INTRAVENOUS at 10:14

## 2017-11-10 ENCOUNTER — INFUSION (OUTPATIENT)
Dept: ONCOLOGY | Facility: HOSPITAL | Age: 82
End: 2017-11-10

## 2017-11-10 VITALS
WEIGHT: 137 LBS | DIASTOLIC BLOOD PRESSURE: 69 MMHG | BODY MASS INDEX: 21.5 KG/M2 | HEART RATE: 67 BPM | HEIGHT: 67 IN | RESPIRATION RATE: 18 BRPM | OXYGEN SATURATION: 99 % | SYSTOLIC BLOOD PRESSURE: 130 MMHG | TEMPERATURE: 97 F

## 2017-11-10 DIAGNOSIS — D50.0 IRON DEFICIENCY ANEMIA DUE TO CHRONIC BLOOD LOSS: Primary | ICD-10-CM

## 2017-11-10 PROCEDURE — 96365 THER/PROPH/DIAG IV INF INIT: CPT

## 2017-11-10 PROCEDURE — 25010000002 IRON SUCROSE PER 1 MG: Performed by: INTERNAL MEDICINE

## 2017-11-10 RX ORDER — FAMOTIDINE 10 MG/ML
20 INJECTION, SOLUTION INTRAVENOUS AS NEEDED
Status: DISCONTINUED | OUTPATIENT
Start: 2017-11-10 | End: 2017-11-10 | Stop reason: HOSPADM

## 2017-11-10 RX ORDER — DIPHENHYDRAMINE HYDROCHLORIDE 50 MG/ML
50 INJECTION INTRAMUSCULAR; INTRAVENOUS AS NEEDED
Status: DISCONTINUED | OUTPATIENT
Start: 2017-11-10 | End: 2017-11-10 | Stop reason: HOSPADM

## 2017-11-10 RX ORDER — SODIUM CHLORIDE 9 MG/ML
250 INJECTION, SOLUTION INTRAVENOUS ONCE
Status: COMPLETED | OUTPATIENT
Start: 2017-11-10 | End: 2017-11-10

## 2017-11-10 RX ADMIN — IRON SUCROSE 200 MG: 20 INJECTION, SOLUTION INTRAVENOUS at 08:43

## 2017-11-10 RX ADMIN — SODIUM CHLORIDE 250 ML: 0.9 INJECTION, SOLUTION INTRAVENOUS at 08:39

## 2017-11-14 ENCOUNTER — INFUSION (OUTPATIENT)
Dept: ONCOLOGY | Facility: HOSPITAL | Age: 82
End: 2017-11-14

## 2017-11-14 VITALS
HEART RATE: 70 BPM | WEIGHT: 139 LBS | RESPIRATION RATE: 18 BRPM | BODY MASS INDEX: 21.82 KG/M2 | TEMPERATURE: 97.9 F | SYSTOLIC BLOOD PRESSURE: 136 MMHG | OXYGEN SATURATION: 97 % | DIASTOLIC BLOOD PRESSURE: 68 MMHG | HEIGHT: 67 IN

## 2017-11-14 DIAGNOSIS — D50.0 IRON DEFICIENCY ANEMIA DUE TO CHRONIC BLOOD LOSS: Primary | ICD-10-CM

## 2017-11-14 PROCEDURE — 25010000002 IRON SUCROSE PER 1 MG: Performed by: INTERNAL MEDICINE

## 2017-11-14 PROCEDURE — 96365 THER/PROPH/DIAG IV INF INIT: CPT

## 2017-11-14 RX ORDER — FAMOTIDINE 10 MG/ML
20 INJECTION, SOLUTION INTRAVENOUS AS NEEDED
Status: DISCONTINUED | OUTPATIENT
Start: 2017-11-14 | End: 2017-11-14 | Stop reason: HOSPADM

## 2017-11-14 RX ORDER — DIPHENHYDRAMINE HYDROCHLORIDE 50 MG/ML
50 INJECTION INTRAMUSCULAR; INTRAVENOUS AS NEEDED
Status: DISCONTINUED | OUTPATIENT
Start: 2017-11-14 | End: 2017-11-14 | Stop reason: HOSPADM

## 2017-11-14 RX ORDER — SODIUM CHLORIDE 9 MG/ML
250 INJECTION, SOLUTION INTRAVENOUS ONCE
Status: COMPLETED | OUTPATIENT
Start: 2017-11-14 | End: 2017-11-14

## 2017-11-14 RX ADMIN — IRON SUCROSE 200 MG: 20 INJECTION, SOLUTION INTRAVENOUS at 08:59

## 2017-11-14 RX ADMIN — SODIUM CHLORIDE 250 ML: 0.9 INJECTION, SOLUTION INTRAVENOUS at 08:55

## 2017-11-17 ENCOUNTER — INFUSION (OUTPATIENT)
Dept: ONCOLOGY | Facility: HOSPITAL | Age: 82
End: 2017-11-17

## 2017-11-17 VITALS
WEIGHT: 140 LBS | BODY MASS INDEX: 21.97 KG/M2 | DIASTOLIC BLOOD PRESSURE: 63 MMHG | HEIGHT: 67 IN | TEMPERATURE: 97.6 F | HEART RATE: 67 BPM | RESPIRATION RATE: 18 BRPM | SYSTOLIC BLOOD PRESSURE: 141 MMHG | OXYGEN SATURATION: 96 %

## 2017-11-17 DIAGNOSIS — D50.0 IRON DEFICIENCY ANEMIA DUE TO CHRONIC BLOOD LOSS: Primary | ICD-10-CM

## 2017-11-17 PROCEDURE — 25010000002 IRON SUCROSE PER 1 MG: Performed by: INTERNAL MEDICINE

## 2017-11-17 PROCEDURE — 96365 THER/PROPH/DIAG IV INF INIT: CPT

## 2017-11-17 RX ORDER — MULTIPLE VITAMINS W/ MINERALS TAB 9MG-400MCG
1 TAB ORAL DAILY
COMMUNITY
End: 2019-07-26

## 2017-11-17 RX ORDER — SODIUM CHLORIDE 9 MG/ML
250 INJECTION, SOLUTION INTRAVENOUS ONCE
Status: COMPLETED | OUTPATIENT
Start: 2017-11-17 | End: 2017-11-17

## 2017-11-17 RX ADMIN — SODIUM CHLORIDE 250 ML: 9 INJECTION, SOLUTION INTRAVENOUS at 08:48

## 2017-11-17 RX ADMIN — IRON SUCROSE 200 MG: 20 INJECTION, SOLUTION INTRAVENOUS at 08:59

## 2017-11-21 ENCOUNTER — INFUSION (OUTPATIENT)
Dept: ONCOLOGY | Facility: HOSPITAL | Age: 82
End: 2017-11-21

## 2017-11-21 VITALS
WEIGHT: 140 LBS | RESPIRATION RATE: 18 BRPM | BODY MASS INDEX: 21.97 KG/M2 | HEART RATE: 69 BPM | TEMPERATURE: 97.7 F | SYSTOLIC BLOOD PRESSURE: 154 MMHG | HEIGHT: 67 IN | DIASTOLIC BLOOD PRESSURE: 66 MMHG | OXYGEN SATURATION: 100 %

## 2017-11-21 DIAGNOSIS — D50.0 IRON DEFICIENCY ANEMIA DUE TO CHRONIC BLOOD LOSS: Primary | ICD-10-CM

## 2017-11-21 PROCEDURE — 25010000002 IRON SUCROSE PER 1 MG: Performed by: INTERNAL MEDICINE

## 2017-11-21 PROCEDURE — 96365 THER/PROPH/DIAG IV INF INIT: CPT

## 2017-11-21 RX ORDER — FAMOTIDINE 10 MG/ML
20 INJECTION, SOLUTION INTRAVENOUS AS NEEDED
Status: DISCONTINUED | OUTPATIENT
Start: 2017-11-21 | End: 2017-11-21 | Stop reason: HOSPADM

## 2017-11-21 RX ORDER — SODIUM CHLORIDE 9 MG/ML
250 INJECTION, SOLUTION INTRAVENOUS ONCE
Status: COMPLETED | OUTPATIENT
Start: 2017-11-21 | End: 2017-11-21

## 2017-11-21 RX ORDER — DIPHENHYDRAMINE HYDROCHLORIDE 50 MG/ML
50 INJECTION INTRAMUSCULAR; INTRAVENOUS AS NEEDED
Status: DISCONTINUED | OUTPATIENT
Start: 2017-11-21 | End: 2017-11-21 | Stop reason: HOSPADM

## 2017-11-21 RX ADMIN — IRON SUCROSE 200 MG: 20 INJECTION, SOLUTION INTRAVENOUS at 08:31

## 2017-11-21 RX ADMIN — SODIUM CHLORIDE 250 ML: 9 INJECTION, SOLUTION INTRAVENOUS at 08:30

## 2017-12-20 DIAGNOSIS — C18.2 MALIGNANT NEOPLASM OF ASCENDING COLON (HCC): Primary | ICD-10-CM

## 2017-12-26 ENCOUNTER — OFFICE VISIT (OUTPATIENT)
Dept: ONCOLOGY | Facility: CLINIC | Age: 82
End: 2017-12-26

## 2017-12-26 ENCOUNTER — LAB (OUTPATIENT)
Dept: LAB | Facility: HOSPITAL | Age: 82
End: 2017-12-26

## 2017-12-26 VITALS
SYSTOLIC BLOOD PRESSURE: 118 MMHG | BODY MASS INDEX: 21.43 KG/M2 | DIASTOLIC BLOOD PRESSURE: 62 MMHG | HEIGHT: 67 IN | RESPIRATION RATE: 16 BRPM | OXYGEN SATURATION: 95 % | TEMPERATURE: 97.5 F | WEIGHT: 136.5 LBS | HEART RATE: 72 BPM

## 2017-12-26 DIAGNOSIS — D50.9 IRON DEFICIENCY ANEMIA, UNSPECIFIED IRON DEFICIENCY ANEMIA TYPE: ICD-10-CM

## 2017-12-26 DIAGNOSIS — C18.2 MALIGNANT NEOPLASM OF ASCENDING COLON (HCC): ICD-10-CM

## 2017-12-26 DIAGNOSIS — C18.2 MALIGNANT NEOPLASM OF ASCENDING COLON (HCC): Primary | ICD-10-CM

## 2017-12-26 DIAGNOSIS — D50.0 IRON DEFICIENCY ANEMIA DUE TO CHRONIC BLOOD LOSS: ICD-10-CM

## 2017-12-26 LAB
ALBUMIN SERPL-MCNC: 3.9 G/DL (ref 3.5–5)
ALBUMIN/GLOB SERPL: 1.3 G/DL (ref 1.1–2.5)
ALP SERPL-CCNC: 63 U/L (ref 24–120)
ALT SERPL W P-5'-P-CCNC: 38 U/L (ref 0–54)
ANION GAP SERPL CALCULATED.3IONS-SCNC: 5 MMOL/L (ref 4–13)
AST SERPL-CCNC: 29 U/L (ref 7–45)
BILIRUB SERPL-MCNC: 0.3 MG/DL (ref 0.1–1)
BUN BLD-MCNC: 39 MG/DL (ref 5–21)
BUN/CREAT SERPL: 21.2
CALCIUM SPEC-SCNC: 9 MG/DL (ref 8.4–10.4)
CEA SERPL-MCNC: 2.22 NG/ML (ref 0–5)
CHLORIDE SERPL-SCNC: 113 MMOL/L (ref 98–110)
CO2 SERPL-SCNC: 22 MMOL/L (ref 24–31)
CREAT BLD-MCNC: 1.84 MG/DL (ref 0.5–1.4)
EOSINOPHIL NFR BLD MANUAL: 1 % (ref 0–4)
ERYTHROCYTE [DISTWIDTH] IN BLOOD BY AUTOMATED COUNT: 14.5 % (ref 12–15)
GFR SERPL CREATININE-BSD FRML MDRD: 35 ML/MIN/1.73
GLOBULIN UR ELPH-MCNC: 3.1 GM/DL
GLUCOSE BLD-MCNC: 203 MG/DL (ref 70–100)
HCT VFR BLD AUTO: 32.9 % (ref 40–52)
HGB BLD-MCNC: 11 G/DL (ref 14–18)
IRON 24H UR-MRATE: 166 MCG/DL (ref 42–180)
IRON SATN MFR SERPL: 70 % (ref 20–45)
LYMPHOCYTES # BLD MANUAL: ABNORMAL 10*3/MM3 (ref 0.72–4.86)
LYMPHOCYTES NFR BLD MANUAL: 1 % (ref 4–12)
LYMPHOCYTES NFR BLD MANUAL: 17 % (ref 15–45)
MCH RBC QN AUTO: 31.7 PG (ref 28–32)
MCHC RBC AUTO-ENTMCNC: 33.4 G/DL (ref 33–36)
MCV RBC AUTO: 94.8 FL (ref 82–95)
NEUTROPHILS # BLD AUTO: ABNORMAL 10*3/MM3 (ref 1.87–8.4)
NEUTROPHILS NFR BLD MANUAL: 81 % (ref 39–78)
PLAT MORPH BLD: NORMAL
PLATELET # BLD AUTO: 218 10*3/MM3 (ref 130–400)
PMV BLD AUTO: 8.6 FL (ref 6–12)
POIKILOCYTOSIS BLD QL SMEAR: ABNORMAL
POTASSIUM BLD-SCNC: 5.3 MMOL/L (ref 3.5–5.3)
PROT SERPL-MCNC: 7 G/DL (ref 6.3–8.7)
RBC # BLD AUTO: 3.47 10*6/MM3 (ref 4.2–5.4)
SODIUM BLD-SCNC: 140 MMOL/L (ref 135–145)
TIBC SERPL-MCNC: 236 MCG/DL (ref 225–420)
WBC MORPH BLD: NORMAL
WBC NRBC COR # BLD: 6.3 10*3/MM3 (ref 4.8–10.8)

## 2017-12-26 PROCEDURE — 82378 CARCINOEMBRYONIC ANTIGEN: CPT | Performed by: INTERNAL MEDICINE

## 2017-12-26 PROCEDURE — 85027 COMPLETE CBC AUTOMATED: CPT

## 2017-12-26 PROCEDURE — 83540 ASSAY OF IRON: CPT | Performed by: INTERNAL MEDICINE

## 2017-12-26 PROCEDURE — 80053 COMPREHEN METABOLIC PANEL: CPT

## 2017-12-26 PROCEDURE — 83550 IRON BINDING TEST: CPT | Performed by: INTERNAL MEDICINE

## 2017-12-26 PROCEDURE — 85007 BL SMEAR W/DIFF WBC COUNT: CPT | Performed by: INTERNAL MEDICINE

## 2017-12-26 PROCEDURE — 36415 COLL VENOUS BLD VENIPUNCTURE: CPT

## 2017-12-26 PROCEDURE — 99213 OFFICE O/P EST LOW 20 MIN: CPT | Performed by: INTERNAL MEDICINE

## 2017-12-26 RX ORDER — INFLUENZA A VIRUS A/MICHIGAN/45/2015 X-275 (H1N1) ANTIGEN (FORMALDEHYDE INACTIVATED), INFLUENZA A VIRUS A/SINGAPORE/INFIMH-16-0019/2016 IVR-186 (H3N2) ANTIGEN (FORMALDEHYDE INACTIVATED), AND INFLUENZA B VIRUS B/MARYLAND/15/2016 BX-69A (A B/COLORADO/6/2017-LIKE VIRUS) ANTIGEN (FORMALDEHYDE INACTIVATED) 60; 60; 60 UG/.5ML; UG/.5ML; UG/.5ML
INJECTION, SUSPENSION INTRAMUSCULAR
Refills: 0 | COMMUNITY
Start: 2017-12-04 | End: 2019-03-27

## 2017-12-26 NOTE — PROGRESS NOTES
[facility]  HEMATOLOGY & ONCOLOGY    Cancer Staging Information:  Cancer of right colon    Staging form: Colon and Rectum, AJCC V7    - Clinical stage from 9/28/2017: No stage assigned - Unsigned    - Pathologic stage from 9/28/2017: Stage IIA (T3, N0, cM0) - Signed by Keara Mehta MD on 9/28/2017      Subjective     VISIT DIAGNOSIS:   Encounter Diagnoses   Name Primary?   • Malignant neoplasm of ascending colon Yes   • Iron deficiency anemia, unspecified iron deficiency anemia type        REASON FOR VISIT:   No chief complaint on file.       HEMATOLOGY / ONCOLOGY HISTORY:    No history exists.           INTERVAL HISTORY  Patient ID: Maikel Mathews is a 88 y.o. year old male Maikel Mathews is a 88 y.o. year old male Maikel Mathews is a 88 y.o. year old male with diagnosis of colon adenocarcinoma status post surgical resection tumor 5.5 in greatest dimension low-grade moderately differentiated, 0 of 15 lymph nodes negative for metastases, pT3 N 0 M0 stage II a disease who presents today with With no complaint.    Past Medical History:   Past Medical History:   Diagnosis Date   • Arthritis    • AVM (arteriovenous malformation)    • Cancer     PROSTATE   • Cataract    • Colon polyp    • Diabetes mellitus    • Diverticulosis    • Dizzinesses 10/20/2017   • GERD (gastroesophageal reflux disease)    • Hemorrhoids    • History of transfusion    • Hypertension    • Iron deficiency anemia due to chronic blood loss 7/26/2017   • Prostate CA    • Prostate hypertrophy    • Rotator cuff syndrome      Past Surgical History:   Past Surgical History:   Procedure Laterality Date   • APPENDECTOMY     • CHOLECYSTECTOMY     • COLON RESECTION N/A 9/18/2017    Procedure: LAPAROSCOPIC ASSISTED RIGHT COLECTOMY;  Surgeon: Caroline Loomis MD;  Location: [facility];  Service:    • COLONOSCOPY  04/29/2010   • COLONOSCOPY N/A 8/25/2017    Procedure: COLONOSCOPY WITH ANESTHESIA;  Surgeon: Joaquín Neff MD;   Location: [facility] ENDOSCOPY;  Service:    • ENDOSCOPY  03/27/2013   • ENDOSCOPY N/A 8/25/2017    Procedure: ESOPHAGOGASTRODUODENOSCOPY WITH ANESTHESIA;  Surgeon: Joaquín Neff MD;  Location: [facility] ENDOSCOPY;  Service:    • EYE SURGERY Left     CATARACT   • INTERVENTIONAL RADIOLOGY PROCEDURE N/A 9/18/2017    Procedure: URETHRA DILITATION with dominguez catheter insertion;  Surgeon: Mamadou Paez MD;  Location: [facility] OR;  Service:    • PROSTATECTOMY     • PROSTATECTOMY       Social History:   Social History     Social History   • Marital status:      Spouse name: N/A   • Number of children: N/A   • Years of education: N/A     Occupational History   • Not on file.     Social History Main Topics   • Smoking status: Never Smoker   • Smokeless tobacco: Never Used   • Alcohol use No   • Drug use: No   • Sexual activity: Defer     Other Topics Concern   • Not on file     Social History Narrative     Family History:   Family History   Problem Relation Age of Onset   • Colon cancer Neg Hx    • Colon polyps Neg Hx        Review of Systems   Constitutional: Negative.    HENT: Negative.    Eyes: Negative.    Respiratory: Negative.    Cardiovascular: Negative.    Gastrointestinal: Negative.    Endocrine: Negative.    Genitourinary: Negative.    Musculoskeletal: Negative.    Skin: Negative.    Neurological: Negative.    Hematological: Negative.    Psychiatric/Behavioral: Negative.         Performance Status:  Asymptomatic    Medications:    Current Outpatient Prescriptions   Medication Sig Dispense Refill   • amLODIPine (NORVASC) 10 MG tablet   5   • FLUZONE HIGH-DOSE 0.5 ML suspension prefilled syringe injection ADM 0.5ML IM UTD  0   • GAVILYTE-N WITH FLAVOR PACK 420 g solution      • glipiZIDE (GLUCOTROL) 5 MG ER tablet Daily.  2   • HYDROcodone-acetaminophen (NORCO) 7.5-325 MG per tablet Take 1-2 tablets by mouth Every 4 (Four) Hours As Needed for Moderate Pain  (Pain). 30 tablet 0   • IRON SUCROSE IV Infuse   "into a venous catheter. Tue, fri     • losartan (COZAAR) 100 MG tablet      • Multiple Vitamins-Minerals (MULTIVITAMIN WITH MINERALS) tablet tablet Take 1 tablet by mouth Daily. Takes 1/2 of tablet daily     • neomycin (MYCIFRADIN) 500 MG tablet        No current facility-administered medications for this visit.        ALLERGIES:  No Known Allergies    Objective      Vitals:    12/26/17 0924   BP: 118/62   Pulse: 72   Resp: 16   Temp: 97.5 °F (36.4 °C)   TempSrc: Tympanic   SpO2: 95%   Weight: 61.9 kg (136 lb 8 oz)   Height: 170.2 cm (67.01\")         Current Status 12/26/2017   ECOG score 1         Physical Exam  General Appearance: Frail Patient is awake, alert, oriented and in no acute distress. Patient is welldeveloped, wellnourished, and appears stated age.  HEENT: Normocephalic. Sclerae clear, conjunctiva pink, extraocular movements intact, pupils, round, reactive to light and  accommodation. Mouth and throat are clear with moist oral mucosa.  NECK: Supple, no jugular venous distention, thyroid not enlarged.  LYMPH: No cervical, supraclavicular, axillary, or inguinal lymphadenopathy.  CHEST: Equal bilateral expansion, AP  diameter normal, resonant percussion note  LUNGS: Good air movement, no rales, rhonchi, rubs or wheezes with auscultation  CARDIO: Regular sinus rhythm, no murmurs, gallops or rubs.  ABDOMEN: Nondistended, soft, No tenderness, no guarding, no rebound, No hepatosplenomegaly. No abdominal masses. Bowel sounds positive. No hernia  GENITALIA: Not examined.  BREASTS: Not examined.  MUSKEL: No joint swelling, decreased motion, or inflammation  EXTREMS: No edema, clubbing, cyanosis, No varicose veins.  NEURO: Grossly nonfocal, Gait is coordinated and smooth, Cognition is preserved.  SKIN: No rashes, no ecchymoses, no petechia.  PSYCH: Oriented to time, place and person. Memory is preserved. Mood and affect appear normal  RECENT LABS:  Lab on 12/26/2017   Component Date Value Ref Range Status   • " Glucose 12/26/2017 203* 70 - 100 mg/dL Final   • BUN 12/26/2017 39* 5 - 21 mg/dL Final   • Creatinine 12/26/2017 1.84* 0.50 - 1.40 mg/dL Final   • Sodium 12/26/2017 140  135 - 145 mmol/L Final   • Potassium 12/26/2017 5.3  3.5 - 5.3 mmol/L Final   • Chloride 12/26/2017 113* 98 - 110 mmol/L Final   • CO2 12/26/2017 22.0* 24.0 - 31.0 mmol/L Final   • Calcium 12/26/2017 9.0  8.4 - 10.4 mg/dL Final   • Total Protein 12/26/2017 7.0  6.3 - 8.7 g/dL Final   • Albumin 12/26/2017 3.90  3.50 - 5.00 g/dL Final   • ALT (SGPT) 12/26/2017 38  0 - 54 U/L Final   • AST (SGOT) 12/26/2017 29  7 - 45 U/L Final   • Alkaline Phosphatase 12/26/2017 63  24 - 120 U/L Final   • Total Bilirubin 12/26/2017 0.3  0.1 - 1.0 mg/dL Final   • eGFR Non African Amer 12/26/2017 35* >60 mL/min/1.73 Final   • Globulin 12/26/2017 3.1  gm/dL Final   • A/G Ratio 12/26/2017 1.3  1.1 - 2.5 g/dL Final   • BUN/Creatinine Ratio 12/26/2017 21.2    Final   • Anion Gap 12/26/2017 5.0  4.0 - 13.0 mmol/L Final   • WBC 12/26/2017 6.30  4.80 - 10.80 10*3/mm3 Final   • RBC 12/26/2017 3.47* 4.20 - 5.40 10*6/mm3 Final   • Hemoglobin 12/26/2017 11.0* 14.0 - 18.0 g/dL Final   • Hematocrit 12/26/2017 32.9* 40.0 - 52.0 % Final   • MCV 12/26/2017 94.8  82.0 - 95.0 fL Final   • MCH 12/26/2017 31.7  28.0 - 32.0 pg Final   • MCHC 12/26/2017 33.4  33.0 - 36.0 g/dL Final   • RDW 12/26/2017 14.5  12.0 - 15.0 % Final   • MPV 12/26/2017 8.6  6.0 - 12.0 fL Final   • Platelets 12/26/2017 218  130 - 400 10*3/mm3 Final       RADIOLOGY:  No results found.         Assessment/Plan  Maikel Mathews is a 88 y.o. year old male     Patient Active Problem List   Diagnosis   • Anemia associated with nutritional deficiency   • Iron deficiency anemia due to chronic blood loss   • Cancer of right colon   • Dizzinesses          1.Stage IIA right-sided colon adenocarcinoma:Patient currently on observation.He has  favorable risk features; it is moderately differentiated, right-sided, 0 of 15 lymph nodes  uninvolved, negative for lymphovascular  invasion, negative for perineural invasion, margins are negative.  Given these variables my preference is to observe him and not give chemotherapy.  This is also recommended by NCCN guideline.  His tumor was PROSPER. The patient and his wife understands and are willing to go with observation.  Chemotherapy is not needed in his situation. I reviewed the images that were done to complete staging, CT chest negative for malignancy but had some nodules that needed to be followed in 3-6 months. There were areas of concern in the spine that was negative with a bone scan. Next colonoscopy a year from last. CT chest /abdomen/pelvis in 6months per NCCN guideline.  He is necessary CT imaging is in March 2018.  --CEA 2.22.      2.  Iron deficiency anemia due to GI loss: s/p 8 doses of iv venofer. I reviewd his cbc with him and wife, Hg 11. iron 166, %sat 70. .       3.  GERD: Continue omeprazole.     4.  Chronic kidney disease stage III: Advised patient to follow-up with nephrologist.     5. Lung nodule on CT chest: Repeat CT chest in 6months.     6. Diarrhea: Manageable, will monitor for now. Advised high fiber food.        Keara Mehta MD    12/26/2017    9:45 AM

## 2018-03-20 ENCOUNTER — HOSPITAL ENCOUNTER (OUTPATIENT)
Dept: CARDIOLOGY | Facility: HOSPITAL | Age: 83
Discharge: HOME | End: 2018-03-20

## 2018-03-20 ENCOUNTER — HOSPITAL ENCOUNTER (OUTPATIENT)
Dept: CT IMAGING | Facility: HOSPITAL | Age: 83
Discharge: HOME | End: 2018-03-20
Attending: INTERNAL MEDICINE | Admitting: INTERNAL MEDICINE

## 2018-03-20 VITALS
SYSTOLIC BLOOD PRESSURE: 153 MMHG | RESPIRATION RATE: 19 BRPM | OXYGEN SATURATION: 97 % | DIASTOLIC BLOOD PRESSURE: 73 MMHG | HEART RATE: 60 BPM

## 2018-03-20 DIAGNOSIS — C18.2 MALIGNANT NEOPLASM OF ASCENDING COLON (HCC): ICD-10-CM

## 2018-03-20 LAB — CREAT BLDA-MCNC: 1.8 MG/DL (ref 0.6–1.3)

## 2018-03-20 PROCEDURE — 96360 HYDRATION IV INFUSION INIT: CPT

## 2018-03-20 PROCEDURE — 74177 CT ABD & PELVIS W/CONTRAST: CPT

## 2018-03-20 PROCEDURE — 25010000002 IOPAMIDOL 61 % SOLUTION: Performed by: INTERNAL MEDICINE

## 2018-03-20 PROCEDURE — 82565 ASSAY OF CREATININE: CPT

## 2018-03-20 PROCEDURE — 0 IOHEXOL 300 MG/ML SOLUTION: Performed by: INTERNAL MEDICINE

## 2018-03-20 RX ADMIN — IOPAMIDOL 75 ML: 612 INJECTION, SOLUTION INTRAVENOUS at 10:00

## 2018-03-20 RX ADMIN — IOHEXOL 50 ML: 300 INJECTION, SOLUTION INTRAVENOUS at 10:00

## 2018-03-20 RX ADMIN — SODIUM CHLORIDE 500 ML: 9 INJECTION, SOLUTION INTRAVENOUS at 10:15

## 2018-03-23 DIAGNOSIS — C18.2 MALIGNANT NEOPLASM OF ASCENDING COLON (HCC): Primary | ICD-10-CM

## 2018-03-26 ENCOUNTER — LAB (OUTPATIENT)
Dept: LAB | Facility: HOSPITAL | Age: 83
End: 2018-03-26

## 2018-03-26 ENCOUNTER — OFFICE VISIT (OUTPATIENT)
Dept: ONCOLOGY | Facility: CLINIC | Age: 83
End: 2018-03-26

## 2018-03-26 VITALS
SYSTOLIC BLOOD PRESSURE: 148 MMHG | TEMPERATURE: 97.2 F | RESPIRATION RATE: 16 BRPM | HEIGHT: 67 IN | OXYGEN SATURATION: 96 % | DIASTOLIC BLOOD PRESSURE: 78 MMHG | HEART RATE: 72 BPM

## 2018-03-26 DIAGNOSIS — C18.2 MALIGNANT NEOPLASM OF ASCENDING COLON (HCC): ICD-10-CM

## 2018-03-26 DIAGNOSIS — C18.9 MALIGNANT NEOPLASM OF COLON, UNSPECIFIED PART OF COLON (HCC): Primary | ICD-10-CM

## 2018-03-26 DIAGNOSIS — D50.9 IRON DEFICIENCY ANEMIA, UNSPECIFIED IRON DEFICIENCY ANEMIA TYPE: ICD-10-CM

## 2018-03-26 LAB
ALBUMIN SERPL-MCNC: 4 G/DL (ref 3.5–5)
ALBUMIN/GLOB SERPL: 1.3 G/DL (ref 1.1–2.5)
ALP SERPL-CCNC: 58 U/L (ref 24–120)
ALT SERPL W P-5'-P-CCNC: 22 U/L (ref 0–54)
ANION GAP SERPL CALCULATED.3IONS-SCNC: 12 MMOL/L (ref 4–13)
AST SERPL-CCNC: 28 U/L (ref 7–45)
BASOPHILS # BLD AUTO: 0.05 10*3/MM3 (ref 0–0.2)
BASOPHILS NFR BLD AUTO: 0.8 % (ref 0–2)
BILIRUB SERPL-MCNC: 0.3 MG/DL (ref 0.1–1)
BUN BLD-MCNC: 43 MG/DL (ref 5–21)
BUN/CREAT SERPL: 24.4 (ref 7–25)
CALCIUM SPEC-SCNC: 8.9 MG/DL (ref 8.4–10.4)
CEA SERPL-MCNC: 2.31 NG/ML (ref 0–5)
CHLORIDE SERPL-SCNC: 112 MMOL/L (ref 98–110)
CO2 SERPL-SCNC: 21 MMOL/L (ref 24–31)
CREAT BLD-MCNC: 1.76 MG/DL (ref 0.5–1.4)
DEPRECATED RDW RBC AUTO: 45.7 FL (ref 40–54)
EOSINOPHIL # BLD AUTO: 0.09 10*3/MM3 (ref 0–0.7)
EOSINOPHIL NFR BLD AUTO: 1.5 % (ref 0–4)
ERYTHROCYTE [DISTWIDTH] IN BLOOD BY AUTOMATED COUNT: 12.7 % (ref 12–15)
GFR SERPL CREATININE-BSD FRML MDRD: 37 ML/MIN/1.73
GLOBULIN UR ELPH-MCNC: 3.1 GM/DL
GLUCOSE BLD-MCNC: 165 MG/DL (ref 70–100)
HCT VFR BLD AUTO: 32.2 % (ref 40–52)
HGB BLD-MCNC: 10.6 G/DL (ref 14–18)
IMM GRANULOCYTES # BLD: 0.02 10*3/MM3 (ref 0–0.03)
IMM GRANULOCYTES NFR BLD: 0.3 % (ref 0–5)
LYMPHOCYTES # BLD AUTO: 1.2 10*3/MM3 (ref 0.72–4.86)
LYMPHOCYTES NFR BLD AUTO: 20.3 % (ref 15–45)
MCH RBC QN AUTO: 32.5 PG (ref 28–32)
MCHC RBC AUTO-ENTMCNC: 32.9 G/DL (ref 33–36)
MCV RBC AUTO: 98.8 FL (ref 82–95)
MONOCYTES # BLD AUTO: 0.39 10*3/MM3 (ref 0.19–1.3)
MONOCYTES NFR BLD AUTO: 6.6 % (ref 4–12)
NEUTROPHILS # BLD AUTO: 4.15 10*3/MM3 (ref 1.87–8.4)
NEUTROPHILS NFR BLD AUTO: 70.5 % (ref 39–78)
NRBC BLD MANUAL-RTO: 0 /100 WBC (ref 0–0)
PLATELET # BLD AUTO: 245 10*3/MM3 (ref 130–400)
PMV BLD AUTO: 9.9 FL (ref 6–12)
POTASSIUM BLD-SCNC: 5.2 MMOL/L (ref 3.5–5.3)
PROT SERPL-MCNC: 7.1 G/DL (ref 6.3–8.7)
RBC # BLD AUTO: 3.26 10*6/MM3 (ref 4.8–5.9)
SODIUM BLD-SCNC: 145 MMOL/L (ref 135–145)
WBC NRBC COR # BLD: 5.9 10*3/MM3 (ref 4.8–10.8)

## 2018-03-26 PROCEDURE — 85027 COMPLETE CBC AUTOMATED: CPT | Performed by: INTERNAL MEDICINE

## 2018-03-26 PROCEDURE — 82378 CARCINOEMBRYONIC ANTIGEN: CPT | Performed by: INTERNAL MEDICINE

## 2018-03-26 PROCEDURE — 80053 COMPREHEN METABOLIC PANEL: CPT | Performed by: INTERNAL MEDICINE

## 2018-03-26 PROCEDURE — 36415 COLL VENOUS BLD VENIPUNCTURE: CPT

## 2018-03-26 PROCEDURE — 99214 OFFICE O/P EST MOD 30 MIN: CPT | Performed by: INTERNAL MEDICINE

## 2018-03-26 NOTE — PROGRESS NOTES
[facility]  HEMATOLOGY & ONCOLOGY    Cancer Staging Information:  Cancer of right colon    Staging form: Colon and Rectum, AJCC V7    - Clinical stage from 9/28/2017: No stage assigned - Unsigned    - Pathologic stage from 9/28/2017: Stage IIA (T3, N0, cM0) - Signed by Keara Mehta MD on 9/28/2017      Subjective     VISIT DIAGNOSIS:   Encounter Diagnoses   Name Primary?   • Malignant neoplasm of colon, unspecified part of colon Yes   • Iron deficiency anemia, unspecified iron deficiency anemia type        REASON FOR VISIT:     Chief Complaint   Patient presents with   • Colon Cancer     f/u   • Anemia        HEMATOLOGY / ONCOLOGY HISTORY:    No history exists.           INTERVAL HISTORY  Patient ID: Maikel Mathews is a 88 y.o. year old male Maikel Mathews is a 88 y.o. year old male Maikel Mathews is a 88 y.o. year old male with diagnosis of colon adenocarcinoma status post surgical resection tumor 5.5 in greatest dimension low-grade moderately differentiated, 0 of 15 lymph nodes negative for metastases, pT3 N 0 M0 stage II a disease who presents today with With no complaint.  3/26/18: doing well. 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 CT no evidence of disease.    Past Medical History:   Past Medical History:   Diagnosis Date   • Arthritis    • AVM (arteriovenous malformation)    • Cancer     PROSTATE   • Cataract    • Colon polyp    • Diabetes mellitus    • Diverticulosis    • Dizzinesses 10/20/2017   • GERD (gastroesophageal reflux disease)    • Hemorrhoids    • History of transfusion    • Hypertension    • Iron deficiency anemia due to chronic blood loss 7/26/2017   • Prostate CA    • Prostate hypertrophy    • Rotator cuff syndrome      Past Surgical History:   Past Surgical History:   Procedure Laterality Date   • APPENDECTOMY     • CHOLECYSTECTOMY     • COLON RESECTION N/A 9/18/2017    Procedure: LAPAROSCOPIC ASSISTED RIGHT COLECTOMY;  Surgeon: Caroline Loomis MD;   Location:  [facility] OR;  Service:    • COLONOSCOPY  04/29/2010   • COLONOSCOPY N/A 8/25/2017    Procedure: COLONOSCOPY WITH ANESTHESIA;  Surgeon: Joaquín Neff MD;  Location: [facility] ENDOSCOPY;  Service:    • ENDOSCOPY  03/27/2013   • ENDOSCOPY N/A 8/25/2017    Procedure: ESOPHAGOGASTRODUODENOSCOPY WITH ANESTHESIA;  Surgeon: Joaquín Neff MD;  Location: [facility] ENDOSCOPY;  Service:    • EYE SURGERY Left     CATARACT   • INTERVENTIONAL RADIOLOGY PROCEDURE N/A 9/18/2017    Procedure: URETHRA DILITATION with dominguez catheter insertion;  Surgeon: Mamadou Paez MD;  Location: [facility] OR;  Service:    • PROSTATECTOMY     • PROSTATECTOMY       Social History:   Social History     Social History   • Marital status:      Spouse name: N/A   • Number of children: N/A   • Years of education: N/A     Occupational History   • Not on file.     Social History Main Topics   • Smoking status: Never Smoker   • Smokeless tobacco: Never Used   • Alcohol use No   • Drug use: No   • Sexual activity: Defer     Other Topics Concern   • Not on file     Social History Narrative   • No narrative on file     Family History:   Family History   Problem Relation Age of Onset   • Colon cancer Neg Hx    • Colon polyps Neg Hx        Review of Systems   Constitutional: Negative.    HENT: Negative.    Eyes: Negative.    Respiratory: Negative.    Cardiovascular: Negative.    Gastrointestinal: Negative.    Endocrine: Negative.    Genitourinary: Negative.    Musculoskeletal: Negative.    Skin: Negative.    Neurological: Negative.    Hematological: Negative.    Psychiatric/Behavioral: Negative.         Performance Status:  Asymptomatic    Medications:    Current Outpatient Prescriptions   Medication Sig Dispense Refill   • amLODIPine (NORVASC) 10 MG tablet   5   • FLUZONE HIGH-DOSE 0.5 ML suspension prefilled syringe injection ADM 0.5ML IM UTD  0   • GAVILYTE-N WITH FLAVOR PACK 420 g solution      • glipiZIDE (GLUCOTROL) 5 MG ER tablet  "Daily.  2   • HYDROcodone-acetaminophen (NORCO) 7.5-325 MG per tablet Take 1-2 tablets by mouth Every 4 (Four) Hours As Needed for Moderate Pain  (Pain). 30 tablet 0   • IRON SUCROSE IV Infuse  into a venous catheter. Tue, fri     • losartan (COZAAR) 100 MG tablet      • Multiple Vitamins-Minerals (MULTIVITAMIN WITH MINERALS) tablet tablet Take 1 tablet by mouth Daily. Takes 1/2 of tablet daily     • neomycin (MYCIFRADIN) 500 MG tablet        No current facility-administered medications for this visit.        ALLERGIES:  No Known Allergies    Objective      Vitals:    03/26/18 0939   BP: 148/78   Pulse: 72   Resp: 16   Temp: 97.2 °F (36.2 °C)   TempSrc: Tympanic   SpO2: 96%   Height: 170.2 cm (67.01\")         Current Status 3/26/2018   ECOG score 0         Physical Exam    General Appearance: Frail Patient is awake, alert, oriented and in no acute distress. Patient is welldeveloped, wellnourished, and appears stated age.  HEENT: Normocephalic. Sclerae clear, conjunctiva pink, extraocular movements intact, pupils, round, reactive to light and  accommodation. Mouth and throat are clear with moist oral mucosa.  NECK: Supple, no jugular venous distention, thyroid not enlarged.  LYMPH: No cervical, supraclavicular, axillary, or inguinal lymphadenopathy.  CHEST: Equal bilateral expansion, AP  diameter normal, resonant percussion note  LUNGS: Good air movement, no rales, rhonchi, rubs or wheezes with auscultation  CARDIO: Regular sinus rhythm, no murmurs, gallops or rubs.  ABDOMEN: Nondistended, soft, No tenderness, no guarding, no rebound, No hepatosplenomegaly. No abdominal masses. Bowel sounds positive. No hernia  GENITALIA: Not examined.  BREASTS: Not examined.  MUSKEL: No joint swelling, decreased motion, or inflammation  EXTREMS: No edema, clubbing, cyanosis, No varicose veins.  NEURO: Grossly nonfocal, Gait is coordinated and smooth, Cognition is preserved.  SKIN: No rashes, no ecchymoses, no petechia.  PSYCH: " Oriented to time, place and person. Memory is preserved. Mood and affect appear normal  RECENT LABS:  Lab on 03/26/2018   Component Date Value Ref Range Status   • Glucose 03/26/2018 165* 70 - 100 mg/dL Final   • BUN 03/26/2018 43* 5 - 21 mg/dL Final   • Creatinine 03/26/2018 1.76* 0.50 - 1.40 mg/dL Final   • Sodium 03/26/2018 145  135 - 145 mmol/L Final   • Potassium 03/26/2018 5.2  3.5 - 5.3 mmol/L Final   • Chloride 03/26/2018 112* 98 - 110 mmol/L Final   • CO2 03/26/2018 21.0* 24.0 - 31.0 mmol/L Final   • Calcium 03/26/2018 8.9  8.4 - 10.4 mg/dL Final   • Total Protein 03/26/2018 7.1  6.3 - 8.7 g/dL Final   • Albumin 03/26/2018 4.00  3.50 - 5.00 g/dL Final   • ALT (SGPT) 03/26/2018 22  0 - 54 U/L Final   • AST (SGOT) 03/26/2018 28  7 - 45 U/L Final   • Alkaline Phosphatase 03/26/2018 58  24 - 120 U/L Final   • Total Bilirubin 03/26/2018 0.3  0.1 - 1.0 mg/dL Final   • eGFR Non African Amer 03/26/2018 37* >60 mL/min/1.73 Final   • Globulin 03/26/2018 3.1  gm/dL Final   • A/G Ratio 03/26/2018 1.3  1.1 - 2.5 g/dL Final   • BUN/Creatinine Ratio 03/26/2018 24.4  7.0 - 25.0 Final   • Anion Gap 03/26/2018 12.0  4.0 - 13.0 mmol/L Final   Hospital Outpatient Visit on 03/20/2018   Component Date Value Ref Range Status   • Creatinine 03/20/2018 1.80* 0.60 - 1.30 mg/dL Final       RADIOLOGY:  Ct Abdomen Pelvis With Contrast    Result Date: 3/20/2018  Narrative: EXAMINATION: CT ABDOMEN PELVIS W CONTRAST- 3/20/2018 10:02 AM CDT  HISTORY: Follow-up, right colon cancer  COMPARISON: CT abdomen and pelvis without contrast 1/6/2017  DOSE: 308 mGy-cm  TECHNIQUE: Sequential imaging was performed from the lung bases through the pubic symphysis after the administration of IV contrast.  Sagittal and coronal reformations were made from the original source data and reviewed. Automated exposure control was also utilized to decrease patient radiation dose.  FINDINGS: The visualized heart appears normal in size. Coronary artery  calcifications are seen. Atherosclerotic secretions are also noted in the aorta. The lung bases appear clear.  Dual phase imaging of the liver was performed, which demonstrates no focal liver lesions. The gallbladder is surgically absent. There is mild intrahepatic biliary ductal dilatation. The spleen, pancreas, and adrenal glands are normal in appearance. A small hypodense lesion is seen in the inferior left kidney which is too small to characterize. A small nonobstructive stone is seen in the right kidney measuring approximately 2 mm in size. The right kidney is otherwise normal in appearance. The ureters appear decompressed bilaterally.  The main portal and splenic veins and IVC are unremarkable. The abdominal aorta appears normal in caliber. There are scattered atherosclerotic calcifications of the aorta and its branch vessels. The origins of the celiac axis, superior mesenteric artery, and inferior mesenteric artery enhance normally.  No central mesenteric lymphadenopathy is appreciated. Several small retroperitoneal lymph nodes are seen which are not pathologically enlarged.  The small bowel does not appear overly dilated. Liquid stool is noted in the rectum. There are numerous scattered colonic diverticula without evidence of diverticulitis. There appears to have been previous partial right colectomy with suture material noted near the hepatic flexure. A filling defect within the lumen at this level is presumably postsurgical in nature. No free air or free fluid is seen in the abdomen. A midline abdominal wall scar is present.  The urinary bladder is grossly normal in appearance. There has been previous prostatectomy. No pelvic or inguinal lymphadenopathy is identified. Apparent sigmoid wall thickening is felt to be related to underdistention.  Review of the visualized osseous structures demonstrates no acute or aggressive lesions. Degenerative changes are seen in the spine. Stable sclerotic lesion is seen  in the T11 vertebral body.      Impression: 1. Postsurgical changes from previous partial right colectomy. Apparent wall thickening with a small filling defect at the suture site is presumably postsurgical in nature. 2. Extensive descending and sigmoid colon diverticulitis. Apparent wall thickening of the sigmoid colon is felt to be related to underdistention. 3. Atherosclerotic disease. 4. Previous prostatectomy. 5. Stable sclerotic lesion in the T11 vertebral body.    This report was finalized on [id number] by Dr. Davian Montes De Oca MD.           Assessment/Plan  Maikel Mathews is a 88 y.o. year old male     Patient Active Problem List   Diagnosis   • Anemia associated with nutritional deficiency   • Iron deficiency anemia due to chronic blood loss   • Cancer of right colon   • Dizzinesses          1.Stage IIA right-sided colon adenocarcinoma:Patient currently on observation.He has  favorable risk features; it is moderately differentiated, right-sided, 0 of 15 lymph nodes uninvolved, negative for lymphovascular  invasion, negative for perineural invasion, margins are negative.  Given these variables my preference is to observe him and not give chemotherapy.  This is also recommended by NCCN guideline.  His tumor was PROSPER. The patient and his wife understands and are willing to go with observation.  Chemotherapy is not needed in his situation. I reviewed the images that were done to complete staging, CT chest negative for malignancy but had some nodules that needed to be followed in 3-6 months. There were areas of concern in the spine that was negative with a bone scan. Next colonoscopy a year from last. CT chest /abdomen/pelvis in 6months per NCCN guideline.   --3/26/18: CT loreto.    2.  Iron deficiency anemia due to GI loss: s/p 8 doses of iv venofer.labs prnding. Will act accordingly.    3.  GERD: Continue omeprazole.     4.  Chronic kidney disease stage III: Advised patient again to follow-up with  nephrologist.     5. Lung nodule on CT chest: Repeat CT chest in 6months.             Keara Mehta MD    3/26/2018    9:57 AM

## 2018-04-09 DIAGNOSIS — C18.2 MALIGNANT NEOPLASM OF ASCENDING COLON (HCC): Primary | ICD-10-CM

## 2018-06-25 DIAGNOSIS — C18.2 MALIGNANT NEOPLASM OF ASCENDING COLON (HCC): Primary | ICD-10-CM

## 2018-06-26 ENCOUNTER — APPOINTMENT (OUTPATIENT)
Dept: LAB | Facility: HOSPITAL | Age: 83
End: 2018-06-26

## 2018-06-26 DIAGNOSIS — C18.2 MALIGNANT NEOPLASM OF ASCENDING COLON (HCC): Primary | ICD-10-CM

## 2018-06-27 ENCOUNTER — APPOINTMENT (OUTPATIENT)
Dept: LAB | Facility: HOSPITAL | Age: 83
End: 2018-06-27

## 2018-06-27 ENCOUNTER — TELEPHONE (OUTPATIENT)
Dept: ONCOLOGY | Facility: CLINIC | Age: 83
End: 2018-06-27

## 2018-06-27 NOTE — TELEPHONE ENCOUNTER
Call placed to check on patient and see why he had missed his apt again today. He was moustapha to be seen yesterday Tuesday 6/26/18 for f/u apt and when called to see why he had not shown up at the Center, he states he had forgotten about the apt date. He was wero for f/u apt today and when he failed to show up this am call was placed to check on him. 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 She was encouraged to call the office if he or she has questions or concerns or new problems arise. 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

## 2018-06-29 ENCOUNTER — APPOINTMENT (OUTPATIENT)
Dept: LAB | Facility: HOSPITAL | Age: 83
End: 2018-06-29

## 2018-07-11 ENCOUNTER — APPOINTMENT (OUTPATIENT)
Dept: LAB | Facility: HOSPITAL | Age: 83
End: 2018-07-11

## 2019-03-26 ENCOUNTER — TELEPHONE (OUTPATIENT)
Dept: PODIATRY | Facility: CLINIC | Age: 84
End: 2019-03-26

## 2019-03-26 NOTE — PROGRESS NOTES
[facility] - PODIATRY    Today's Date: 03/27/19    Patient Name: Maikel Mathews  MRN: [id number]  CSN: [id number]  PCP: David Barajas MD  Referring Provider: No ref. provider found    SUBJECTIVE     Chief Complaint   Patient presents with   • Establish Care     referral from Dr. David Barajas for diabetic foot and nail care - pt c/o long thickened toenails , painful calluses on bottom of feet, pt stated on right foot between 5th -4th toe lesion that is painful. - pain scale  8/10   • Diabetes     last stated blood sugar 80mg/dl - PCP Dr. David Evans  last visit 01/09/19     HPI: Maikel Mathews, a 89 y.o.male, comes to clinic as a(n) new patient presenting for diabetic foot exam and complaining of thick toenails and painful callus. Patient has h/o arthritis, AVM, Prostate Cancer, DM2, GERD, Hemorrhoids, HTN. Patient is NIDDM with last stated BG level of 80mg/dl. Admits to occasional burning but minimal numbness to feet. Denies open wounds or sores. States that he has a painful callus between his right 4th and 5th toes that is painful when wearing shoes and walking. Denies redness or drainage. Notes that his toenails are also long, thick, and discolored. Admits pain at 8/10 level and described as burning, aching, sharp and tingling. Denies previous treatment. Denies any constitutional symptoms. No other pedal complaints at this time.    Past Medical History:   Diagnosis Date   • Arthritis    • AVM (arteriovenous malformation)    • Cancer (CMS/HCC)     PROSTATE   • Cataract    • Colon polyp    • Diabetes mellitus (CMS/HCC)    • Diverticulosis    • Dizzinesses 10/20/2017   • GERD (gastroesophageal reflux disease)    • Hemorrhoids    • History of transfusion    • Hypertension    • Iron deficiency anemia due to chronic blood loss 7/26/2017   • Prostate CA (CMS/HCC)    • Prostate hypertrophy    • Rotator cuff syndrome      Past Surgical History:   Procedure Laterality Date   • APPENDECTOMY     •  CHOLECYSTECTOMY     • COLON RESECTION N/A 9/18/2017    Procedure: LAPAROSCOPIC ASSISTED RIGHT COLECTOMY;  Surgeon: Caroline Loomis MD;  Location: [facility] OR;  Service:    • COLONOSCOPY  04/29/2010   • COLONOSCOPY N/A 8/25/2017    Procedure: COLONOSCOPY WITH ANESTHESIA;  Surgeon: Joaquín Neff MD;  Location: [facility] ENDOSCOPY;  Service:    • ENDOSCOPY  03/27/2013   • ENDOSCOPY N/A 8/25/2017    Procedure: ESOPHAGOGASTRODUODENOSCOPY WITH ANESTHESIA;  Surgeon: Joaquín Neff MD;  Location: [facility] ENDOSCOPY;  Service:    • EYE SURGERY Left     CATARACT   • INTERVENTIONAL RADIOLOGY PROCEDURE N/A 9/18/2017    Procedure: URETHRA DILITATION with dominguez catheter insertion;  Surgeon: Mamadou Paez MD;  Location: [facility] OR;  Service:    • PROSTATECTOMY     • PROSTATECTOMY       Family History   Problem Relation Age of Onset   • Colon cancer Neg Hx    • Colon polyps Neg Hx      Social History     Socioeconomic History   • Marital status:      Spouse name: Not on file   • Number of children: Not on file   • Years of education: Not on file   • Highest education level: Not on file   Tobacco Use   • Smoking status: Never Smoker   • Smokeless tobacco: Never Used   Substance and Sexual Activity   • Alcohol use: No   • Drug use: No   • Sexual activity: Defer     No Known Allergies  Current Outpatient Medications   Medication Sig Dispense Refill   • amLODIPine (NORVASC) 10 MG tablet   5   • gabapentin (NEURONTIN) 100 MG capsule Take 100 mg by mouth 3 (Three) Times a Day.     • glipiZIDE (GLUCOTROL) 5 MG ER tablet Daily.  2   • losartan (COZAAR) 100 MG tablet      • Multiple Vitamins-Minerals (MULTIVITAMIN WITH MINERALS) tablet tablet Take 1 tablet by mouth Daily. Takes 1/2 of tablet daily       No current facility-administered medications for this visit.      Review of Systems   Constitutional: Negative for chills and fever.   HENT: Negative for congestion.    Respiratory: Negative for shortness of breath.     Cardiovascular: Negative for chest pain and leg swelling.   Gastrointestinal: Negative for constipation, diarrhea, nausea and vomiting.   Musculoskeletal:        Foot pain   Skin: Negative for wound.   Neurological: Positive for numbness.       OBJECTIVE     Vitals:    03/27/19 1322   BP: 138/70   Pulse: 71   SpO2: 97%       PHYSICAL EXAM  GEN:   Accompanied by wife.     General Exam  Diabetic Foot Exam: performed  Appearance: appears stated age and healthy   Orientation: alert and oriented to person, place, and time   Affect: appropriate   Gait: unimpaired   Assistance: independent   Shoe Gear: casual shoes      Foot/Ankle Exam  Inspection and Palpation  Ecchymosis - Right: none Left: none  Swelling - Right: none   Left: none    Arch - Right: normal Left: normal  Hammertoes - Right: absent Left: absent  Claw Toes - Right: fifth toe Left: fifth toe  Mallet Toes - Right: absent Left: absent  Hallux Valgus - Right: no Left: no  Hallux Limitus - Right: no Left: no  Skin - Right: callus  Left: skin intact   Nails -  Right: abnormally thick and onychomycosis Left: abnormally thick and onychomycosis     Vascular  Dorsalis Pedis - Right: 2+ Left: 2+  Posterior Tibial - Right: 2+ Left: 2+  Skin Temperature -  Right: warm  Left: warm    CFT - Right: 3 Left: 3  Pedal Hair Growth - Right: present Left: present  Varicosities -  Right: no varicosities  Left: no varicosities      Neurologic  Saphenous Nerve Sensation  - Right: normal Left: normal  Tibial Nerve Sensation - Right: diminished Left: diminished  Superficial Peroneal Nerve Sensation - Right: diminished Left: diminished  Deep Peroneal Nerve Sensation - Right: diminished Left: diminished  Sural Nerve Sensation - Right: diminished Left: diminished  Protective Sensation using [place]ela Monofilament - Right: 10 Left: 10    Muscle Strength  Dorsiflexion - Right: 5 Left: 5  Plantar Flexion - Right: 5 Left: 5  Eversion - Right: 5 Left: 5  Inversion - Right: 5 Left:  5  Great Toe Extension - Right: 5 Left: 5  Great Toe Flexion - Right: 5 Left: 5    Range of Motion  Normal right ankle ROM  Normal left ankle ROM            RADIOLOGY/NUCLEAR:  No results found.    LABORATORY/CULTURE RESULTS:      PATHOLOGY RESULTS:       ASSESSMENT/PLAN     Maikel was seen today for establish care and diabetes.    Diagnoses and all orders for this visit:    Onychomycosis    Diabetic foot (CMS/HCC)    Foot callus    Controlled type 2 diabetes mellitus without complication, without long-term current use of insulin (CMS/HCC)    Hammer toes of both feet      Comprehensive lower extremity examination and evaluation was performed.  Discussed findings and treatment plan including risks, benefits, and treatment options with patient in detail. Patient agreed with treatment plan.  After verbal consent obtained, nail(s) x10 debrided of length and thickness with nail nipper without incidence  After verbal consent obtained, calluses x1 pared utilizing dermal curette and/or scalpel without incidence  Patient may maintain nails and calluses at home utilizing emery board or pumice stone between visits as needed  Reviewed at home diabetic foot care including daily foot checks   Dispensed 1 gel toe spacer  An After Visit Summary was printed and given to the patient at discharge, including (if requested) any available informative/educational handouts regarding diagnosis, treatment, or medications. All questions were answered to patient/family satisfaction. Should symptoms fail to improve or worsen they agree to call or return to clinic or to go to the Emergency Department. Discussed the importance of following up with any needed screening tests/labs/specialist appointments and any requested follow-up recommended by me today. Importance of maintaining follow-up discussed and patient accepts that missed appointments can delay diagnosis and potentially lead to worsening of conditions.  Return in about 3 months (around  6/27/2019)., or sooner if acute issues arise.    Lab Frequency Next Occurrence   Comprehensive Metabolic Panel Once 06/26/2018   CBC & Differential Once 06/26/2018   Iron and TIBC Once 06/26/2018   Ferritin Once 06/26/2018   CBC & Differential Once 06/29/2018   Comprehensive Metabolic Panel Once 06/29/2018       This document has been electronically signed by Kevin Hirsch DPM on March 27, 2019 1:48 PM

## 2019-03-26 NOTE — TELEPHONE ENCOUNTER
Patient's wife called to have her  seen by Dr. Hirsch for diabetic foot care and calluses. Patient is now scheduled for March 27, 2019 at 1:30 pm. Patient's wife gave verbal understanding of appointment date and time.

## 2019-03-27 ENCOUNTER — OFFICE VISIT (OUTPATIENT)
Dept: PODIATRY | Facility: CLINIC | Age: 84
End: 2019-03-27

## 2019-03-27 VITALS
DIASTOLIC BLOOD PRESSURE: 70 MMHG | HEART RATE: 71 BPM | WEIGHT: 142 LBS | BODY MASS INDEX: 22.29 KG/M2 | SYSTOLIC BLOOD PRESSURE: 138 MMHG | HEIGHT: 67 IN | OXYGEN SATURATION: 97 %

## 2019-03-27 DIAGNOSIS — E11.9 CONTROLLED TYPE 2 DIABETES MELLITUS WITHOUT COMPLICATION, WITHOUT LONG-TERM CURRENT USE OF INSULIN (HCC): ICD-10-CM

## 2019-03-27 DIAGNOSIS — B35.1 ONYCHOMYCOSIS: Primary | ICD-10-CM

## 2019-03-27 DIAGNOSIS — M20.42 HAMMER TOES OF BOTH FEET: ICD-10-CM

## 2019-03-27 DIAGNOSIS — M20.41 HAMMER TOES OF BOTH FEET: ICD-10-CM

## 2019-03-27 DIAGNOSIS — L84 FOOT CALLUS: ICD-10-CM

## 2019-03-27 DIAGNOSIS — E11.8 DIABETIC FOOT (HCC): ICD-10-CM

## 2019-03-27 PROCEDURE — 99203 OFFICE O/P NEW LOW 30 MIN: CPT | Performed by: PODIATRIST

## 2019-03-27 PROCEDURE — 11055 PARING/CUTG B9 HYPRKER LES 1: CPT | Performed by: PODIATRIST

## 2019-03-27 PROCEDURE — 11721 DEBRIDE NAIL 6 OR MORE: CPT | Performed by: PODIATRIST

## 2019-03-27 RX ORDER — GABAPENTIN 100 MG/1
100 CAPSULE ORAL 3 TIMES DAILY
COMMUNITY
End: 2019-07-26

## 2019-03-27 NOTE — PATIENT INSTRUCTIONS
Diabetes Mellitus and Foot Care  Foot care is an important part of your health, especially when you have diabetes. Diabetes may cause you to have problems because of poor blood flow (circulation) to your feet and legs, which can cause your skin to:  · Become thinner and drier.  · Break more easily.  · Heal more slowly.  · Peel and crack.    You may also have nerve damage (neuropathy) in your legs and feet, causing decreased feeling in them. This means that you may not notice minor injuries to your feet that could lead to more serious problems. Noticing and addressing any potential problems early is the best way to prevent future foot problems.  How to care for your feet  Foot hygiene  · Wash your feet daily with warm water and mild soap. Do not use hot water. Then, pat your feet and the areas between your toes until they are completely dry. Do not soak your feet as this can dry your skin.  · Trim your toenails straight across. Do not dig under them or around the cuticle. File the edges of your nails with an emery board or nail file.  · Apply a moisturizing lotion or petroleum jelly to the skin on your feet and to dry, brittle toenails. Use lotion that does not contain alcohol and is unscented. Do not apply lotion between your toes.  Shoes and socks  · Wear clean socks or stockings every day. Make sure they are not too tight. Do not wear knee-high stockings since they may decrease blood flow to your legs.  · Wear shoes that fit properly and have enough cushioning. Always look in your shoes before you put them on to be sure there are no objects inside.  · To break in new shoes, wear them for just a few hours a day. This prevents injuries on your feet.  Wounds, scrapes, corns, and calluses  · Check your feet daily for blisters, cuts, bruises, sores, and redness. If you cannot see the bottom of your feet, use a mirror or ask someone for help.  · Do not cut corns or calluses or try to remove them with medicine.  · If you  find a minor scrape, cut, or break in the skin on your feet, keep it and the skin around it clean and dry. You may clean these areas with mild soap and water. Do not clean the area with peroxide, alcohol, or iodine.  · If you have a wound, scrape, corn, or callus on your foot, look at it several times a day to make sure it is healing and not infected. Check for:  ? Redness, swelling, or pain.  ? Fluid or blood.  ? Warmth.  ? Pus or a bad smell.  General instructions  · Do not cross your legs. This may decrease blood flow to your feet.  · Do not use heating pads or hot water bottles on your feet. They may burn your skin. If you have lost feeling in your feet or legs, you may not know this is happening until it is too late.  · Protect your feet from hot and cold by wearing shoes, such as at the beach or on hot pavement.  · Schedule a complete foot exam at least once a year (annually) or more often if you have foot problems. If you have foot problems, report any cuts, sores, or bruises to your health care provider immediately.  Contact a health care provider if:  · You have a medical condition that increases your risk of infection and you have any cuts, sores, or bruises on your feet.  · You have an injury that is not healing.  · You have redness on your legs or feet.  · You feel burning or tingling in your legs or feet.  · You have pain or cramps in your legs and feet.  · Your legs or feet are numb.  · Your feet always feel cold.  · You have pain around a toenail.  Get help right away if:  · You have a wound, scrape, corn, or callus on your foot and:  ? You have pain, swelling, or redness that gets worse.  ? You have fluid or blood coming from the wound, scrape, corn, or callus.  ? Your wound, scrape, corn, or callus feels warm to the touch.  ? You have pus or a bad smell coming from the wound, scrape, corn, or callus.  ? You have a fever.  ? You have a red line going up your leg.  Summary  · Check your feet every day  for cuts, sores, red spots, swelling, and blisters.  · Moisturize feet and legs daily.  · Wear shoes that fit properly and have enough cushioning.  · If you have foot problems, report any cuts, sores, or bruises to your health care provider immediately.  · Schedule a complete foot exam at least once a year (annually) or more often if you have foot problems.  This information is not intended to replace advice given to you by your health care provider. Make sure you discuss any questions you have with your health care provider.  Document Released: 12/15/2001 Document Revised: 01/19/2018 Document Reviewed: 01/19/2018  [employer] Interactive Patient Education © 2019 [employer]

## 2019-04-05 NOTE — ANESTHESIA PREPROCEDURE EVALUATION
Anesthesia Evaluation     Patient summary reviewed   no history of anesthetic complications:  NPO Solid Status: > 8 hours  NPO Liquid Status: > 8 hours     Airway   Mallampati: II  TM distance: >3 FB  Neck ROM: full  no difficulty expected  Dental      Comment: Wire from bridge visible      Pulmonary - normal exam   (-) COPD, asthma, sleep apnea, not a smoker  Cardiovascular - normal exam  Exercise tolerance: good (4-7 METS)    ECG reviewed    (+) hypertension,   (-) angina      Neuro/Psych  (-) seizures, TIA, CVA  GI/Hepatic/Renal/Endo    (+)  GERD, renal disease ARF, diabetes mellitus,   (-) liver disease    Musculoskeletal     Abdominal    Substance History      OB/GYN          Other      history of cancer (prostate, colon)                                      Anesthesia Plan    ASA 2     general     intravenous induction   Anesthetic plan and risks discussed with patient.      
Patient refused

## 2019-06-17 DIAGNOSIS — D50.9 IRON DEFICIENCY ANEMIA, UNSPECIFIED IRON DEFICIENCY ANEMIA TYPE: ICD-10-CM

## 2019-06-17 DIAGNOSIS — R29.898 WEAKNESS OF BOTH LOWER EXTREMITIES: ICD-10-CM

## 2019-06-17 DIAGNOSIS — D50.0 IRON DEFICIENCY ANEMIA DUE TO CHRONIC BLOOD LOSS: ICD-10-CM

## 2019-06-17 DIAGNOSIS — D53.9 ANEMIA ASSOCIATED WITH NUTRITIONAL DEFICIENCY: Primary | ICD-10-CM

## 2019-06-18 ENCOUNTER — TELEPHONE (OUTPATIENT)
Dept: ONCOLOGY | Facility: CLINIC | Age: 84
End: 2019-06-18

## 2019-06-18 ENCOUNTER — INFUSION (OUTPATIENT)
Dept: ONCOLOGY | Facility: HOSPITAL | Age: 84
End: 2019-06-18

## 2019-06-18 ENCOUNTER — OFFICE VISIT (OUTPATIENT)
Dept: ONCOLOGY | Facility: CLINIC | Age: 84
End: 2019-06-18

## 2019-06-18 ENCOUNTER — LAB (OUTPATIENT)
Dept: LAB | Facility: HOSPITAL | Age: 84
End: 2019-06-18

## 2019-06-18 VITALS
SYSTOLIC BLOOD PRESSURE: 140 MMHG | DIASTOLIC BLOOD PRESSURE: 67 MMHG | TEMPERATURE: 98.2 F | BODY MASS INDEX: 21.66 KG/M2 | WEIGHT: 138 LBS | OXYGEN SATURATION: 99 % | HEIGHT: 67 IN | HEART RATE: 78 BPM | RESPIRATION RATE: 18 BRPM

## 2019-06-18 VITALS
OXYGEN SATURATION: 99 % | DIASTOLIC BLOOD PRESSURE: 58 MMHG | HEIGHT: 67 IN | WEIGHT: 138 LBS | BODY MASS INDEX: 21.66 KG/M2 | TEMPERATURE: 97.8 F | HEART RATE: 76 BPM | RESPIRATION RATE: 16 BRPM | SYSTOLIC BLOOD PRESSURE: 134 MMHG

## 2019-06-18 DIAGNOSIS — E53.8 LOW VITAMIN B12 LEVEL: ICD-10-CM

## 2019-06-18 DIAGNOSIS — C18.2 CANCER OF RIGHT COLON (HCC): Primary | ICD-10-CM

## 2019-06-18 DIAGNOSIS — D53.9 ANEMIA ASSOCIATED WITH NUTRITIONAL DEFICIENCY: Primary | ICD-10-CM

## 2019-06-18 DIAGNOSIS — D50.9 IRON DEFICIENCY ANEMIA, UNSPECIFIED IRON DEFICIENCY ANEMIA TYPE: ICD-10-CM

## 2019-06-18 DIAGNOSIS — R29.898 WEAKNESS OF BOTH LOWER EXTREMITIES: ICD-10-CM

## 2019-06-18 DIAGNOSIS — C18.9 MALIGNANT NEOPLASM OF COLON, UNSPECIFIED PART OF COLON (HCC): ICD-10-CM

## 2019-06-18 DIAGNOSIS — N18.30 STAGE 3 CHRONIC KIDNEY DISEASE (HCC): ICD-10-CM

## 2019-06-18 DIAGNOSIS — D50.0 IRON DEFICIENCY ANEMIA DUE TO CHRONIC BLOOD LOSS: ICD-10-CM

## 2019-06-18 DIAGNOSIS — N18.30 STAGE 3 CHRONIC KIDNEY DISEASE (HCC): Primary | ICD-10-CM

## 2019-06-18 PROBLEM — Z29.89 ENCOUNTER FOR HYDRATION PRIOR TO CT SCAN: Status: ACTIVE | Noted: 2019-06-18

## 2019-06-18 PROBLEM — Z29.8 ENCOUNTER FOR HYDRATION PRIOR TO CT SCAN: Status: ACTIVE | Noted: 2019-06-18

## 2019-06-18 PROBLEM — R79.89 LOW VITAMIN B12 LEVEL: Status: ACTIVE | Noted: 2019-06-18

## 2019-06-18 LAB
ALBUMIN SERPL-MCNC: 4.2 G/DL (ref 3.5–5)
ALBUMIN/GLOB SERPL: 1.4 G/DL (ref 1.1–2.5)
ALP SERPL-CCNC: 53 U/L (ref 24–120)
ALT SERPL W P-5'-P-CCNC: 19 U/L (ref 0–54)
ANION GAP SERPL CALCULATED.3IONS-SCNC: 12 MMOL/L (ref 4–13)
AST SERPL-CCNC: 30 U/L (ref 7–45)
BASOPHILS # BLD AUTO: 0.03 10*3/MM3 (ref 0–0.2)
BASOPHILS NFR BLD AUTO: 0.4 % (ref 0–2)
BILIRUB SERPL-MCNC: 0.4 MG/DL (ref 0.1–1)
BUN BLD-MCNC: 49 MG/DL (ref 5–21)
BUN/CREAT SERPL: 23.4 (ref 7–25)
CALCIUM SPEC-SCNC: 8.7 MG/DL (ref 8.4–10.4)
CEA SERPL-MCNC: 3.14 NG/ML (ref 0–5)
CHLORIDE SERPL-SCNC: 117 MMOL/L (ref 98–110)
CO2 SERPL-SCNC: 14 MMOL/L (ref 24–31)
CREAT BLD-MCNC: 2.09 MG/DL (ref 0.5–1.4)
DEPRECATED RDW RBC AUTO: 46.2 FL (ref 40–54)
EOSINOPHIL # BLD AUTO: 0.13 10*3/MM3 (ref 0–0.7)
EOSINOPHIL NFR BLD AUTO: 1.9 % (ref 0–4)
ERYTHROCYTE [DISTWIDTH] IN BLOOD BY AUTOMATED COUNT: 13.6 % (ref 12–15)
FERRITIN SERPL-MCNC: 365 NG/ML (ref 17.9–464)
FOLATE SERPL-MCNC: 13.3 NG/ML (ref 4.78–24.2)
GFR SERPL CREATININE-BSD FRML MDRD: 30 ML/MIN/1.73
GLOBULIN UR ELPH-MCNC: 3.1 GM/DL
GLUCOSE BLD-MCNC: 177 MG/DL (ref 70–100)
HCT VFR BLD AUTO: 29.4 % (ref 40–52)
HGB BLD-MCNC: 9.9 G/DL (ref 14–18)
IMM GRANULOCYTES # BLD AUTO: 0.03 10*3/MM3 (ref 0–0.05)
IMM GRANULOCYTES NFR BLD AUTO: 0.4 % (ref 0–5)
IRON 24H UR-MRATE: 107 MCG/DL (ref 42–180)
IRON SATN MFR SERPL: 41 % (ref 20–45)
LYMPHOCYTES # BLD AUTO: 1.57 10*3/MM3 (ref 0.72–4.86)
LYMPHOCYTES NFR BLD AUTO: 22.9 % (ref 15–45)
MCH RBC QN AUTO: 30.9 PG (ref 28–32)
MCHC RBC AUTO-ENTMCNC: 33.7 G/DL (ref 33–36)
MCV RBC AUTO: 91.9 FL (ref 82–95)
MONOCYTES # BLD AUTO: 0.45 10*3/MM3 (ref 0.19–1.3)
MONOCYTES NFR BLD AUTO: 6.6 % (ref 4–12)
NEUTROPHILS # BLD AUTO: 4.66 10*3/MM3 (ref 1.87–8.4)
NEUTROPHILS NFR BLD AUTO: 67.8 % (ref 39–78)
NRBC BLD AUTO-RTO: 0 /100 WBC (ref 0–0.2)
PLATELET # BLD AUTO: 204 10*3/MM3 (ref 130–400)
PMV BLD AUTO: 9.3 FL (ref 6–12)
POTASSIUM BLD-SCNC: 5 MMOL/L (ref 3.5–5.3)
PROT SERPL-MCNC: 7.3 G/DL (ref 6.3–8.7)
RBC # BLD AUTO: 3.2 10*6/MM3 (ref 4.8–5.9)
SODIUM BLD-SCNC: 143 MMOL/L (ref 135–145)
TIBC SERPL-MCNC: 263 MCG/DL (ref 225–420)
VIT B12 BLD-MCNC: 209 PG/ML (ref 239–931)
WBC NRBC COR # BLD: 6.87 10*3/MM3 (ref 4.8–10.8)

## 2019-06-18 PROCEDURE — 82728 ASSAY OF FERRITIN: CPT | Performed by: INTERNAL MEDICINE

## 2019-06-18 PROCEDURE — 82378 CARCINOEMBRYONIC ANTIGEN: CPT | Performed by: INTERNAL MEDICINE

## 2019-06-18 PROCEDURE — 36415 COLL VENOUS BLD VENIPUNCTURE: CPT | Performed by: INTERNAL MEDICINE

## 2019-06-18 PROCEDURE — 82232 ASSAY OF BETA-2 PROTEIN: CPT

## 2019-06-18 PROCEDURE — 99214 OFFICE O/P EST MOD 30 MIN: CPT | Performed by: INTERNAL MEDICINE

## 2019-06-18 PROCEDURE — 96361 HYDRATE IV INFUSION ADD-ON: CPT

## 2019-06-18 PROCEDURE — 96360 HYDRATION IV INFUSION INIT: CPT

## 2019-06-18 PROCEDURE — 82746 ASSAY OF FOLIC ACID SERUM: CPT | Performed by: INTERNAL MEDICINE

## 2019-06-18 PROCEDURE — 82607 VITAMIN B-12: CPT | Performed by: INTERNAL MEDICINE

## 2019-06-18 PROCEDURE — 82668 ASSAY OF ERYTHROPOIETIN: CPT

## 2019-06-18 PROCEDURE — 80053 COMPREHEN METABOLIC PANEL: CPT | Performed by: INTERNAL MEDICINE

## 2019-06-18 PROCEDURE — 85025 COMPLETE CBC W/AUTO DIFF WBC: CPT | Performed by: INTERNAL MEDICINE

## 2019-06-18 PROCEDURE — 83615 LACTATE (LD) (LDH) ENZYME: CPT | Performed by: INTERNAL MEDICINE

## 2019-06-18 PROCEDURE — 83550 IRON BINDING TEST: CPT | Performed by: INTERNAL MEDICINE

## 2019-06-18 PROCEDURE — 83540 ASSAY OF IRON: CPT | Performed by: INTERNAL MEDICINE

## 2019-06-18 RX ORDER — SODIUM CHLORIDE 9 MG/ML
1000 INJECTION, SOLUTION INTRAVENOUS CONTINUOUS
Status: CANCELLED
Start: 2019-06-26

## 2019-06-18 RX ORDER — CYANOCOBALAMIN 1000 UG/ML
1000 INJECTION, SOLUTION INTRAMUSCULAR; SUBCUTANEOUS
Status: CANCELLED | OUTPATIENT
Start: 2019-09-11

## 2019-06-18 RX ORDER — CYANOCOBALAMIN 1000 UG/ML
1000 INJECTION, SOLUTION INTRAMUSCULAR; SUBCUTANEOUS
Status: CANCELLED | OUTPATIENT
Start: 2019-08-14

## 2019-06-18 RX ORDER — CYANOCOBALAMIN 1000 UG/ML
1000 INJECTION, SOLUTION INTRAMUSCULAR; SUBCUTANEOUS
Status: CANCELLED | OUTPATIENT
Start: 2019-07-10

## 2019-06-18 RX ORDER — SODIUM CHLORIDE 9 MG/ML
1000 INJECTION, SOLUTION INTRAVENOUS ONCE
Status: CANCELLED | OUTPATIENT
Start: 2019-06-18

## 2019-06-18 RX ORDER — SODIUM CHLORIDE 9 MG/ML
1000 INJECTION, SOLUTION INTRAVENOUS ONCE
Status: CANCELLED | OUTPATIENT
Start: 2019-06-26

## 2019-06-18 RX ORDER — SODIUM CHLORIDE 9 MG/ML
1000 INJECTION, SOLUTION INTRAVENOUS ONCE
OUTPATIENT
Start: 2019-06-18

## 2019-06-18 RX ORDER — CYANOCOBALAMIN 1000 UG/ML
1000 INJECTION, SOLUTION INTRAMUSCULAR; SUBCUTANEOUS
Status: CANCELLED | OUTPATIENT
Start: 2019-06-26

## 2019-06-18 RX ORDER — CYANOCOBALAMIN 1000 UG/ML
1000 INJECTION, SOLUTION INTRAMUSCULAR; SUBCUTANEOUS
Status: CANCELLED | OUTPATIENT
Start: 2019-07-17

## 2019-06-18 RX ORDER — SODIUM CHLORIDE 9 MG/ML
1000 INJECTION, SOLUTION INTRAVENOUS ONCE
Status: COMPLETED | OUTPATIENT
Start: 2019-06-18 | End: 2019-06-18

## 2019-06-18 RX ORDER — CYANOCOBALAMIN 1000 UG/ML
1000 INJECTION, SOLUTION INTRAMUSCULAR; SUBCUTANEOUS
Status: CANCELLED | OUTPATIENT
Start: 2019-07-03

## 2019-06-18 RX ADMIN — SODIUM CHLORIDE 1000 ML: 9 INJECTION, SOLUTION INTRAVENOUS at 10:10

## 2019-06-18 NOTE — PROGRESS NOTES
[facility]  HEMATOLOGY & ONCOLOGY    Cancer Staging Information:  Cancer of right colon    Staging form: Colon and Rectum, AJCC V7    - Clinical stage from 9/28/2017: No stage assigned - Unsigned    - Pathologic stage from 9/28/2017: Stage IIA (T3, N0, cM0) - Signed by Keara Mehta MD on 9/28/2017      Subjective     VISIT DIAGNOSIS:   Encounter Diagnoses   Name Primary?   • Cancer of right colon (CMS/HCC) Yes   • Stage 3 chronic kidney disease (CMS/HCC)    • Iron deficiency anemia, unspecified iron deficiency anemia type    • Weakness of both lower extremities    • Low vitamin B12 level        REASON FOR VISIT:     Chief Complaint   Patient presents with   • Colon Cancer   • Iron Def. Anemia     Here to check blood   • Fatigue     Can't walk very far   • Depression Screeing        HEMATOLOGY / ONCOLOGY HISTORY:    No history exists.           INTERVAL HISTORY  Patient ID: Maikel Mathews is a 89 y.o. year old male Maikel Mathews is a 88 y.o. year old male Maikel Mathews is a 88 y.o. year old male with diagnosis of colon adenocarcinoma status post surgical resection tumor 5.5 in greatest dimension low-grade moderately differentiated, 0 of 15 lymph nodes negative for metastases, pT3 N 0 M0 stage II a disease who presents today with With no complaint.  3/26/18: doing well. 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 CT no evidence of disease.  6/18/19: pt has missed various appointments presents today that his leg his weak, he tires easily. 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 He also has diabetes. Denies sob, cp, dizziness, n/v, abdominal pain, melena, back pain.  Rest of ros unremarkable.    Past Medical History:   Past Medical History:   Diagnosis Date   • Arthritis    • AVM (arteriovenous malformation)    • Cancer (CMS/HCC)     PROSTATE   • Cataract    • Colon polyp    • Diabetes mellitus (CMS/HCC)    • Diverticulosis    • Dizzinesses 10/20/2017   • GERD (gastroesophageal reflux disease)     • Hemorrhoids    • History of transfusion    • Hypertension    • Iron deficiency anemia due to chronic blood loss 7/26/2017   • Prostate CA (CMS/HCC)    • Prostate hypertrophy    • Rotator cuff syndrome      Past Surgical History:   Past Surgical History:   Procedure Laterality Date   • APPENDECTOMY     • CHOLECYSTECTOMY     • COLON RESECTION N/A 9/18/2017    Procedure: LAPAROSCOPIC ASSISTED RIGHT COLECTOMY;  Surgeon: Caroline Loomis MD;  Location: [facility] OR;  Service:    • COLONOSCOPY  04/29/2010   • COLONOSCOPY N/A 8/25/2017    Procedure: COLONOSCOPY WITH ANESTHESIA;  Surgeon: Joaquín Neff MD;  Location: [facility] ENDOSCOPY;  Service:    • ENDOSCOPY  03/27/2013   • ENDOSCOPY N/A 8/25/2017    Procedure: ESOPHAGOGASTRODUODENOSCOPY WITH ANESTHESIA;  Surgeon: Joaquín Neff MD;  Location: [facility] ENDOSCOPY;  Service:    • EYE SURGERY Left     CATARACT   • INTERVENTIONAL RADIOLOGY PROCEDURE N/A 9/18/2017    Procedure: URETHRA DILITATION with dominguez catheter insertion;  Surgeon: Mamadou Paez MD;  Location: [facility] OR;  Service:    • PROSTATECTOMY     • PROSTATECTOMY       Social History:   Social History     Socioeconomic History   • Marital status:      Spouse name: Not on file   • Number of children: Not on file   • Years of education: Not on file   • Highest education level: Not on file   Tobacco Use   • Smoking status: Never Smoker   • Smokeless tobacco: Never Used   Substance and Sexual Activity   • Alcohol use: No   • Drug use: No   • Sexual activity: Defer     Family History:   Family History   Problem Relation Age of Onset   • Colon cancer Neg Hx    • Colon polyps Neg Hx        Review of Systems   Constitutional: Negative.    HENT: Negative.    Eyes: Negative.    Respiratory: Negative.    Cardiovascular: Negative.    Gastrointestinal: Negative.    Endocrine: Negative.    Genitourinary: Negative.    Musculoskeletal: Negative.    Skin: Negative.    Neurological: Negative.    Hematological:  "Negative.    Psychiatric/Behavioral: Negative.         Performance Status:  Asymptomatic    Medications:    Current Outpatient Medications   Medication Sig Dispense Refill   • amLODIPine (NORVASC) 10 MG tablet   5   • glipiZIDE (GLUCOTROL) 5 MG ER tablet Daily.  2   • losartan (COZAAR) 100 MG tablet      • gabapentin (NEURONTIN) 100 MG capsule Take 100 mg by mouth 3 (Three) Times a Day.     • Multiple Vitamins-Minerals (MULTIVITAMIN WITH MINERALS) tablet tablet Take 1 tablet by mouth Daily. Takes 1/2 of tablet daily       No current facility-administered medications for this visit.        ALLERGIES:  No Known Allergies    Objective      Vitals:    06/18/19 0850   BP: 134/58   Pulse: 76   Resp: 16   Temp: 97.8 °F (36.6 °C)   TempSrc: Oral   SpO2: 99%   Weight: 62.6 kg (138 lb)   Height: 170.2 cm (67\")   PainSc: 0-No pain         Current Status 6/18/2019   ECOG score 1         Physical Exam  General Appearance: Frail Patient is awake, alert, oriented and in no acute distress. Patient is welldeveloped, wellnourished, and appears stated age.  HEENT: Normocephalic. Sclerae clear, conjunctiva pink, extraocular movements intact, pupils, round, reactive to light and  accommodation. Mouth and throat are clear with moist oral mucosa.  NECK: Supple, no jugular venous distention, thyroid not enlarged.  LYMPH: No cervical, supraclavicular, axillary, or inguinal lymphadenopathy.  CHEST: Equal bilateral expansion, AP  diameter normal, resonant percussion note  LUNGS: Good air movement, no rales, rhonchi, rubs or wheezes with auscultation  CARDIO: Regular sinus rhythm, no murmurs, gallops or rubs.  ABDOMEN: Nondistended, soft, No tenderness, no guarding, no rebound, No hepatosplenomegaly. No abdominal masses. Bowel sounds positive. No hernia  GENITALIA: Not examined.  BREASTS: Not examined.  MUSKEL: No joint swelling, decreased motion, or inflammation  EXTREMS: No edema, clubbing, cyanosis, No varicose veins.  NEURO: Grossly " nonfocal, Gait is coordinated and smooth, Cognition is preserved.  SKIN: No rashes, no ecchymoses, no petechia.  PSYCH: Oriented to time, place and person. Memory is preserved. Mood and affect appear normal  RECENT LABS:  Orders Only on 06/18/2019   Component Date Value Ref Range Status   • CEA 06/18/2019 3.14  0.00 - 5.00 ng/mL Final   Lab on 06/18/2019   Component Date Value Ref Range Status   • Erythropoietin 06/18/2019 2.2* 2.6 - 18.5 mIU/mL Final    [employer] DxI 800 Immunoassay System  Values obtained with different assay methods or kits cannot be used  interchangeably. Results cannot be interpreted as absolute evidence  of the presence or absence of malignant disease.   Orders Only on 06/17/2019   Component Date Value Ref Range Status   • Glucose 06/18/2019 177* 70 - 100 mg/dL Final   • BUN 06/18/2019 49* 5 - 21 mg/dL Final   • Creatinine 06/18/2019 2.09* 0.50 - 1.40 mg/dL Final   • Sodium 06/18/2019 143  135 - 145 mmol/L Final   • Potassium 06/18/2019 5.0  3.5 - 5.3 mmol/L Final   • Chloride 06/18/2019 117* 98 - 110 mmol/L Final   • CO2 06/18/2019 14.0* 24.0 - 31.0 mmol/L Final   • Calcium 06/18/2019 8.7  8.4 - 10.4 mg/dL Final   • Total Protein 06/18/2019 7.3  6.3 - 8.7 g/dL Final   • Albumin 06/18/2019 4.20  3.50 - 5.00 g/dL Final   • ALT (SGPT) 06/18/2019 19  0 - 54 U/L Final   • AST (SGOT) 06/18/2019 30  7 - 45 U/L Final   • Alkaline Phosphatase 06/18/2019 53  24 - 120 U/L Final   • Total Bilirubin 06/18/2019 0.4  0.1 - 1.0 mg/dL Final   • eGFR Non African Amer 06/18/2019 30* >60 mL/min/1.73 Final   • Globulin 06/18/2019 3.1  gm/dL Final   • A/G Ratio 06/18/2019 1.4  1.1 - 2.5 g/dL Final   • BUN/Creatinine Ratio 06/18/2019 23.4  7.0 - 25.0 Final   • Anion Gap 06/18/2019 12.0  4.0 - 13.0 mmol/L Final   • Iron 06/18/2019 107  42 - 180 mcg/dL Final   • TIBC 06/18/2019 263  225 - 420 mcg/dL Final   • Iron Saturation 06/18/2019 41  20 - 45 % Final   • Ferritin 06/18/2019 365.00  17.90 - 464.00  ng/mL Final   • WBC 06/18/2019 6.87  4.80 - 10.80 10*3/mm3 Final   • RBC 06/18/2019 3.20* 4.80 - 5.90 10*6/mm3 Final   • Hemoglobin 06/18/2019 9.9* 14.0 - 18.0 g/dL Final   • Hematocrit 06/18/2019 29.4* 40.0 - 52.0 % Final   • MCV 06/18/2019 91.9  82.0 - 95.0 fL Final   • MCH 06/18/2019 30.9  28.0 - 32.0 pg Final   • MCHC 06/18/2019 33.7  33.0 - 36.0 g/dL Final   • RDW 06/18/2019 13.6  12.0 - 15.0 % Final   • RDW-SD 06/18/2019 46.2  40.0 - 54.0 fl Final   • MPV 06/18/2019 9.3  6.0 - 12.0 fL Final   • Platelets 06/18/2019 204  130 - 400 10*3/mm3 Final   • Neutrophil % 06/18/2019 67.8  39.0 - 78.0 % Final   • Lymphocyte % 06/18/2019 22.9  15.0 - 45.0 % Final   • Monocyte % 06/18/2019 6.6  4.0 - 12.0 % Final   • Eosinophil % 06/18/2019 1.9  0.0 - 4.0 % Final   • Basophil % 06/18/2019 0.4  0.0 - 2.0 % Final   • Immature Grans % 06/18/2019 0.4  0.0 - 5.0 % Final   • Neutrophils, Absolute 06/18/2019 4.66  1.87 - 8.40 10*3/mm3 Final   • Lymphocytes, Absolute 06/18/2019 1.57  0.72 - 4.86 10*3/mm3 Final   • Monocytes, Absolute 06/18/2019 0.45  0.19 - 1.30 10*3/mm3 Final   • Eosinophils, Absolute 06/18/2019 0.13  0.00 - 0.70 10*3/mm3 Final   • Basophils, Absolute 06/18/2019 0.03  0.00 - 0.20 10*3/mm3 Final   • Immature Grans, Absolute 06/18/2019 0.03  0.00 - 0.05 10*3/mm3 Final   • nRBC 06/18/2019 0.0  0.0 - 0.2 /100 WBC Final   • Folate 06/18/2019 13.30  4.78 - 24.20 ng/mL Final   • Vitamin B-12 06/18/2019 209* 239 - 931 pg/mL Final   • LDH 06/18/2019 590  265 - 665 U/L Final       RADIOLOGY:  No results found.         Assessment/Plan  Maikel Mathews is a 89 y.o. year old male with stage 2a crc here for follow-up complaining of weakness.    Patient Active Problem List   Diagnosis   • Anemia associated with nutritional deficiency   • Iron deficiency anemia due to chronic blood loss   • Cancer of right colon (CMS/HCC)   • Dizzinesses   • Stage 3 chronic kidney disease (CMS/HCC)   • Encounter for hydration prior to CT scan   •  Low vitamin B12 level          1.Stage IIA right-sided colon adenocarcinoma:Patient currently on observation.He has  favorable risk features; it is moderately differentiated, right-sided, 0 of 15 lymph nodes uninvolved, negative for lymphovascular  invasion, negative for perineural invasion, margins are negative.  Given these variables my preference is to observe him and not give chemotherapy.  This is also recommended by NCCN guideline.  His tumor was PROSPER. The patient and his wife understands and are willing to go with observation.  Chemotherapy is not needed in his situation. I reviewed the images that were done to complete staging, CT chest negative for malignancy but had some nodules that needed to be followed in 3-6 months. There were areas of concern in the spine that was negative with a bone scan. Next colonoscopy a year from last. CT chest /abdomen/pelvis in 6months per NCCN guideline.   --3/26/18: CT l[place]to.    --6/18/19: will obtain CT c/a/p with contrast. Will give fluid to see if his kidney function can be improved to allow us to give contrast.  2.  Iron deficiency anemia due to GI loss: s/p 8 doses of iv venofer.labs pending. Will act accordingly.    -also checking myeloma panel    3.  GERD: Continue omeprazole.     4.  Chronic kidney disease stage III: Advised patient again to follow-up with nephrologist.     5. Lung nodule on CT chest: Repeat CT chest in 6months. CT ordered.   6. 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 He reports his leg weak. He defered physical therapy  -will check b12 and folate.  7. DM: on glipizide  Addendum: B12 low 209. Will start weekly B12 injections and transition to monthly  CEA normal  Erythropoietin low 2.2. He will need procrit        Keara Mehta MD    6/18/2019    9:27 AM

## 2019-06-18 NOTE — TELEPHONE ENCOUNTER
Call placed to PCP Dr Barajas's office, per Dr Mehta instructions, and spoke with Noris regarding mutual patient Maikel Mathews, informed her that patient was seen in f/u in the office today and his renal function continues to decline and he informed her that he did not go to his recent moustapha apt with Nephrologist regarding this matter.   Dr Mehta did order IV fluids to given today x 1 liter. Noris v/u and will inform his provider of this issue so it can be addressed.

## 2019-06-18 NOTE — TELEPHONE ENCOUNTER
----- Message from Keara Mehta MD sent at 6/18/2019  1:11 PM CDT -----  Please call the patient regarding his abnormal result. Low b12. Start with weekly then transition to monthly. Let him know his iron is good.

## 2019-06-18 NOTE — TELEPHONE ENCOUNTER
Notified Maikel that his iron level is normal and that his B12 level is low and DR. Mehta wants him to start getting B12 injections.  Will need weekly for 4 weeks then monthly and will plan to start 6/26/19.  Patient verbalized understanding.

## 2019-06-19 LAB — ETHNIC BACKGROUND STATED: 2.2 MIU/ML (ref 2.6–18.5)

## 2019-06-20 DIAGNOSIS — D50.0 IRON DEFICIENCY ANEMIA DUE TO CHRONIC BLOOD LOSS: ICD-10-CM

## 2019-06-20 DIAGNOSIS — D53.9 ANEMIA ASSOCIATED WITH NUTRITIONAL DEFICIENCY: Primary | ICD-10-CM

## 2019-06-20 LAB — LDH SERPL-CCNC: 590 U/L (ref 265–665)

## 2019-06-24 LAB — B2 MICROGLOB SERPL-MCNC: 3.4 MG/L (ref 0.6–2.4)

## 2019-06-26 ENCOUNTER — HOSPITAL ENCOUNTER (OUTPATIENT)
Dept: CT IMAGING | Facility: HOSPITAL | Age: 84
Discharge: HOME | End: 2019-06-26
Admitting: INTERNAL MEDICINE

## 2019-06-26 ENCOUNTER — INFUSION (OUTPATIENT)
Dept: ONCOLOGY | Facility: HOSPITAL | Age: 84
End: 2019-06-26

## 2019-06-26 VITALS
TEMPERATURE: 98.6 F | HEART RATE: 65 BPM | BODY MASS INDEX: 19.99 KG/M2 | HEIGHT: 69 IN | RESPIRATION RATE: 18 BRPM | OXYGEN SATURATION: 100 % | SYSTOLIC BLOOD PRESSURE: 151 MMHG | DIASTOLIC BLOOD PRESSURE: 57 MMHG | WEIGHT: 135 LBS

## 2019-06-26 DIAGNOSIS — E53.8 LOW VITAMIN B12 LEVEL: Primary | ICD-10-CM

## 2019-06-26 DIAGNOSIS — C18.2 CANCER OF RIGHT COLON (HCC): ICD-10-CM

## 2019-06-26 DIAGNOSIS — Z29.8 ENCOUNTER FOR HYDRATION PRIOR TO CT SCAN: ICD-10-CM

## 2019-06-26 DIAGNOSIS — N18.30 STAGE 3 CHRONIC KIDNEY DISEASE (HCC): ICD-10-CM

## 2019-06-26 PROCEDURE — 25010000002 CYANOCOBALAMIN PER 1000 MCG: Performed by: INTERNAL MEDICINE

## 2019-06-26 PROCEDURE — 74177 CT ABD & PELVIS W/CONTRAST: CPT

## 2019-06-26 PROCEDURE — 96361 HYDRATE IV INFUSION ADD-ON: CPT

## 2019-06-26 PROCEDURE — 82962 GLUCOSE BLOOD TEST: CPT

## 2019-06-26 PROCEDURE — 25010000002 IOPAMIDOL 61 % SOLUTION: Performed by: INTERNAL MEDICINE

## 2019-06-26 PROCEDURE — 96372 THER/PROPH/DIAG INJ SC/IM: CPT

## 2019-06-26 PROCEDURE — 71260 CT THORAX DX C+: CPT

## 2019-06-26 PROCEDURE — 96360 HYDRATION IV INFUSION INIT: CPT

## 2019-06-26 PROCEDURE — 25010000002 CYANOCOBALAMIN PER 1000 MCG

## 2019-06-26 RX ORDER — SODIUM CHLORIDE 9 MG/ML
1000 INJECTION, SOLUTION INTRAVENOUS CONTINUOUS
Status: DISCONTINUED | OUTPATIENT
Start: 2019-06-26 | End: 2019-06-26 | Stop reason: HOSPADM

## 2019-06-26 RX ORDER — CYANOCOBALAMIN 1000 UG/ML
1000 INJECTION, SOLUTION INTRAMUSCULAR; SUBCUTANEOUS ONCE
Status: COMPLETED | OUTPATIENT
Start: 2019-06-26 | End: 2019-06-26

## 2019-06-26 RX ORDER — SODIUM CHLORIDE 9 MG/ML
1000 INJECTION, SOLUTION INTRAVENOUS ONCE
Status: CANCELLED | OUTPATIENT
Start: 2019-06-26

## 2019-06-26 RX ORDER — SODIUM CHLORIDE 9 MG/ML
1000 INJECTION, SOLUTION INTRAVENOUS ONCE
Status: COMPLETED | OUTPATIENT
Start: 2019-06-26 | End: 2019-06-26

## 2019-06-26 RX ORDER — SODIUM CHLORIDE 9 MG/ML
1000 INJECTION, SOLUTION INTRAVENOUS CONTINUOUS
Status: CANCELLED
Start: 2019-06-26

## 2019-06-26 RX ADMIN — SODIUM CHLORIDE 1000 ML: 9 INJECTION, SOLUTION INTRAVENOUS at 11:28

## 2019-06-26 RX ADMIN — IOPAMIDOL 100 ML: 612 INJECTION, SOLUTION INTRAVENOUS at 11:34

## 2019-06-26 RX ADMIN — SODIUM CHLORIDE 1000 ML: 9 INJECTION, SOLUTION INTRAVENOUS at 08:36

## 2019-06-26 RX ADMIN — CYANOCOBALAMIN 1000 MCG: 1000 INJECTION, SOLUTION INTRAMUSCULAR at 11:29

## 2019-06-27 DIAGNOSIS — C18.2 CANCER OF RIGHT COLON (HCC): Primary | ICD-10-CM

## 2019-06-27 LAB — GLUCOSE BLDC GLUCOMTR-MCNC: 102 MG/DL (ref 70–130)

## 2019-06-28 ENCOUNTER — OFFICE VISIT (OUTPATIENT)
Dept: ONCOLOGY | Facility: CLINIC | Age: 84
End: 2019-06-28

## 2019-06-28 ENCOUNTER — INFUSION (OUTPATIENT)
Dept: ONCOLOGY | Facility: HOSPITAL | Age: 84
End: 2019-06-28

## 2019-06-28 VITALS
DIASTOLIC BLOOD PRESSURE: 63 MMHG | HEART RATE: 69 BPM | OXYGEN SATURATION: 100 % | SYSTOLIC BLOOD PRESSURE: 145 MMHG | TEMPERATURE: 98.5 F | RESPIRATION RATE: 18 BRPM

## 2019-06-28 VITALS
TEMPERATURE: 98.2 F | HEIGHT: 69 IN | DIASTOLIC BLOOD PRESSURE: 62 MMHG | WEIGHT: 139.2 LBS | HEART RATE: 84 BPM | BODY MASS INDEX: 20.62 KG/M2 | RESPIRATION RATE: 16 BRPM | SYSTOLIC BLOOD PRESSURE: 142 MMHG | OXYGEN SATURATION: 91 %

## 2019-06-28 DIAGNOSIS — D53.9 ANEMIA ASSOCIATED WITH NUTRITIONAL DEFICIENCY: ICD-10-CM

## 2019-06-28 DIAGNOSIS — D53.9 ANEMIA ASSOCIATED WITH NUTRITIONAL DEFICIENCY: Primary | ICD-10-CM

## 2019-06-28 PROCEDURE — 99214 OFFICE O/P EST MOD 30 MIN: CPT | Performed by: INTERNAL MEDICINE

## 2019-06-28 PROCEDURE — 96372 THER/PROPH/DIAG INJ SC/IM: CPT

## 2019-06-28 PROCEDURE — 25010000002 EPOETIN ALFA PER 1000 UNITS: Performed by: INTERNAL MEDICINE

## 2019-06-28 RX ADMIN — ERYTHROPOIETIN 40000 UNITS: 40000 INJECTION, SOLUTION INTRAVENOUS; SUBCUTANEOUS at 12:32

## 2019-06-28 NOTE — PROGRESS NOTES
2125 Called Dr. Mehta's office and spoke with Erin CORTES about why ordering w/o labs today. Erin reports giving based on most recent labs ok to give procrit today.Yaz Garrett RN

## 2019-06-28 NOTE — PROGRESS NOTES
[facility]  HEMATOLOGY & ONCOLOGY    Cancer Staging Information:  Cancer of right colon    Staging form: Colon and Rectum, AJCC V7    - Clinical stage from 9/28/2017: No stage assigned - Unsigned    - Pathologic stage from 9/28/2017: Stage IIA (T3, N0, cM0) - Signed by Keara Mehta MD on 9/28/2017      Subjective     VISIT DIAGNOSIS:   No diagnosis found.    REASON FOR VISIT:     Chief Complaint   Patient presents with   • Colon Cancer     Here to review scans        HEMATOLOGY / ONCOLOGY HISTORY:    No history exists.           INTERVAL HISTORY  Patient ID: Maikel Mathews is a 89 y.o. year old male Maikel Mathews is a 88 y.o. year old male Maikel Mathews is a 88 y.o. year old male with diagnosis of colon adenocarcinoma status post surgical resection tumor 5.5 in greatest dimension low-grade moderately differentiated, 0 of 15 lymph nodes negative for metastases, pT3 N 0 M0 stage II a disease who presents today with With no complaint.  3/26/18: doing well. 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 CT no evidence of disease.  6/18/19: pt has missed various appointments presents today that his leg his weak, he tires easily. 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 He also has diabetes. Denies sob, cp, dizziness, n/v, abdominal pain, melena, back pain.  Rest of ros unremarkable.    Past Medical History:   Past Medical History:   Diagnosis Date   • Arthritis    • AVM (arteriovenous malformation)    • Cancer (CMS/HCC)     PROSTATE   • Cataract    • Colon polyp    • Diabetes mellitus (CMS/HCC)    • Diverticulosis    • Dizzinesses 10/20/2017   • GERD (gastroesophageal reflux disease)    • Hemorrhoids    • History of transfusion    • Hypertension    • Iron deficiency anemia due to chronic blood loss 7/26/2017   • Prostate CA (CMS/HCC)    • Prostate hypertrophy    • Rotator cuff syndrome      Past Surgical History:   Past Surgical History:   Procedure Laterality Date   • APPENDECTOMY     •  CHOLECYSTECTOMY     • COLON RESECTION N/A 9/18/2017    Procedure: LAPAROSCOPIC ASSISTED RIGHT COLECTOMY;  Surgeon: Caroline Loomis MD;  Location: [facility] OR;  Service:    • COLONOSCOPY  04/29/2010   • COLONOSCOPY N/A 8/25/2017    Procedure: COLONOSCOPY WITH ANESTHESIA;  Surgeon: Joaquín Neff MD;  Location: [facility] ENDOSCOPY;  Service:    • ENDOSCOPY  03/27/2013   • ENDOSCOPY N/A 8/25/2017    Procedure: ESOPHAGOGASTRODUODENOSCOPY WITH ANESTHESIA;  Surgeon: Joaquín Neff MD;  Location: [facility] ENDOSCOPY;  Service:    • EYE SURGERY Left     CATARACT   • INTERVENTIONAL RADIOLOGY PROCEDURE N/A 9/18/2017    Procedure: URETHRA DILITATION with dominguez catheter insertion;  Surgeon: Mamadou Paez MD;  Location: [facility] OR;  Service:    • PROSTATECTOMY     • PROSTATECTOMY       Social History:   Social History     Socioeconomic History   • Marital status:      Spouse name: Not on file   • Number of children: Not on file   • Years of education: Not on file   • Highest education level: Not on file   Tobacco Use   • Smoking status: Never Smoker   • Smokeless tobacco: Never Used   Substance and Sexual Activity   • Alcohol use: No   • Drug use: No   • Sexual activity: Defer     Family History:   Family History   Problem Relation Age of Onset   • Colon cancer Neg Hx    • Colon polyps Neg Hx        Review of Systems   Constitutional: Negative.    HENT: Negative.    Eyes: Negative.    Respiratory: Negative.    Cardiovascular: Negative.    Gastrointestinal: Negative.    Endocrine: Negative.    Genitourinary: Negative.    Musculoskeletal: Negative.    Skin: Negative.    Neurological: Negative.    Hematological: Negative.    Psychiatric/Behavioral: Negative.         Performance Status:  Asymptomatic    Medications:    Current Outpatient Medications   Medication Sig Dispense Refill   • amLODIPine (NORVASC) 10 MG tablet   5   • gabapentin (NEURONTIN) 100 MG capsule Take 100 mg by mouth 3 (Three) Times a Day.     •  "glipiZIDE (GLUCOTROL) 5 MG ER tablet Daily.  2   • losartan (COZAAR) 100 MG tablet      • Multiple Vitamins-Minerals (MULTIVITAMIN WITH MINERALS) tablet tablet Take 1 tablet by mouth Daily. Takes 1/2 of tablet daily       No current facility-administered medications for this visit.        ALLERGIES:  No Known Allergies    Objective      Vitals:    06/28/19 1051   BP: 142/62   Pulse: 84   Resp: 16   Temp: 98.2 °F (36.8 °C)   TempSrc: Oral   SpO2: 91%   Weight: 63.1 kg (139 lb 3.2 oz)   Height: 175.3 cm (69\")         Current Status 6/28/2019   ECOG score 1         Physical Exam  General Appearance: Frail Patient is awake, alert, oriented and in no acute distress. Patient is welldeveloped, wellnourished, and appears stated age.  HEENT: Normocephalic. Sclerae clear, conjunctiva pink, extraocular movements intact, pupils, round, reactive to light and  accommodation. Mouth and throat are clear with moist oral mucosa.  NECK: Supple, no jugular venous distention, thyroid not enlarged.  LYMPH: No cervical, supraclavicular, axillary, or inguinal lymphadenopathy.  CHEST: Equal bilateral expansion, AP  diameter normal, resonant percussion note  LUNGS: Good air movement, no rales, rhonchi, rubs or wheezes with auscultation  CARDIO: Regular sinus rhythm, no murmurs, gallops or rubs.  ABDOMEN: Nondistended, soft, No tenderness, no guarding, no rebound, No hepatosplenomegaly. No abdominal masses. Bowel sounds positive. No hernia  GENITALIA: Not examined.  BREASTS: Not examined.  MUSKEL: No joint swelling, decreased motion, or inflammation  EXTREMS: No edema, clubbing, cyanosis, No varicose veins.  NEURO: Grossly nonfocal, Gait is coordinated and smooth, Cognition is preserved.  SKIN: No rashes, no ecchymoses, no petechia.  PSYCH: Oriented to time, place and person. Memory is preserved. Mood and affect appear normal  RECENT LABS:  Infusion on 06/26/2019   Component Date Value Ref Range Status   • Glucose 06/26/2019 102  70 - 130 " mg/dL Final    : [id number] Jeison PatriciaMeter ID: [id number]       RADIOLOGY:  Ct Chest With Contrast    Result Date: 6/26/2019  Narrative: CT CHEST W CONTRAST- 6/26/2019 11:08 AM CDT  HISTORY: colon cancer surveillance; C18.2-Malignant neoplasm of ascending colon   COMPARISON: 10/02/2017.  DOSE LENGTH PRODUCT: 208 mGy cm. Automated exposure control was also utilized to decrease patient radiation dose.  TECHNIQUE: Following the intravenous administration of Isovue contrast, helical CT tomographic images of the chest were acquired. Multiplanar reformatted images were also provided for review.  FINDINGS:  Neck base: There is a stable, 3.9 cm partially calcified nodule in the LEFT thyroid lobe that extends into the middle mediastinum.  Lungs: Granulomatous calcifications are noted in the lungs. There is a stable groundglass nodule in the LEFT lower lobe that measures 5 mm in diameter on axial image 91. No pleural effusion is present. The trachea and bronchial tree are patent.  Heart: Normal in size and appearance. There is no pericardial effusion.  Vasculature: There is aortic atherosclerosis without stenosis or aneurysm. Calcifications are seen in the coronary arteries. The great vessels are normal in appearance. The pulmonary arteries are normal in appearance.  Mediastinum and Lymph nodes: A 1.3 cm lymph node is seen in the precarinal region. This is stable since the previous study from 2017. Prominent axillary lymph nodes are present. These are stable as well.  Bones and soft tissues: There is a stable sclerotic lesion in the T11 vertebral body. This was present on an MRI lumbar spine from 10/14/2015. Degenerative changes are seen in the spine.  Upper abdomen: Findings in the upper abdomen are described in a separate dictation.      Impression: 1. No evidence of metastatic disease in the chest. 2. Stable LEFT thyroid nodule. 3. Stable sclerotic lesion in the posterior aspect of the T11 vertebral body has  not changed in size since an MRI from 2015. This is likely a benign process. 4. No new disease. This report was finalized on 06/26/2019 11:36 by Dr. Ronny Ferrera MD.    Ct Abdomen Pelvis With Contrast    Result Date: 6/26/2019  Narrative: CT ABDOMEN PELVIS W CONTRAST- 6/26/2019 11:08 AM CDT  HISTORY: colon ca surveillance; C18.2-Malignant neoplasm of ascending colon   COMPARISON: 03/20/2018.  DOSE LENGTH PRODUCT: 202 mGy cm. Automated exposure control was also utilized to decrease patient radiation dose.  TECHNIQUE: Following the oral ingestion and intravenous administration of contrast, helical CT tomographic images of the abdomen and pelvis were acquired. Multiplanar reformatted images were provided for review.  FINDINGS: LOWER CHEST: The lung bases and base of the heart are unremarkable.  LIVER: No focal liver lesion. The hepatic vasculature is patent.  BILIARY SYSTEM: The gallbladder has been removed. There is no evidence of biliary ductal dilation.  PANCREAS: No focal pancreatic lesion.  SPLEEN: Unremarkable.  KIDNEYS: Bilateral kidneys are unremarkable. The ureters are decompressed and normal in appearance.  ADRENALS: Unremarkable.  RETROPERITONEUM: No mass, lymphadenopathy or hemorrhage.  GI TRACT: Multiple colonic diverticula are present. Bilateral inguinal hernias containing nonobstructed bowel. The RIGHT hernia is larger and contains small bowel loops. A portion of the RIGHT colon has been removed.  OTHER: There is no mesenteric mass, lymphadenopathy or fluid collection. The abdominopelvic vasculature is patent. The osseous structures and soft tissues demonstrate no worrisome lesions. Degenerative changes are seen throughout the spine. A sclerotic lesion in the T11 vertebral body is stable since previous exams.  PELVIS: No mass lesion, fluid collection or significant lymphadenopathy is seen in the pelvis. There is mild urinary bladder wall thickening. This is stable in appearance.      Impression: 1. No  evidence of metastatic disease in the abdomen or pelvis. 2. Bilateral inguinal hernias, larger on the RIGHT. 3. Diverticulosis.   This report was finalized on 06/26/2019 13:53 by Dr. Ronny Ferrera MD.           Assessment/Plan  Maikel Mathews is a 89 y.o. year old male with stage 2a crc here for follow-up complaining of weakness.    Patient Active Problem List   Diagnosis   • Anemia associated with nutritional deficiency   • Iron deficiency anemia due to chronic blood loss   • Cancer of right colon (CMS/HCC)   • Dizzinesses   • Stage 3 chronic kidney disease (CMS/HCC)   • Encounter for hydration prior to CT scan   • Low vitamin B12 level          1.Stage IIA right-sided colon adenocarcinoma:Patient currently on observation.He has  favorable risk features; it is moderately differentiated, right-sided, 0 of 15 lymph nodes uninvolved, negative for lymphovascular  invasion, negative for perineural invasion, margins are negative.  Given these variables my preference is to observe him and not give chemotherapy.  This is also recommended by NCCN guideline.  His tumor was PROSPER. The patient and his wife understands and are willing to go with observation.  Chemotherapy is not needed in his situation. I reviewed the images that were done to complete staging, CT chest negative for malignancy but had some nodules that needed to be followed in 3-6 months. There were areas of concern in the spine that was negative with a bone scan. Next colonoscopy a year from last. CT chest /abdomen/pelvis in 6months per NCCN guideline.   --3/26/18: CT l[place]to.    --6/28/19: CT c/a/p 6/25/19 with no evidence of disease recurrence  2.  Iron deficiency anemia due to GI loss: s/p 8 doses of iv venofer.labs pending. Will act accordingly.    -myeloma panel negative. Iron 107, %sat 41.    3.  GERD: Continue omeprazole.     4.  Chronic kidney disease stage III: Advised patient again to follow-up with nephrologist.     5. Lung nodule on CT chest: Repeat CT  chest in 6months. CT ordered.   6. 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 He reports his leg weak. He defered physical therapy  -will check b12 and folate.  7. DM: on glipizide  Addendum: B12 low 209. Will start weekly B12 injections and transition to monthly  CEA normal  Erythropoietin low 2.2. He will need procrit. Started 6/28/19        Keara Mehta MD    6/28/2019    11:14 AM

## 2019-07-03 ENCOUNTER — INFUSION (OUTPATIENT)
Dept: ONCOLOGY | Facility: HOSPITAL | Age: 84
End: 2019-07-03

## 2019-07-03 VITALS
SYSTOLIC BLOOD PRESSURE: 140 MMHG | DIASTOLIC BLOOD PRESSURE: 67 MMHG | HEIGHT: 69 IN | BODY MASS INDEX: 21.03 KG/M2 | HEART RATE: 77 BPM | WEIGHT: 142 LBS | RESPIRATION RATE: 17 BRPM | TEMPERATURE: 98.1 F | OXYGEN SATURATION: 99 %

## 2019-07-03 DIAGNOSIS — E53.8 LOW VITAMIN B12 LEVEL: Primary | ICD-10-CM

## 2019-07-03 PROCEDURE — 25010000002 CYANOCOBALAMIN PER 1000 MCG: Performed by: INTERNAL MEDICINE

## 2019-07-03 PROCEDURE — 96372 THER/PROPH/DIAG INJ SC/IM: CPT

## 2019-07-03 RX ORDER — CYANOCOBALAMIN 1000 UG/ML
1000 INJECTION, SOLUTION INTRAMUSCULAR; SUBCUTANEOUS
Status: DISCONTINUED | OUTPATIENT
Start: 2019-07-03 | End: 2019-07-03 | Stop reason: HOSPADM

## 2019-07-03 RX ADMIN — CYANOCOBALAMIN 1000 MCG: 1000 INJECTION, SOLUTION INTRAMUSCULAR at 14:05

## 2019-07-10 ENCOUNTER — INFUSION (OUTPATIENT)
Dept: ONCOLOGY | Facility: HOSPITAL | Age: 84
End: 2019-07-10

## 2019-07-10 VITALS
TEMPERATURE: 98 F | SYSTOLIC BLOOD PRESSURE: 128 MMHG | OXYGEN SATURATION: 99 % | BODY MASS INDEX: 21.03 KG/M2 | RESPIRATION RATE: 16 BRPM | WEIGHT: 142 LBS | HEIGHT: 69 IN | HEART RATE: 86 BPM | DIASTOLIC BLOOD PRESSURE: 60 MMHG

## 2019-07-10 DIAGNOSIS — E53.8 LOW VITAMIN B12 LEVEL: Primary | ICD-10-CM

## 2019-07-10 PROCEDURE — 25010000002 CYANOCOBALAMIN PER 1000 MCG: Performed by: INTERNAL MEDICINE

## 2019-07-10 PROCEDURE — 96372 THER/PROPH/DIAG INJ SC/IM: CPT

## 2019-07-10 RX ORDER — CYANOCOBALAMIN 1000 UG/ML
1000 INJECTION, SOLUTION INTRAMUSCULAR; SUBCUTANEOUS
Status: DISCONTINUED | OUTPATIENT
Start: 2019-07-10 | End: 2019-07-10 | Stop reason: HOSPADM

## 2019-07-10 RX ADMIN — CYANOCOBALAMIN 1000 MCG: 1000 INJECTION, SOLUTION INTRAMUSCULAR at 13:49

## 2019-07-17 ENCOUNTER — INFUSION (OUTPATIENT)
Dept: ONCOLOGY | Facility: HOSPITAL | Age: 84
End: 2019-07-17

## 2019-07-17 VITALS
SYSTOLIC BLOOD PRESSURE: 139 MMHG | TEMPERATURE: 98 F | HEART RATE: 85 BPM | HEIGHT: 69 IN | RESPIRATION RATE: 18 BRPM | WEIGHT: 138 LBS | BODY MASS INDEX: 20.44 KG/M2 | OXYGEN SATURATION: 99 % | DIASTOLIC BLOOD PRESSURE: 67 MMHG

## 2019-07-17 DIAGNOSIS — E53.8 LOW VITAMIN B12 LEVEL: Primary | ICD-10-CM

## 2019-07-17 PROCEDURE — 96372 THER/PROPH/DIAG INJ SC/IM: CPT

## 2019-07-17 PROCEDURE — 25010000002 CYANOCOBALAMIN PER 1000 MCG: Performed by: INTERNAL MEDICINE

## 2019-07-17 RX ORDER — CYANOCOBALAMIN 1000 UG/ML
1000 INJECTION, SOLUTION INTRAMUSCULAR; SUBCUTANEOUS ONCE
Status: COMPLETED | OUTPATIENT
Start: 2019-07-17 | End: 2019-07-17

## 2019-07-17 RX ADMIN — CYANOCOBALAMIN 1000 MCG: 1000 INJECTION, SOLUTION INTRAMUSCULAR at 13:38

## 2019-07-24 DIAGNOSIS — D53.9 ANEMIA ASSOCIATED WITH NUTRITIONAL DEFICIENCY: ICD-10-CM

## 2019-07-26 ENCOUNTER — APPOINTMENT (OUTPATIENT)
Dept: CT IMAGING | Facility: HOSPITAL | Age: 84
End: 2019-07-26

## 2019-07-26 ENCOUNTER — HOSPITAL ENCOUNTER (EMERGENCY)
Facility: HOSPITAL | Age: 84
Discharge: HOME | End: 2019-07-26
Admitting: EMERGENCY MEDICINE

## 2019-07-26 VITALS
OXYGEN SATURATION: 98 % | RESPIRATION RATE: 14 BRPM | SYSTOLIC BLOOD PRESSURE: 148 MMHG | HEART RATE: 87 BPM | DIASTOLIC BLOOD PRESSURE: 69 MMHG | WEIGHT: 136 LBS | BODY MASS INDEX: 20.14 KG/M2 | TEMPERATURE: 97.2 F | HEIGHT: 69 IN

## 2019-07-26 DIAGNOSIS — R93.89 ABNORMAL CT SCAN: ICD-10-CM

## 2019-07-26 DIAGNOSIS — K92.2 GASTROINTESTINAL HEMORRHAGE, UNSPECIFIED GASTROINTESTINAL HEMORRHAGE TYPE: Primary | ICD-10-CM

## 2019-07-26 DIAGNOSIS — N39.0 URINARY TRACT INFECTION WITHOUT HEMATURIA, SITE UNSPECIFIED: ICD-10-CM

## 2019-07-26 LAB
ABO GROUP BLD: NORMAL
ALBUMIN SERPL-MCNC: 3.7 G/DL (ref 3.5–5)
ALBUMIN/GLOB SERPL: 1.3 G/DL (ref 1.1–2.5)
ALP SERPL-CCNC: 49 U/L (ref 24–120)
ALT SERPL W P-5'-P-CCNC: 22 U/L (ref 0–54)
ANION GAP SERPL CALCULATED.3IONS-SCNC: 10 MMOL/L (ref 4–13)
APTT PPP: 27 SECONDS (ref 24.1–35)
AST SERPL-CCNC: 24 U/L (ref 7–45)
BACTERIA UR QL AUTO: ABNORMAL /HPF
BASOPHILS # BLD AUTO: 0.03 10*3/MM3 (ref 0–0.2)
BASOPHILS NFR BLD AUTO: 0.4 % (ref 0–2)
BILIRUB SERPL-MCNC: 0.3 MG/DL (ref 0.1–1)
BILIRUB UR QL STRIP: NEGATIVE
BLD GP AB SCN SERPL QL: NEGATIVE
BUN BLD-MCNC: 57 MG/DL (ref 5–21)
BUN/CREAT SERPL: 27 (ref 7–25)
CALCIUM SPEC-SCNC: 8.7 MG/DL (ref 8.4–10.4)
CHLORIDE SERPL-SCNC: 115 MMOL/L (ref 98–110)
CLARITY UR: CLEAR
CO2 SERPL-SCNC: 14 MMOL/L (ref 24–31)
COLOR UR: YELLOW
CREAT BLD-MCNC: 2.11 MG/DL (ref 0.5–1.4)
DEPRECATED RDW RBC AUTO: 53.5 FL (ref 40–54)
DEVELOPER EXPIRATION DATE: ABNORMAL
DEVELOPER LOT NUMBER: 147
EOSINOPHIL # BLD AUTO: 0.06 10*3/MM3 (ref 0–0.7)
EOSINOPHIL NFR BLD AUTO: 0.8 % (ref 0–4)
ERYTHROCYTE [DISTWIDTH] IN BLOOD BY AUTOMATED COUNT: 15.2 % (ref 12–15)
EXPIRATION DATE: ABNORMAL
FECAL OCCULT BLOOD SCREEN, POC: POSITIVE
GFR SERPL CREATININE-BSD FRML MDRD: 30 ML/MIN/1.73
GLOBULIN UR ELPH-MCNC: 2.9 GM/DL
GLUCOSE BLD-MCNC: 291 MG/DL (ref 70–100)
GLUCOSE UR STRIP-MCNC: NEGATIVE MG/DL
HCT VFR BLD AUTO: 29.4 % (ref 40–52)
HGB BLD-MCNC: 9.5 G/DL (ref 14–18)
HGB UR QL STRIP.AUTO: NEGATIVE
HYALINE CASTS UR QL AUTO: ABNORMAL /LPF
IMM GRANULOCYTES # BLD AUTO: 0.06 10*3/MM3 (ref 0–0.05)
IMM GRANULOCYTES NFR BLD AUTO: 0.8 % (ref 0–5)
INR PPP: 1.04 (ref 0.91–1.09)
KETONES UR QL STRIP: NEGATIVE
LEUKOCYTE ESTERASE UR QL STRIP.AUTO: ABNORMAL
LIPASE SERPL-CCNC: 326 U/L (ref 23–203)
LYMPHOCYTES # BLD AUTO: 1.15 10*3/MM3 (ref 0.72–4.86)
LYMPHOCYTES NFR BLD AUTO: 15 % (ref 15–45)
Lab: ABNORMAL
MCH RBC QN AUTO: 31.8 PG (ref 28–32)
MCHC RBC AUTO-ENTMCNC: 32.3 G/DL (ref 33–36)
MCV RBC AUTO: 98.3 FL (ref 82–95)
MONOCYTES # BLD AUTO: 0.4 10*3/MM3 (ref 0.19–1.3)
MONOCYTES NFR BLD AUTO: 5.2 % (ref 4–12)
NEGATIVE CONTROL: NEGATIVE
NEUTROPHILS # BLD AUTO: 5.97 10*3/MM3 (ref 1.87–8.4)
NEUTROPHILS NFR BLD AUTO: 77.8 % (ref 39–78)
NITRITE UR QL STRIP: NEGATIVE
NRBC BLD AUTO-RTO: 0 /100 WBC (ref 0–0.2)
PH UR STRIP.AUTO: 5.5 [PH] (ref 5–8)
PLATELET # BLD AUTO: 239 10*3/MM3 (ref 130–400)
PMV BLD AUTO: 10 FL (ref 6–12)
POSITIVE CONTROL: POSITIVE
POTASSIUM BLD-SCNC: 5.7 MMOL/L (ref 3.5–5.3)
PROT SERPL-MCNC: 6.6 G/DL (ref 6.3–8.7)
PROT UR QL STRIP: NEGATIVE
PROTHROMBIN TIME: 13.9 SECONDS (ref 11.9–14.6)
RBC # BLD AUTO: 2.99 10*6/MM3 (ref 4.8–5.9)
RBC # UR: ABNORMAL /HPF
REF LAB TEST METHOD: ABNORMAL
RH BLD: POSITIVE
SODIUM BLD-SCNC: 139 MMOL/L (ref 135–145)
SP GR UR STRIP: 1.01 (ref 1–1.03)
SQUAMOUS #/AREA URNS HPF: ABNORMAL /HPF
T&S EXPIRATION DATE: NORMAL
TROPONIN I SERPL-MCNC: <0.012 NG/ML (ref 0–0.03)
UROBILINOGEN UR QL STRIP: ABNORMAL
WBC NRBC COR # BLD: 7.67 10*3/MM3 (ref 4.8–10.8)
WBC UR QL AUTO: ABNORMAL /HPF

## 2019-07-26 PROCEDURE — 85025 COMPLETE CBC W/AUTO DIFF WBC: CPT | Performed by: NURSE PRACTITIONER

## 2019-07-26 PROCEDURE — 87186 SC STD MICRODIL/AGAR DIL: CPT | Performed by: NURSE PRACTITIONER

## 2019-07-26 PROCEDURE — 86901 BLOOD TYPING SEROLOGIC RH(D): CPT | Performed by: NURSE PRACTITIONER

## 2019-07-26 PROCEDURE — 85610 PROTHROMBIN TIME: CPT | Performed by: NURSE PRACTITIONER

## 2019-07-26 PROCEDURE — 80053 COMPREHEN METABOLIC PANEL: CPT | Performed by: NURSE PRACTITIONER

## 2019-07-26 PROCEDURE — 87086 URINE CULTURE/COLONY COUNT: CPT | Performed by: NURSE PRACTITIONER

## 2019-07-26 PROCEDURE — 74177 CT ABD & PELVIS W/CONTRAST: CPT

## 2019-07-26 PROCEDURE — 25010000002 IOPAMIDOL 61 % SOLUTION: Performed by: NURSE PRACTITIONER

## 2019-07-26 PROCEDURE — 84484 ASSAY OF TROPONIN QUANT: CPT | Performed by: NURSE PRACTITIONER

## 2019-07-26 PROCEDURE — 82270 OCCULT BLOOD FECES: CPT | Performed by: NURSE PRACTITIONER

## 2019-07-26 PROCEDURE — 81001 URINALYSIS AUTO W/SCOPE: CPT | Performed by: NURSE PRACTITIONER

## 2019-07-26 PROCEDURE — 86850 RBC ANTIBODY SCREEN: CPT | Performed by: NURSE PRACTITIONER

## 2019-07-26 PROCEDURE — 86900 BLOOD TYPING SEROLOGIC ABO: CPT | Performed by: NURSE PRACTITIONER

## 2019-07-26 PROCEDURE — 85730 THROMBOPLASTIN TIME PARTIAL: CPT | Performed by: NURSE PRACTITIONER

## 2019-07-26 PROCEDURE — 93010 ELECTROCARDIOGRAM REPORT: CPT | Performed by: INTERNAL MEDICINE

## 2019-07-26 PROCEDURE — 99283 EMERGENCY DEPT VISIT LOW MDM: CPT

## 2019-07-26 PROCEDURE — 87088 URINE BACTERIA CULTURE: CPT | Performed by: NURSE PRACTITIONER

## 2019-07-26 PROCEDURE — 93005 ELECTROCARDIOGRAM TRACING: CPT | Performed by: NURSE PRACTITIONER

## 2019-07-26 PROCEDURE — 83690 ASSAY OF LIPASE: CPT | Performed by: NURSE PRACTITIONER

## 2019-07-26 RX ORDER — SULFAMETHOXAZOLE AND TRIMETHOPRIM 400; 80 MG/1; MG/1
1 TABLET ORAL 2 TIMES DAILY
Qty: 14 TABLET | Refills: 0 | Status: SHIPPED | OUTPATIENT
Start: 2019-07-26 | End: 2019-08-02

## 2019-07-26 RX ADMIN — SODIUM CHLORIDE 500 ML: 9 INJECTION, SOLUTION INTRAVENOUS at 10:29

## 2019-07-26 RX ADMIN — IOPAMIDOL 60 ML: 612 INJECTION, SOLUTION INTRAVENOUS at 12:01

## 2019-07-28 LAB — BACTERIA SPEC AEROBE CULT: ABNORMAL

## 2019-07-31 DIAGNOSIS — D53.9 ANEMIA ASSOCIATED WITH NUTRITIONAL DEFICIENCY: Primary | ICD-10-CM

## 2019-08-05 ENCOUNTER — OFFICE VISIT (OUTPATIENT)
Dept: ONCOLOGY | Facility: CLINIC | Age: 84
End: 2019-08-05

## 2019-08-05 ENCOUNTER — INFUSION (OUTPATIENT)
Dept: ONCOLOGY | Facility: HOSPITAL | Age: 84
End: 2019-08-05

## 2019-08-05 ENCOUNTER — PREP FOR SURGERY (OUTPATIENT)
Dept: OTHER | Facility: HOSPITAL | Age: 84
End: 2019-08-05

## 2019-08-05 ENCOUNTER — HOSPITAL ENCOUNTER (OUTPATIENT)
Facility: HOSPITAL | Age: 84
Discharge: HOME | End: 2019-08-05
Attending: INTERNAL MEDICINE | Admitting: INTERNAL MEDICINE

## 2019-08-05 ENCOUNTER — LAB (OUTPATIENT)
Dept: LAB | Facility: HOSPITAL | Age: 84
End: 2019-08-05

## 2019-08-05 VITALS
BODY MASS INDEX: 20.87 KG/M2 | OXYGEN SATURATION: 98 % | WEIGHT: 140.9 LBS | RESPIRATION RATE: 16 BRPM | DIASTOLIC BLOOD PRESSURE: 58 MMHG | SYSTOLIC BLOOD PRESSURE: 158 MMHG | TEMPERATURE: 98.4 F | HEIGHT: 69 IN | HEART RATE: 80 BPM

## 2019-08-05 VITALS
HEART RATE: 67 BPM | RESPIRATION RATE: 18 BRPM | DIASTOLIC BLOOD PRESSURE: 78 MMHG | SYSTOLIC BLOOD PRESSURE: 168 MMHG | TEMPERATURE: 98 F | OXYGEN SATURATION: 98 %

## 2019-08-05 DIAGNOSIS — D50.9 IRON DEFICIENCY ANEMIA, UNSPECIFIED IRON DEFICIENCY ANEMIA TYPE: ICD-10-CM

## 2019-08-05 DIAGNOSIS — D50.0 IRON DEFICIENCY ANEMIA DUE TO CHRONIC BLOOD LOSS: Primary | ICD-10-CM

## 2019-08-05 DIAGNOSIS — D53.9 ANEMIA ASSOCIATED WITH NUTRITIONAL DEFICIENCY: ICD-10-CM

## 2019-08-05 LAB
ABO GROUP BLD: NORMAL
ALBUMIN SERPL-MCNC: 3.9 G/DL (ref 3.5–5)
ALBUMIN/GLOB SERPL: 1.4 G/DL (ref 1.1–2.5)
ALP SERPL-CCNC: 47 U/L (ref 24–120)
ALT SERPL W P-5'-P-CCNC: 21 U/L (ref 0–54)
ANION GAP SERPL CALCULATED.3IONS-SCNC: 6 MMOL/L (ref 4–13)
AST SERPL-CCNC: 31 U/L (ref 7–45)
BASOPHILS # BLD AUTO: 0.03 10*3/MM3 (ref 0–0.2)
BASOPHILS NFR BLD AUTO: 0.4 % (ref 0–2)
BILIRUB SERPL-MCNC: 0.4 MG/DL (ref 0.1–1)
BLD GP AB SCN SERPL QL: NEGATIVE
BUN BLD-MCNC: 38 MG/DL (ref 5–21)
BUN/CREAT SERPL: 15.7 (ref 7–25)
CALCIUM SPEC-SCNC: 8.9 MG/DL (ref 8.4–10.4)
CHLORIDE SERPL-SCNC: 114 MMOL/L (ref 98–110)
CO2 SERPL-SCNC: 20 MMOL/L (ref 24–31)
CREAT BLD-MCNC: 2.42 MG/DL (ref 0.5–1.4)
DEPRECATED RDW RBC AUTO: 57.3 FL (ref 40–54)
EOSINOPHIL # BLD AUTO: 0.16 10*3/MM3 (ref 0–0.7)
EOSINOPHIL NFR BLD AUTO: 2.3 % (ref 0–4)
ERYTHROCYTE [DISTWIDTH] IN BLOOD BY AUTOMATED COUNT: 15.9 % (ref 12–15)
FERRITIN SERPL-MCNC: 307 NG/ML (ref 17.9–464)
GFR SERPL CREATININE-BSD FRML MDRD: 25 ML/MIN/1.73
GLOBULIN UR ELPH-MCNC: 2.8 GM/DL
GLUCOSE BLD-MCNC: 123 MG/DL (ref 70–100)
HCT VFR BLD AUTO: 23.8 % (ref 40–52)
HGB BLD-MCNC: 7.6 G/DL (ref 14–18)
HOLD SPECIMEN: NORMAL
HOLD SPECIMEN: NORMAL
IMM GRANULOCYTES # BLD AUTO: 0.05 10*3/MM3 (ref 0–0.05)
IMM GRANULOCYTES NFR BLD AUTO: 0.7 % (ref 0–5)
IRON 24H UR-MRATE: 106 MCG/DL (ref 42–180)
IRON SATN MFR SERPL: 37 % (ref 20–45)
LYMPHOCYTES # BLD AUTO: 1.6 10*3/MM3 (ref 0.72–4.86)
LYMPHOCYTES NFR BLD AUTO: 23.2 % (ref 15–45)
MCH RBC QN AUTO: 32.1 PG (ref 28–32)
MCHC RBC AUTO-ENTMCNC: 31.9 G/DL (ref 33–36)
MCV RBC AUTO: 100.4 FL (ref 82–95)
MONOCYTES # BLD AUTO: 0.56 10*3/MM3 (ref 0.19–1.3)
MONOCYTES NFR BLD AUTO: 8.1 % (ref 4–12)
NEUTROPHILS # BLD AUTO: 4.49 10*3/MM3 (ref 1.87–8.4)
NEUTROPHILS NFR BLD AUTO: 65.3 % (ref 39–78)
NRBC BLD AUTO-RTO: 0 /100 WBC (ref 0–0.2)
PLATELET # BLD AUTO: 257 10*3/MM3 (ref 130–400)
PMV BLD AUTO: 9.2 FL (ref 6–12)
POTASSIUM BLD-SCNC: 5.4 MMOL/L (ref 3.5–5.3)
PROT SERPL-MCNC: 6.7 G/DL (ref 6.3–8.7)
RBC # BLD AUTO: 2.37 10*6/MM3 (ref 4.8–5.9)
RH BLD: POSITIVE
SODIUM BLD-SCNC: 140 MMOL/L (ref 135–145)
T&S EXPIRATION DATE: NORMAL
TIBC SERPL-MCNC: 287 MCG/DL (ref 225–420)
WBC NRBC COR # BLD: 6.89 10*3/MM3 (ref 4.8–10.8)

## 2019-08-05 PROCEDURE — 86900 BLOOD TYPING SEROLOGIC ABO: CPT

## 2019-08-05 PROCEDURE — A9270 NON-COVERED ITEM OR SERVICE: HCPCS | Performed by: INTERNAL MEDICINE

## 2019-08-05 PROCEDURE — 96374 THER/PROPH/DIAG INJ IV PUSH: CPT

## 2019-08-05 PROCEDURE — G0378 HOSPITAL OBSERVATION PER HR: HCPCS

## 2019-08-05 PROCEDURE — 86923 COMPATIBILITY TEST ELECTRIC: CPT

## 2019-08-05 PROCEDURE — 86850 RBC ANTIBODY SCREEN: CPT | Performed by: INTERNAL MEDICINE

## 2019-08-05 PROCEDURE — 86901 BLOOD TYPING SEROLOGIC RH(D): CPT | Performed by: INTERNAL MEDICINE

## 2019-08-05 PROCEDURE — 80053 COMPREHEN METABOLIC PANEL: CPT | Performed by: INTERNAL MEDICINE

## 2019-08-05 PROCEDURE — 82728 ASSAY OF FERRITIN: CPT | Performed by: INTERNAL MEDICINE

## 2019-08-05 PROCEDURE — 63710000001 DIPHENHYDRAMINE PER 50 MG: Performed by: INTERNAL MEDICINE

## 2019-08-05 PROCEDURE — 36415 COLL VENOUS BLD VENIPUNCTURE: CPT | Performed by: INTERNAL MEDICINE

## 2019-08-05 PROCEDURE — P9016 RBC LEUKOCYTES REDUCED: HCPCS

## 2019-08-05 PROCEDURE — 25010000002 FUROSEMIDE PER 20 MG: Performed by: INTERNAL MEDICINE

## 2019-08-05 PROCEDURE — 83550 IRON BINDING TEST: CPT | Performed by: INTERNAL MEDICINE

## 2019-08-05 PROCEDURE — 83540 ASSAY OF IRON: CPT | Performed by: INTERNAL MEDICINE

## 2019-08-05 PROCEDURE — 86900 BLOOD TYPING SEROLOGIC ABO: CPT | Performed by: INTERNAL MEDICINE

## 2019-08-05 PROCEDURE — 85025 COMPLETE CBC W/AUTO DIFF WBC: CPT | Performed by: INTERNAL MEDICINE

## 2019-08-05 PROCEDURE — 63710000001 ACETAMINOPHEN 325 MG TABLET: Performed by: INTERNAL MEDICINE

## 2019-08-05 PROCEDURE — 36430 TRANSFUSION BLD/BLD COMPNT: CPT

## 2019-08-05 PROCEDURE — 99214 OFFICE O/P EST MOD 30 MIN: CPT | Performed by: INTERNAL MEDICINE

## 2019-08-05 RX ORDER — ACETAMINOPHEN 325 MG/1
650 TABLET ORAL ONCE
Status: COMPLETED | OUTPATIENT
Start: 2019-08-05 | End: 2019-08-05

## 2019-08-05 RX ORDER — ACETAMINOPHEN 325 MG/1
650 TABLET ORAL ONCE
Status: CANCELLED | OUTPATIENT
Start: 2019-08-05 | End: 2019-08-05

## 2019-08-05 RX ORDER — FUROSEMIDE 10 MG/ML
20 INJECTION INTRAMUSCULAR; INTRAVENOUS ONCE
Status: COMPLETED | OUTPATIENT
Start: 2019-08-05 | End: 2019-08-05

## 2019-08-05 RX ORDER — DIPHENHYDRAMINE HCL 25 MG
25 CAPSULE ORAL ONCE
Status: CANCELLED | OUTPATIENT
Start: 2019-08-05 | End: 2019-08-05

## 2019-08-05 RX ORDER — SODIUM CHLORIDE 0.9 % (FLUSH) 0.9 %
3 SYRINGE (ML) INJECTION EVERY 12 HOURS SCHEDULED
Status: DISCONTINUED | OUTPATIENT
Start: 2019-08-05 | End: 2019-08-06 | Stop reason: HOSPADM

## 2019-08-05 RX ORDER — SODIUM CHLORIDE 0.9 % (FLUSH) 0.9 %
3-10 SYRINGE (ML) INJECTION AS NEEDED
Status: DISCONTINUED | OUTPATIENT
Start: 2019-08-05 | End: 2019-08-06 | Stop reason: HOSPADM

## 2019-08-05 RX ORDER — FUROSEMIDE 10 MG/ML
20 INJECTION INTRAMUSCULAR; INTRAVENOUS ONCE
Status: CANCELLED | OUTPATIENT
Start: 2019-08-05 | End: 2019-08-05

## 2019-08-05 RX ORDER — SODIUM CHLORIDE 0.9 % (FLUSH) 0.9 %
3-10 SYRINGE (ML) INJECTION AS NEEDED
Status: CANCELLED | OUTPATIENT
Start: 2019-08-05

## 2019-08-05 RX ORDER — SODIUM CHLORIDE 0.9 % (FLUSH) 0.9 %
3 SYRINGE (ML) INJECTION EVERY 12 HOURS SCHEDULED
Status: CANCELLED | OUTPATIENT
Start: 2019-08-05

## 2019-08-05 RX ORDER — DIPHENHYDRAMINE HCL 25 MG
25 CAPSULE ORAL ONCE
Status: COMPLETED | OUTPATIENT
Start: 2019-08-05 | End: 2019-08-05

## 2019-08-05 RX ADMIN — FUROSEMIDE 20 MG: 10 INJECTION, SOLUTION INTRAVENOUS at 20:01

## 2019-08-05 RX ADMIN — SODIUM CHLORIDE, PRESERVATIVE FREE 3 ML: 5 INJECTION INTRAVENOUS at 20:02

## 2019-08-05 RX ADMIN — ACETAMINOPHEN 650 MG: 325 TABLET, FILM COATED ORAL at 16:25

## 2019-08-05 RX ADMIN — DIPHENHYDRAMINE HYDROCHLORIDE 25 MG: 25 CAPSULE ORAL at 16:25

## 2019-08-05 NOTE — PROGRESS NOTES
[facility]  HEMATOLOGY & ONCOLOGY    Cancer Staging Information:  Cancer of right colon    Staging form: Colon and Rectum, AJCC V7    - Clinical stage from 9/28/2017: No stage assigned - Unsigned    - Pathologic stage from 9/28/2017: Stage IIA (T3, N0, cM0) - Signed by Keara Mehta MD on 9/28/2017      Subjective     VISIT DIAGNOSIS:   Encounter Diagnosis   Name Primary?   • Anemia associated with nutritional deficiency        REASON FOR VISIT:     Chief Complaint   Patient presents with   • Anemia     Here to see if he needs procrit. About a week ago had a divericuler bleed and lost 2 pints of blood.   • Colon Cancer        HEMATOLOGY / ONCOLOGY HISTORY:    No history exists.           INTERVAL HISTORY  Patient ID: Maikel Mathews is a 89 y.o. year old male Maikel Mathews is a 88 y.o. year old male Maikel Mathews is a 88 y.o. year old male with diagnosis of colon adenocarcinoma status post surgical resection tumor 5.5 in greatest dimension low-grade moderately differentiated, 0 of 15 lymph nodes negative for metastases, pT3 N 0 M0 stage II a disease who presents today with With no complaint.  3/26/18: doing well. 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 CT no evidence of disease.  6/18/19: pt has missed various appointments presents today that his leg his weak, he tires easily. 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 He also has diabetes.    8/5/19: He was seen 10 days ago for diverticular bleed. No transfusion given. He feels tired and wiped out.   -- Denies sob, cp, dizziness, n/v, abdominal pain, melena, back pain.  Rest of ros unremarkable. PE remains the same.    Past Medical History:   Past Medical History:   Diagnosis Date   • Arthritis    • AVM (arteriovenous malformation)    • Cancer (CMS/HCC)     PROSTATE   • Cataract    • Colon polyp    • Diabetes mellitus (CMS/HCC)    • Diverticulosis    • Dizzinesses 10/20/2017   • GERD (gastroesophageal reflux disease)    • Hemorrhoids    •  History of transfusion    • Hypertension    • Iron deficiency anemia due to chronic blood loss 7/26/2017   • Prostate CA (CMS/HCC)    • Prostate hypertrophy    • Rotator cuff syndrome      Past Surgical History:   Past Surgical History:   Procedure Laterality Date   • APPENDECTOMY     • CHOLECYSTECTOMY     • COLON RESECTION N/A 9/18/2017    Procedure: LAPAROSCOPIC ASSISTED RIGHT COLECTOMY;  Surgeon: Caroline Loomis MD;  Location: [facility] OR;  Service:    • COLONOSCOPY  04/29/2010   • COLONOSCOPY N/A 8/25/2017    Procedure: COLONOSCOPY WITH ANESTHESIA;  Surgeon: Joaquín Neff MD;  Location: [facility] ENDOSCOPY;  Service:    • ENDOSCOPY  03/27/2013   • ENDOSCOPY N/A 8/25/2017    Procedure: ESOPHAGOGASTRODUODENOSCOPY WITH ANESTHESIA;  Surgeon: Joaquín Neff MD;  Location: [facility] ENDOSCOPY;  Service:    • EYE SURGERY Left     CATARACT   • INTERVENTIONAL RADIOLOGY PROCEDURE N/A 9/18/2017    Procedure: URETHRA DILITATION with dominguez catheter insertion;  Surgeon: Mamadou Paez MD;  Location: [facility] OR;  Service:    • PROSTATECTOMY     • PROSTATECTOMY       Social History:   Social History     Socioeconomic History   • Marital status:      Spouse name: Not on file   • Number of children: Not on file   • Years of education: Not on file   • Highest education level: Not on file   Tobacco Use   • Smoking status: Never Smoker   • Smokeless tobacco: Never Used   Substance and Sexual Activity   • Alcohol use: No   • Drug use: No   • Sexual activity: Defer     Family History:   Family History   Problem Relation Age of Onset   • Colon cancer Neg Hx    • Colon polyps Neg Hx        Review of Systems   Constitutional: Negative.    HENT: Negative.    Eyes: Negative.    Respiratory: Negative.    Cardiovascular: Negative.    Gastrointestinal: Negative.    Endocrine: Negative.    Genitourinary: Negative.    Musculoskeletal: Negative.    Skin: Negative.    Neurological: Negative.    Hematological: Negative.   "  Psychiatric/Behavioral: Negative.         Performance Status:  Asymptomatic    Medications:    Current Outpatient Medications   Medication Sig Dispense Refill   • amLODIPine (NORVASC) 10 MG tablet Take 10 mg by mouth Daily.  5   • glipiZIDE (GLUCOTROL) 5 MG ER tablet Daily.  2   • losartan (COZAAR) 100 MG tablet 100 mg Daily.       No current facility-administered medications for this visit.        ALLERGIES:  No Known Allergies    Objective      Vitals:    08/05/19 1114   BP: 158/58   Pulse: 80   Resp: 16   Temp: 98.4 °F (36.9 °C)   TempSrc: Oral   SpO2: 98%   Weight: 63.9 kg (140 lb 14.4 oz)   Height: 175.3 cm (69\")   PainSc: 0-No pain         Current Status 8/5/2019   ECOG score 1         Physical Exam  General Appearance: Frail Patient is awake, alert, oriented and in no acute distress. Patient is welldeveloped, wellnourished, and appears stated age.  HEENT: Normocephalic. Sclerae clear, conjunctiva pink, extraocular movements intact, pupils, round, reactive to light and  accommodation. Mouth and throat are clear with moist oral mucosa.  NECK: Supple, no jugular venous distention, thyroid not enlarged.  LYMPH: No cervical, supraclavicular, axillary, or inguinal lymphadenopathy.  CHEST: Equal bilateral expansion, AP  diameter normal, resonant percussion note  LUNGS: Good air movement, no rales, rhonchi, rubs or wheezes with auscultation  CARDIO: Regular sinus rhythm, no murmurs, gallops or rubs.  ABDOMEN: Nondistended, soft, No tenderness, no guarding, no rebound, No hepatosplenomegaly. No abdominal masses. Bowel sounds positive. No hernia  GENITALIA: Not examined.  BREASTS: Not examined.  MUSKEL: No joint swelling, decreased motion, or inflammation  EXTREMS: No edema, clubbing, cyanosis, No varicose veins.  NEURO: Grossly nonfocal, Gait is coordinated and smooth, Cognition is preserved.  SKIN: No rashes, no ecchymoses, no petechia.  PSYCH: Oriented to time, place and person. Memory is preserved. Mood and " affect appear normal  RECENT LABS:  Orders Only on 07/31/2019   Component Date Value Ref Range Status   • Glucose 08/05/2019 123* 70 - 100 mg/dL Final   • BUN 08/05/2019 38* 5 - 21 mg/dL Final   • Creatinine 08/05/2019 2.42* 0.50 - 1.40 mg/dL Final   • Sodium 08/05/2019 140  135 - 145 mmol/L Final   • Potassium 08/05/2019 5.4* 3.5 - 5.3 mmol/L Final   • Chloride 08/05/2019 114* 98 - 110 mmol/L Final   • CO2 08/05/2019 20.0* 24.0 - 31.0 mmol/L Final   • Calcium 08/05/2019 8.9  8.4 - 10.4 mg/dL Final   • Total Protein 08/05/2019 6.7  6.3 - 8.7 g/dL Final   • Albumin 08/05/2019 3.90  3.50 - 5.00 g/dL Final   • ALT (SGPT) 08/05/2019 21  0 - 54 U/L Final   • AST (SGOT) 08/05/2019 31  7 - 45 U/L Final   • Alkaline Phosphatase 08/05/2019 47  24 - 120 U/L Final   • Total Bilirubin 08/05/2019 0.4  0.1 - 1.0 mg/dL Final   • eGFR Non African Amer 08/05/2019 25* >60 mL/min/1.73 Final   • Globulin 08/05/2019 2.8  gm/dL Final   • A/G Ratio 08/05/2019 1.4  1.1 - 2.5 g/dL Final   • BUN/Creatinine Ratio 08/05/2019 15.7  7.0 - 25.0 Final   • Anion Gap 08/05/2019 6.0  4.0 - 13.0 mmol/L Final   • WBC 08/05/2019 6.89  4.80 - 10.80 10*3/mm3 Final   • RBC 08/05/2019 2.37* 4.80 - 5.90 10*6/mm3 Final   • Hemoglobin 08/05/2019 7.6* 14.0 - 18.0 g/dL Final   • Hematocrit 08/05/2019 23.8* 40.0 - 52.0 % Final   • MCV 08/05/2019 100.4* 82.0 - 95.0 fL Final   • MCH 08/05/2019 32.1* 28.0 - 32.0 pg Final   • MCHC 08/05/2019 31.9* 33.0 - 36.0 g/dL Final   • RDW 08/05/2019 15.9* 12.0 - 15.0 % Final   • RDW-SD 08/05/2019 57.3* 40.0 - 54.0 fl Final   • MPV 08/05/2019 9.2  6.0 - 12.0 fL Final   • Platelets 08/05/2019 257  130 - 400 10*3/mm3 Final   • Neutrophil % 08/05/2019 65.3  39.0 - 78.0 % Final   • Lymphocyte % 08/05/2019 23.2  15.0 - 45.0 % Final   • Monocyte % 08/05/2019 8.1  4.0 - 12.0 % Final   • Eosinophil % 08/05/2019 2.3  0.0 - 4.0 % Final   • Basophil % 08/05/2019 0.4  0.0 - 2.0 % Final   • Immature Grans % 08/05/2019 0.7  0.0 - 5.0 % Final    • Neutrophils, Absolute 08/05/2019 4.49  1.87 - 8.40 10*3/mm3 Final   • Lymphocytes, Absolute 08/05/2019 1.60  0.72 - 4.86 10*3/mm3 Final   • Monocytes, Absolute 08/05/2019 0.56  0.19 - 1.30 10*3/mm3 Final   • Eosinophils, Absolute 08/05/2019 0.16  0.00 - 0.70 10*3/mm3 Final   • Basophils, Absolute 08/05/2019 0.03  0.00 - 0.20 10*3/mm3 Final   • Immature Grans, Absolute 08/05/2019 0.05  0.00 - 0.05 10*3/mm3 Final   • nRBC 08/05/2019 0.0  0.0 - 0.2 /100 WBC Final       RADIOLOGY:  Ct Abdomen Pelvis With Contrast    Addendum Date: 7/26/2019 Addendum:   ADDENDUM  IMPRESSION should have also included Diffuse thickening of the urinary bladder, could be due to cystitis, chronic bladder outlet obstruction, or less likely infiltrative process.  Addendum communicated by telephone to Stephanie Méndez 2:39 PM on 07/26/2019.  END ADDENDUM This report was finalized on 07/26/2019 14:39 by Dr Mary Short MD.    Result Date: 7/26/2019  Narrative: EXAM: CT ABDOMEN PELVIS W CONTRAST- - 7/26/2019 12:00 PM CDT  HISTORY: abdominal pain; history of diverticulitis with rectal bleeding   COMPARISON: 06/26/2019.  DOSE LENGTH PRODUCT: 208 mGy cm. Automatic exposure control was utilized to make radiation dose as low as reasonably achievable.  TECHNIQUE: Enhanced  axial images of the abdomen and pelvis obtained with multiplanar reformats.  FINDINGS: VISUALIZED CHEST: Emphysema. No pleural or pericardial effusion.  LIVER: No focal liver lesion. Patent portal and hepatic veins.  BILIARY: Cholecystectomy clips. No bile duct dilation.  PANCREAS: Normal pancreas contour and enhancement.  SPLEEN: Normal size and contour.  ADRENAL: Normal appearance of the bilateral adrenal glands.  GENITOURINARY: No hydronephrosis, urolithiasis or solid renal lesion. Low-density lesion at the left inferior kidney measures about 0.8 cm, likely a renal cyst. Diffuse thickening of the urinary bladder. Diminutive or absent prostate. Surgical clips in the low pelvis.   PERITONEUM: No free air or ascites.  GI TRACT: Normal configuration of the stomach and duodenum.  Numerous colonic diverticula. Changes of a right hemicolectomy. Nonvisualized appendix. No evidence of bowel obstruction. There is small bowel extending into the right inguinal hernia, which appears to contain focally dilated bowel and fluid visible on coronal image 25. Colon minimally extends into the left inguinal hernia without evidence of obstruction.  VESSELS: Aorta normal in course and caliber with calcified atherosclerosis. Celiac, superior mesenteric, bilateral renal and inferior mesenteric arteries opacify normally.  RETROPERITONEUM: A few small retroperitoneal lymph nodes, not enlarged by CT size criteria, and stable compared to 03/20/2018.  SOFT TISSUES: Normal appearance of the overlying abdominal soft tissues.   BONES: Degenerative changes at the lumbosacral junction. Transitional anatomy at the lumbosacral junction.       Impression: 1. Right inguinal hernia contains small bowel, which appears to be focally dilated with a small amount of fluid in the hernia sac. No upstream bowel obstruction. Correlate for incarcerated or strangulated hernia. 2. Diffuse thickening of the urinary bladder. Correlate for cystitis or chronic bladder outlet obstruction. Infiltrative malignancy not excluded. Correlate with patient history. 3. Left inguinal hernia contains a small amount of colon without inflammatory changes. 4. Changes of right hemicolectomy. Possible prior prostatectomy. 5. Emphysema. 6. Cholecystectomy. This report was finalized on 07/26/2019 12:45 by Dr Mary Short MD.           Assessment/Plan  Maikel Mathews is a 89 y.o. year old male with stage 2a crc here for follow-up complaining of weakness.    Patient Active Problem List   Diagnosis   • Anemia associated with nutritional deficiency   • Iron deficiency anemia due to chronic blood loss   • Cancer of right colon (CMS/HCC)   • Dizzinesses   • Stage 3  chronic kidney disease (CMS/HCC)   • Encounter for hydration prior to CT scan   • Low vitamin B12 level          1.Stage IIA right-sided colon adenocarcinoma:Patient currently on observation.He has  favorable risk features; it is moderately differentiated, right-sided, 0 of 15 lymph nodes uninvolved, negative for lymphovascular  invasion, negative for perineural invasion, margins are negative.  Given these variables my preference is to observe him and not give chemotherapy.  This is also recommended by NCCN guideline.  His tumor was PROSPER. The patient and his wife understands and are willing to go with observation.  Chemotherapy is not needed in his situation. I reviewed the images that were done to complete staging, CT chest negative for malignancy but had some nodules that needed to be followed in 3-6 months. There were areas of concern in the spine that was negative with a bone scan. Next colonoscopy a year from last. CT chest /abdomen/pelvis in 6months per NCCN guideline.   --3/26/18: CT loreto.    --6/28/19: CT c/a/p 6/25/19 with no evidence of disease recurrence  2.  Iron deficiency anemia due to GI loss: s/p 8 doses of iv venofer.labs pending. Will act accordingly.    -myeloma panel negative. Iron 107, %sat 41.  --Hg today of 7.9 and pt symptomatic. Plan for 2 units of packed RBC with lasix in between.    3.  GERD: Continue omeprazole.     4.  Chronic kidney disease stage III: Advised patient again to follow-up with nephrologist.     5. Lung nodule on CT chest: Repeat CT chest in 6months. CT ordered.   6. 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 He reports his leg weak. He defered physical therapy  -will check b12 and folate.  7. DM: on glipizide  Addendum: B12 low 209. Will start weekly B12 injections and transition to monthly  CEA normal  Erythropoietin low 2.2. He will need procrit. Started 6/28/19        Keara Mehta MD    8/5/2019    11:28 AM

## 2019-08-05 NOTE — PLAN OF CARE
Problem: Anemia (Adult)  Goal: Identify Related Risk Factors and Signs and Symptoms  Outcome: Outcome(s) achieved Date Met: 08/05/19 08/05/19 1407   Anemia (Adult)   Related Risk Factors (Anemia) bleeding   Signs and Symptoms (Anemia) other (see comments)

## 2019-08-14 ENCOUNTER — INFUSION (OUTPATIENT)
Dept: ONCOLOGY | Facility: HOSPITAL | Age: 84
End: 2019-08-14

## 2019-08-14 VITALS
OXYGEN SATURATION: 99 % | SYSTOLIC BLOOD PRESSURE: 131 MMHG | BODY MASS INDEX: 20.88 KG/M2 | TEMPERATURE: 97.7 F | DIASTOLIC BLOOD PRESSURE: 61 MMHG | RESPIRATION RATE: 18 BRPM | HEART RATE: 83 BPM | WEIGHT: 141 LBS | HEIGHT: 69 IN

## 2019-08-14 DIAGNOSIS — E53.8 LOW VITAMIN B12 LEVEL: Primary | ICD-10-CM

## 2019-08-14 PROCEDURE — 25010000002 CYANOCOBALAMIN PER 1000 MCG: Performed by: INTERNAL MEDICINE

## 2019-08-14 PROCEDURE — 96372 THER/PROPH/DIAG INJ SC/IM: CPT

## 2019-08-14 RX ORDER — CYANOCOBALAMIN 1000 UG/ML
1000 INJECTION, SOLUTION INTRAMUSCULAR; SUBCUTANEOUS
Status: DISCONTINUED | OUTPATIENT
Start: 2019-08-14 | End: 2019-08-14 | Stop reason: HOSPADM

## 2019-08-14 RX ADMIN — CYANOCOBALAMIN 1000 MCG: 1000 INJECTION, SOLUTION INTRAMUSCULAR at 13:16

## 2019-09-10 DIAGNOSIS — D53.9 ANEMIA ASSOCIATED WITH NUTRITIONAL DEFICIENCY: Primary | ICD-10-CM

## 2019-09-10 DIAGNOSIS — D50.0 IRON DEFICIENCY ANEMIA DUE TO CHRONIC BLOOD LOSS: ICD-10-CM

## 2019-09-11 ENCOUNTER — OFFICE VISIT (OUTPATIENT)
Dept: ONCOLOGY | Facility: CLINIC | Age: 84
End: 2019-09-11

## 2019-09-11 ENCOUNTER — INFUSION (OUTPATIENT)
Dept: ONCOLOGY | Facility: HOSPITAL | Age: 84
End: 2019-09-11

## 2019-09-11 ENCOUNTER — APPOINTMENT (OUTPATIENT)
Dept: LAB | Facility: HOSPITAL | Age: 84
End: 2019-09-11

## 2019-09-11 VITALS
OXYGEN SATURATION: 90 % | WEIGHT: 146.7 LBS | HEIGHT: 69 IN | TEMPERATURE: 97 F | HEART RATE: 100 BPM | RESPIRATION RATE: 16 BRPM | DIASTOLIC BLOOD PRESSURE: 68 MMHG | SYSTOLIC BLOOD PRESSURE: 148 MMHG | BODY MASS INDEX: 21.73 KG/M2

## 2019-09-11 VITALS
BODY MASS INDEX: 21.5 KG/M2 | WEIGHT: 137 LBS | TEMPERATURE: 97.5 F | RESPIRATION RATE: 20 BRPM | DIASTOLIC BLOOD PRESSURE: 90 MMHG | SYSTOLIC BLOOD PRESSURE: 145 MMHG | HEART RATE: 85 BPM | HEIGHT: 67 IN | OXYGEN SATURATION: 100 %

## 2019-09-11 DIAGNOSIS — N18.30 ANEMIA DUE TO STAGE 3 CHRONIC KIDNEY DISEASE (HCC): ICD-10-CM

## 2019-09-11 DIAGNOSIS — E53.8 LOW VITAMIN B12 LEVEL: ICD-10-CM

## 2019-09-11 DIAGNOSIS — D63.1 ANEMIA DUE TO STAGE 3 CHRONIC KIDNEY DISEASE (HCC): ICD-10-CM

## 2019-09-11 DIAGNOSIS — E53.8 LOW VITAMIN B12 LEVEL: Primary | ICD-10-CM

## 2019-09-11 DIAGNOSIS — D50.0 IRON DEFICIENCY ANEMIA DUE TO CHRONIC BLOOD LOSS: Primary | ICD-10-CM

## 2019-09-11 DIAGNOSIS — C18.2 MALIGNANT NEOPLASM OF ASCENDING COLON (HCC): ICD-10-CM

## 2019-09-11 LAB
ALBUMIN SERPL-MCNC: 4.3 G/DL (ref 3.5–5.2)
ALBUMIN/GLOB SERPL: 1.4 G/DL
ALP SERPL-CCNC: 64 U/L (ref 39–117)
ALT SERPL W P-5'-P-CCNC: 14 U/L (ref 1–41)
ANION GAP SERPL CALCULATED.3IONS-SCNC: 10 MMOL/L (ref 5–15)
AST SERPL-CCNC: 20 U/L (ref 1–40)
BASOPHILS # BLD AUTO: 0.04 10*3/MM3 (ref 0–0.2)
BASOPHILS NFR BLD AUTO: 0.6 % (ref 0–1.5)
BILIRUB SERPL-MCNC: 0.3 MG/DL (ref 0.2–1.2)
BUN BLD-MCNC: 40 MG/DL (ref 8–23)
BUN/CREAT SERPL: 19.2 (ref 7–25)
CALCIUM SPEC-SCNC: 8.8 MG/DL (ref 8.2–9.6)
CHLORIDE SERPL-SCNC: 110 MMOL/L (ref 98–107)
CO2 SERPL-SCNC: 20 MMOL/L (ref 22–29)
CREAT BLD-MCNC: 2.08 MG/DL (ref 0.76–1.27)
DEPRECATED RDW RBC AUTO: 51.8 FL (ref 37–54)
EOSINOPHIL # BLD AUTO: 0.15 10*3/MM3 (ref 0–0.4)
EOSINOPHIL NFR BLD AUTO: 2.1 % (ref 0.3–6.2)
ERYTHROCYTE [DISTWIDTH] IN BLOOD BY AUTOMATED COUNT: 14.6 % (ref 12.3–15.4)
GFR SERPL CREATININE-BSD FRML MDRD: 30 ML/MIN/1.73
GLOBULIN UR ELPH-MCNC: 3.1 GM/DL
GLUCOSE BLD-MCNC: 122 MG/DL (ref 65–99)
HCT VFR BLD AUTO: 33.5 % (ref 37.5–51)
HGB BLD-MCNC: 10.9 G/DL (ref 13–17.7)
HOLD SPECIMEN: NORMAL
HOLD SPECIMEN: NORMAL
IMM GRANULOCYTES # BLD AUTO: 0.03 10*3/MM3 (ref 0–0.05)
IMM GRANULOCYTES NFR BLD AUTO: 0.4 % (ref 0–0.5)
LYMPHOCYTES # BLD AUTO: 1.62 10*3/MM3 (ref 0.7–3.1)
LYMPHOCYTES NFR BLD AUTO: 22.9 % (ref 19.6–45.3)
MCH RBC QN AUTO: 31.6 PG (ref 26.6–33)
MCHC RBC AUTO-ENTMCNC: 32.5 G/DL (ref 31.5–35.7)
MCV RBC AUTO: 97.1 FL (ref 79–97)
MONOCYTES # BLD AUTO: 0.51 10*3/MM3 (ref 0.1–0.9)
MONOCYTES NFR BLD AUTO: 7.2 % (ref 5–12)
NEUTROPHILS # BLD AUTO: 4.71 10*3/MM3 (ref 1.7–7)
NEUTROPHILS NFR BLD AUTO: 66.8 % (ref 42.7–76)
NRBC BLD AUTO-RTO: 0 /100 WBC (ref 0–0.2)
PLATELET # BLD AUTO: 249 10*3/MM3 (ref 140–450)
PMV BLD AUTO: 9.6 FL (ref 6–12)
POTASSIUM BLD-SCNC: 5 MMOL/L (ref 3.5–5.2)
PROT SERPL-MCNC: 7.4 G/DL (ref 6–8.5)
RBC # BLD AUTO: 3.45 10*6/MM3 (ref 4.14–5.8)
SODIUM BLD-SCNC: 140 MMOL/L (ref 136–145)
WBC NRBC COR # BLD: 7.06 10*3/MM3 (ref 3.4–10.8)

## 2019-09-11 PROCEDURE — 99214 OFFICE O/P EST MOD 30 MIN: CPT | Performed by: INTERNAL MEDICINE

## 2019-09-11 PROCEDURE — 85025 COMPLETE CBC W/AUTO DIFF WBC: CPT | Performed by: INTERNAL MEDICINE

## 2019-09-11 PROCEDURE — 36415 COLL VENOUS BLD VENIPUNCTURE: CPT

## 2019-09-11 PROCEDURE — 96372 THER/PROPH/DIAG INJ SC/IM: CPT

## 2019-09-11 PROCEDURE — 25010000002 CYANOCOBALAMIN PER 1000 MCG: Performed by: INTERNAL MEDICINE

## 2019-09-11 PROCEDURE — 80053 COMPREHEN METABOLIC PANEL: CPT | Performed by: INTERNAL MEDICINE

## 2019-09-11 RX ORDER — CYANOCOBALAMIN 1000 UG/ML
1000 INJECTION, SOLUTION INTRAMUSCULAR; SUBCUTANEOUS ONCE
Status: COMPLETED | OUTPATIENT
Start: 2019-09-11 | End: 2019-09-11

## 2019-09-11 RX ADMIN — CYANOCOBALAMIN 1000 MCG: 1000 INJECTION, SOLUTION INTRAMUSCULAR at 14:19

## 2019-09-11 NOTE — PROGRESS NOTES
[facility]  HEMATOLOGY & ONCOLOGY    Cancer Staging Information:  Cancer of right colon    Staging form: Colon and Rectum, AJCC V7    - Clinical stage from 9/28/2017: No stage assigned - Unsigned    - Pathologic stage from 9/28/2017: Stage IIA (T3, N0, cM0) - Signed by Keara Mehta MD on 9/28/2017      Subjective     VISIT DIAGNOSIS:   No diagnosis found.    REASON FOR VISIT:     Chief Complaint   Patient presents with   • Anemia     possible procrit. not any new issues        HEMATOLOGY / ONCOLOGY HISTORY:    No history exists.           INTERVAL HISTORY  Patient ID: Maikel Mathews is a 89 y.o. year old male Maikel Mathews is a 88 y.o. year old male Maikel Mathews is a 88 y.o. year old male with diagnosis of colon adenocarcinoma status post surgical resection tumor 5.5 in greatest dimension low-grade moderately differentiated, 0 of 15 lymph nodes negative for metastases, pT3 N 0 M0 stage II a disease who presents today with With no complaint.  3/26/18: doing well. 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 CT no evidence of disease.  6/18/19: pt has missed various appointments presents today that his leg his weak, he tires easily. 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 He also has diabetes.    9/11/19: he feels more energetic  -- Denies sob, cp, dizziness, n/v, abdominal pain, melena, back pain.  Rest of ros unremarkable. PE remains the same.    Past Medical History:   Past Medical History:   Diagnosis Date   • Arthritis    • AVM (arteriovenous malformation)    • Cancer (CMS/HCC)     PROSTATE   • Cataract    • Colon polyp    • Diabetes mellitus (CMS/HCC)    • Diverticulosis    • Dizzinesses 10/20/2017   • GERD (gastroesophageal reflux disease)    • Hemorrhoids    • History of transfusion    • Hypertension    • Iron deficiency anemia due to chronic blood loss 7/26/2017   • Prostate CA (CMS/HCC)    • Prostate hypertrophy    • Rotator cuff syndrome      Past Surgical History:   Past Surgical  History:   Procedure Laterality Date   • APPENDECTOMY     • CHOLECYSTECTOMY     • COLON RESECTION N/A 9/18/2017    Procedure: LAPAROSCOPIC ASSISTED RIGHT COLECTOMY;  Surgeon: Caroline Loomis MD;  Location: [facility] OR;  Service:    • COLONOSCOPY  04/29/2010   • COLONOSCOPY N/A 8/25/2017    Procedure: COLONOSCOPY WITH ANESTHESIA;  Surgeon: Joaquín Neff MD;  Location: [facility] ENDOSCOPY;  Service:    • ENDOSCOPY  03/27/2013   • ENDOSCOPY N/A 8/25/2017    Procedure: ESOPHAGOGASTRODUODENOSCOPY WITH ANESTHESIA;  Surgeon: Joaquín Neff MD;  Location: [facility] ENDOSCOPY;  Service:    • EYE SURGERY Left     CATARACT   • INTERVENTIONAL RADIOLOGY PROCEDURE N/A 9/18/2017    Procedure: URETHRA DILITATION with dominguez catheter insertion;  Surgeon: Mamadou Paez MD;  Location: [facility] OR;  Service:    • PROSTATECTOMY     • PROSTATECTOMY       Social History:   Social History     Socioeconomic History   • Marital status:      Spouse name: Not on file   • Number of children: Not on file   • Years of education: Not on file   • Highest education level: Not on file   Tobacco Use   • Smoking status: Never Smoker   • Smokeless tobacco: Never Used   Substance and Sexual Activity   • Alcohol use: No   • Drug use: No   • Sexual activity: Defer     Family History:   Family History   Problem Relation Age of Onset   • Colon cancer Neg Hx    • Colon polyps Neg Hx        Review of Systems   Constitutional: Negative.    HENT: Negative.    Eyes: Negative.    Respiratory: Negative.    Cardiovascular: Negative.    Gastrointestinal: Negative.    Endocrine: Negative.    Genitourinary: Negative.    Musculoskeletal: Negative.    Skin: Negative.    Neurological: Negative.    Hematological: Negative.    Psychiatric/Behavioral: Negative.         Performance Status:  Asymptomatic    Medications:    Current Outpatient Medications   Medication Sig Dispense Refill   • amLODIPine (NORVASC) 10 MG tablet Take 10 mg by mouth Daily.  5   • glipiZIDE  "(GLUCOTROL) 5 MG ER tablet Daily.  2   • losartan (COZAAR) 100 MG tablet 100 mg Daily.       No current facility-administered medications for this visit.        ALLERGIES:  No Known Allergies    Objective      Vitals:    09/11/19 1313   BP: 148/68   Pulse: 100   Resp: 16   Temp: 97 °F (36.1 °C)   TempSrc: Temporal   SpO2: 90%   Weight: 66.5 kg (146 lb 11.2 oz)   Height: 175.3 cm (69\")   PainSc: 0-No pain         Current Status 9/11/2019   ECOG score 0         Physical Exam  General Appearance: Frail Patient is awake, alert, oriented and in no acute distress. Patient is welldeveloped, wellnourished, and appears stated age.  HEENT: Normocephalic. Sclerae clear, conjunctiva pink, extraocular movements intact, pupils, round, reactive to light and  accommodation. Mouth and throat are clear with moist oral mucosa.  NECK: Supple, no jugular venous distention, thyroid not enlarged.  LYMPH: No cervical, supraclavicular, axillary, or inguinal lymphadenopathy.  CHEST: Equal bilateral expansion, AP  diameter normal, resonant percussion note  LUNGS: Good air movement, no rales, rhonchi, rubs or wheezes with auscultation  CARDIO: Regular sinus rhythm, no murmurs, gallops or rubs.  ABDOMEN: Nondistended, soft, No tenderness, no guarding, no rebound, No hepatosplenomegaly. No abdominal masses. Bowel sounds positive. No hernia  GENITALIA: Not examined.  BREASTS: Not examined.  MUSKEL: No joint swelling, decreased motion, or inflammation  EXTREMS: No edema, clubbing, cyanosis, No varicose veins.  NEURO: Grossly nonfocal, Gait is coordinated and smooth, Cognition is preserved.  SKIN: No rashes, no ecchymoses, no petechia.  PSYCH: Oriented to time, place and person. Memory is preserved. Mood and affect appear normal  RECENT LABS:  Appointment on 09/11/2019   Component Date Value Ref Range Status   • Extra Tube 09/11/2019 Hold for add-ons.   Final    Auto resulted.   • Extra Tube 09/11/2019 Hold for add-ons.   Final    Auto resulted. "   Orders Only on 09/10/2019   Component Date Value Ref Range Status   • Glucose 09/11/2019 122* 65 - 99 mg/dL Final   • BUN 09/11/2019 40* 8 - 23 mg/dL Final   • Creatinine 09/11/2019 2.08* 0.76 - 1.27 mg/dL Final   • Sodium 09/11/2019 140  136 - 145 mmol/L Final   • Potassium 09/11/2019 5.0  3.5 - 5.2 mmol/L Final   • Chloride 09/11/2019 110* 98 - 107 mmol/L Final   • CO2 09/11/2019 20.0* 22.0 - 29.0 mmol/L Final   • Calcium 09/11/2019 8.8  8.2 - 9.6 mg/dL Final   • Total Protein 09/11/2019 7.4  6.0 - 8.5 g/dL Final   • Albumin 09/11/2019 4.30  3.50 - 5.20 g/dL Final   • ALT (SGPT) 09/11/2019 14  1 - 41 U/L Final   • AST (SGOT) 09/11/2019 20  1 - 40 U/L Final   • Alkaline Phosphatase 09/11/2019 64  39 - 117 U/L Final   • Total Bilirubin 09/11/2019 0.3  0.2 - 1.2 mg/dL Final   • eGFR Non African Amer 09/11/2019 30* >60 mL/min/1.73 Final   • Globulin 09/11/2019 3.1  gm/dL Final   • A/G Ratio 09/11/2019 1.4  g/dL Final   • BUN/Creatinine Ratio 09/11/2019 19.2  7.0 - 25.0 Final   • Anion Gap 09/11/2019 10.0  5.0 - 15.0 mmol/L Final   • WBC 09/11/2019 7.06  3.40 - 10.80 10*3/mm3 Final   • RBC 09/11/2019 3.45* 4.14 - 5.80 10*6/mm3 Final   • Hemoglobin 09/11/2019 10.9* 13.0 - 17.7 g/dL Final   • Hematocrit 09/11/2019 33.5* 37.5 - 51.0 % Final   • MCV 09/11/2019 97.1* 79.0 - 97.0 fL Final   • MCH 09/11/2019 31.6  26.6 - 33.0 pg Final   • MCHC 09/11/2019 32.5  31.5 - 35.7 g/dL Final   • RDW 09/11/2019 14.6  12.3 - 15.4 % Final   • RDW-SD 09/11/2019 51.8  37.0 - 54.0 fl Final   • MPV 09/11/2019 9.6  6.0 - 12.0 fL Final   • Platelets 09/11/2019 249  140 - 450 10*3/mm3 Final   • Neutrophil % 09/11/2019 66.8  42.7 - 76.0 % Final   • Lymphocyte % 09/11/2019 22.9  19.6 - 45.3 % Final   • Monocyte % 09/11/2019 7.2  5.0 - 12.0 % Final   • Eosinophil % 09/11/2019 2.1  0.3 - 6.2 % Final   • Basophil % 09/11/2019 0.6  0.0 - 1.5 % Final   • Immature Grans % 09/11/2019 0.4  0.0 - 0.5 % Final   • Neutrophils, Absolute 09/11/2019 4.71   1.70 - 7.00 10*3/mm3 Final   • Lymphocytes, Absolute 09/11/2019 1.62  0.70 - 3.10 10*3/mm3 Final   • Monocytes, Absolute 09/11/2019 0.51  0.10 - 0.90 10*3/mm3 Final   • Eosinophils, Absolute 09/11/2019 0.15  0.00 - 0.40 10*3/mm3 Final   • Basophils, Absolute 09/11/2019 0.04  0.00 - 0.20 10*3/mm3 Final   • Immature Grans, Absolute 09/11/2019 0.03  0.00 - 0.05 10*3/mm3 Final   • nRBC 09/11/2019 0.0  0.0 - 0.2 /100 WBC Final       RADIOLOGY:  No results found.         Assessment/Plan  Maikel Mathews is a 89 y.o. year old male with stage 2a crc here for follow-up complaining of weakness.    Patient Active Problem List   Diagnosis   • Anemia associated with nutritional deficiency   • Iron deficiency anemia due to chronic blood loss   • Cancer of right colon (CMS/HCC)   • Dizzinesses   • Stage 3 chronic kidney disease (CMS/HCC)   • Encounter for hydration prior to CT scan   • Low vitamin B12 level   • Iron deficiency anemia          1.Stage IIA right-sided colon adenocarcinoma:Patient currently on observation.He has  favorable risk features; it is moderately differentiated, right-sided, 0 of 15 lymph nodes uninvolved, negative for lymphovascular  invasion, negative for perineural invasion, margins are negative.  Given these variables my preference is to observe him and not give chemotherapy.  This is also recommended by NCCN guideline.  His tumor was PROSPER. The patient and his wife understands and are willing to go with observation.  Chemotherapy is not needed in his situation. I reviewed the images that were done to complete staging, CT chest negative for malignancy but had some nodules that needed to be followed in 3-6 months. There were areas of concern in the spine that was negative with a bone scan. Next colonoscopy a year from last. CT chest /abdomen/pelvis in 6months per NCCN guideline.   --3/26/18: CT [place].    --6/28/19: CT c/a/p 6/25/19 with no evidence of disease recurrence    2.  Iron deficiency anemia due to GI  loss: s/p 8 doses of iv venofer.labs pending. Will act accordingly.    -myeloma panel negative. Iron 107, %sat 41.  --Hg 10.9. Will give pprocrit    3.  GERD: Continue omeprazole.     4.  Chronic kidney disease stage III: Advised patient again to follow-up with nephrologist.     5. Lung nodule on CT chest: Repeat CT chest in 6months. CT ordered.   6. 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 He reports his leg weak. He defered physical therapy  -will check b12 and folate.  7. DM: on glipizide  8. B12 low 209.  On monthly shot.          Keara Mehta MD    9/11/2019    1:38 PM

## 2019-10-03 DIAGNOSIS — E53.8 LOW VITAMIN B12 LEVEL: ICD-10-CM

## 2019-10-03 RX ORDER — CYANOCOBALAMIN 1000 UG/ML
1000 INJECTION, SOLUTION INTRAMUSCULAR; SUBCUTANEOUS
Status: CANCELLED | OUTPATIENT
Start: 2019-10-11

## 2019-10-07 DIAGNOSIS — N18.30 ANEMIA DUE TO STAGE 3 CHRONIC KIDNEY DISEASE (HCC): Primary | ICD-10-CM

## 2019-10-07 DIAGNOSIS — D63.1 ANEMIA DUE TO STAGE 3 CHRONIC KIDNEY DISEASE (HCC): Primary | ICD-10-CM

## 2019-10-11 ENCOUNTER — APPOINTMENT (OUTPATIENT)
Dept: LAB | Facility: HOSPITAL | Age: 84
End: 2019-10-11

## 2019-10-11 ENCOUNTER — INFUSION (OUTPATIENT)
Dept: ONCOLOGY | Facility: HOSPITAL | Age: 84
End: 2019-10-11

## 2019-10-11 ENCOUNTER — OFFICE VISIT (OUTPATIENT)
Dept: ONCOLOGY | Facility: CLINIC | Age: 84
End: 2019-10-11

## 2019-10-11 VITALS
DIASTOLIC BLOOD PRESSURE: 72 MMHG | BODY MASS INDEX: 21.97 KG/M2 | WEIGHT: 140 LBS | OXYGEN SATURATION: 100 % | RESPIRATION RATE: 16 BRPM | HEART RATE: 68 BPM | HEIGHT: 67 IN | SYSTOLIC BLOOD PRESSURE: 138 MMHG | TEMPERATURE: 97.7 F

## 2019-10-11 VITALS
BODY MASS INDEX: 22.02 KG/M2 | HEART RATE: 104 BPM | WEIGHT: 140.3 LBS | DIASTOLIC BLOOD PRESSURE: 86 MMHG | OXYGEN SATURATION: 93 % | TEMPERATURE: 97.7 F | SYSTOLIC BLOOD PRESSURE: 158 MMHG | HEIGHT: 67 IN | RESPIRATION RATE: 16 BRPM

## 2019-10-11 DIAGNOSIS — E53.8 LOW VITAMIN B12 LEVEL: ICD-10-CM

## 2019-10-11 DIAGNOSIS — D50.0 IRON DEFICIENCY ANEMIA DUE TO CHRONIC BLOOD LOSS: ICD-10-CM

## 2019-10-11 DIAGNOSIS — D53.9 ANEMIA ASSOCIATED WITH NUTRITIONAL DEFICIENCY: ICD-10-CM

## 2019-10-11 DIAGNOSIS — D63.1 ANEMIA DUE TO STAGE 3 CHRONIC KIDNEY DISEASE (HCC): Primary | ICD-10-CM

## 2019-10-11 DIAGNOSIS — N18.30 ANEMIA DUE TO STAGE 3 CHRONIC KIDNEY DISEASE (HCC): Primary | ICD-10-CM

## 2019-10-11 DIAGNOSIS — E53.8 LOW VITAMIN B12 LEVEL: Primary | ICD-10-CM

## 2019-10-11 PROBLEM — M43.10 SPONDYLOLISTHESIS, GRADE 1: Status: ACTIVE | Noted: 2019-10-11

## 2019-10-11 PROBLEM — R05.9 COUGH: Status: ACTIVE | Noted: 2019-10-11

## 2019-10-11 PROBLEM — J02.9 PHARYNGITIS: Status: ACTIVE | Noted: 2019-10-11

## 2019-10-11 PROBLEM — M54.50 LOW BACK PAIN: Status: ACTIVE | Noted: 2019-10-11

## 2019-10-11 LAB
ALBUMIN SERPL-MCNC: 4.1 G/DL (ref 3.5–5.2)
ALBUMIN/GLOB SERPL: 1.4 G/DL
ALP SERPL-CCNC: 67 U/L (ref 39–117)
ALT SERPL W P-5'-P-CCNC: 10 U/L (ref 1–41)
ANION GAP SERPL CALCULATED.3IONS-SCNC: 11 MMOL/L (ref 5–15)
AST SERPL-CCNC: 14 U/L (ref 1–40)
BASOPHILS # BLD AUTO: 0.04 10*3/MM3 (ref 0–0.2)
BASOPHILS NFR BLD AUTO: 0.6 % (ref 0–1.5)
BILIRUB SERPL-MCNC: 0.2 MG/DL (ref 0.2–1.2)
BUN BLD-MCNC: 45 MG/DL (ref 8–23)
BUN/CREAT SERPL: 22.7 (ref 7–25)
CALCIUM SPEC-SCNC: 8.5 MG/DL (ref 8.2–9.6)
CHLORIDE SERPL-SCNC: 106 MMOL/L (ref 98–107)
CO2 SERPL-SCNC: 22 MMOL/L (ref 22–29)
CREAT BLD-MCNC: 1.98 MG/DL (ref 0.76–1.27)
DEPRECATED RDW RBC AUTO: 49.1 FL (ref 37–54)
EOSINOPHIL # BLD AUTO: 0.12 10*3/MM3 (ref 0–0.4)
EOSINOPHIL NFR BLD AUTO: 1.7 % (ref 0.3–6.2)
ERYTHROCYTE [DISTWIDTH] IN BLOOD BY AUTOMATED COUNT: 14 % (ref 12.3–15.4)
GFR SERPL CREATININE-BSD FRML MDRD: 32 ML/MIN/1.73
GLOBULIN UR ELPH-MCNC: 3 GM/DL
GLUCOSE BLD-MCNC: 194 MG/DL (ref 65–99)
HCT VFR BLD AUTO: 32.7 % (ref 37.5–51)
HGB BLD-MCNC: 10.7 G/DL (ref 13–17.7)
HOLD SPECIMEN: NORMAL
HOLD SPECIMEN: NORMAL
IMM GRANULOCYTES # BLD AUTO: 0.02 10*3/MM3 (ref 0–0.05)
IMM GRANULOCYTES NFR BLD AUTO: 0.3 % (ref 0–0.5)
LYMPHOCYTES # BLD AUTO: 1.6 10*3/MM3 (ref 0.7–3.1)
LYMPHOCYTES NFR BLD AUTO: 22.4 % (ref 19.6–45.3)
MCH RBC QN AUTO: 31.3 PG (ref 26.6–33)
MCHC RBC AUTO-ENTMCNC: 32.7 G/DL (ref 31.5–35.7)
MCV RBC AUTO: 95.6 FL (ref 79–97)
MONOCYTES # BLD AUTO: 0.58 10*3/MM3 (ref 0.1–0.9)
MONOCYTES NFR BLD AUTO: 8.1 % (ref 5–12)
NEUTROPHILS # BLD AUTO: 4.79 10*3/MM3 (ref 1.7–7)
NEUTROPHILS NFR BLD AUTO: 66.9 % (ref 42.7–76)
NRBC BLD AUTO-RTO: 0 /100 WBC (ref 0–0.2)
PLATELET # BLD AUTO: 243 10*3/MM3 (ref 140–450)
PMV BLD AUTO: 9.9 FL (ref 6–12)
POTASSIUM BLD-SCNC: 5.1 MMOL/L (ref 3.5–5.2)
PROT SERPL-MCNC: 7.1 G/DL (ref 6–8.5)
RBC # BLD AUTO: 3.42 10*6/MM3 (ref 4.14–5.8)
SODIUM BLD-SCNC: 139 MMOL/L (ref 136–145)
WBC NRBC COR # BLD: 7.15 10*3/MM3 (ref 3.4–10.8)

## 2019-10-11 PROCEDURE — 85025 COMPLETE CBC W/AUTO DIFF WBC: CPT | Performed by: INTERNAL MEDICINE

## 2019-10-11 PROCEDURE — 25010000002 EPOETIN ALFA PER 1000 UNITS: Performed by: INTERNAL MEDICINE

## 2019-10-11 PROCEDURE — 80053 COMPREHEN METABOLIC PANEL: CPT | Performed by: INTERNAL MEDICINE

## 2019-10-11 PROCEDURE — 99214 OFFICE O/P EST MOD 30 MIN: CPT | Performed by: INTERNAL MEDICINE

## 2019-10-11 PROCEDURE — 25010000002 CYANOCOBALAMIN PER 1000 MCG: Performed by: INTERNAL MEDICINE

## 2019-10-11 PROCEDURE — 36415 COLL VENOUS BLD VENIPUNCTURE: CPT | Performed by: INTERNAL MEDICINE

## 2019-10-11 PROCEDURE — 96372 THER/PROPH/DIAG INJ SC/IM: CPT

## 2019-10-11 RX ORDER — CYANOCOBALAMIN 1000 UG/ML
1000 INJECTION, SOLUTION INTRAMUSCULAR; SUBCUTANEOUS
Status: DISCONTINUED | OUTPATIENT
Start: 2019-10-11 | End: 2019-10-11 | Stop reason: HOSPADM

## 2019-10-11 RX ADMIN — CYANOCOBALAMIN 1000 MCG: 1000 INJECTION, SOLUTION INTRAMUSCULAR at 13:49

## 2019-10-11 RX ADMIN — ERYTHROPOIETIN 40000 UNITS: 40000 INJECTION, SOLUTION INTRAVENOUS; SUBCUTANEOUS at 13:54

## 2019-10-11 NOTE — PROGRESS NOTES
sPOKE WITH van Wagner that patient is due for procrit today.  Informed her that Dr. Mehta's parameters indicate we will need to call if the Hgb is > 10 - so if she would like to change her parameters this would be helpful.

## 2019-10-11 NOTE — PROGRESS NOTES
[facility]  HEMATOLOGY & ONCOLOGY    Cancer Staging Information:  Cancer of right colon    Staging form: Colon and Rectum, AJCC V7    - Clinical stage from 9/28/2017: No stage assigned - Unsigned    - Pathologic stage from 9/28/2017: Stage IIA (T3, N0, cM0) - Signed by Keara Mehta MD on 9/28/2017      Subjective     VISIT DIAGNOSIS:   No diagnosis found.    REASON FOR VISIT:     Chief Complaint   Patient presents with   • Anemia     here for  followup   • Low Vit b12        HEMATOLOGY / ONCOLOGY HISTORY:    No history exists.           INTERVAL HISTORY  Patient ID: Maikel Mathews is a 89 y.o. year old male Maikel Mathews is a 88 y.o. year old male Maikel Mathews is a 88 y.o. year old male with diagnosis of colon adenocarcinoma status post surgical resection tumor 5.5 in greatest dimension low-grade moderately differentiated, 0 of 15 lymph nodes negative for metastases, pT3 N 0 M0 stage II a disease who presents today with With no complaint.  3/26/18: doing well. 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 CT no evidence of disease.  6/18/19: pt has missed various appointments presents today that his leg his weak, he tires easily. 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 He also has diabetes.    10/11/19: he feels more energetic. He will be undergoing rigth inguinal hernia repair end of the month  -- Denies sob, cp, dizziness, n/v, abdominal pain, melena, back pain.  Rest of ros unremarkable. PE remains the same.    Past Medical History:   Past Medical History:   Diagnosis Date   • Arthritis    • AVM (arteriovenous malformation)    • Cancer (CMS/HCC)     PROSTATE   • Cataract    • Colon polyp    • Diabetes mellitus (CMS/HCC)    • Diverticulosis    • Dizzinesses 10/20/2017   • GERD (gastroesophageal reflux disease)    • Hemorrhoids    • History of transfusion    • Hypertension    • Iron deficiency anemia due to chronic blood loss 7/26/2017   • Prostate CA (CMS/HCC)    • Prostate hypertrophy     • Rotator cuff syndrome      Past Surgical History:   Past Surgical History:   Procedure Laterality Date   • APPENDECTOMY     • CHOLECYSTECTOMY     • COLON RESECTION N/A 9/18/2017    Procedure: LAPAROSCOPIC ASSISTED RIGHT COLECTOMY;  Surgeon: Caroline Loomis MD;  Location: [facility] OR;  Service:    • COLONOSCOPY  04/29/2010   • COLONOSCOPY N/A 8/25/2017    Procedure: COLONOSCOPY WITH ANESTHESIA;  Surgeon: Joaquín Neff MD;  Location: [facility] ENDOSCOPY;  Service:    • ENDOSCOPY  03/27/2013   • ENDOSCOPY N/A 8/25/2017    Procedure: ESOPHAGOGASTRODUODENOSCOPY WITH ANESTHESIA;  Surgeon: Joaquín Neff MD;  Location: [facility] ENDOSCOPY;  Service:    • EYE SURGERY Left     CATARACT   • INTERVENTIONAL RADIOLOGY PROCEDURE N/A 9/18/2017    Procedure: URETHRA DILITATION with dominguez catheter insertion;  Surgeon: Mamadou Paez MD;  Location: [facility] OR;  Service:    • PROSTATECTOMY     • PROSTATECTOMY       Social History:   Social History     Socioeconomic History   • Marital status:      Spouse name: Not on file   • Number of children: Not on file   • Years of education: Not on file   • Highest education level: Not on file   Tobacco Use   • Smoking status: Never Smoker   • Smokeless tobacco: Never Used   Substance and Sexual Activity   • Alcohol use: No   • Drug use: No   • Sexual activity: Defer     Family History:   Family History   Problem Relation Age of Onset   • Colon cancer Neg Hx    • Colon polyps Neg Hx        Review of Systems   Constitutional: Negative.    HENT: Negative.    Eyes: Negative.    Respiratory: Negative.    Cardiovascular: Negative.    Gastrointestinal: Negative.    Endocrine: Negative.    Genitourinary: Negative.    Musculoskeletal: Negative.    Skin: Negative.    Neurological: Negative.    Hematological: Negative.    Psychiatric/Behavioral: Negative.         Performance Status:  Asymptomatic    Medications:    Current Outpatient Medications   Medication Sig Dispense Refill   •  "amLODIPine (NORVASC) 10 MG tablet Take 10 mg by mouth Daily.  5   • glipiZIDE (GLUCOTROL) 5 MG ER tablet Daily.  2   • losartan (COZAAR) 100 MG tablet 100 mg Daily.       No current facility-administered medications for this visit.        ALLERGIES:  No Known Allergies    Objective      Vitals:    10/11/19 1246   BP: 158/86   Pulse: 104   Resp: 16   Temp: 97.7 °F (36.5 °C)   TempSrc: Temporal   SpO2: 93%   Weight: 63.6 kg (140 lb 4.8 oz)   Height: 170.2 cm (67\")   PainSc: 0-No pain         Current Status 10/11/2019   ECOG score 1         Physical Exam  General Appearance: Frail Patient is awake, alert, oriented and in no acute distress. Patient is welldeveloped, wellnourished, and appears stated age.  HEENT: Normocephalic. Sclerae clear, conjunctiva pink, extraocular movements intact, pupils, round, reactive to light and  accommodation. Mouth and throat are clear with moist oral mucosa.  NECK: Supple, no jugular venous distention, thyroid not enlarged.  LYMPH: No cervical, supraclavicular, axillary, or inguinal lymphadenopathy.  CHEST: Equal bilateral expansion, AP  diameter normal, resonant percussion note  LUNGS: Good air movement, no rales, rhonchi, rubs or wheezes with auscultation  CARDIO: Regular sinus rhythm, no murmurs, gallops or rubs.  ABDOMEN: Nondistended, soft, No tenderness, no guarding, no rebound, No hepatosplenomegaly. No abdominal masses. Bowel sounds positive. No hernia  GENITALIA: Not examined.  BREASTS: Not examined.  MUSKEL: No joint swelling, decreased motion, or inflammation  EXTREMS: No edema, clubbing, cyanosis, No varicose veins.  NEURO: Grossly nonfocal, Gait is coordinated and smooth, Cognition is preserved.  SKIN: No rashes, no ecchymoses, no petechia.  PSYCH: Oriented to time, place and person. Memory is preserved. Mood and affect appear normal  RECENT LABS:  Appointment on 10/11/2019   Component Date Value Ref Range Status   • Extra Tube 10/11/2019 Hold for add-ons.   Final    Auto " resulted.   • Extra Tube 10/11/2019 Hold for add-ons.   Final    Auto resulted.   Orders Only on 10/07/2019   Component Date Value Ref Range Status   • Glucose 10/11/2019 194* 65 - 99 mg/dL Final   • BUN 10/11/2019 45* 8 - 23 mg/dL Final   • Creatinine 10/11/2019 1.98* 0.76 - 1.27 mg/dL Final   • Sodium 10/11/2019 139  136 - 145 mmol/L Final   • Potassium 10/11/2019 5.1  3.5 - 5.2 mmol/L Final   • Chloride 10/11/2019 106  98 - 107 mmol/L Final   • CO2 10/11/2019 22.0  22.0 - 29.0 mmol/L Final   • Calcium 10/11/2019 8.5  8.2 - 9.6 mg/dL Final   • Total Protein 10/11/2019 7.1  6.0 - 8.5 g/dL Final   • Albumin 10/11/2019 4.10  3.50 - 5.20 g/dL Final   • ALT (SGPT) 10/11/2019 10  1 - 41 U/L Final   • AST (SGOT) 10/11/2019 14  1 - 40 U/L Final   • Alkaline Phosphatase 10/11/2019 67  39 - 117 U/L Final   • Total Bilirubin 10/11/2019 0.2  0.2 - 1.2 mg/dL Final   • eGFR Non African Amer 10/11/2019 32* >60 mL/min/1.73 Final   • Globulin 10/11/2019 3.0  gm/dL Final   • A/G Ratio 10/11/2019 1.4  g/dL Final   • BUN/Creatinine Ratio 10/11/2019 22.7  7.0 - 25.0 Final   • Anion Gap 10/11/2019 11.0  5.0 - 15.0 mmol/L Final   • WBC 10/11/2019 7.15  3.40 - 10.80 10*3/mm3 Final   • RBC 10/11/2019 3.42* 4.14 - 5.80 10*6/mm3 Final   • Hemoglobin 10/11/2019 10.7* 13.0 - 17.7 g/dL Final   • Hematocrit 10/11/2019 32.7* 37.5 - 51.0 % Final   • MCV 10/11/2019 95.6  79.0 - 97.0 fL Final   • MCH 10/11/2019 31.3  26.6 - 33.0 pg Final   • MCHC 10/11/2019 32.7  31.5 - 35.7 g/dL Final   • RDW 10/11/2019 14.0  12.3 - 15.4 % Final   • RDW-SD 10/11/2019 49.1  37.0 - 54.0 fl Final   • MPV 10/11/2019 9.9  6.0 - 12.0 fL Final   • Platelets 10/11/2019 243  140 - 450 10*3/mm3 Final   • Neutrophil % 10/11/2019 66.9  42.7 - 76.0 % Final   • Lymphocyte % 10/11/2019 22.4  19.6 - 45.3 % Final   • Monocyte % 10/11/2019 8.1  5.0 - 12.0 % Final   • Eosinophil % 10/11/2019 1.7  0.3 - 6.2 % Final   • Basophil % 10/11/2019 0.6  0.0 - 1.5 % Final   • Immature Grans %  10/11/2019 0.3  0.0 - 0.5 % Final   • Neutrophils, Absolute 10/11/2019 4.79  1.70 - 7.00 10*3/mm3 Final   • Lymphocytes, Absolute 10/11/2019 1.60  0.70 - 3.10 10*3/mm3 Final   • Monocytes, Absolute 10/11/2019 0.58  0.10 - 0.90 10*3/mm3 Final   • Eosinophils, Absolute 10/11/2019 0.12  0.00 - 0.40 10*3/mm3 Final   • Basophils, Absolute 10/11/2019 0.04  0.00 - 0.20 10*3/mm3 Final   • Immature Grans, Absolute 10/11/2019 0.02  0.00 - 0.05 10*3/mm3 Final   • nRBC 10/11/2019 0.0  0.0 - 0.2 /100 WBC Final       RADIOLOGY:  No results found.         Assessment/Plan  Maikel Mathews is a 89 y.o. year old male with stage 2a crc here for follow-up complaining of weakness.    Patient Active Problem List   Diagnosis   • Anemia associated with nutritional deficiency   • Iron deficiency anemia due to chronic blood loss   • Cancer of right colon (CMS/HCC)   • Dizzinesses   • Stage 3 chronic kidney disease (CMS/HCC)   • Encounter for hydration prior to CT scan   • Low vitamin B12 level   • Iron deficiency anemia   • Cat bite   • Cough   • Low back pain   • Pharyngitis   • Spondylolisthesis, grade 1          1.Stage IIA right-sided colon adenocarcinoma:Patient currently on observation.He has  favorable risk features; it is moderately differentiated, right-sided, 0 of 15 lymph nodes uninvolved, negative for lymphovascular  invasion, negative for perineural invasion, margins are negative.  Given these variables my preference is to observe him and not give chemotherapy.  This is also recommended by NCCN guideline.  His tumor was PROSPER. The patient and his wife understands and are willing to go with observation.  Chemotherapy is not needed in his situation. I reviewed the images that were done to complete staging, CT chest negative for malignancy but had some nodules that needed to be followed in 3-6 months. There were areas of concern in the spine that was negative with a bone scan. Next colonoscopy a year from last. CT chest  /abdomen/pelvis in 6months per NCCN guideline.   --3/26/18: CT [place].    --6/28/19: CT c/a/p 6/25/19 with no evidence of disease recurrence  -labs reviewed with pt wbc 7.15, Hg 10.7, plt 243    2. Anemia: component of  Iron deficiency anemia due to GI loss plus AoCKD. s/p 8 doses of iv venofer.  -myeloma panel negative. Iron 107, %sat 41.  --Hg 10.7. Will give procrit    3.  GERD: Continue omeprazole.     4.  Chronic kidney disease stage III: Advised patient again to follow-up with nephrologist.     5. Lung nodule on CT chest: Repeat CT chest in 6months. CT ordered.   6. 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 He reports his leg weak. He defered physical therapy  -will check b12 and folate.  7. DM: on glipizide  8. B12 low 209.  On monthly shot.          Keara Mehta MD    10/11/2019    2:40 PM

## 2019-10-25 ENCOUNTER — APPOINTMENT (OUTPATIENT)
Dept: PREADMISSION TESTING | Facility: HOSPITAL | Age: 84
End: 2019-10-25

## 2019-10-25 VITALS
BODY MASS INDEX: 22.25 KG/M2 | WEIGHT: 141.76 LBS | DIASTOLIC BLOOD PRESSURE: 84 MMHG | RESPIRATION RATE: 16 BRPM | HEART RATE: 75 BPM | OXYGEN SATURATION: 97 % | HEIGHT: 67 IN | SYSTOLIC BLOOD PRESSURE: 141 MMHG

## 2019-10-25 LAB
ALBUMIN SERPL-MCNC: 4.1 G/DL (ref 3.5–5.2)
ALBUMIN/GLOB SERPL: 1.4 G/DL
ALP SERPL-CCNC: 60 U/L (ref 39–117)
ALT SERPL W P-5'-P-CCNC: 12 U/L (ref 1–41)
ANION GAP SERPL CALCULATED.3IONS-SCNC: 12 MMOL/L (ref 5–15)
AST SERPL-CCNC: 15 U/L (ref 1–40)
BASOPHILS # BLD AUTO: 0.04 10*3/MM3 (ref 0–0.2)
BASOPHILS NFR BLD AUTO: 0.6 % (ref 0–1.5)
BILIRUB SERPL-MCNC: 0.5 MG/DL (ref 0.2–1.2)
BUN BLD-MCNC: 34 MG/DL (ref 8–23)
BUN/CREAT SERPL: 20.9 (ref 7–25)
CALCIUM SPEC-SCNC: 8.6 MG/DL (ref 8.2–9.6)
CHLORIDE SERPL-SCNC: 108 MMOL/L (ref 98–107)
CO2 SERPL-SCNC: 21 MMOL/L (ref 22–29)
CREAT BLD-MCNC: 1.63 MG/DL (ref 0.76–1.27)
DEPRECATED RDW RBC AUTO: 56.4 FL (ref 37–54)
EOSINOPHIL # BLD AUTO: 0.1 10*3/MM3 (ref 0–0.4)
EOSINOPHIL NFR BLD AUTO: 1.4 % (ref 0.3–6.2)
ERYTHROCYTE [DISTWIDTH] IN BLOOD BY AUTOMATED COUNT: 15.9 % (ref 12.3–15.4)
GFR SERPL CREATININE-BSD FRML MDRD: 40 ML/MIN/1.73
GLOBULIN UR ELPH-MCNC: 2.9 GM/DL
GLUCOSE BLD-MCNC: 154 MG/DL (ref 65–99)
HCT VFR BLD AUTO: 39.3 % (ref 37.5–51)
HGB BLD-MCNC: 12.3 G/DL (ref 13–17.7)
IMM GRANULOCYTES # BLD AUTO: 0.03 10*3/MM3 (ref 0–0.05)
IMM GRANULOCYTES NFR BLD AUTO: 0.4 % (ref 0–0.5)
LYMPHOCYTES # BLD AUTO: 1.13 10*3/MM3 (ref 0.7–3.1)
LYMPHOCYTES NFR BLD AUTO: 15.8 % (ref 19.6–45.3)
MCH RBC QN AUTO: 30.9 PG (ref 26.6–33)
MCHC RBC AUTO-ENTMCNC: 31.3 G/DL (ref 31.5–35.7)
MCV RBC AUTO: 98.7 FL (ref 79–97)
MONOCYTES # BLD AUTO: 0.5 10*3/MM3 (ref 0.1–0.9)
MONOCYTES NFR BLD AUTO: 7 % (ref 5–12)
NEUTROPHILS # BLD AUTO: 5.34 10*3/MM3 (ref 1.7–7)
NEUTROPHILS NFR BLD AUTO: 74.8 % (ref 42.7–76)
NRBC BLD AUTO-RTO: 0 /100 WBC (ref 0–0.2)
PLATELET # BLD AUTO: 297 10*3/MM3 (ref 140–450)
PMV BLD AUTO: 9.6 FL (ref 6–12)
POTASSIUM BLD-SCNC: 5.2 MMOL/L (ref 3.5–5.2)
PROT SERPL-MCNC: 7 G/DL (ref 6–8.5)
RBC # BLD AUTO: 3.98 10*6/MM3 (ref 4.14–5.8)
SODIUM BLD-SCNC: 141 MMOL/L (ref 136–145)
WBC NRBC COR # BLD: 7.14 10*3/MM3 (ref 3.4–10.8)

## 2019-10-25 PROCEDURE — 85025 COMPLETE CBC W/AUTO DIFF WBC: CPT | Performed by: SPECIALIST

## 2019-10-25 PROCEDURE — 93005 ELECTROCARDIOGRAM TRACING: CPT

## 2019-10-25 PROCEDURE — 80053 COMPREHEN METABOLIC PANEL: CPT | Performed by: SPECIALIST

## 2019-10-25 PROCEDURE — 36415 COLL VENOUS BLD VENIPUNCTURE: CPT

## 2019-10-25 PROCEDURE — 93010 ELECTROCARDIOGRAM REPORT: CPT | Performed by: INTERNAL MEDICINE

## 2019-10-25 NOTE — DISCHARGE INSTRUCTIONS
DAY OF SURGERY INSTRUCTIONS        YOUR SURGEON: DR GUS MITTAL    PROCEDURE: ***OPEN BILATERAL INGUINAL HERNIA REPAIR WITH MESH    DATE OF SURGERY: ***11/01/2019    ARRIVAL TIME: AS DIRECTED BY OFFICE    YOU MAY TAKE THE FOLLOWING MEDICATION(S) THE MORNING OF SURGERY WITH A SIP OF WATER: ***DO NOT TAKE LOSARTAN MORNING OF SURGERY PER ANESTHESIA      ALL OTHER HOME MEDICATION CHECK WITH YOUR PHYSICIAN                MANAGING PAIN AFTER SURGERY    We know you are probably wondering what your pain will be like after surgery.  Following surgery it is unrealistic to expect you will not have pain.   Pain is how our bodies let us know that something is wrong or cautions us to be careful.  That said, our goal is to make your pain tolerable.    Methods we may use to treat your pain include (oral or IV medications, PCAs, epidurals, nerve blocks, etc.)   While some procedures require IV pain medications for a short time after surgery, transitioning to pain medications by mouth allows for better management of pain.   Your nurse will encourage you to take oral pain medications whenever possible.  IV medications work almost immediately, but only last a short while.  Taking medications by mouth allows for a more constant level of medication in your blood stream for a longer period of time.      Once your pain is out of control it is harder to get back under control.  It is important you are aware when your next dose of pain medication is due.  If you are admitted, your nurse may write the time of your next dose on the white board in your room to help you remember.      We are interested in your pain and encourage you to inform us about aggravating factors during your visit.   Many times a simple repositioning every few hours can make a big difference.    If your physician says it is okay, do not let your pain prevent you from getting out of bed. Be sure to call your nurse for assistance prior to getting up so you do not fall.       Before surgery, please decide your tolerable pain goal.  These faces help describe the pain ratings we use on a 0-10 scale.   Be prepared to tell us your goal and whether or not you take pain or anxiety medications at home.          BEFORE YOU COME TO THE HOSPITAL  (Pre-op instructions)  • Do not eat, drink, smoke or chew gum after midnight the night before surgery.  This also includes no mints.  • Morning of surgery take only the medicines you have been instructed with a sip of water unless otherwise instructed  by your physician.  • Do not shave, wear makeup or dark nail polish.  • Remove all jewelry including rings.  • Leave anything you consider valuable at home.  • Leave your suitcase in the car until after your surgery.  • Bring the following with you if applicable:  o Picture ID and insurance, Medicare or Medicaid cards  o Co-pay/deductible required by insurance (cash, check, credit card)  o Copy of advance directive, living will or power-of- documents if not brought to PAT  o CPAP or BIPAP mask and tubing  o Relaxation aids ( book, magazine), etc.  o Hearing aids                        ON THE DAY OF SURGERY  · On the day of surgery check in at registration located at the main entrance of the hospital.   ? You will be registered and given a beeper with instructions where to wait in the main lobby.  ? When your beeper lights up and vibrates a member of the Outpatient Surgery staff will meet you at the double doors under the stair steps and escort you to your preoperative room.   · You may have cloth compression devices placed on your legs. These help to prevent blood clots and reduce swelling in your legs.  · An IV may be inserted into one of your veins.  · In the operating room, you may be given one or more of the following:  ? A medicine to help you relax (sedative).  ? A medicine to numb the area (local anesthetic).  ? A medicine to make you fall asleep (general anesthetic).  ? A medicine that  "is injected into an area of your body to numb everything below the injection site (regional anesthetic).  · Your surgical site will be marked or identified.  · You may be given an antibiotic through your IV to help prevent infection.  Contact a health care provider if you:  · Develop a fever of more than 100.4°F (38°C) or other feelings of illness during the 48 hours before your surgery.  · Have symptoms that get worse.  Have questions or concerns about your surgery    General Anesthesia/Surgery, Adult  General anesthesia is the use of medicines to make a person \"go to sleep\" (unconscious) for a medical procedure. General anesthesia must be used for certain procedures, and is often recommended for procedures that:  · Last a long time.  · Require you to be still or in an unusual position.  · Are major and can cause blood loss.  The medicines used for general anesthesia are called general anesthetics. As well as making you unconscious for a certain amount of time, these medicines:  · Prevent pain.  · Control your blood pressure.  · Relax your muscles.  Tell a health care provider about:  · Any allergies you have.  · All medicines you are taking, including vitamins, herbs, eye drops, creams, and over-the-counter medicines.  · Any problems you or family members have had with anesthetic medicines.  · Types of anesthetics you have had in the past.  · Any blood disorders you have.  · Any surgeries you have had.  · Any medical conditions you have.  · Any recent upper respiratory, chest, or ear infections.  · Any history of:  ? Heart or lung conditions, such as heart failure, sleep apnea, asthma, or chronic obstructive pulmonary disease (COPD).  ?  service.  ? Depression or anxiety.  · Any tobacco or drug use, including marijuana or alcohol use.  · Whether you are pregnant or may be pregnant.  What are the risks?  Generally, this is a safe procedure. However, problems may occur, including:  · Allergic " reaction.  · Lung and heart problems.  · Inhaling food or liquid from the stomach into the lungs (aspiration).  · Nerve injury.  · Air in the bloodstream, which can lead to stroke.  · Extreme agitation or confusion (delirium) when you wake up from the anesthetic.  · Waking up during your procedure and being unable to move. This is rare.  These problems are more likely to develop if you are having a major surgery or if you have an advanced or serious medical condition. You can prevent some of these complications by answering all of your health care provider's questions thoroughly and by following all instructions before your procedure.  General anesthesia can cause side effects, including:  · Nausea or vomiting.  · A sore throat from the breathing tube.  · Hoarseness.  · Wheezing or coughing.  · Shaking chills.  · Tiredness.  · Body aches.  · Anxiety.  · Sleepiness or drowsiness.  · Confusion or agitation.  RISKS AND COMPLICATIONS OF SURGERY  Your health care provider will discuss possible risks and complications with you before surgery. Common risks and complications include:    · Problems due to the use of anesthetics.  · Blood loss and replacement (does not apply to minor surgical procedures).  · Temporary increase in pain due to surgery.  · Uncorrected pain or problems that the surgery was meant to correct.  · Infection.  · New damage.    What happens before the procedure?    Medicines  Ask your health care provider about:  · Changing or stopping your regular medicines. This is especially important if you are taking diabetes medicines or blood thinners.  · Taking medicines such as aspirin and ibuprofen. These medicines can thin your blood. Do not take these medicines unless your health care provider tells you to take them.  · Taking over-the-counter medicines, vitamins, herbs, and supplements. Do not take these during the week before your procedure unless your health care provider approves them.  General  instructions  · Starting 3-6 weeks before the procedure, do not use any products that contain nicotine or tobacco, such as cigarettes and e-cigarettes. If you need help quitting, ask your health care provider.  · If you brush your teeth on the morning of the procedure, make sure to spit out all of the toothpaste.  · Tell your health care provider if you become ill or develop a cold, cough, or fever.  · If instructed by your health care provider, bring your sleep apnea device with you on the day of your surgery (if applicable).  · Ask your health care provider if you will be going home the same day, the following day, or after a longer hospital stay.  ? Plan to have someone take you home from the hospital or clinic.  ? Plan to have a responsible adult care for you for at least 24 hours after you leave the hospital or clinic. This is important.  What happens during the procedure?  · You will be given anesthetics through both of the following:  ? A mask placed over your nose and mouth.  ? An IV in one of your veins.  · You may receive a medicine to help you relax (sedative).  · After you are unconscious, a breathing tube may be inserted down your throat to help you breathe. This will be removed before you wake up.  · An anesthesia specialist will stay with you throughout your procedure. He or she will:  ? Keep you comfortable and safe by continuing to give you medicines and adjusting the amount of medicine that you get.  ? Monitor your blood pressure, pulse, and oxygen levels to make sure that the anesthetics do not cause any problems.  The procedure may vary among health care providers and hospitals.  What happens after the procedure?  · Your blood pressure, temperature, heart rate, breathing rate, and blood oxygen level will be monitored until the medicines you were given have worn off.  · You will wake up in a recovery area. You may wake up slowly.  · If you feel anxious or agitated, you may be given medicine to  help you calm down.  · If you will be going home the same day, your health care provider may check to make sure you can walk, drink, and urinate.  · Your health care provider will treat any pain or side effects you have before you go home.  · Do not drive for 24 hours if you were given a sedative.  Summary  · General anesthesia is used to keep you still and prevent pain during a procedure.  · It is important to tell your healthcare provider about your medical history and any surgeries you have had, and previous experience with anesthesia.  · Follow your healthcare provider’s instructions about when to stop eating, drinking, or taking certain medicines before your procedure.  · Plan to have someone take you home from the hospital or clinic.  This information is not intended to replace advice given to you by your health care provider. Make sure you discuss any questions you have with your health care provider.  Document Released: 03/26/2009 Document Revised: 08/03/2018 Document Reviewed: 08/03/2018  [employer] Interactive Patient Education © 2019 [employer]       Fall Prevention in Hospitals, Adult  As a hospital patient, your condition and the treatments you receive can increase your risk for falls. Some additional risk factors for falls in a hospital include:  · Being in an unfamiliar environment.  · Being on bed rest.  · Your surgery.  · Taking certain medicines.  · Your tubing requirements, such as intravenous (IV) therapy or catheters.  It is important that you learn how to decrease fall risks while at the hospital. Below are important tips that can help prevent falls.  SAFETY TIPS FOR PREVENTING FALLS  Talk about your risk of falling.  · Ask your health care provider why you are at risk for falling. Is it your medicine, illness, tubing placement, or something else?  · Make a plan with your health care provider to keep you safe from falls.  · Ask your health care provider or pharmacist about side effects of your  medicines. Some medicines can make you dizzy or affect your coordination.  Ask for help.  · Ask for help before getting out of bed. You may need to press your call button.  · Ask for assistance in getting safely to the toilet.  · Ask for a walker or cane to be put at your bedside. Ask that most of the side rails on your bed be placed up before your health care provider leaves the room.  · Ask family or friends to sit with you.  · Ask for things that are out of your reach, such as your glasses, hearing aids, telephone, bedside table, or call button.  Follow these tips to avoid falling:  · Stay lying or seated, rather than standing, while waiting for help.  · Wear rubber-soled slippers or shoes whenever you walk in the hospital.  · Avoid quick, sudden movements.  ¨ Change positions slowly.  ¨ Sit on the side of your bed before standing.  ¨ Stand up slowly and wait before you start to walk.  · Let your health care provider know if there is a spill on the floor.  · Pay careful attention to the medical equipment, electrical cords, and tubes around you.  · When you need help, use your call button by your bed or in the bathroom. Wait for one of your health care providers to help you.  · If you feel dizzy or unsure of your footing, return to bed and wait for assistance.  · Avoid being distracted by the TV, telephone, or another person in your room.  · Do not lean or support yourself on rolling objects, such as IV poles or bedside tables.     This information is not intended to replace advice given to you by your health care provider. Make sure you discuss any questions you have with your health care provider.     Document Released: 12/15/2001 Document Revised: 01/08/2016 Document Reviewed: 08/25/2013  [employer] Interactive Patient Education ©2016 [employer]       Surgical Site Infections FAQs  What is a Surgical Site Infection (SSI)?  A surgical site infection is an infection that occurs after surgery in the part of the  body where the surgery took place. Most patients who have surgery do not develop an infection. However, infections develop in about 1 to 3 out of every 100 patients who have surgery.  Some of the common symptoms of a surgical site infection are:  · Redness and pain around the area where you had surgery  · Drainage of cloudy fluid from your surgical wound  · Fever  Can SSIs be treated?  Yes. Most surgical site infections can be treated with antibiotics. The antibiotic given to you depends on the bacteria (germs) causing the infection. Sometimes patients with SSIs also need another surgery to treat the infection.  What are some of the things that hospitals are doing to prevent SSIs?  To prevent SSIs, doctors, nurses, and other healthcare providers:  · Clean their hands and arms up to their elbows with an antiseptic agent just before the surgery.  · Clean their hands with soap and water or an alcohol-based hand rub before and after caring for each patient.  · May remove some of your hair immediately before your surgery using electric clippers if the hair is in the same area where the procedure will occur. They should not shave you with a razor.  · Wear special hair covers, masks, gowns, and gloves during surgery to keep the surgery area clean.  · Give you antibiotics before your surgery starts. In most cases, you should get antibiotics within 60 minutes before the surgery starts and the antibiotics should be stopped within 24 hours after surgery.  · Clean the skin at the site of your surgery with a special soap that kills germs.  What can I do to help prevent SSIs?  Before your surgery:  · Tell your doctor about other medical problems you may have. Health problems such as allergies, diabetes, and obesity could affect your surgery and your treatment.  · Quit smoking. Patients who smoke get more infections. Talk to your doctor about how you can quit before your surgery.  · Do not shave near where you will have surgery.  Shaving with a razor can irritate your skin and make it easier to develop an infection.  At the time of your surgery:  · Speak up if someone tries to shave you with a razor before surgery. Ask why you need to be shaved and talk with your surgeon if you have any concerns.  · Ask if you will get antibiotics before surgery.  After your surgery:  · Make sure that your healthcare providers clean their hands before examining you, either with soap and water or an alcohol-based hand rub.  · If you do not see your providers clean their hands, please ask them to do so.  · Family and friends who visit you should not touch the surgical wound or dressings.  · Family and friends should clean their hands with soap and water or an alcohol-based hand rub before and after visiting you. If you do not see them clean their hands, ask them to clean their hands.  What do I need to do when I go home from the hospital?  · Before you go home, your doctor or nurse should explain everything you need to know about taking care of your wound. Make sure you understand how to care for your wound before you leave the hospital.  · Always clean your hands before and after caring for your wound.  · Before you go home, make sure you know who to contact if you have questions or problems after you get home.  · If you have any symptoms of an infection, such as redness and pain at the surgery site, drainage, or fever, call your doctor immediately.  If you have additional questions, please ask your doctor or nurse.  Developed and co-sponsored by The Society for Healthcare Epidemiology of [place] (SHEA); Infectious Diseases Society of [place] (IDSA); American Hospital Association; Association for Professionals in Infection Control and Epidemiology (APIC); Centers for Disease Control and Prevention (CDC); and The Joint Commission.     This information is not intended to replace advice given to you by your health care provider. Make sure you discuss any  questions you have with your health care provider.     Document Released: 12/23/2014 Document Revised: 01/08/2016 Document Reviewed: 03/02/2016  [employer] Interactive Patient Education ©2016 [employer]           [facility]  CHG 4% Patient Instruction Sheet    Chlorhexidine Before Surgery  Chlorhexidine gluconate (CHG) is a germ-killing (antiseptic) solution that is used to clean the skin. It gets rid of the bacteria that normally live on the skin. Cleaning your skin with CHG before surgery helps lower the risk for infection after surgery.    How to use CHG solution  · You will take 2 showers, one shower the night before surgery, the second shower the morning of surgery before coming to the hospital.  · Use CHG only as told by your health care provider, and follow the instructions on the label.  · Use CHG solution while taking a shower. Follow these steps when using CHG solution (unless your health care provider gives you different instructions):  1. Start the shower.  2. Use your normal soap and shampoo to wash your face and hair.  3. Turn off the shower or move out of the shower stream.  4. Pour the CHG onto a clean washcloth. Do not use any type of brush or rough-edged sponge.  5. Starting at your neck, lather your body down to your toes. Make sure you:  6. Pay special attention to the part of your body where you will be having surgery. Scrub this area for at least 1 minute.  7. Use the full amount of CHG as directed. Usually, this is one half bottle for each shower.  8. Do not use CHG on your head or face. If the solution gets into your ears or eyes, rinse them well with water.  9. Avoid your genital area.  10. Avoid any areas of skin that have broken skin, cuts, or scrapes.  11. Scrub your back and under your arms. Make sure to wash skin folds.  12. Let the lather sit on your skin for 1-2 minutes or as long as told by your health care  provider.  13. Thoroughly rinse your entire body in the shower.  Make sure that all body creases and crevices are rinsed well.  14. Dry off with a clean towel. Do not put any substances on your body afterward, such as powder, lotion, or perfume.  15. Put on clean clothes or pajamas.  16. If it is the night before your surgery, sleep in clean sheets.    What are the risks?  Risks of using CHG include:  · A skin reaction.  · Hearing loss, if CHG gets in your ears.  · Eye injury, if CHG gets in your eyes and is not rinsed out.  · The CHG product catching fire.  Make sure that you avoid smoking and flames after applying CHG to your skin.  Do not use CHG:  · If you have a chlorhexidine allergy or have previously reacted to chlorhexidine.  · On babies younger than 2 months of age.      On the day of surgery, when you are taken to your room in Outpatient Surgery you will be given a CHG prepackaged cloth to wipe the site for your surgery.  How to use CHG prepackaged cloths  · Follow the instructions on the label.  · Use the CHG cloth on clean, dry skin. Follow these steps when using a CHG cloth (unless your health care provider gives you different instructions):  1. Using the CHG cloth, vigorously scrub the part of your body where you will be having surgery. Scrub using a back-and-forth motion for 3 minutes. The area on your body should be completely wet with CHG when you are finished scrubbing.  2. Do not rinse. Discard the cloth and let the area air-dry for 1 minute. Do not put any substances on your body afterward, such as powder, lotion, or perfume.  Contact a health care provider if:  · Your skin gets irritated after scrubbing.  · You have questions about using your solution or cloth.  Get help right away if:  · Your eyes become very red or swollen.  · Your eyes itch badly.  · Your skin itches badly and is red or swollen.  · Your hearing changes.  · You have trouble seeing.  · You have swelling or tingling in your mouth or throat.  · You have trouble breathing.  · You swallow any  chlorhexidine.  Summary  · Chlorhexidine gluconate (CHG) is a germ-killing (antiseptic) solution that is used to clean the skin. Cleaning your skin with CHG before surgery helps lower the risk for infection after surgery.  · You may be given CHG to use at home. It may be in a bottle or in a prepackaged cloth to use on your skin. Carefully follow your health care provider's instructions and the instructions on the product label.  · Do not use CHG if you have a chlorhexidine allergy.  · Contact your health care provider if your skin gets irritated after scrubbing.  This information is not intended to replace advice given to you by your health care provider. Make sure you discuss any questions you have with your health care provider.  Document Released: 09/11/2013 Document Revised: 11/15/2018 Document Reviewed: 11/15/2018  [employer] Interactive Patient Education © 2019 [employer]          PATIENT/FAMILY/RESPONSIBLE PARTY VERBALIZES UNDERSTANDING OF ABOVE EDUCATION.  COPY OF PAIN SCALE GIVEN AND REVIEWED WITH VERBALIZED UNDERSTANDING.

## 2019-11-01 ENCOUNTER — ANESTHESIA EVENT (OUTPATIENT)
Dept: PERIOP | Facility: HOSPITAL | Age: 84
End: 2019-11-01

## 2019-11-01 ENCOUNTER — HOSPITAL ENCOUNTER (OUTPATIENT)
Facility: HOSPITAL | Age: 84
Discharge: HOME | End: 2019-11-02
Attending: SPECIALIST | Admitting: SPECIALIST

## 2019-11-01 ENCOUNTER — ANESTHESIA (OUTPATIENT)
Dept: PERIOP | Facility: HOSPITAL | Age: 84
End: 2019-11-01

## 2019-11-01 PROBLEM — K40.20 BILATERAL INGUINAL HERNIA: Status: ACTIVE | Noted: 2019-11-01

## 2019-11-01 LAB
GLUCOSE BLDC GLUCOMTR-MCNC: 102 MG/DL (ref 70–130)
GLUCOSE BLDC GLUCOMTR-MCNC: 110 MG/DL (ref 70–130)

## 2019-11-01 PROCEDURE — 25010000002 PROPOFOL 10 MG/ML EMULSION: Performed by: NURSE ANESTHETIST, CERTIFIED REGISTERED

## 2019-11-01 PROCEDURE — 25010000002 FENTANYL CITRATE (PF) 100 MCG/2ML SOLUTION: Performed by: NURSE ANESTHETIST, CERTIFIED REGISTERED

## 2019-11-01 PROCEDURE — 25010000002 ONDANSETRON PER 1 MG: Performed by: NURSE ANESTHETIST, CERTIFIED REGISTERED

## 2019-11-01 PROCEDURE — G0378 HOSPITAL OBSERVATION PER HR: HCPCS

## 2019-11-01 PROCEDURE — 63710000001 OXYCODONE-ACETAMINOPHEN 7.5-325 MG TABLET: Performed by: ANESTHESIOLOGY

## 2019-11-01 PROCEDURE — A9270 NON-COVERED ITEM OR SERVICE: HCPCS | Performed by: ANESTHESIOLOGY

## 2019-11-01 PROCEDURE — C1781 MESH (IMPLANTABLE): HCPCS | Performed by: SPECIALIST

## 2019-11-01 PROCEDURE — 63710000001 LOSARTAN 50 MG TABLET: Performed by: SPECIALIST

## 2019-11-01 PROCEDURE — 63710000001 AMLODIPINE 10 MG TABLET: Performed by: SPECIALIST

## 2019-11-01 PROCEDURE — A9270 NON-COVERED ITEM OR SERVICE: HCPCS | Performed by: SPECIALIST

## 2019-11-01 PROCEDURE — 25010000002 FENTANYL CITRATE (PF) 100 MCG/2ML SOLUTION: Performed by: ANESTHESIOLOGY

## 2019-11-01 PROCEDURE — 25010000002 ROPIVACAINE PER 1 MG: Performed by: ANESTHESIOLOGY

## 2019-11-01 PROCEDURE — 85027 COMPLETE CBC AUTOMATED: CPT | Performed by: SPECIALIST

## 2019-11-01 PROCEDURE — 82962 GLUCOSE BLOOD TEST: CPT

## 2019-11-01 DEVICE — BARD MESH
Type: IMPLANTABLE DEVICE | Site: INGUINAL | Status: FUNCTIONAL
Brand: BARD MESH

## 2019-11-01 RX ORDER — SODIUM CHLORIDE, SODIUM LACTATE, POTASSIUM CHLORIDE, CALCIUM CHLORIDE 600; 310; 30; 20 MG/100ML; MG/100ML; MG/100ML; MG/100ML
100 INJECTION, SOLUTION INTRAVENOUS CONTINUOUS
Status: DISCONTINUED | OUTPATIENT
Start: 2019-11-01 | End: 2019-11-02 | Stop reason: HOSPADM

## 2019-11-01 RX ORDER — PROPOFOL 10 MG/ML
VIAL (ML) INTRAVENOUS AS NEEDED
Status: DISCONTINUED | OUTPATIENT
Start: 2019-11-01 | End: 2019-11-01 | Stop reason: SURG

## 2019-11-01 RX ORDER — IPRATROPIUM BROMIDE AND ALBUTEROL SULFATE 2.5; .5 MG/3ML; MG/3ML
3 SOLUTION RESPIRATORY (INHALATION) ONCE AS NEEDED
Status: DISCONTINUED | OUTPATIENT
Start: 2019-11-01 | End: 2019-11-01 | Stop reason: HOSPADM

## 2019-11-01 RX ORDER — EPHEDRINE SULFATE 50 MG/ML
INJECTION INTRAVENOUS AS NEEDED
Status: DISCONTINUED | OUTPATIENT
Start: 2019-11-01 | End: 2019-11-01 | Stop reason: SURG

## 2019-11-01 RX ORDER — METOCLOPRAMIDE HYDROCHLORIDE 5 MG/ML
5 INJECTION INTRAMUSCULAR; INTRAVENOUS
Status: DISCONTINUED | OUTPATIENT
Start: 2019-11-01 | End: 2019-11-01 | Stop reason: HOSPADM

## 2019-11-01 RX ORDER — NALOXONE HCL 0.4 MG/ML
0.4 VIAL (ML) INJECTION AS NEEDED
Status: DISCONTINUED | OUTPATIENT
Start: 2019-11-01 | End: 2019-11-01 | Stop reason: HOSPADM

## 2019-11-01 RX ORDER — OXYCODONE AND ACETAMINOPHEN 10; 325 MG/1; MG/1
1 TABLET ORAL ONCE AS NEEDED
Status: DISCONTINUED | OUTPATIENT
Start: 2019-11-01 | End: 2019-11-01 | Stop reason: HOSPADM

## 2019-11-01 RX ORDER — SODIUM CHLORIDE, SODIUM LACTATE, POTASSIUM CHLORIDE, CALCIUM CHLORIDE 600; 310; 30; 20 MG/100ML; MG/100ML; MG/100ML; MG/100ML
1000 INJECTION, SOLUTION INTRAVENOUS CONTINUOUS
Status: DISCONTINUED | OUTPATIENT
Start: 2019-11-01 | End: 2019-11-02 | Stop reason: HOSPADM

## 2019-11-01 RX ORDER — FENTANYL CITRATE 50 UG/ML
INJECTION, SOLUTION INTRAMUSCULAR; INTRAVENOUS AS NEEDED
Status: DISCONTINUED | OUTPATIENT
Start: 2019-11-01 | End: 2019-11-01 | Stop reason: SURG

## 2019-11-01 RX ORDER — ACETAMINOPHEN 500 MG
1000 TABLET ORAL ONCE
Status: COMPLETED | OUTPATIENT
Start: 2019-11-01 | End: 2019-11-01

## 2019-11-01 RX ORDER — SODIUM CHLORIDE 0.9 % (FLUSH) 0.9 %
3-10 SYRINGE (ML) INJECTION AS NEEDED
Status: DISCONTINUED | OUTPATIENT
Start: 2019-11-01 | End: 2019-11-01 | Stop reason: HOSPADM

## 2019-11-01 RX ORDER — IBUPROFEN 600 MG/1
600 TABLET ORAL ONCE AS NEEDED
Status: DISCONTINUED | OUTPATIENT
Start: 2019-11-01 | End: 2019-11-01 | Stop reason: HOSPADM

## 2019-11-01 RX ORDER — GLIPIZIDE 5 MG/1
2.5 TABLET ORAL
Status: DISCONTINUED | OUTPATIENT
Start: 2019-11-01 | End: 2019-11-02 | Stop reason: HOSPADM

## 2019-11-01 RX ORDER — HYDROCODONE BITARTRATE AND ACETAMINOPHEN 7.5; 325 MG/1; MG/1
1-2 TABLET ORAL EVERY 4 HOURS PRN
Qty: 30 TABLET | Refills: 0 | Status: SHIPPED | OUTPATIENT
Start: 2019-11-01

## 2019-11-01 RX ORDER — SODIUM CHLORIDE 0.9 % (FLUSH) 0.9 %
3 SYRINGE (ML) INJECTION EVERY 12 HOURS SCHEDULED
Status: DISCONTINUED | OUTPATIENT
Start: 2019-11-01 | End: 2019-11-01 | Stop reason: HOSPADM

## 2019-11-01 RX ORDER — MIDAZOLAM HYDROCHLORIDE 1 MG/ML
1 INJECTION INTRAMUSCULAR; INTRAVENOUS
Status: DISCONTINUED | OUTPATIENT
Start: 2019-11-01 | End: 2019-11-01 | Stop reason: HOSPADM

## 2019-11-01 RX ORDER — LOSARTAN POTASSIUM 50 MG/1
100 TABLET ORAL DAILY
Status: DISCONTINUED | OUTPATIENT
Start: 2019-11-01 | End: 2019-11-02 | Stop reason: HOSPADM

## 2019-11-01 RX ORDER — FENTANYL CITRATE 50 UG/ML
100 INJECTION, SOLUTION INTRAMUSCULAR; INTRAVENOUS ONCE
Status: COMPLETED | OUTPATIENT
Start: 2019-11-01 | End: 2019-11-01

## 2019-11-01 RX ORDER — HYDROCODONE BITARTRATE AND ACETAMINOPHEN 7.5; 325 MG/1; MG/1
1 TABLET ORAL EVERY 4 HOURS PRN
Status: DISCONTINUED | OUTPATIENT
Start: 2019-11-01 | End: 2019-11-02 | Stop reason: HOSPADM

## 2019-11-01 RX ORDER — MIDAZOLAM HYDROCHLORIDE 1 MG/ML
2 INJECTION INTRAMUSCULAR; INTRAVENOUS
Status: DISCONTINUED | OUTPATIENT
Start: 2019-11-01 | End: 2019-11-01 | Stop reason: HOSPADM

## 2019-11-01 RX ORDER — FENTANYL CITRATE 50 UG/ML
25 INJECTION, SOLUTION INTRAMUSCULAR; INTRAVENOUS AS NEEDED
Status: DISCONTINUED | OUTPATIENT
Start: 2019-11-01 | End: 2019-11-01 | Stop reason: HOSPADM

## 2019-11-01 RX ORDER — SODIUM CHLORIDE 0.9 % (FLUSH) 0.9 %
3 SYRINGE (ML) INJECTION AS NEEDED
Status: DISCONTINUED | OUTPATIENT
Start: 2019-11-01 | End: 2019-11-01 | Stop reason: HOSPADM

## 2019-11-01 RX ORDER — ROCURONIUM BROMIDE 10 MG/ML
INJECTION, SOLUTION INTRAVENOUS AS NEEDED
Status: DISCONTINUED | OUTPATIENT
Start: 2019-11-01 | End: 2019-11-01 | Stop reason: SURG

## 2019-11-01 RX ORDER — ONDANSETRON 2 MG/ML
INJECTION INTRAMUSCULAR; INTRAVENOUS AS NEEDED
Status: DISCONTINUED | OUTPATIENT
Start: 2019-11-01 | End: 2019-11-01 | Stop reason: SURG

## 2019-11-01 RX ORDER — AMLODIPINE BESYLATE 10 MG/1
10 TABLET ORAL DAILY
Status: DISCONTINUED | OUTPATIENT
Start: 2019-11-01 | End: 2019-11-02 | Stop reason: HOSPADM

## 2019-11-01 RX ORDER — ONDANSETRON 2 MG/ML
4 INJECTION INTRAMUSCULAR; INTRAVENOUS ONCE AS NEEDED
Status: DISCONTINUED | OUTPATIENT
Start: 2019-11-01 | End: 2019-11-01 | Stop reason: HOSPADM

## 2019-11-01 RX ORDER — LABETALOL HYDROCHLORIDE 5 MG/ML
5 INJECTION, SOLUTION INTRAVENOUS
Status: DISCONTINUED | OUTPATIENT
Start: 2019-11-01 | End: 2019-11-01 | Stop reason: HOSPADM

## 2019-11-01 RX ORDER — OXYCODONE AND ACETAMINOPHEN 7.5; 325 MG/1; MG/1
2 TABLET ORAL EVERY 4 HOURS PRN
Status: DISCONTINUED | OUTPATIENT
Start: 2019-11-01 | End: 2019-11-01 | Stop reason: HOSPADM

## 2019-11-01 RX ORDER — ROPIVACAINE HYDROCHLORIDE 2 MG/ML
INJECTION, SOLUTION EPIDURAL; INFILTRATION; PERINEURAL
Status: COMPLETED | OUTPATIENT
Start: 2019-11-01 | End: 2019-11-01

## 2019-11-01 RX ADMIN — ROCURONIUM BROMIDE 20 MG: 10 INJECTION INTRAVENOUS at 11:56

## 2019-11-01 RX ADMIN — FENTANYL CITRATE 50 MCG: 50 INJECTION, SOLUTION INTRAMUSCULAR; INTRAVENOUS at 12:08

## 2019-11-01 RX ADMIN — ONDANSETRON HYDROCHLORIDE 4 MG: 2 SOLUTION INTRAMUSCULAR; INTRAVENOUS at 13:10

## 2019-11-01 RX ADMIN — FENTANYL CITRATE 50 MCG: 50 INJECTION, SOLUTION INTRAMUSCULAR; INTRAVENOUS at 11:55

## 2019-11-01 RX ADMIN — FENTANYL CITRATE 25 MCG: 50 INJECTION, SOLUTION INTRAMUSCULAR; INTRAVENOUS at 10:28

## 2019-11-01 RX ADMIN — SODIUM CHLORIDE, POTASSIUM CHLORIDE, SODIUM LACTATE AND CALCIUM CHLORIDE 1000 ML: 600; 310; 30; 20 INJECTION, SOLUTION INTRAVENOUS at 09:10

## 2019-11-01 RX ADMIN — FENTANYL CITRATE 25 MCG: 50 INJECTION, SOLUTION INTRAMUSCULAR; INTRAVENOUS at 13:38

## 2019-11-01 RX ADMIN — SODIUM CHLORIDE, POTASSIUM CHLORIDE, SODIUM LACTATE AND CALCIUM CHLORIDE 1000 ML: 600; 310; 30; 20 INJECTION, SOLUTION INTRAVENOUS at 14:26

## 2019-11-01 RX ADMIN — AMLODIPINE BESYLATE 10 MG: 10 TABLET ORAL at 15:54

## 2019-11-01 RX ADMIN — EPHEDRINE SULFATE 15 MG: 50 INJECTION INTRAVENOUS at 12:15

## 2019-11-01 RX ADMIN — PROPOFOL 60 MG: 10 INJECTION, EMULSION INTRAVENOUS at 11:56

## 2019-11-01 RX ADMIN — ACETAMINOPHEN 1000 MG: 500 TABLET, FILM COATED ORAL at 10:28

## 2019-11-01 RX ADMIN — ROPIVACAINE HYDROCHLORIDE 80 ML: 2 INJECTION, SOLUTION EPIDURAL; INFILTRATION at 10:46

## 2019-11-01 RX ADMIN — LOSARTAN POTASSIUM 100 MG: 50 TABLET, FILM COATED ORAL at 15:54

## 2019-11-01 RX ADMIN — EPHEDRINE SULFATE 10 MG: 50 INJECTION INTRAVENOUS at 12:50

## 2019-11-01 RX ADMIN — FENTANYL CITRATE 25 MCG: 50 INJECTION, SOLUTION INTRAMUSCULAR; INTRAVENOUS at 13:40

## 2019-11-01 RX ADMIN — OXYCODONE HYDROCHLORIDE AND ACETAMINOPHEN 2 TABLET: 7.5; 325 TABLET ORAL at 13:44

## 2019-11-01 NOTE — ANESTHESIA POSTPROCEDURE EVALUATION
Patient: Maikel Mathews    Procedure Summary     Date:  11/01/19 Room / Location:  [facility] OR  /  [facility] OR    Anesthesia Start:  1153 Anesthesia Stop:  1328    Procedure:  OPEN BILATERAL INGUINAL HERNIA REPAIR WITH MESH (Bilateral Groin) Diagnosis:  (BILATERAL INGUINAL HERNIA)    Surgeon:  Caroline Loomis MD Provider:  Adolfo Cao CRNA    Anesthesia Type:  general with block ASA Status:  2          Anesthesia Type: general with block  Last vitals  BP   156/73 (11/01/19 1425)   Temp   97.8 °F (36.6 °C) (11/01/19 1425)   Pulse   85 (11/01/19 1425)   Resp   14 (11/01/19 1425)     SpO2   98 % (11/01/19 1425)     Post Anesthesia Care and Evaluation    Patient location during evaluation: PACU  Patient participation: complete - patient participated  Level of consciousness: awake and alert  Pain management: adequate  Airway patency: patent  Anesthetic complications: No anesthetic complications    Cardiovascular status: acceptable  Respiratory status: acceptable  Hydration status: acceptable    Comments: Blood pressure 156/73, pulse 85, temperature 97.8 °F (36.6 °C), temperature source Temporal, resp. rate 14, SpO2 98 %.    Pt discharged from PACU based on ngoc score >8

## 2019-11-01 NOTE — ANESTHESIA PROCEDURE NOTES
Peripheral Block    Pre-sedation assessment completed: 11/1/2019 10:29 AM    Patient reassessed immediately prior to procedure    Patient location during procedure: holding area  Start time: 11/1/2019 10:38 AM  Stop time: 11/1/2019 10:46 AM  Reason for block: procedure for pain, at surgeon's request, post-op pain management and Requested by Dr. Loomis  Performed by  Anesthesiologist: Vernon Griffith MD  Preanesthetic Checklist  Completed: patient identified, site marked, surgical consent, pre-op evaluation, timeout performed, IV checked, risks and benefits discussed and monitors and equipment checked  Prep:  Pt Position: supine  Sterile barriers:gloves  Prep: ChloraPrep  Patient monitoring: blood pressure monitoring, continuous pulse oximetry and EKG  Procedure  Sedation:yes  Performed under: MAC  Guidance:ultrasound guided and Fascial plane identified and local anesthetic seen dissecting between IOM and transvers abdominus  ULTRASOUND INTERPRETATION. Using ultrasound guidance a 20 G gauge needle was placed in close proximity to the nerve, at which point, under ultrasound guidance anesthetic was injected in the area of the nerve and spread of the anesthesia was seen on ultrasound in close proximity thereto.  There were no abnormalities seen on ultrasound; a digital image was taken; and the patient tolerated the procedure with no complications. Images:still images obtained (picture printed and placed in patients chart)    Laterality:Bilateral  Block Type:ilioinguinal  Injection Technique:single-shot  Needle Type:echogenic  Needle Gauge:20 G  Resistance on Injection: none    Medications Used: ropivacaine (NAROPIN) 0.2% injection, 80 mL  Med admintered at 11/1/2019 10:46 AM      Post Assessment  Injection Assessment: negative aspiration for heme, no paresthesia on injection and incremental injection  Patient Tolerance:comfortable throughout block  Complications:no

## 2019-11-01 NOTE — OP NOTE
INGUINAL HERNIA REPAIR BILATERAL  Procedure Note    Maikel Mathwes  11/1/2019    Pre-op Diagnosis:   BILATERAL INGUINAL HERNIA    Post-op Diagnosis:     same    Procedure/CPT® Codes:      Procedure(s):  OPEN BILATERAL INGUINAL HERNIA REPAIR WITH MESH    Surgeon(s):  Caroline Loomis MD    Anesthesia: General    Staff:   Circulator: Codi Acevedo RN  Scrub Person: Jonathan Samano; Stephanie Galindo; Mraely Redding    Estimated Blood Loss: minimal    Specimens:                None      Drains:      Findings: Bilateral indirect hernias with significant weakening of the floors    Complications: none          Date: 11/1/2019  Time: 1:29 PM  The patient was brought to the operating room and placed in the supine position. After the induction of general anesthesia and infusion of IV antibiotics, the patient was prepped and draped in the usual sterile fashion.  Both groins were treated in a similar manner.  We began on the right side.  An oblique incision was made in the  groin. Subcutaneous tissue was dissected with electrocautery down to the external oblique.  It was opened up in the direction of its fibers, opening up the external ring. The cord was dissected free and isolated with a penrose drain.  The cord was skeletonized and the hernia was reduced.  We then imbricated the floor with interrupted 2-0 Prolene. The internal ring was tightened. We then used a 2 x 4 inch piece of mesh, fishtailed it around the cord, and sutured it in place with interrupted 0 Prolene. Irrigated with antibiotic solution. We then closed the external oblique with running 2-0 Vicryl and then 2-0, 3-0 and 4-0 Vicryl sutures in the skin. Dressing was placed. The patient was awakened and transferred to the recovery room in stable condition. He tolerated the procedure well. At the end of the procedure, all counts were correct.       Caroline Loomis MD

## 2019-11-01 NOTE — PLAN OF CARE
Problem: Patient Care Overview  Goal: Plan of Care Review  Outcome: Ongoing (interventions implemented as appropriate)   11/01/19 1511   Coping/Psychosocial   Plan of Care Reviewed With patient   Plan of Care Review   Progress no change   Pt denies pain. He is due to void. Dressings are dry and intact. Pt is already ready to go home.   Goal: Individualization and Mutuality  Outcome: Ongoing (interventions implemented as appropriate)   11/01/19 1511   Individualization   Patient Specific Interventions Ready to go home     Goal: Discharge Needs Assessment  Outcome: Ongoing (interventions implemented as appropriate)   11/01/19 1511   Discharge Needs Assessment   Readmission Within the Last 30 Days no previous admission in last 30 days   Concerns to be Addressed no discharge needs identified   Transportation Concerns car, none   Anticipated Changes Related to Illness none   Equipment Needed After Discharge none       Problem: Surgery Nonspecified (Adult)  Goal: Signs and Symptoms of Listed Potential Problems Will be Absent, Minimized or Managed (Surgery Nonspecified)  Outcome: Ongoing (interventions implemented as appropriate)   11/01/19 1511   Goal/Outcome Evaluation   Problems Assessed (Surgery) all   Problems Present (Surgery) pain;situational response     Goal: Anesthesia/Sedation Recovery  Outcome: Outcome(s) achieved Date Met: 11/01/19 11/01/19 1511   Goal/Outcome Evaluation   Anesthesia/Sedation Recovery criteria met for discharge

## 2019-11-01 NOTE — ANESTHESIA PREPROCEDURE EVALUATION
Anesthesia Evaluation     Patient summary reviewed   no history of anesthetic complications:  NPO Solid Status: > 8 hours  NPO Liquid Status: > 2 hours           Airway   Mallampati: I  TM distance: >3 FB  Neck ROM: full  Dental          Pulmonary - normal exam    breath sounds clear to auscultation  (-) asthma, recent URI, sleep apnea, not a smoker  Cardiovascular - normal exam  Exercise tolerance: good (4-7 METS)    ECG reviewed  Rhythm: regular  Rate: normal    (+) hypertension,   (-) pacemaker, past MI, angina, cardiac stents, CABG      Neuro/Psych  (-) seizures, TIA, CVA  GI/Hepatic/Renal/Endo    (+)   renal disease CRI, diabetes mellitus,   (-) GERD, liver disease, no thyroid disorder    Musculoskeletal     Abdominal    Substance History      OB/GYN          Other                      Anesthesia Plan    ASA 2     general with block     intravenous induction   Anesthetic plan, all risks, benefits, and alternatives have been provided, discussed and informed consent has been obtained with: patient.

## 2019-11-01 NOTE — ANESTHESIA PROCEDURE NOTES
Airway  Urgency: elective    Date/Time: 11/1/2019 11:59 AM  Airway not difficult    General Information and Staff    Patient location during procedure: OR  CRNA: Adolfo Cao CRNA    Indications and Patient Condition  Indications for airway management: airway protection    Preoxygenated: yes  MILS maintained throughout  Mask difficulty assessment: 1 - vent by mask    Final Airway Details  Final airway type: endotracheal airway      Successful airway: ETT  Cuffed: yes   Successful intubation technique: direct laryngoscopy  Endotracheal tube insertion site: oral  Blade: [place]  Blade size: 2  ETT size (mm): 7.0  Cormack-Lehane Classification: grade IIa - partial view of glottis  Placement verified by: chest auscultation and capnometry   Cuff volume (mL): 5  Measured from: gums  ETT/EBT to gums (cm): 21  Number of attempts at approach: 1  Assessment: lips, teeth, and gum same as pre-op and atraumatic intubation

## 2019-11-02 VITALS
OXYGEN SATURATION: 94 % | HEART RATE: 72 BPM | DIASTOLIC BLOOD PRESSURE: 67 MMHG | HEIGHT: 67 IN | BODY MASS INDEX: 22.25 KG/M2 | TEMPERATURE: 98.6 F | SYSTOLIC BLOOD PRESSURE: 134 MMHG | WEIGHT: 141.76 LBS | RESPIRATION RATE: 16 BRPM

## 2019-11-02 LAB
DEPRECATED RDW RBC AUTO: 52 FL (ref 37–54)
ERYTHROCYTE [DISTWIDTH] IN BLOOD BY AUTOMATED COUNT: 14.4 % (ref 12.3–15.4)
HCT VFR BLD AUTO: 36.3 % (ref 37.5–51)
HGB BLD-MCNC: 11.4 G/DL (ref 13–17.7)
MCH RBC QN AUTO: 31.1 PG (ref 26.6–33)
MCHC RBC AUTO-ENTMCNC: 31.4 G/DL (ref 31.5–35.7)
MCV RBC AUTO: 99.2 FL (ref 79–97)
PLATELET # BLD AUTO: 237 10*3/MM3 (ref 140–450)
PMV BLD AUTO: 10 FL (ref 6–12)
RBC # BLD AUTO: 3.66 10*6/MM3 (ref 4.14–5.8)
WBC NRBC COR # BLD: 9.39 10*3/MM3 (ref 3.4–10.8)

## 2019-11-02 NOTE — PLAN OF CARE
Problem: Patient Care Overview  Goal: Plan of Care Review  Outcome: Ongoing (interventions implemented as appropriate)   11/02/19 2467   Coping/Psychosocial   Plan of Care Reviewed With patient   Plan of Care Review   Progress improving   OTHER   Outcome Summary patient denies pain, voiding per urinal, tolerating reg diet, left dressing w drainage, right dressing C/D/I, soft/tender, VSS, will cont to monitor.

## 2019-11-02 NOTE — PROGRESS NOTES
LOS: 0 days   Patient Care Team:  David Barajas MD as PCP - General (Internal Medicine)  David Barajas MD as PCP - Claims Attributed    Chief Complaint: Bilateral inguinal hernia  Subjective     Subjective     Operative day #1 status post bilateral inguinal hernia repair, he is increasing his activity he is urinating well, he has minimal incisional pain he is ready for discharge.  Objective      Objective     Vital Signs  Temp:  [97.2 °F (36.2 °C)-98.6 °F (37 °C)] 98.6 °F (37 °C)  Heart Rate:  [69-92] 72  Resp:  [12-18] 16  BP: (123-168)/(62-77) 134/67    Intake & Output (last 3 days)       10/30 0701 - 10/31 0700 10/31 0701 - 11/01 0700 11/01 0701 - 11/02 0700 11/02 0701 - 11/03 0700    I.V. (mL/kg)   1000 (15.6)     Total Intake(mL/kg)   1000 (15.6)     Urine (mL/kg/hr)   1650     Stool   0     Total Output   1650     Net   -650             Urine Unmeasured Occurrence   2 x           Physical Exam:     General Appearance:    Alert, cooperative, in no acute distress   Lungs:     Clear to auscultation,respirations regular, even and                  unlabored    Heart:    Regular rhythm and normal rate, normal S1 and S2, no            murmur, no gallop, no rub, no click   Chest Wall:    No abnormalities observed   Abdomen:     Normal bowel sounds, no masses, no organomegaly, soft        non-tender, non-distended,wound clean and dry, no   erythema, no discharge no sign of any infection there is a slight fluid around the wound on the left that will be changed.        Results Review:     I reviewed the patient's new clinical results.  I reviewed the patient's new imaging results and agree with the interpretation.    Results from last 7 days   Lab Units 11/01/19  2336   WBC 10*3/mm3 9.39   HEMOGLOBIN g/dL 11.4*   HEMATOCRIT % 36.3*   PLATELETS 10*3/mm3 237              Invalid input(s): LABALBU, PROT    Assessment/Plan     Assessment/Plan       Bilateral inguinal hernia    Status post bilateral  inguinal hernia will change his dressing in the left lower quadrant prepare him for discharge follow-up with Dr. Loomis in 2 weeks.    Gilbert Hein MD  11/02/19  8:58 AM      Time: time spent with patient 15 minutes     EMR Dragon/Transcription disclaimer: Much of this encounter note is an electronic transcription/translation of spoken language to printed text. The electronic translation of spoken language may permit erroneous, or at times, nonsensical words or phrases to be inadvertently transcribed; although I have reviewed the note for such errors, some may still exist.

## 2019-11-19 DIAGNOSIS — E53.8 LOW VITAMIN B12 LEVEL: Primary | ICD-10-CM

## 2019-11-19 DIAGNOSIS — C18.2 MALIGNANT NEOPLASM OF ASCENDING COLON (HCC): ICD-10-CM

## 2019-11-22 ENCOUNTER — OFFICE VISIT (OUTPATIENT)
Dept: ONCOLOGY | Facility: CLINIC | Age: 84
End: 2019-11-22

## 2019-11-22 ENCOUNTER — INFUSION (OUTPATIENT)
Dept: ONCOLOGY | Facility: HOSPITAL | Age: 84
End: 2019-11-22

## 2019-11-22 ENCOUNTER — APPOINTMENT (OUTPATIENT)
Dept: LAB | Facility: HOSPITAL | Age: 84
End: 2019-11-22

## 2019-11-22 VITALS
TEMPERATURE: 97.7 F | RESPIRATION RATE: 16 BRPM | OXYGEN SATURATION: 96 % | BODY MASS INDEX: 19.91 KG/M2 | HEIGHT: 70 IN | WEIGHT: 139.1 LBS | DIASTOLIC BLOOD PRESSURE: 58 MMHG | HEART RATE: 80 BPM | SYSTOLIC BLOOD PRESSURE: 130 MMHG

## 2019-11-22 VITALS
DIASTOLIC BLOOD PRESSURE: 67 MMHG | RESPIRATION RATE: 20 BRPM | SYSTOLIC BLOOD PRESSURE: 149 MMHG | BODY MASS INDEX: 19.9 KG/M2 | TEMPERATURE: 97.4 F | HEART RATE: 70 BPM | HEIGHT: 70 IN | WEIGHT: 139 LBS

## 2019-11-22 DIAGNOSIS — D63.1 ANEMIA DUE TO STAGE 3 CHRONIC KIDNEY DISEASE (HCC): ICD-10-CM

## 2019-11-22 DIAGNOSIS — E53.8 LOW VITAMIN B12 LEVEL: Primary | ICD-10-CM

## 2019-11-22 DIAGNOSIS — D50.8 OTHER IRON DEFICIENCY ANEMIA: Primary | ICD-10-CM

## 2019-11-22 DIAGNOSIS — N18.30 ANEMIA DUE TO STAGE 3 CHRONIC KIDNEY DISEASE (HCC): ICD-10-CM

## 2019-11-22 DIAGNOSIS — E53.8 LOW VITAMIN B12 LEVEL: ICD-10-CM

## 2019-11-22 LAB
ALBUMIN SERPL-MCNC: 4.3 G/DL (ref 3.5–5.2)
ALBUMIN/GLOB SERPL: 1.4 G/DL
ALP SERPL-CCNC: 69 U/L (ref 39–117)
ALT SERPL W P-5'-P-CCNC: 8 U/L (ref 1–41)
ANION GAP SERPL CALCULATED.3IONS-SCNC: 10 MMOL/L (ref 5–15)
AST SERPL-CCNC: 17 U/L (ref 1–40)
BASOPHILS # BLD AUTO: 0.05 10*3/MM3 (ref 0–0.2)
BASOPHILS NFR BLD AUTO: 0.7 % (ref 0–1.5)
BILIRUB SERPL-MCNC: 0.3 MG/DL (ref 0.2–1.2)
BUN BLD-MCNC: 48 MG/DL (ref 8–23)
BUN/CREAT SERPL: 25 (ref 7–25)
CALCIUM SPEC-SCNC: 8.9 MG/DL (ref 8.2–9.6)
CHLORIDE SERPL-SCNC: 110 MMOL/L (ref 98–107)
CO2 SERPL-SCNC: 20 MMOL/L (ref 22–29)
CREAT BLD-MCNC: 1.92 MG/DL (ref 0.76–1.27)
DEPRECATED RDW RBC AUTO: 48.7 FL (ref 37–54)
EOSINOPHIL # BLD AUTO: 0.11 10*3/MM3 (ref 0–0.4)
EOSINOPHIL NFR BLD AUTO: 1.6 % (ref 0.3–6.2)
ERYTHROCYTE [DISTWIDTH] IN BLOOD BY AUTOMATED COUNT: 13.9 % (ref 12.3–15.4)
FERRITIN SERPL-MCNC: 388.7 NG/ML (ref 30–400)
FOLATE SERPL-MCNC: 12.5 NG/ML (ref 4.78–24.2)
GFR SERPL CREATININE-BSD FRML MDRD: 33 ML/MIN/1.73
GLOBULIN UR ELPH-MCNC: 3 GM/DL
GLUCOSE BLD-MCNC: 235 MG/DL (ref 65–99)
HCT VFR BLD AUTO: 38.6 % (ref 37.5–51)
HGB BLD-MCNC: 12.5 G/DL (ref 13–17.7)
HOLD SPECIMEN: NORMAL
IMM GRANULOCYTES # BLD AUTO: 0.02 10*3/MM3 (ref 0–0.05)
IMM GRANULOCYTES NFR BLD AUTO: 0.3 % (ref 0–0.5)
IRON 24H UR-MRATE: 121 MCG/DL (ref 59–158)
IRON SATN MFR SERPL: 36 % (ref 20–50)
LYMPHOCYTES # BLD AUTO: 1.36 10*3/MM3 (ref 0.7–3.1)
LYMPHOCYTES NFR BLD AUTO: 19.7 % (ref 19.6–45.3)
MCH RBC QN AUTO: 31 PG (ref 26.6–33)
MCHC RBC AUTO-ENTMCNC: 32.4 G/DL (ref 31.5–35.7)
MCV RBC AUTO: 95.8 FL (ref 79–97)
MONOCYTES # BLD AUTO: 0.48 10*3/MM3 (ref 0.1–0.9)
MONOCYTES NFR BLD AUTO: 6.9 % (ref 5–12)
NEUTROPHILS # BLD AUTO: 4.9 10*3/MM3 (ref 1.7–7)
NEUTROPHILS NFR BLD AUTO: 70.8 % (ref 42.7–76)
NRBC BLD AUTO-RTO: 0 /100 WBC (ref 0–0.2)
PLATELET # BLD AUTO: 263 10*3/MM3 (ref 140–450)
PMV BLD AUTO: 9.9 FL (ref 6–12)
POTASSIUM BLD-SCNC: 5.1 MMOL/L (ref 3.5–5.2)
PROT SERPL-MCNC: 7.3 G/DL (ref 6–8.5)
RBC # BLD AUTO: 4.03 10*6/MM3 (ref 4.14–5.8)
SODIUM BLD-SCNC: 140 MMOL/L (ref 136–145)
TIBC SERPL-MCNC: 332 MCG/DL (ref 298–536)
TRANSFERRIN SERPL-MCNC: 223 MG/DL (ref 200–360)
VIT B12 BLD-MCNC: 423 PG/ML (ref 211–946)
WBC NRBC COR # BLD: 6.92 10*3/MM3 (ref 3.4–10.8)

## 2019-11-22 PROCEDURE — 85025 COMPLETE CBC W/AUTO DIFF WBC: CPT | Performed by: INTERNAL MEDICINE

## 2019-11-22 PROCEDURE — 80053 COMPREHEN METABOLIC PANEL: CPT | Performed by: INTERNAL MEDICINE

## 2019-11-22 PROCEDURE — 83540 ASSAY OF IRON: CPT | Performed by: INTERNAL MEDICINE

## 2019-11-22 PROCEDURE — 99214 OFFICE O/P EST MOD 30 MIN: CPT | Performed by: INTERNAL MEDICINE

## 2019-11-22 PROCEDURE — 82728 ASSAY OF FERRITIN: CPT | Performed by: INTERNAL MEDICINE

## 2019-11-22 PROCEDURE — 36415 COLL VENOUS BLD VENIPUNCTURE: CPT | Performed by: INTERNAL MEDICINE

## 2019-11-22 PROCEDURE — 84466 ASSAY OF TRANSFERRIN: CPT | Performed by: INTERNAL MEDICINE

## 2019-11-22 PROCEDURE — 82746 ASSAY OF FOLIC ACID SERUM: CPT | Performed by: INTERNAL MEDICINE

## 2019-11-22 PROCEDURE — 25010000002 CYANOCOBALAMIN PER 1000 MCG: Performed by: INTERNAL MEDICINE

## 2019-11-22 PROCEDURE — 82607 VITAMIN B-12: CPT | Performed by: INTERNAL MEDICINE

## 2019-11-22 PROCEDURE — 96372 THER/PROPH/DIAG INJ SC/IM: CPT

## 2019-11-22 RX ORDER — CYANOCOBALAMIN 1000 UG/ML
1000 INJECTION, SOLUTION INTRAMUSCULAR; SUBCUTANEOUS
Status: CANCELLED | OUTPATIENT
Start: 2020-01-24

## 2019-11-22 RX ORDER — CYANOCOBALAMIN 1000 UG/ML
1000 INJECTION, SOLUTION INTRAMUSCULAR; SUBCUTANEOUS
OUTPATIENT
Start: 2020-03-20

## 2019-11-22 RX ORDER — CYANOCOBALAMIN 1000 UG/ML
1000 INJECTION, SOLUTION INTRAMUSCULAR; SUBCUTANEOUS
Status: CANCELLED | OUTPATIENT
Start: 2019-11-22

## 2019-11-22 RX ORDER — CYANOCOBALAMIN 1000 UG/ML
1000 INJECTION, SOLUTION INTRAMUSCULAR; SUBCUTANEOUS
OUTPATIENT
Start: 2020-02-21

## 2019-11-22 RX ORDER — CYANOCOBALAMIN 1000 UG/ML
1000 INJECTION, SOLUTION INTRAMUSCULAR; SUBCUTANEOUS
OUTPATIENT
Start: 2020-04-17

## 2019-11-22 RX ORDER — CYANOCOBALAMIN 1000 UG/ML
1000 INJECTION, SOLUTION INTRAMUSCULAR; SUBCUTANEOUS
Status: DISCONTINUED | OUTPATIENT
Start: 2019-11-22 | End: 2019-11-22 | Stop reason: HOSPADM

## 2019-11-22 RX ADMIN — CYANOCOBALAMIN 1000 MCG: 1000 INJECTION, SOLUTION INTRAMUSCULAR at 12:09

## 2019-11-22 NOTE — PROGRESS NOTES
[facility]  HEMATOLOGY & ONCOLOGY    Cancer Staging Information:  Cancer of right colon    Staging form: Colon and Rectum, AJCC V7    - Clinical stage from 9/28/2017: No stage assigned - Unsigned    - Pathologic stage from 9/28/2017: Stage IIA (T3, N0, cM0) - Signed by Keara Mehta MD on 9/28/2017      Subjective     VISIT DIAGNOSIS:   Encounter Diagnoses   Name Primary?   • Low vitamin B12 level    • Other iron deficiency anemia Yes   • Anemia due to stage 3 chronic kidney disease (CMS/HCC)        REASON FOR VISIT:     Chief Complaint   Patient presents with   • Anemia     Here for f/u        HEMATOLOGY / ONCOLOGY HISTORY:    No history exists.           INTERVAL HISTORY  Patient ID: Maikel Mathews is a 89 y.o. year old male Maikel Mathews is a 88 y.o. year old male Maikel Mathews is a 88 y.o. year old male with diagnosis of colon adenocarcinoma status post surgical resection tumor 5.5 in greatest dimension low-grade moderately differentiated, 0 of 15 lymph nodes negative for metastases, pT3 N 0 M0 stage II a disease who presents today with With no complaint.  3/26/18: doing well. 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 CT no evidence of disease.  6/18/19: pt has missed various appointments presents today that his leg his weak, he tires easily. 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 He also has diabetes.    10/11/19: he feels more energetic. He will be undergoing rigth inguinal hernia repair end of the month  11/22/19: s/p ariadna inguinal hernia repair. Doing well a little sore. His energy is good. He was cleaning out deer ad forgot that he had an appointment today. His wife has had some cardiac issues. Recently diagnosed with heart failure and wears a pacemaker.  -- Denies sob, cp, dizziness, n/v, abdominal pain, melena, back pain.  Rest of ros unremarkable. PE remains the same.    Past Medical History:   Past Medical History:   Diagnosis Date   • Arthritis    • AVM (arteriovenous  malformation)    • Cancer (CMS/HCC)     PROSTATE   • Cataract    • Colon cancer (CMS/HCC)    • Colon polyp    • Diabetes mellitus (CMS/HCC)    • Diverticulosis    • Dizzinesses 10/20/2017   • GERD (gastroesophageal reflux disease)    • Hemorrhoids    • History of transfusion    • Hypertension    • Inguinal hernia    • Iron deficiency anemia due to chronic blood loss 7/26/2017   • Prostate CA (CMS/HCC)    • Prostate hypertrophy    • Rotator cuff syndrome      Past Surgical History:   Past Surgical History:   Procedure Laterality Date   • APPENDECTOMY     • CHOLECYSTECTOMY     • COLON RESECTION N/A 9/18/2017    Procedure: LAPAROSCOPIC ASSISTED RIGHT COLECTOMY;  Surgeon: Caroline Loomis MD;  Location:  [facility] OR;  Service:    • COLONOSCOPY  04/29/2010   • COLONOSCOPY N/A 8/25/2017    Procedure: COLONOSCOPY WITH ANESTHESIA;  Surgeon: Joaquín Neff MD;  Location: [facility] ENDOSCOPY;  Service:    • ENDOSCOPY  03/27/2013   • ENDOSCOPY N/A 8/25/2017    Procedure: ESOPHAGOGASTRODUODENOSCOPY WITH ANESTHESIA;  Surgeon: Joaquín Neff MD;  Location: [facility] ENDOSCOPY;  Service:    • EYE SURGERY Left     CATARACT   • INGUINAL HERNIA REPAIR Bilateral 11/1/2019    Procedure: OPEN BILATERAL INGUINAL HERNIA REPAIR WITH MESH;  Surgeon: Caroline Loomis MD;  Location: [facility] OR;  Service: General   • INTERVENTIONAL RADIOLOGY PROCEDURE N/A 9/18/2017    Procedure: URETHRA DILITATION with dominguez catheter insertion;  Surgeon: Mamadou Paez MD;  Location:  [facility] OR;  Service:    • PROSTATECTOMY     • PROSTATECTOMY       Social History:   Social History     Socioeconomic History   • Marital status:      Spouse name: Not on file   • Number of children: Not on file   • Years of education: Not on file   • Highest education level: Not on file   Tobacco Use   • Smoking status: Never Smoker   • Smokeless tobacco: Never Used   Substance and Sexual Activity   • Alcohol use: No   • Drug use: No   • Sexual activity: Defer     Family  "History:   Family History   Problem Relation Age of Onset   • Colon cancer Neg Hx    • Colon polyps Neg Hx        Review of Systems   Constitutional: Negative.    HENT: Negative.    Eyes: Negative.    Respiratory: Negative.    Cardiovascular: Negative.    Gastrointestinal: Negative.    Endocrine: Negative.    Genitourinary: Negative.    Musculoskeletal: Negative.    Skin: Negative.    Neurological: Negative.    Hematological: Negative.    Psychiatric/Behavioral: Negative.         Performance Status:  Asymptomatic    Medications:    Current Outpatient Medications   Medication Sig Dispense Refill   • amLODIPine (NORVASC) 10 MG tablet Take 10 mg by mouth Daily.  5   • glipiZIDE (GLUCOTROL) 5 MG ER tablet Take 5 mg by mouth Daily.  2   • HYDROcodone-acetaminophen (NORCO) 7.5-325 MG per tablet Take 1-2 tablets by mouth Every 4 (Four) Hours As Needed for Moderate Pain  (Pain). 30 tablet 0   • losartan (COZAAR) 100 MG tablet Take 100 mg by mouth Daily.       No current facility-administered medications for this visit.        ALLERGIES:  No Known Allergies    Objective      Vitals:    11/22/19 1119   BP: 130/58   Pulse: 80   Resp: 16   Temp: 97.7 °F (36.5 °C)   TempSrc: Temporal   SpO2: 96%   Weight: 63.1 kg (139 lb 1.6 oz)   Height: 177.8 cm (70\")   PainSc: 0-No pain         Current Status 11/22/2019   ECOG score 1         Physical Exam  General Appearance: Frail Patient is awake, alert, oriented and in no acute distress. Patient is welldeveloped, wellnourished, and appears stated age.  HEENT: Normocephalic. Sclerae clear, conjunctiva pink, extraocular movements intact, pupils, round, reactive to light and  accommodation. Mouth and throat are clear with moist oral mucosa.  NECK: Supple, no jugular venous distention, thyroid not enlarged.  LYMPH: No cervical, supraclavicular, axillary, or inguinal lymphadenopathy.  CHEST: Equal bilateral expansion, AP  diameter normal, resonant percussion note  LUNGS: Good air movement, no " rales, rhonchi, rubs or wheezes with auscultation  CARDIO: Regular sinus rhythm, no murmurs, gallops or rubs.  ABDOMEN: Nondistended, soft, No tenderness, no guarding, no rebound, No hepatosplenomegaly. No abdominal masses. Bowel sounds positive. No hernia  GENITALIA: Not examined.  BREASTS: Not examined.  MUSKEL: No joint swelling, decreased motion, or inflammation  EXTREMS: No edema, clubbing, cyanosis, No varicose veins.  NEURO: Grossly nonfocal, Gait is coordinated and smooth, Cognition is preserved.  SKIN: No rashes, no ecchymoses, no petechia.  PSYCH: Oriented to time, place and person. Memory is preserved. Mood and affect appear normal  RECENT LABS:  Orders Only on 11/19/2019   Component Date Value Ref Range Status   • Glucose 11/22/2019 235* 65 - 99 mg/dL Final   • BUN 11/22/2019 48* 8 - 23 mg/dL Final   • Creatinine 11/22/2019 1.92* 0.76 - 1.27 mg/dL Final   • Sodium 11/22/2019 140  136 - 145 mmol/L Final   • Potassium 11/22/2019 5.1  3.5 - 5.2 mmol/L Final   • Chloride 11/22/2019 110* 98 - 107 mmol/L Final   • CO2 11/22/2019 20.0* 22.0 - 29.0 mmol/L Final   • Calcium 11/22/2019 8.9  8.2 - 9.6 mg/dL Final   • Total Protein 11/22/2019 7.3  6.0 - 8.5 g/dL Final   • Albumin 11/22/2019 4.30  3.50 - 5.20 g/dL Final   • ALT (SGPT) 11/22/2019 8  1 - 41 U/L Final   • AST (SGOT) 11/22/2019 17  1 - 40 U/L Final   • Alkaline Phosphatase 11/22/2019 69  39 - 117 U/L Final   • Total Bilirubin 11/22/2019 0.3  0.2 - 1.2 mg/dL Final   • eGFR Non African Amer 11/22/2019 33* >60 mL/min/1.73 Final   • Globulin 11/22/2019 3.0  gm/dL Final   • A/G Ratio 11/22/2019 1.4  g/dL Final   • BUN/Creatinine Ratio 11/22/2019 25.0  7.0 - 25.0 Final   • Anion Gap 11/22/2019 10.0  5.0 - 15.0 mmol/L Final   • WBC 11/22/2019 6.92  3.40 - 10.80 10*3/mm3 Final   • RBC 11/22/2019 4.03* 4.14 - 5.80 10*6/mm3 Final   • Hemoglobin 11/22/2019 12.5* 13.0 - 17.7 g/dL Final   • Hematocrit 11/22/2019 38.6  37.5 - 51.0 % Final   • MCV 11/22/2019 95.8  79.0  - 97.0 fL Final   • MCH 11/22/2019 31.0  26.6 - 33.0 pg Final   • MCHC 11/22/2019 32.4  31.5 - 35.7 g/dL Final   • RDW 11/22/2019 13.9  12.3 - 15.4 % Final   • RDW-SD 11/22/2019 48.7  37.0 - 54.0 fl Final   • MPV 11/22/2019 9.9  6.0 - 12.0 fL Final   • Platelets 11/22/2019 263  140 - 450 10*3/mm3 Final   • Neutrophil % 11/22/2019 70.8  42.7 - 76.0 % Final   • Lymphocyte % 11/22/2019 19.7  19.6 - 45.3 % Final   • Monocyte % 11/22/2019 6.9  5.0 - 12.0 % Final   • Eosinophil % 11/22/2019 1.6  0.3 - 6.2 % Final   • Basophil % 11/22/2019 0.7  0.0 - 1.5 % Final   • Immature Grans % 11/22/2019 0.3  0.0 - 0.5 % Final   • Neutrophils, Absolute 11/22/2019 4.90  1.70 - 7.00 10*3/mm3 Final   • Lymphocytes, Absolute 11/22/2019 1.36  0.70 - 3.10 10*3/mm3 Final   • Monocytes, Absolute 11/22/2019 0.48  0.10 - 0.90 10*3/mm3 Final   • Eosinophils, Absolute 11/22/2019 0.11  0.00 - 0.40 10*3/mm3 Final   • Basophils, Absolute 11/22/2019 0.05  0.00 - 0.20 10*3/mm3 Final   • Immature Grans, Absolute 11/22/2019 0.02  0.00 - 0.05 10*3/mm3 Final   • nRBC 11/22/2019 0.0  0.0 - 0.2 /100 WBC Final       RADIOLOGY:  No results found.         Assessment/Plan  Maikel Mathews is a 89 y.o. year old male with stage 2a crc here for follow-up complaining of weakness.    Patient Active Problem List   Diagnosis   • Anemia associated with nutritional deficiency   • Iron deficiency anemia due to chronic blood loss   • Cancer of right colon (CMS/HCC)   • Dizzinesses   • Stage 3 chronic kidney disease (CMS/HCC)   • Encounter for hydration prior to CT scan   • Low vitamin B12 level   • Iron deficiency anemia   • Cat bite   • Cough   • Low back pain   • Pharyngitis   • Spondylolisthesis, grade 1   • Bilateral inguinal hernia          1.Stage IIA right-sided colon adenocarcinoma:Patient currently on observation.He has  favorable risk features; it is moderately differentiated, right-sided, 0 of 15 lymph nodes uninvolved, negative for lymphovascular  invasion,  negative for perineural invasion, margins are negative.  Given these variables my preference is to observe him and not give chemotherapy.  This is also recommended by NCCN guideline.  His tumor was PROSPER. The patient and his wife understands and are willing to go with observation.  Chemotherapy is not needed in his situation. I reviewed the images that were done to complete staging, CT chest negative for malignancy but had some nodules that needed to be followed in 3-6 months. There were areas of concern in the spine that was negative with a bone scan. Next colonoscopy a year from last. CT chest /abdomen/pelvis in 6months per NCCN guideline.   --3/26/18: CT [place].    --6/28/19: CT c/a/p 6/25/19 with no evidence of disease recurrence  -labs reviewed with pt wbc 7.15, Hg 10.7, plt 243    2. Anemia: component of  Iron deficiency anemia due to GI loss plus AoCKD. s/p 8 doses of iv venofer and procrit  -myeloma panel negative.  --labs reviewed with pt and son, cr 1.92, eGFR 33, wbc 6.92, Hg 12.5, plt 263.  -no procrit today. Check iron profile, ferritin and act accordingly    3.  GERD: Continue omeprazole.     4.  Chronic kidney disease stage III: Advised patient again to follow-up with nephrologist.     5. Lung nodule on CT chest: Repeat CT chest in 6months. CT ordered.   6. 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 He reports his leg weak. He defered physical therapy  -will check b12 and folate.  7. DM: on glipizide  8. B12 low 209.  On monthly shot.          Keara Mehta MD    11/22/2019    11:36 AM

## 2020-01-23 DIAGNOSIS — D63.1 ANEMIA DUE TO STAGE 3 CHRONIC KIDNEY DISEASE (HCC): ICD-10-CM

## 2020-01-23 DIAGNOSIS — C18.2 MALIGNANT NEOPLASM OF ASCENDING COLON (HCC): ICD-10-CM

## 2020-01-23 DIAGNOSIS — N18.30 ANEMIA DUE TO STAGE 3 CHRONIC KIDNEY DISEASE (HCC): ICD-10-CM

## 2020-01-23 DIAGNOSIS — E53.8 LOW VITAMIN B12 LEVEL: Primary | ICD-10-CM

## 2020-01-24 ENCOUNTER — APPOINTMENT (OUTPATIENT)
Dept: LAB | Facility: HOSPITAL | Age: 85
End: 2020-01-24

## 2020-01-24 ENCOUNTER — OFFICE VISIT (OUTPATIENT)
Dept: ONCOLOGY | Facility: CLINIC | Age: 85
End: 2020-01-24

## 2020-01-24 ENCOUNTER — INFUSION (OUTPATIENT)
Dept: ONCOLOGY | Facility: HOSPITAL | Age: 85
End: 2020-01-24

## 2020-01-24 VITALS
BODY MASS INDEX: 19.86 KG/M2 | TEMPERATURE: 98.1 F | SYSTOLIC BLOOD PRESSURE: 144 MMHG | HEIGHT: 70 IN | HEART RATE: 82 BPM | WEIGHT: 138.7 LBS | DIASTOLIC BLOOD PRESSURE: 64 MMHG | RESPIRATION RATE: 18 BRPM | OXYGEN SATURATION: 94 %

## 2020-01-24 VITALS
HEIGHT: 66 IN | BODY MASS INDEX: 22.18 KG/M2 | DIASTOLIC BLOOD PRESSURE: 61 MMHG | WEIGHT: 138 LBS | HEART RATE: 65 BPM | OXYGEN SATURATION: 97 % | TEMPERATURE: 97.8 F | SYSTOLIC BLOOD PRESSURE: 145 MMHG

## 2020-01-24 DIAGNOSIS — E11.69 TYPE 2 DIABETES MELLITUS WITH OTHER SPECIFIED COMPLICATION, UNSPECIFIED WHETHER LONG TERM INSULIN USE (HCC): ICD-10-CM

## 2020-01-24 DIAGNOSIS — D50.9 IRON DEFICIENCY ANEMIA, UNSPECIFIED IRON DEFICIENCY ANEMIA TYPE: ICD-10-CM

## 2020-01-24 DIAGNOSIS — C18.9 MALIGNANT NEOPLASM OF COLON, UNSPECIFIED PART OF COLON (HCC): Primary | ICD-10-CM

## 2020-01-24 DIAGNOSIS — E53.8 LOW VITAMIN B12 LEVEL: Primary | ICD-10-CM

## 2020-01-24 DIAGNOSIS — E53.8 B12 DEFICIENCY: ICD-10-CM

## 2020-01-24 LAB
ALBUMIN SERPL-MCNC: 4.1 G/DL (ref 3.5–5.2)
ALBUMIN/GLOB SERPL: 1.3 G/DL
ALP SERPL-CCNC: 71 U/L (ref 39–117)
ALT SERPL W P-5'-P-CCNC: 10 U/L (ref 1–41)
ANION GAP SERPL CALCULATED.3IONS-SCNC: 9 MMOL/L (ref 5–15)
AST SERPL-CCNC: 17 U/L (ref 1–40)
BASOPHILS # BLD AUTO: 0.05 10*3/MM3 (ref 0–0.2)
BASOPHILS NFR BLD AUTO: 0.8 % (ref 0–1.5)
BILIRUB SERPL-MCNC: 0.3 MG/DL (ref 0.2–1.2)
BUN BLD-MCNC: 41 MG/DL (ref 8–23)
BUN/CREAT SERPL: 19.8 (ref 7–25)
CALCIUM SPEC-SCNC: 8.5 MG/DL (ref 8.2–9.6)
CHLORIDE SERPL-SCNC: 108 MMOL/L (ref 98–107)
CO2 SERPL-SCNC: 22 MMOL/L (ref 22–29)
CREAT BLD-MCNC: 2.07 MG/DL (ref 0.76–1.27)
DEPRECATED RDW RBC AUTO: 49.8 FL (ref 37–54)
EOSINOPHIL # BLD AUTO: 0.09 10*3/MM3 (ref 0–0.4)
EOSINOPHIL NFR BLD AUTO: 1.4 % (ref 0.3–6.2)
ERYTHROCYTE [DISTWIDTH] IN BLOOD BY AUTOMATED COUNT: 14.6 % (ref 12.3–15.4)
GFR SERPL CREATININE-BSD FRML MDRD: 30 ML/MIN/1.73
GLOBULIN UR ELPH-MCNC: 3.2 GM/DL
GLUCOSE BLD-MCNC: 189 MG/DL (ref 65–99)
HCT VFR BLD AUTO: 34.6 % (ref 37.5–51)
HGB BLD-MCNC: 11.4 G/DL (ref 13–17.7)
HOLD SPECIMEN: NORMAL
HOLD SPECIMEN: NORMAL
IMM GRANULOCYTES # BLD AUTO: 0.02 10*3/MM3 (ref 0–0.05)
IMM GRANULOCYTES NFR BLD AUTO: 0.3 % (ref 0–0.5)
LYMPHOCYTES # BLD AUTO: 1.37 10*3/MM3 (ref 0.7–3.1)
LYMPHOCYTES NFR BLD AUTO: 22 % (ref 19.6–45.3)
MCH RBC QN AUTO: 30.6 PG (ref 26.6–33)
MCHC RBC AUTO-ENTMCNC: 32.9 G/DL (ref 31.5–35.7)
MCV RBC AUTO: 92.8 FL (ref 79–97)
MONOCYTES # BLD AUTO: 0.54 10*3/MM3 (ref 0.1–0.9)
MONOCYTES NFR BLD AUTO: 8.7 % (ref 5–12)
NEUTROPHILS # BLD AUTO: 4.17 10*3/MM3 (ref 1.7–7)
NEUTROPHILS NFR BLD AUTO: 66.8 % (ref 42.7–76)
NRBC BLD AUTO-RTO: 0 /100 WBC (ref 0–0.2)
PLATELET # BLD AUTO: 235 10*3/MM3 (ref 140–450)
PMV BLD AUTO: 9.8 FL (ref 6–12)
POTASSIUM BLD-SCNC: 4.5 MMOL/L (ref 3.5–5.2)
PROT SERPL-MCNC: 7.3 G/DL (ref 6–8.5)
RBC # BLD AUTO: 3.73 10*6/MM3 (ref 4.14–5.8)
SODIUM BLD-SCNC: 139 MMOL/L (ref 136–145)
WBC NRBC COR # BLD: 6.24 10*3/MM3 (ref 3.4–10.8)

## 2020-01-24 PROCEDURE — 85025 COMPLETE CBC W/AUTO DIFF WBC: CPT | Performed by: INTERNAL MEDICINE

## 2020-01-24 PROCEDURE — 80053 COMPREHEN METABOLIC PANEL: CPT | Performed by: INTERNAL MEDICINE

## 2020-01-24 PROCEDURE — 99214 OFFICE O/P EST MOD 30 MIN: CPT | Performed by: INTERNAL MEDICINE

## 2020-01-24 PROCEDURE — 36415 COLL VENOUS BLD VENIPUNCTURE: CPT | Performed by: INTERNAL MEDICINE

## 2020-01-24 PROCEDURE — 25010000002 CYANOCOBALAMIN PER 1000 MCG: Performed by: INTERNAL MEDICINE

## 2020-01-24 PROCEDURE — 96372 THER/PROPH/DIAG INJ SC/IM: CPT

## 2020-01-24 RX ORDER — CYANOCOBALAMIN 1000 UG/ML
1000 INJECTION, SOLUTION INTRAMUSCULAR; SUBCUTANEOUS ONCE
Status: COMPLETED | OUTPATIENT
Start: 2020-01-24 | End: 2020-01-24

## 2020-01-24 RX ADMIN — CYANOCOBALAMIN 1000 MCG: 1000 INJECTION, SOLUTION INTRAMUSCULAR at 12:16

## 2020-01-24 NOTE — PROGRESS NOTES
"[facility]  HEMATOLOGY & ONCOLOGY    Cancer Staging Information:  Cancer of right colon    Staging form: Colon and Rectum, AJCC V7    - Clinical stage from 9/28/2017: No stage assigned - Unsigned    - Pathologic stage from 9/28/2017: Stage IIA (T3, N0, cM0) - Signed by Keara Mehta MD on 9/28/2017      Subjective     VISIT DIAGNOSIS:   No diagnosis found.    REASON FOR VISIT:     Chief Complaint   Patient presents with   • Anemia     Two mo f/u visit today        HEMATOLOGY / ONCOLOGY HISTORY:    No history exists.           INTERVAL HISTORY  Patient ID: Maikel Mathews is a 89 y.o. year old male Maikel Mathews is a 88 y.o. year old male Maikel Mathews is a 88 y.o. year old male with diagnosis of colon adenocarcinoma status post surgical resection tumor 5.5 in greatest dimension low-grade moderately differentiated, 0 of 15 lymph nodes negative for metastases, pT3 N 0 M0 stage II a disease who presents today with With no complaint.  3/26/18: doing well. 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 CT no evidence of disease.  6/18/19: pt has missed various appointments presents today that his leg his weak, he tires easily. 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 He also has diabetes.    10/11/19: he feels more energetic. He will be undergoing rigth inguinal hernia repair end of the month  11/22/19: s/p ariadna inguinal hernia repair. Doing well a little sore. His energy is good. He was cleaning out deer ad forgot that he had an appointment today. His wife has had some cardiac issues. She was recently diagnosed with heart failure and wears a pacemaker.    1/24/2020: pt have been having \"sun downing\". He wakes up at night crying. Wife wondering if he could get something. He has appointment with PCP on Tuesday. She gave him tylenol PM and that helped last night. I advised f/u with PCP for antidepressant since anxiety meds like ativan are in [employer] drug as inappropriate " in the elderly.  -- Denies sob, cp, dizziness, n/v, abdominal pain, melena, back pain.  Rest of ros unremarkable. PE remains the same.    Past Medical History:   Past Medical History:   Diagnosis Date   • Arthritis    • AVM (arteriovenous malformation)    • Cancer (CMS/HCC)     PROSTATE   • Cataract    • Colon cancer (CMS/HCC)    • Colon polyp    • Diabetes mellitus (CMS/HCC)    • Diverticulosis    • Dizzinesses 10/20/2017   • GERD (gastroesophageal reflux disease)    • Hemorrhoids    • History of transfusion    • Hypertension    • Inguinal hernia    • Iron deficiency anemia due to chronic blood loss 7/26/2017   • Prostate CA (CMS/HCC)    • Prostate hypertrophy    • Rotator cuff syndrome      Past Surgical History:   Past Surgical History:   Procedure Laterality Date   • APPENDECTOMY     • CHOLECYSTECTOMY     • COLON RESECTION N/A 9/18/2017    Procedure: LAPAROSCOPIC ASSISTED RIGHT COLECTOMY;  Surgeon: Caroline Loomis MD;  Location: [facility] OR;  Service:    • COLONOSCOPY  04/29/2010   • COLONOSCOPY N/A 8/25/2017    Procedure: COLONOSCOPY WITH ANESTHESIA;  Surgeon: Joaquín Neff MD;  Location: [facility] ENDOSCOPY;  Service:    • ENDOSCOPY  03/27/2013   • ENDOSCOPY N/A 8/25/2017    Procedure: ESOPHAGOGASTRODUODENOSCOPY WITH ANESTHESIA;  Surgeon: Joaquín Neff MD;  Location: [facility] ENDOSCOPY;  Service:    • EYE SURGERY Left     CATARACT   • INGUINAL HERNIA REPAIR Bilateral 11/1/2019    Procedure: OPEN BILATERAL INGUINAL HERNIA REPAIR WITH MESH;  Surgeon: Caroline Loomis MD;  Location: [facility] OR;  Service: General   • INTERVENTIONAL RADIOLOGY PROCEDURE N/A 9/18/2017    Procedure: URETHRA DILITATION with dominguez catheter insertion;  Surgeon: Mamadou Paez MD;  Location: [facility] OR;  Service:    • PROSTATECTOMY     • PROSTATECTOMY       Social History:   Social History     Socioeconomic History   • Marital status:      Spouse name: Not on file   • Number of children: Not on file   • Years of education:  "Not on file   • Highest education level: Not on file   Tobacco Use   • Smoking status: Never Smoker   • Smokeless tobacco: Never Used   Substance and Sexual Activity   • Alcohol use: No   • Drug use: No   • Sexual activity: Defer     Family History:   Family History   Problem Relation Age of Onset   • Colon cancer Neg Hx    • Colon polyps Neg Hx        Review of Systems   Constitutional: Negative.    HENT: Negative.    Eyes: Negative.    Respiratory: Negative.    Cardiovascular: Negative.    Gastrointestinal: Negative.    Endocrine: Negative.    Genitourinary: Negative.    Musculoskeletal: Negative.    Skin: Negative.    Neurological: Negative.    Hematological: Negative.    Psychiatric/Behavioral: Negative.         Performance Status:  Asymptomatic    Medications:    Current Outpatient Medications   Medication Sig Dispense Refill   • amLODIPine (NORVASC) 10 MG tablet Take 10 mg by mouth Daily.  5   • glipiZIDE (GLUCOTROL) 5 MG ER tablet Take 5 mg by mouth Daily.  2   • losartan (COZAAR) 100 MG tablet Take 100 mg by mouth Daily.     • HYDROcodone-acetaminophen (NORCO) 7.5-325 MG per tablet Take 1-2 tablets by mouth Every 4 (Four) Hours As Needed for Moderate Pain  (Pain). 30 tablet 0     No current facility-administered medications for this visit.        ALLERGIES:  No Known Allergies    Objective      Vitals:    01/24/20 1054   BP: 144/64   Pulse: 82   Resp: 18   Temp: 98.1 °F (36.7 °C)   TempSrc: Tympanic   SpO2: 94%   Weight: 62.9 kg (138 lb 11.2 oz)   Height: 177.8 cm (70\")   PainSc: 0-No pain         Current Status 1/24/2020   ECOG score 1         Physical Exam  General Appearance: Frail Patient is awake, alert, oriented and in no acute distress. Patient is welldeveloped, wellnourished, and appears stated age.  HEENT: Normocephalic. Sclerae clear, conjunctiva pink, extraocular movements intact, pupils, round, reactive to light and  accommodation. Mouth and throat are clear with moist oral mucosa.  NECK: Supple, " no jugular venous distention, thyroid not enlarged.  LYMPH: No cervical, supraclavicular, axillary, or inguinal lymphadenopathy.  CHEST: Equal bilateral expansion, AP  diameter normal, resonant percussion note  LUNGS: Good air movement, no rales, rhonchi, rubs or wheezes with auscultation  CARDIO: Regular sinus rhythm, no murmurs, gallops or rubs.  ABDOMEN: Nondistended, soft, No tenderness, no guarding, no rebound, No hepatosplenomegaly. No abdominal masses. Bowel sounds positive. No hernia  GENITALIA: Not examined.  BREASTS: Not examined.  MUSKEL: No joint swelling, decreased motion, or inflammation  EXTREMS: No edema, clubbing, cyanosis, No varicose veins.  NEURO: Grossly nonfocal, Gait is coordinated and smooth, Cognition is preserved.  SKIN: No rashes, no ecchymoses, no petechia.  PSYCH: Oriented to time, place and person. Memory is preserved. Mood and affect appear normal  RECENT LABS:  Orders Only on 01/23/2020   Component Date Value Ref Range Status   • WBC 01/24/2020 6.24  3.40 - 10.80 10*3/mm3 Final   • RBC 01/24/2020 3.73* 4.14 - 5.80 10*6/mm3 Final   • Hemoglobin 01/24/2020 11.4* 13.0 - 17.7 g/dL Final   • Hematocrit 01/24/2020 34.6* 37.5 - 51.0 % Final   • MCV 01/24/2020 92.8  79.0 - 97.0 fL Final   • MCH 01/24/2020 30.6  26.6 - 33.0 pg Final   • MCHC 01/24/2020 32.9  31.5 - 35.7 g/dL Final   • RDW 01/24/2020 14.6  12.3 - 15.4 % Final   • RDW-SD 01/24/2020 49.8  37.0 - 54.0 fl Final   • MPV 01/24/2020 9.8  6.0 - 12.0 fL Final   • Platelets 01/24/2020 235  140 - 450 10*3/mm3 Final   • Neutrophil % 01/24/2020 66.8  42.7 - 76.0 % Final   • Lymphocyte % 01/24/2020 22.0  19.6 - 45.3 % Final   • Monocyte % 01/24/2020 8.7  5.0 - 12.0 % Final   • Eosinophil % 01/24/2020 1.4  0.3 - 6.2 % Final   • Basophil % 01/24/2020 0.8  0.0 - 1.5 % Final   • Immature Grans % 01/24/2020 0.3  0.0 - 0.5 % Final   • Neutrophils, Absolute 01/24/2020 4.17  1.70 - 7.00 10*3/mm3 Final   • Lymphocytes, Absolute 01/24/2020 1.37  0.70  - 3.10 10*3/mm3 Final   • Monocytes, Absolute 01/24/2020 0.54  0.10 - 0.90 10*3/mm3 Final   • Eosinophils, Absolute 01/24/2020 0.09  0.00 - 0.40 10*3/mm3 Final   • Basophils, Absolute 01/24/2020 0.05  0.00 - 0.20 10*3/mm3 Final   • Immature Grans, Absolute 01/24/2020 0.02  0.00 - 0.05 10*3/mm3 Final   • nRBC 01/24/2020 0.0  0.0 - 0.2 /100 WBC Final       RADIOLOGY:  No results found.         Assessment/Plan  Maikel Mathews is a 89 y.o. year old male with stage 2a crc here for follow-up complaining of weakness.    Patient Active Problem List   Diagnosis   • Anemia associated with nutritional deficiency   • Iron deficiency anemia due to chronic blood loss   • Cancer of right colon (CMS/HCC)   • Dizzinesses   • Stage 3 chronic kidney disease (CMS/HCC)   • Encounter for hydration prior to CT scan   • Low vitamin B12 level   • Iron deficiency anemia   • Cat bite   • Cough   • Low back pain   • Pharyngitis   • Spondylolisthesis, grade 1   • Bilateral inguinal hernia          1.Stage IIA right-sided colon adenocarcinoma:Patient currently on observation.He has  favorable risk features; it is moderately differentiated, right-sided, 0 of 15 lymph nodes uninvolved, negative for lymphovascular  invasion, negative for perineural invasion, margins are negative.  Given these variables my preference is to observe him and not give chemotherapy.  This is also recommended by NCCN guideline.  His tumor was PROSPER. The patient and his wife understands and are willing to go with observation.  Chemotherapy is not needed in his situation. I reviewed the images that were done to complete staging, CT chest negative for malignancy but had some nodules that needed to be followed in 3-6 months. There were areas of concern in the spine that was negative with a bone scan. Next colonoscopy a year from last. CT chest /abdomen/pelvis in 6months per NCCN guideline.   --3/26/18: CT [place].    --6/28/19: CT c/a/p 6/25/19 with no evidence of disease  recurrence  -labs reviewed with pt wbc 7.15, Hg 10.7, plt 243    2. Anemia: component of  Iron deficiency anemia due to GI loss plus AoCKD. s/p 8 doses of iv venofer and procrit  -myeloma panel negative.  --labs reviewed with pt and son, cr 1.92, eGFR 33, wbc 6.92, Hg 12.5, plt 263.  -no procrit today. Check iron profile, ferritin and act accordingly    3.  GERD: Continue omeprazole.     4.  Chronic kidney disease stage III: Advised patient again to follow-up with nephrologist.     5. Lung nodule on CT chest: Repeat CT chest in 6months. CT ordered.   6. 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 He reports his leg weak. He defered physical therapy  -will check b12 and folate.  7. DM: on glipizide  8. B12 low 209.  On monthly shot.          Keara Mehta MD    1/24/2020    10:59 AM

## 2020-03-20 ENCOUNTER — APPOINTMENT (OUTPATIENT)
Dept: ONCOLOGY | Facility: HOSPITAL | Age: 85
End: 2020-03-20

## 2020-04-17 ENCOUNTER — APPOINTMENT (OUTPATIENT)
Dept: ONCOLOGY | Facility: HOSPITAL | Age: 85
End: 2020-04-17

## 2020-04-17 ENCOUNTER — TELEPHONE (OUTPATIENT)
Dept: ONCOLOGY | Facility: CLINIC | Age: 85
End: 2020-04-17

## 2020-04-17 NOTE — TELEPHONE ENCOUNTER
Keara Mehta MD    Pt called wanting to reschdule appointment on: 04/17     Due to: COVID-19   Please call pt back at: [phone]     Thanks

## 2020-04-24 ENCOUNTER — APPOINTMENT (OUTPATIENT)
Dept: LAB | Facility: HOSPITAL | Age: 85
End: 2020-04-24

## 2021-10-21 ENCOUNTER — TRANSCRIBE ORDERS (OUTPATIENT)
Dept: ADMINISTRATIVE | Facility: HOSPITAL | Age: 86
End: 2021-10-21

## 2021-10-21 ENCOUNTER — LAB (OUTPATIENT)
Dept: LAB | Facility: HOSPITAL | Age: 86
End: 2021-10-21

## 2021-10-21 DIAGNOSIS — E11.69 TYPE 2 DIABETES MELLITUS WITH OTHER SPECIFIED COMPLICATION, UNSPECIFIED WHETHER LONG TERM INSULIN USE (HCC): Primary | ICD-10-CM

## 2021-10-21 DIAGNOSIS — N18.4 CHRONIC KIDNEY DISEASE, STAGE IV (SEVERE) (HCC): ICD-10-CM

## 2021-10-21 LAB
ALBUMIN SERPL-MCNC: 4.1 G/DL (ref 3.5–5.2)
ALBUMIN/GLOB SERPL: 1.3 G/DL
ALP SERPL-CCNC: 81 U/L (ref 39–117)
ALT SERPL W P-5'-P-CCNC: 10 U/L (ref 1–41)
ANION GAP SERPL CALCULATED.3IONS-SCNC: 9 MMOL/L (ref 5–15)
AST SERPL-CCNC: 18 U/L (ref 1–40)
BILIRUB SERPL-MCNC: 0.4 MG/DL (ref 0–1.2)
BUN SERPL-MCNC: 36 MG/DL (ref 8–23)
BUN/CREAT SERPL: 18.2 (ref 7–25)
CALCIUM SPEC-SCNC: 8.6 MG/DL (ref 8.2–9.6)
CHLORIDE SERPL-SCNC: 107 MMOL/L (ref 98–107)
CO2 SERPL-SCNC: 24 MMOL/L (ref 22–29)
CREAT SERPL-MCNC: 1.98 MG/DL (ref 0.76–1.27)
GFR SERPL CREATININE-BSD FRML MDRD: 32 ML/MIN/1.73
GLOBULIN UR ELPH-MCNC: 3.1 GM/DL
GLUCOSE SERPL-MCNC: 154 MG/DL (ref 65–99)
HBA1C MFR BLD: 6.2 % (ref 4.8–5.6)
POTASSIUM SERPL-SCNC: 4.7 MMOL/L (ref 3.5–5.2)
PROT SERPL-MCNC: 7.2 G/DL (ref 6–8.5)
SODIUM SERPL-SCNC: 140 MMOL/L (ref 136–145)

## 2021-10-21 PROCEDURE — 80053 COMPREHEN METABOLIC PANEL: CPT | Performed by: INTERNAL MEDICINE

## 2021-10-21 PROCEDURE — 36415 COLL VENOUS BLD VENIPUNCTURE: CPT | Performed by: INTERNAL MEDICINE

## 2021-10-21 PROCEDURE — 83036 HEMOGLOBIN GLYCOSYLATED A1C: CPT | Performed by: INTERNAL MEDICINE

## 2023-03-11 ENCOUNTER — NURSE TRIAGE (OUTPATIENT)
Dept: CALL CENTER | Facility: HOSPITAL | Age: 88
End: 2023-03-11
Payer: MEDICARE

## 2023-03-11 RX ORDER — GLIPIZIDE 5 MG/1
5 TABLET, FILM COATED, EXTENDED RELEASE ORAL DAILY
Qty: 30 TABLET | Refills: 0 | Status: SHIPPED | OUTPATIENT
Start: 2023-03-11 | End: 2023-04-10

## 2023-03-11 NOTE — TELEPHONE ENCOUNTER
Outpatient Medication Detail    glipizide (glipiZIDE XL) 5 MG ER tablet        Sig: Take 1 tablet by mouth Daily for 30 days.        Sent to pharmacy as: glipiZIDE ER 5 MG Oral Tablet Extended Release 24 Hour (glipiZIDE XL)        Class: Normal        Route: Oral        Cosign for Ordering: Required by David Barajas MD        E-Prescribing Status: Receipt confirmed by pharmacy (3/11/2023  9:41 AM CST)          Has enough for today.

## 2023-03-11 NOTE — TELEPHONE ENCOUNTER
"  Reason for Disposition  • [1] Prescription refill request for ESSENTIAL medicine (i.e., likelihood of harm to patient if not taken) AND [2] triager unable to refill per department policy    Additional Information  • Negative: New-onset or worsening symptoms, see that guideline  (e.g., diarrhea, runny nose, sore throat)  • Negative: Medicine question not related to refill or renewal  • Negative: Caller requesting information unrelated to medicine  • Negative: [1] Prescription not at pharmacy AND [2] was prescribed by PCP recently (Exception: triager has access to EMR and prescription is recorded there. Go to Home Care and confirm for pharmacy.)  • Negative: [1] Pharmacy calling with prescription questions AND [2] triager unable to answer question  • Negative: Prescription request for new medicine (not a refill)  • Negative: Caller requesting a CONTROLLED substance prescription refill (e.g., narcotics, ADHD medicines)  • Negative: [1] Prescription refill request for NON-ESSENTIAL medicine (i.e., no harm to patient if med not taken) AND [2] triager unable to refill per department policy  • Negative: [1] Caller has NON-URGENT medicine question about med that PCP prescribed AND [2] triager unable to answer question  • Negative: [1] Prescription prescribed recently is not at pharmacy AND [2] triager has access to patient's EMR AND [3] prescription is recorded in the EMR    Answer Assessment - Initial Assessment Questions  1. DRUG NAME: \"What medicine do you need to have refilled?\"      glipizide  2. REFILLS REMAINING: \"How many refills are remaining?\" (Note: The label on the medicine or pill bottle will show how many refills are remaining. If there are no refills remaining, then a renewal may be needed.)      no  3. EXPIRATION DATE: \"What is the expiration date?\" (Note: The label states when the prescription will , and thus can no longer be refilled.)      na  4. PRESCRIBING HCP: \"Who prescribed it?\" Reason: If " "prescribed by specialist, call should be referred to that group.      Dr. Barajas  5. SYMPTOMS: \"Do you have any symptoms?\"      no  6. PREGNANCY: \"Is there any chance that you are pregnant?\" \"When was your last menstrual period?\"      na    Protocols used: MEDICATION REFILL AND RENEWAL CALL-ADULT-    "

## 2023-12-21 ENCOUNTER — HOSPITAL ENCOUNTER (OUTPATIENT)
Dept: GENERAL RADIOLOGY | Facility: HOSPITAL | Age: 88
Discharge: HOME | End: 2023-12-21
Admitting: PODIATRIST
Payer: MEDICARE

## 2023-12-21 ENCOUNTER — OFFICE VISIT (OUTPATIENT)
Dept: WOUND CARE | Facility: HOSPITAL | Age: 88
End: 2023-12-21
Payer: MEDICARE

## 2023-12-21 ENCOUNTER — TRANSCRIBE ORDERS (OUTPATIENT)
Dept: ADMINISTRATIVE | Facility: HOSPITAL | Age: 88
End: 2023-12-21
Payer: MEDICARE

## 2023-12-21 DIAGNOSIS — M20.41 ACQUIRED HAMMER TOE OF RIGHT FOOT: Primary | ICD-10-CM

## 2023-12-21 DIAGNOSIS — L84 CORNS AND CALLOSITIES: ICD-10-CM

## 2023-12-21 DIAGNOSIS — M20.41 ACQUIRED HAMMER TOE OF RIGHT FOOT: ICD-10-CM

## 2023-12-21 PROCEDURE — 73630 X-RAY EXAM OF FOOT: CPT

## 2023-12-21 PROCEDURE — 11055 PARING/CUTG B9 HYPRKER LES 1: CPT

## 2023-12-21 PROCEDURE — G0463 HOSPITAL OUTPT CLINIC VISIT: HCPCS

## 2025-03-31 ENCOUNTER — APPOINTMENT (OUTPATIENT)
Dept: GENERAL RADIOLOGY | Facility: HOSPITAL | Age: OVER 89
End: 2025-03-31
Payer: MEDICARE

## 2025-03-31 ENCOUNTER — HOSPITAL ENCOUNTER (EMERGENCY)
Facility: HOSPITAL | Age: OVER 89
Discharge: HOME | End: 2025-03-31
Attending: EMERGENCY MEDICINE | Admitting: EMERGENCY MEDICINE
Payer: MEDICARE

## 2025-03-31 VITALS
SYSTOLIC BLOOD PRESSURE: 170 MMHG | BODY MASS INDEX: 22.64 KG/M2 | TEMPERATURE: 98.2 F | WEIGHT: 140.9 LBS | HEART RATE: 79 BPM | HEIGHT: 66 IN | RESPIRATION RATE: 20 BRPM | OXYGEN SATURATION: 92 % | DIASTOLIC BLOOD PRESSURE: 77 MMHG

## 2025-03-31 DIAGNOSIS — U07.1 COVID: Primary | ICD-10-CM

## 2025-03-31 DIAGNOSIS — N17.9 AKI (ACUTE KIDNEY INJURY): ICD-10-CM

## 2025-03-31 LAB
ALBUMIN SERPL-MCNC: 3.9 G/DL (ref 3.5–5.2)
ALBUMIN/GLOB SERPL: 1.1 G/DL
ALP SERPL-CCNC: 77 U/L (ref 39–117)
ALT SERPL W P-5'-P-CCNC: 6 U/L (ref 1–41)
ANION GAP SERPL CALCULATED.3IONS-SCNC: 16 MMOL/L (ref 5–15)
AST SERPL-CCNC: 17 U/L (ref 1–40)
BASOPHILS # BLD AUTO: 0.01 10*3/MM3 (ref 0–0.2)
BASOPHILS NFR BLD AUTO: 0.1 % (ref 0–1.5)
BILIRUB SERPL-MCNC: 0.4 MG/DL (ref 0–1.2)
BUN SERPL-MCNC: 57 MG/DL (ref 8–23)
BUN/CREAT SERPL: 17 (ref 7–25)
CALCIUM SPEC-SCNC: 8.2 MG/DL (ref 8.2–9.6)
CHLORIDE SERPL-SCNC: 105 MMOL/L (ref 98–107)
CO2 SERPL-SCNC: 17 MMOL/L (ref 22–29)
CREAT SERPL-MCNC: 3.36 MG/DL (ref 0.76–1.27)
D-LACTATE SERPL-SCNC: 1.1 MMOL/L (ref 0.5–2)
DEPRECATED RDW RBC AUTO: 49.8 FL (ref 37–54)
EGFRCR SERPLBLD CKD-EPI 2021: 16.2 ML/MIN/1.73
EOSINOPHIL # BLD AUTO: 0 10*3/MM3 (ref 0–0.4)
EOSINOPHIL NFR BLD AUTO: 0 % (ref 0.3–6.2)
ERYTHROCYTE [DISTWIDTH] IN BLOOD BY AUTOMATED COUNT: 13.9 % (ref 12.3–15.4)
FLUAV RNA RESP QL NAA+PROBE: NOT DETECTED
FLUBV RNA RESP QL NAA+PROBE: NOT DETECTED
GLOBULIN UR ELPH-MCNC: 3.5 GM/DL
GLUCOSE SERPL-MCNC: 139 MG/DL (ref 65–99)
HCT VFR BLD AUTO: 32.4 % (ref 37.5–51)
HGB BLD-MCNC: 10.8 G/DL (ref 13–17.7)
IMM GRANULOCYTES # BLD AUTO: 0.06 10*3/MM3 (ref 0–0.05)
IMM GRANULOCYTES NFR BLD AUTO: 0.6 % (ref 0–0.5)
LYMPHOCYTES # BLD AUTO: 1.29 10*3/MM3 (ref 0.7–3.1)
LYMPHOCYTES NFR BLD AUTO: 13.9 % (ref 19.6–45.3)
MCH RBC QN AUTO: 32.4 PG (ref 26.6–33)
MCHC RBC AUTO-ENTMCNC: 33.3 G/DL (ref 31.5–35.7)
MCV RBC AUTO: 97.3 FL (ref 79–97)
MONOCYTES # BLD AUTO: 0.48 10*3/MM3 (ref 0.1–0.9)
MONOCYTES NFR BLD AUTO: 5.2 % (ref 5–12)
NEUTROPHILS NFR BLD AUTO: 7.45 10*3/MM3 (ref 1.7–7)
NEUTROPHILS NFR BLD AUTO: 80.2 % (ref 42.7–76)
NRBC BLD AUTO-RTO: 0 /100 WBC (ref 0–0.2)
PLATELET # BLD AUTO: 186 10*3/MM3 (ref 140–450)
PMV BLD AUTO: 10.7 FL (ref 6–12)
POTASSIUM SERPL-SCNC: 3.9 MMOL/L (ref 3.5–5.2)
PROT SERPL-MCNC: 7.4 G/DL (ref 6–8.5)
RBC # BLD AUTO: 3.33 10*6/MM3 (ref 4.14–5.8)
RSV RNA RESP QL NAA+PROBE: NOT DETECTED
S PYO AG THROAT QL: NEGATIVE
SARS-COV-2 RNA RESP QL NAA+PROBE: DETECTED
SODIUM SERPL-SCNC: 138 MMOL/L (ref 136–145)
WBC NRBC COR # BLD AUTO: 9.29 10*3/MM3 (ref 3.4–10.8)

## 2025-03-31 PROCEDURE — 87081 CULTURE SCREEN ONLY: CPT | Performed by: EMERGENCY MEDICINE

## 2025-03-31 PROCEDURE — 25010000002 CEFTRIAXONE PER 250 MG: Performed by: EMERGENCY MEDICINE

## 2025-03-31 PROCEDURE — 80053 COMPREHEN METABOLIC PANEL: CPT | Performed by: EMERGENCY MEDICINE

## 2025-03-31 PROCEDURE — 85025 COMPLETE CBC W/AUTO DIFF WBC: CPT | Performed by: EMERGENCY MEDICINE

## 2025-03-31 PROCEDURE — 87040 BLOOD CULTURE FOR BACTERIA: CPT | Performed by: EMERGENCY MEDICINE

## 2025-03-31 PROCEDURE — 96365 THER/PROPH/DIAG IV INF INIT: CPT

## 2025-03-31 PROCEDURE — 87637 SARSCOV2&INF A&B&RSV AMP PRB: CPT | Performed by: EMERGENCY MEDICINE

## 2025-03-31 PROCEDURE — 87880 STREP A ASSAY W/OPTIC: CPT | Performed by: EMERGENCY MEDICINE

## 2025-03-31 PROCEDURE — 25810000003 SODIUM CHLORIDE 0.9 % SOLUTION: Performed by: EMERGENCY MEDICINE

## 2025-03-31 PROCEDURE — 83605 ASSAY OF LACTIC ACID: CPT | Performed by: EMERGENCY MEDICINE

## 2025-03-31 PROCEDURE — 36415 COLL VENOUS BLD VENIPUNCTURE: CPT | Performed by: EMERGENCY MEDICINE

## 2025-03-31 PROCEDURE — 71045 X-RAY EXAM CHEST 1 VIEW: CPT

## 2025-03-31 PROCEDURE — 99283 EMERGENCY DEPT VISIT LOW MDM: CPT

## 2025-03-31 RX ORDER — SODIUM CHLORIDE 0.9 % (FLUSH) 0.9 %
10 SYRINGE (ML) INJECTION AS NEEDED
Status: DISCONTINUED | OUTPATIENT
Start: 2025-03-31 | End: 2025-03-31 | Stop reason: HOSPADM

## 2025-03-31 RX ORDER — CEFDINIR 300 MG/1
300 CAPSULE ORAL DAILY
Qty: 10 CAPSULE | Refills: 0 | Status: SHIPPED | OUTPATIENT
Start: 2025-03-31

## 2025-03-31 RX ORDER — ACETAMINOPHEN 500 MG
1000 TABLET ORAL ONCE
Status: COMPLETED | OUTPATIENT
Start: 2025-03-31 | End: 2025-03-31

## 2025-03-31 RX ADMIN — SODIUM CHLORIDE 1000 MG: 900 INJECTION INTRAVENOUS at 13:45

## 2025-03-31 RX ADMIN — ACETAMINOPHEN TAB 500 MG 1000 MG: 500 TAB at 13:58

## 2025-03-31 RX ADMIN — SODIUM CHLORIDE 1000 ML: 9 INJECTION, SOLUTION INTRAVENOUS at 13:46

## 2025-03-31 NOTE — ED PROVIDER NOTES
Subjective   History of Present Illness  Patient is brought to emergency room by ambulance with a complaint of cough and congestion and just generally feeling bad and feeling weak.  He actually complains mostly of a sore throat.  He does not know of any fever or chills.  The cough he has is productive of brownish sputum.  His wife has been ill with a similar illness at home but is gradually getting over now.    History provided by:  Patient   used: No    Cough  Cough characteristics:  Productive  Sputum characteristics:  Brown  Severity:  Moderate  Onset quality:  Gradual  Duration:  3 days  Timing:  Intermittent  Progression:  Worsening  Chronicity:  New  Smoker: no    Context: sick contacts and upper respiratory infection    Context: not animal exposure, not exposure to allergens, not fumes, not occupational exposure, not smoke exposure, not weather changes and not with activity    Worsened by:  Nothing  Ineffective treatments:  None tried  Associated symptoms: no ear fullness, no eye discharge, no rash, no sinus congestion and no wheezing    Risk factors: no chemical exposure, no recent infection and no recent travel        Review of Systems   Constitutional: Negative.    HENT: Negative.     Eyes:  Negative for discharge.   Respiratory:  Positive for cough. Negative for wheezing.    Genitourinary: Negative.    Musculoskeletal: Negative.    Skin:  Negative for rash.   All other systems reviewed and are negative.      Past Medical History:   Diagnosis Date    Arthritis     AVM (arteriovenous malformation)     Cancer     PROSTATE    Cataract     Colon cancer     Colon polyp     Diabetes mellitus     Diverticulosis     Dizzinesses 10/20/2017    GERD (gastroesophageal reflux disease)     Hemorrhoids     History of transfusion     Hypertension     Inguinal hernia     Iron deficiency anemia due to chronic blood loss 7/26/2017    Prostate CA     Prostate hypertrophy     Rotator cuff syndrome        No  Known Allergies    Past Surgical History:   Procedure Laterality Date    APPENDECTOMY      CHOLECYSTECTOMY      COLON RESECTION N/A 9/18/2017    Procedure: LAPAROSCOPIC ASSISTED RIGHT COLECTOMY;  Surgeon: Caroline Loomis MD;  Location: [facility] OR;  Service:     COLONOSCOPY  04/29/2010    COLONOSCOPY N/A 8/25/2017    Procedure: COLONOSCOPY WITH ANESTHESIA;  Surgeon: Joaquín Neff MD;  Location: [facility] ENDOSCOPY;  Service:     ENDOSCOPY  03/27/2013    ENDOSCOPY N/A 8/25/2017    Procedure: ESOPHAGOGASTRODUODENOSCOPY WITH ANESTHESIA;  Surgeon: Joaquín Neff MD;  Location: [facility] ENDOSCOPY;  Service:     EYE SURGERY Left     CATARACT    INGUINAL HERNIA REPAIR Bilateral 11/1/2019    Procedure: OPEN BILATERAL INGUINAL HERNIA REPAIR WITH MESH;  Surgeon: Caroline Loomis MD;  Location: [facility] OR;  Service: General    INTERVENTIONAL RADIOLOGY PROCEDURE N/A 9/18/2017    Procedure: URETHRA DILITATION with dominguez catheter insertion;  Surgeon: Mamadou Paez MD;  Location:  [facility] OR;  Service:     PROSTATECTOMY      PROSTATECTOMY         Family History   Problem Relation Age of Onset    Colon cancer Neg Hx     Colon polyps Neg Hx        Social History     Socioeconomic History    Marital status:    Tobacco Use    Smoking status: Never    Smokeless tobacco: Never   Substance and Sexual Activity    Alcohol use: No    Drug use: No    Sexual activity: Defer       Prior to Admission medications    Medication Sig Start Date End Date Taking? Authorizing Provider   amLODIPine (NORVASC) 10 MG tablet Take 10 mg by mouth Daily. 3/20/17   Roney Foster MD   cefdinir (OMNICEF) 300 MG capsule Take 1 capsule by mouth Daily. 3/31/25   Thomas Crisostomo Jr., MD   glipizide (glipiZIDE XL) 5 MG ER tablet Take 1 tablet by mouth Daily for 30 days. 3/11/23 4/10/23  David Barajas MD   glipiZIDE (GLUCOTROL) 5 MG ER tablet Take 5 mg by mouth Daily. 6/28/17   Roney Foster MD   HYDROcodone-acetaminophen (NORCO)  7.5-325 MG per tablet Take 1-2 tablets by mouth Every 4 (Four) Hours As Needed for Moderate Pain  (Pain). 11/1/19   Caroline Loomis MD   losartan (COZAAR) 100 MG tablet Take 100 mg by mouth Daily. 7/12/17   Roney Foster MD   Nirmatrelvir & Ritonavir, 300mg/100mg, (PAXLOVID) Take 2 tablets by mouth 2 (Two) Times a Day. 3/31/25   Thomas Crisostomo Jr., MD       Medications   sodium chloride 0.9 % flush 10 mL (has no administration in time range)   sodium chloride 0.9 % bolus 1,000 mL (1,000 mL Intravenous New Bag 3/31/25 1346)   cefTRIAXone (ROCEPHIN) 1,000 mg in sodium chloride 0.9 % 100 mL MBP (1,000 mg Intravenous New Bag 3/31/25 1345)   acetaminophen (TYLENOL) tablet 1,000 mg (has no administration in time range)       Vitals:    03/31/25 1331   BP: (!) 188/96   Pulse: 69   Resp:    Temp:    SpO2: 94%         Objective   Physical Exam  Vitals and nursing note reviewed.   Constitutional:       Appearance: Normal appearance.   HENT:      Head: Normocephalic and atraumatic.      Mouth/Throat:      Mouth: Mucous membranes are moist.      Pharynx: Oropharynx is clear.   Eyes:      Extraocular Movements: Extraocular movements intact.      Pupils: Pupils are equal, round, and reactive to light.   Cardiovascular:      Rate and Rhythm: Normal rate and regular rhythm.   Pulmonary:      Effort: Pulmonary effort is normal.      Breath sounds: Rhonchi present.   Abdominal:      General: Abdomen is flat.      Palpations: Abdomen is soft.      Tenderness: There is no abdominal tenderness.   Musculoskeletal:         General: Normal range of motion.      Cervical back: Normal range of motion and neck supple.   Skin:     General: Skin is warm and dry.   Neurological:      General: No focal deficit present.      Mental Status: He is alert and oriented to person, place, and time.   Psychiatric:         Mood and Affect: Mood normal.         Behavior: Behavior normal.         Procedures         Lab Results (last 24 hours)        Procedure Component Value Units Date/Time    CBC & Differential [[id number]]  (Abnormal) Collected: 03/31/25 1114    Specimen: Blood Updated: 03/31/25 1121    Narrative:      The following orders were created for panel order CBC & Differential.  Procedure                               Abnormality         Status                     ---------                               -----------         ------                     CBC Auto Differential[[id number]]        Abnormal            Final result                 Please view results for these tests on the individual orders.    Comprehensive Metabolic Panel [[id number]]  (Abnormal) Collected: 03/31/25 1114    Specimen: Blood Updated: 03/31/25 1156     Glucose 139 mg/dL      BUN 57 mg/dL      Creatinine 3.36 mg/dL      Sodium 138 mmol/L      Potassium 3.9 mmol/L      Chloride 105 mmol/L      CO2 17.0 mmol/L      Calcium 8.2 mg/dL      Total Protein 7.4 g/dL      Albumin 3.9 g/dL      ALT (SGPT) 6 U/L      AST (SGOT) 17 U/L      Alkaline Phosphatase 77 U/L      Total Bilirubin 0.4 mg/dL      Globulin 3.5 gm/dL      A/G Ratio 1.1 g/dL      BUN/Creatinine Ratio 17.0     Anion Gap 16.0 mmol/L      eGFR 16.2 mL/min/1.73     Narrative:      GFR Categories in Chronic Kidney Disease (CKD)      GFR Category          GFR (mL/min/1.73)    Interpretation  G1                     90 or greater         Normal or high (1)  G2                      60-89                Mild decrease (1)  G3a                   45-59                Mild to moderate decrease  G3b                   30-44                Moderate to severe decrease  G4                    15-29                Severe decrease  G5                    14 or less           Kidney failure          (1)In the absence of evidence of kidney disease, neither GFR category G1 or G2 fulfill the criteria for CKD.    eGFR calculation 2021 CKD-EPI creatinine equation, which does not include race as a factor    CBC Auto Differential [[id number]]   (Abnormal) Collected: 03/31/25 1114    Specimen: Blood Updated: 03/31/25 1121     WBC 9.29 10*3/mm3      RBC 3.33 10*6/mm3      Hemoglobin 10.8 g/dL      Hematocrit 32.4 %      MCV 97.3 fL      MCH 32.4 pg      MCHC 33.3 g/dL      RDW 13.9 %      RDW-SD 49.8 fl      MPV 10.7 fL      Platelets 186 10*3/mm3      Neutrophil % 80.2 %      Lymphocyte % 13.9 %      Monocyte % 5.2 %      Eosinophil % 0.0 %      Basophil % 0.1 %      Immature Grans % 0.6 %      Neutrophils, Absolute 7.45 10*3/mm3      Lymphocytes, Absolute 1.29 10*3/mm3      Monocytes, Absolute 0.48 10*3/mm3      Eosinophils, Absolute 0.00 10*3/mm3      Basophils, Absolute 0.01 10*3/mm3      Immature Grans, Absolute 0.06 10*3/mm3      nRBC 0.0 /100 WBC     Blood Culture - Blood, Arm, Right [[id number]] Collected: 03/31/25 1200    Specimen: Blood from Arm, Right Updated: 03/31/25 1218    COVID PRE-OP / PRE-PROCEDURE SCREENING ORDER (NO ISOLATION) - Swab, Nasopharynx [[id number]]  (Abnormal) Collected: 03/31/25 1203    Specimen: Swab from Nasopharynx Updated: 03/31/25 1254    Narrative:      The following orders were created for panel order COVID PRE-OP / PRE-PROCEDURE SCREENING ORDER (NO ISOLATION) - Swab, Nasopharynx.  Procedure                               Abnormality         Status                     ---------                               -----------         ------                     COVID-19, FLU A/B, RSV P...[[id number]]  Abnormal            Final result                 Please view results for these tests on the individual orders.    Rapid Strep A Screen - Swab, Throat [[id number]]  (Normal) Collected: 03/31/25 1203    Specimen: Swab from Throat Updated: 03/31/25 1220     Strep A Ag Negative    COVID-19, FLU A/B, RSV PCR 1 HR TAT - Swab, Nasopharynx [[id number]]  (Abnormal) Collected: 03/31/25 1203    Specimen: Swab from Nasopharynx Updated: 03/31/25 1254     COVID19 Detected     Influenza A PCR Not Detected     Influenza B PCR Not Detected     RSV, PCR  Not Detected    Narrative:      Fact sheet for providers: https://www.fda.gov/media/779530/download    Fact sheet for patients: https://www.fda.gov/media/357116/download    Test performed by PCR.    Beta Strep Culture, Throat - Swab, Throat [[id number]] Collected: 03/31/25 1203    Specimen: Swab from Throat Updated: 03/31/25 1220    Lactic Acid, Plasma [[id number]]  (Normal) Collected: 03/31/25 1209    Specimen: Blood Updated: 03/31/25 1230     Lactate 1.1 mmol/L     Blood Culture - Blood, Arm, Left [[id number]] Collected: 03/31/25 1344    Specimen: Blood from Arm, Left Updated: 03/31/25 1355            XR Chest 1 View   Final Result   1.  No acute process in the chest. No visualized infiltrate.       This report was signed and finalized on 3/31/2025 11:45 AM by Dr Garry Burnett.              ED Course          MDM  Number of Diagnoses or Management Options  DELLA (acute kidney injury): new and requires workup  COVID: new and requires workup  Diagnosis management comments: I told the patient he is COVID-positive nothing is the majority of the problem.  Strep screen is negative.  His chest x-ray does not show pneumonia.  He does have little bit of a kidney injury in the sense of possible dehydration.  His daughter is in room with him now.  He wants to go home.  His pulse ox does drop sometime in the high 80s and sometimes even the 70s but I think that is spurious because he did not seem to be struggling for air.  I did offer to put him in the hospital but he is adamant that he wants to go home.  We will give him some fluids here and put him on some Paxlovid and his daughter also asked for an antibiotic just to be safe for given that.  He is discharged in stable condition.       Amount and/or Complexity of Data Reviewed  Clinical lab tests: ordered and reviewed  Tests in the radiology section of CPT®: ordered and reviewed  Decide to obtain previous medical records or to obtain history from someone other than the patient:  yes    Risk of Complications, Morbidity, and/or Mortality  Presenting problems: moderate  Diagnostic procedures: moderate  Management options: moderate    Patient Progress  Patient progress: stable        Final diagnoses:   COVID   DELLA (acute kidney injury)          Thomas Crisostomo Jr., MD  03/31/25 1882

## 2025-04-02 LAB — BACTERIA SPEC AEROBE CULT: NORMAL

## 2025-04-05 LAB
BACTERIA SPEC AEROBE CULT: NORMAL
BACTERIA SPEC AEROBE CULT: NORMAL

## 2025-05-04 ENCOUNTER — HOSPITAL ENCOUNTER (INPATIENT)
Facility: HOSPITAL | Age: OVER 89
LOS: 9 days | Discharge: HOME | End: 2025-05-13
Attending: EMERGENCY MEDICINE | Admitting: INTERNAL MEDICINE
Payer: MEDICARE

## 2025-05-04 ENCOUNTER — APPOINTMENT (OUTPATIENT)
Dept: CT IMAGING | Facility: HOSPITAL | Age: OVER 89
End: 2025-05-04
Payer: MEDICARE

## 2025-05-04 ENCOUNTER — APPOINTMENT (OUTPATIENT)
Dept: GENERAL RADIOLOGY | Facility: HOSPITAL | Age: OVER 89
End: 2025-05-04
Payer: MEDICARE

## 2025-05-04 DIAGNOSIS — F03.918 AGGRESSIVE BEHAVIOR DUE TO DEMENTIA: ICD-10-CM

## 2025-05-04 DIAGNOSIS — R54 ADVANCED AGE: ICD-10-CM

## 2025-05-04 DIAGNOSIS — E83.42 HYPOMAGNESEMIA: ICD-10-CM

## 2025-05-04 DIAGNOSIS — E11.69 TYPE 2 DIABETES MELLITUS WITH OTHER SPECIFIED COMPLICATION, UNSPECIFIED WHETHER LONG TERM INSULIN USE: ICD-10-CM

## 2025-05-04 DIAGNOSIS — Z78.9 DECREASED ACTIVITIES OF DAILY LIVING (ADL): ICD-10-CM

## 2025-05-04 DIAGNOSIS — Z74.09 IMPAIRED FUNCTIONAL MOBILITY AND ACTIVITY TOLERANCE: ICD-10-CM

## 2025-05-04 DIAGNOSIS — N17.9 ACUTE RENAL FAILURE, UNSPECIFIED ACUTE RENAL FAILURE TYPE: Primary | ICD-10-CM

## 2025-05-04 DIAGNOSIS — R41.82 ALTERED MENTAL STATUS, UNSPECIFIED ALTERED MENTAL STATUS TYPE: ICD-10-CM

## 2025-05-04 DIAGNOSIS — R13.10 DYSPHAGIA, UNSPECIFIED TYPE: ICD-10-CM

## 2025-05-04 LAB
ALBUMIN SERPL-MCNC: 4 G/DL (ref 3.5–5.2)
ALBUMIN/GLOB SERPL: 1.2 G/DL
ALP SERPL-CCNC: 84 U/L (ref 39–117)
ALT SERPL W P-5'-P-CCNC: 6 U/L (ref 1–41)
AMPHET+METHAMPHET UR QL: NEGATIVE
AMPHETAMINES UR QL: NEGATIVE
ANION GAP SERPL CALCULATED.3IONS-SCNC: 16 MMOL/L (ref 5–15)
APAP SERPL-MCNC: <5 MCG/ML (ref 0–30)
AST SERPL-CCNC: 16 U/L (ref 1–40)
BACTERIA UR QL AUTO: NORMAL /HPF
BARBITURATES UR QL SCN: NEGATIVE
BASOPHILS # BLD AUTO: 0.03 10*3/MM3 (ref 0–0.2)
BASOPHILS NFR BLD AUTO: 0.4 % (ref 0–1.5)
BENZODIAZ UR QL SCN: NEGATIVE
BILIRUB SERPL-MCNC: 0.3 MG/DL (ref 0–1.2)
BILIRUB UR QL STRIP: NEGATIVE
BUN SERPL-MCNC: 52 MG/DL (ref 8–23)
BUN/CREAT SERPL: 14.3 (ref 7–25)
BUPRENORPHINE SERPL-MCNC: NEGATIVE NG/ML
CALCIUM SPEC-SCNC: 7.8 MG/DL (ref 8.2–9.6)
CANNABINOIDS SERPL QL: NEGATIVE
CHLORIDE SERPL-SCNC: 104 MMOL/L (ref 98–107)
CLARITY UR: CLEAR
CO2 SERPL-SCNC: 15 MMOL/L (ref 22–29)
COCAINE UR QL: NEGATIVE
COLOR UR: YELLOW
CREAT SERPL-MCNC: 3.64 MG/DL (ref 0.76–1.27)
DEPRECATED RDW RBC AUTO: 51.7 FL (ref 37–54)
EGFRCR SERPLBLD CKD-EPI 2021: 14.7 ML/MIN/1.73
EOSINOPHIL # BLD AUTO: 0.01 10*3/MM3 (ref 0–0.4)
EOSINOPHIL NFR BLD AUTO: 0.1 % (ref 0.3–6.2)
ERYTHROCYTE [DISTWIDTH] IN BLOOD BY AUTOMATED COUNT: 14.5 % (ref 12.3–15.4)
ETHANOL UR QL: <0.01 %
FENTANYL UR-MCNC: NEGATIVE NG/ML
GLOBULIN UR ELPH-MCNC: 3.3 GM/DL
GLUCOSE BLDC GLUCOMTR-MCNC: 138 MG/DL (ref 70–130)
GLUCOSE BLDC GLUCOMTR-MCNC: 153 MG/DL (ref 70–130)
GLUCOSE SERPL-MCNC: 269 MG/DL (ref 65–99)
GLUCOSE UR STRIP-MCNC: ABNORMAL MG/DL
HCT VFR BLD AUTO: 33 % (ref 37.5–51)
HGB BLD-MCNC: 10.7 G/DL (ref 13–17.7)
HGB UR QL STRIP.AUTO: NEGATIVE
HYALINE CASTS UR QL AUTO: NORMAL /LPF
IMM GRANULOCYTES # BLD AUTO: 0.07 10*3/MM3 (ref 0–0.05)
IMM GRANULOCYTES NFR BLD AUTO: 0.8 % (ref 0–0.5)
INR PPP: 1.07 (ref 0.91–1.09)
IRON 24H UR-MRATE: 85 MCG/DL (ref 59–158)
IRON SATN MFR SERPL: 31 % (ref 20–50)
KETONES UR QL STRIP: NEGATIVE
LEUKOCYTE ESTERASE UR QL STRIP.AUTO: NEGATIVE
LYMPHOCYTES # BLD AUTO: 0.94 10*3/MM3 (ref 0.7–3.1)
LYMPHOCYTES NFR BLD AUTO: 11.1 % (ref 19.6–45.3)
MAGNESIUM SERPL-MCNC: 1.5 MG/DL (ref 1.7–2.3)
MCH RBC QN AUTO: 31.6 PG (ref 26.6–33)
MCHC RBC AUTO-ENTMCNC: 32.4 G/DL (ref 31.5–35.7)
MCV RBC AUTO: 97.3 FL (ref 79–97)
METHADONE UR QL SCN: NEGATIVE
MONOCYTES # BLD AUTO: 0.45 10*3/MM3 (ref 0.1–0.9)
MONOCYTES NFR BLD AUTO: 5.3 % (ref 5–12)
NEUTROPHILS NFR BLD AUTO: 6.97 10*3/MM3 (ref 1.7–7)
NEUTROPHILS NFR BLD AUTO: 82.3 % (ref 42.7–76)
NITRITE UR QL STRIP: NEGATIVE
NRBC BLD AUTO-RTO: 0 /100 WBC (ref 0–0.2)
OPIATES UR QL: NEGATIVE
OSMOLALITY UR: 270 MOSM/KG (ref 50–1400)
OXYCODONE UR QL SCN: NEGATIVE
PCP UR QL SCN: NEGATIVE
PH UR STRIP.AUTO: <=5 [PH] (ref 5–8)
PLATELET # BLD AUTO: 241 10*3/MM3 (ref 140–450)
PMV BLD AUTO: 10.2 FL (ref 6–12)
POTASSIUM SERPL-SCNC: 3.9 MMOL/L (ref 3.5–5.2)
PROT SERPL-MCNC: 7.3 G/DL (ref 6–8.5)
PROT UR QL STRIP: ABNORMAL
PROTHROMBIN TIME: 14.4 SECONDS (ref 11.8–14.8)
RBC # BLD AUTO: 3.39 10*6/MM3 (ref 4.14–5.8)
RBC # UR STRIP: NORMAL /HPF
REF LAB TEST METHOD: NORMAL
SALICYLATES SERPL-MCNC: <0.3 MG/DL
SODIUM SERPL-SCNC: 135 MMOL/L (ref 136–145)
SP GR UR STRIP: 1.01 (ref 1–1.03)
SQUAMOUS #/AREA URNS HPF: NORMAL /HPF
TIBC SERPL-MCNC: 276 MCG/DL (ref 298–536)
TRANSFERRIN SERPL-MCNC: 185 MG/DL (ref 200–360)
TRICYCLICS UR QL SCN: NEGATIVE
URATE SERPL-MCNC: 7.3 MG/DL (ref 3.4–7)
UROBILINOGEN UR QL STRIP: ABNORMAL
WBC # UR STRIP: NORMAL /HPF
WBC NRBC COR # BLD AUTO: 8.47 10*3/MM3 (ref 3.4–10.8)

## 2025-05-04 PROCEDURE — 83935 ASSAY OF URINE OSMOLALITY: CPT | Performed by: INTERNAL MEDICINE

## 2025-05-04 PROCEDURE — 25010000002 HYDRALAZINE PER 20 MG: Performed by: HOSPITALIST

## 2025-05-04 PROCEDURE — 85610 PROTHROMBIN TIME: CPT | Performed by: EMERGENCY MEDICINE

## 2025-05-04 PROCEDURE — 85025 COMPLETE CBC W/AUTO DIFF WBC: CPT | Performed by: EMERGENCY MEDICINE

## 2025-05-04 PROCEDURE — 83540 ASSAY OF IRON: CPT | Performed by: INTERNAL MEDICINE

## 2025-05-04 PROCEDURE — 81001 URINALYSIS AUTO W/SCOPE: CPT | Performed by: EMERGENCY MEDICINE

## 2025-05-04 PROCEDURE — 25810000003 LACTATED RINGERS SOLUTION: Performed by: EMERGENCY MEDICINE

## 2025-05-04 PROCEDURE — 93005 ELECTROCARDIOGRAM TRACING: CPT | Performed by: EMERGENCY MEDICINE

## 2025-05-04 PROCEDURE — 99285 EMERGENCY DEPT VISIT HI MDM: CPT | Performed by: EMERGENCY MEDICINE

## 2025-05-04 PROCEDURE — 80307 DRUG TEST PRSMV CHEM ANLYZR: CPT | Performed by: EMERGENCY MEDICINE

## 2025-05-04 PROCEDURE — 80053 COMPREHEN METABOLIC PANEL: CPT | Performed by: EMERGENCY MEDICINE

## 2025-05-04 PROCEDURE — 82077 ASSAY SPEC XCP UR&BREATH IA: CPT | Performed by: EMERGENCY MEDICINE

## 2025-05-04 PROCEDURE — 83735 ASSAY OF MAGNESIUM: CPT | Performed by: EMERGENCY MEDICINE

## 2025-05-04 PROCEDURE — 84466 ASSAY OF TRANSFERRIN: CPT | Performed by: INTERNAL MEDICINE

## 2025-05-04 PROCEDURE — 82570 ASSAY OF URINE CREATININE: CPT | Performed by: INTERNAL MEDICINE

## 2025-05-04 PROCEDURE — 25810000003 SODIUM CHLORIDE 0.9 % SOLUTION: Performed by: HOSPITALIST

## 2025-05-04 PROCEDURE — 80143 DRUG ASSAY ACETAMINOPHEN: CPT | Performed by: EMERGENCY MEDICINE

## 2025-05-04 PROCEDURE — 25010000002 ZIPRASIDONE MESYLATE PER 10 MG: Performed by: HOSPITALIST

## 2025-05-04 PROCEDURE — 70450 CT HEAD/BRAIN W/O DYE: CPT

## 2025-05-04 PROCEDURE — 80179 DRUG ASSAY SALICYLATE: CPT | Performed by: EMERGENCY MEDICINE

## 2025-05-04 PROCEDURE — 93010 ELECTROCARDIOGRAM REPORT: CPT | Performed by: INTERNAL MEDICINE

## 2025-05-04 PROCEDURE — 84550 ASSAY OF BLOOD/URIC ACID: CPT | Performed by: INTERNAL MEDICINE

## 2025-05-04 PROCEDURE — 71045 X-RAY EXAM CHEST 1 VIEW: CPT

## 2025-05-04 PROCEDURE — 82948 REAGENT STRIP/BLOOD GLUCOSE: CPT

## 2025-05-04 PROCEDURE — 25010000002 MAGNESIUM SULFATE IN D5W 1G/100ML (PREMIX) 1-5 GM/100ML-% SOLUTION: Performed by: HOSPITALIST

## 2025-05-04 RX ORDER — INSULIN LISPRO 100 [IU]/ML
2-7 INJECTION, SOLUTION INTRAVENOUS; SUBCUTANEOUS
Status: DISCONTINUED | OUTPATIENT
Start: 2025-05-04 | End: 2025-05-13 | Stop reason: HOSPADM

## 2025-05-04 RX ORDER — DEXTROSE MONOHYDRATE 25 G/50ML
25 INJECTION, SOLUTION INTRAVENOUS
Status: DISCONTINUED | OUTPATIENT
Start: 2025-05-04 | End: 2025-05-13 | Stop reason: HOSPADM

## 2025-05-04 RX ORDER — SODIUM CHLORIDE 9 MG/ML
40 INJECTION, SOLUTION INTRAVENOUS AS NEEDED
Status: DISCONTINUED | OUTPATIENT
Start: 2025-05-04 | End: 2025-05-13 | Stop reason: HOSPADM

## 2025-05-04 RX ORDER — AMLODIPINE BESYLATE 10 MG/1
10 TABLET ORAL
Status: DISCONTINUED | OUTPATIENT
Start: 2025-05-05 | End: 2025-05-13 | Stop reason: HOSPADM

## 2025-05-04 RX ORDER — IBUPROFEN 600 MG/1
1 TABLET ORAL
Status: DISCONTINUED | OUTPATIENT
Start: 2025-05-04 | End: 2025-05-13 | Stop reason: HOSPADM

## 2025-05-04 RX ORDER — ACETAMINOPHEN 160 MG/5ML
650 SOLUTION ORAL EVERY 4 HOURS PRN
Status: DISCONTINUED | OUTPATIENT
Start: 2025-05-04 | End: 2025-05-13 | Stop reason: HOSPADM

## 2025-05-04 RX ORDER — ONDANSETRON 2 MG/ML
4 INJECTION INTRAMUSCULAR; INTRAVENOUS EVERY 6 HOURS PRN
Status: DISCONTINUED | OUTPATIENT
Start: 2025-05-04 | End: 2025-05-13 | Stop reason: HOSPADM

## 2025-05-04 RX ORDER — BISACODYL 10 MG
10 SUPPOSITORY, RECTAL RECTAL DAILY PRN
Status: DISCONTINUED | OUTPATIENT
Start: 2025-05-04 | End: 2025-05-13 | Stop reason: HOSPADM

## 2025-05-04 RX ORDER — ACETAMINOPHEN 650 MG/1
650 SUPPOSITORY RECTAL EVERY 4 HOURS PRN
Status: DISCONTINUED | OUTPATIENT
Start: 2025-05-04 | End: 2025-05-13 | Stop reason: HOSPADM

## 2025-05-04 RX ORDER — SODIUM CHLORIDE 9 MG/ML
40 INJECTION, SOLUTION INTRAVENOUS CONTINUOUS
Status: DISCONTINUED | OUTPATIENT
Start: 2025-05-04 | End: 2025-05-07

## 2025-05-04 RX ORDER — BISACODYL 5 MG/1
5 TABLET, DELAYED RELEASE ORAL DAILY PRN
Status: DISCONTINUED | OUTPATIENT
Start: 2025-05-04 | End: 2025-05-13 | Stop reason: HOSPADM

## 2025-05-04 RX ORDER — HYDRALAZINE HYDROCHLORIDE 20 MG/ML
20 INJECTION INTRAMUSCULAR; INTRAVENOUS EVERY 4 HOURS PRN
Status: DISCONTINUED | OUTPATIENT
Start: 2025-05-04 | End: 2025-05-07

## 2025-05-04 RX ORDER — ZIPRASIDONE MESYLATE 20 MG/ML
20 INJECTION, POWDER, LYOPHILIZED, FOR SOLUTION INTRAMUSCULAR EVERY 4 HOURS PRN
Status: DISCONTINUED | OUTPATIENT
Start: 2025-05-04 | End: 2025-05-13 | Stop reason: HOSPADM

## 2025-05-04 RX ORDER — SODIUM CHLORIDE 0.9 % (FLUSH) 0.9 %
10 SYRINGE (ML) INJECTION AS NEEDED
Status: DISCONTINUED | OUTPATIENT
Start: 2025-05-04 | End: 2025-05-13 | Stop reason: HOSPADM

## 2025-05-04 RX ORDER — NICOTINE POLACRILEX 4 MG
15 LOZENGE BUCCAL
Status: DISCONTINUED | OUTPATIENT
Start: 2025-05-04 | End: 2025-05-13 | Stop reason: HOSPADM

## 2025-05-04 RX ORDER — OXYCODONE AND ACETAMINOPHEN 5; 325 MG/1; MG/1
1 TABLET ORAL EVERY 8 HOURS PRN
Refills: 0 | Status: DISPENSED | OUTPATIENT
Start: 2025-05-04 | End: 2025-05-09

## 2025-05-04 RX ORDER — POLYETHYLENE GLYCOL 3350 17 G/17G
17 POWDER, FOR SOLUTION ORAL DAILY PRN
Status: DISCONTINUED | OUTPATIENT
Start: 2025-05-04 | End: 2025-05-13 | Stop reason: HOSPADM

## 2025-05-04 RX ORDER — MAGNESIUM SULFATE 1 G/100ML
1 INJECTION INTRAVENOUS
Status: DISPENSED | OUTPATIENT
Start: 2025-05-04 | End: 2025-05-04

## 2025-05-04 RX ORDER — AMOXICILLIN 250 MG
2 CAPSULE ORAL 2 TIMES DAILY PRN
Status: DISCONTINUED | OUTPATIENT
Start: 2025-05-04 | End: 2025-05-13 | Stop reason: HOSPADM

## 2025-05-04 RX ORDER — ACETAMINOPHEN 325 MG/1
650 TABLET ORAL EVERY 4 HOURS PRN
Status: DISCONTINUED | OUTPATIENT
Start: 2025-05-04 | End: 2025-05-13 | Stop reason: HOSPADM

## 2025-05-04 RX ORDER — QUETIAPINE FUMARATE 25 MG/1
25 TABLET, FILM COATED ORAL NIGHTLY
Status: DISCONTINUED | OUTPATIENT
Start: 2025-05-04 | End: 2025-05-06

## 2025-05-04 RX ORDER — SODIUM CHLORIDE 0.9 % (FLUSH) 0.9 %
10 SYRINGE (ML) INJECTION EVERY 12 HOURS SCHEDULED
Status: DISCONTINUED | OUTPATIENT
Start: 2025-05-04 | End: 2025-05-13 | Stop reason: HOSPADM

## 2025-05-04 RX ADMIN — QUETIAPINE FUMARATE 25 MG: 25 TABLET ORAL at 19:47

## 2025-05-04 RX ADMIN — HYDRALAZINE HYDROCHLORIDE 20 MG: 20 INJECTION INTRAMUSCULAR; INTRAVENOUS at 20:30

## 2025-05-04 RX ADMIN — MAGNESIUM SULFATE IN DEXTROSE 1 G: 10 INJECTION, SOLUTION INTRAVENOUS at 19:06

## 2025-05-04 RX ADMIN — SODIUM CHLORIDE 75 ML/HR: 9 INJECTION, SOLUTION INTRAVENOUS at 17:48

## 2025-05-04 RX ADMIN — ZIPRASIDONE MESYLATE 20 MG: 20 INJECTION, POWDER, LYOPHILIZED, FOR SOLUTION INTRAMUSCULAR at 18:34

## 2025-05-04 RX ADMIN — SODIUM CHLORIDE, SODIUM LACTATE, POTASSIUM CHLORIDE, AND CALCIUM CHLORIDE 500 ML: .6; .31; .03; .02 INJECTION, SOLUTION INTRAVENOUS at 14:17

## 2025-05-04 RX ADMIN — MAGNESIUM SULFATE IN DEXTROSE 1 G: 10 INJECTION, SOLUTION INTRAVENOUS at 20:31

## 2025-05-04 NOTE — ED PROVIDER NOTES
Subjective   History of Present Illness  89-year-old male presents to the ED with altered mental status.  Patient was brought in by EMS change in his baseline level of confusion.  He has a history of dementia, primary care physician is Dr. Barajas.  He also has a history of hypertension and poorly controlled diabetes.  Per family the patient has not been compliant with his diet medications.  Patient lives at home with his wife who is 86 years old and frail.  She states the patient became confused and increasingly aggressive and has made some threatening statements towards his wife and family.  He was also engaging with animal control regarding some loose goats on the property.  Glucose on scene 334.  There is been no reports of unilateral deficits.  No complaints of chest pain, shortness of breath, abdominal pain, nausea or vomiting.  Patient is oriented to self only.      History provided by:  Patient  History limited by:  Dementia      Review of Systems   Unable to perform ROS: Dementia       Past Medical History:   Diagnosis Date    Arthritis     AVM (arteriovenous malformation)     Cancer     PROSTATE    Cataract     Colon cancer     Colon polyp     Diabetes mellitus     Diverticulosis     Dizzinesses 10/20/2017    GERD (gastroesophageal reflux disease)     Hemorrhoids     History of transfusion     Hypertension     Inguinal hernia     Iron deficiency anemia due to chronic blood loss 7/26/2017    Prostate CA     Prostate hypertrophy     Rotator cuff syndrome        No Known Allergies    Past Surgical History:   Procedure Laterality Date    APPENDECTOMY      CHOLECYSTECTOMY      COLON RESECTION N/A 9/18/2017    Procedure: LAPAROSCOPIC ASSISTED RIGHT COLECTOMY;  Surgeon: Caroline Loomis MD;  Location: [facility] OR;  Service:     COLONOSCOPY  04/29/2010    COLONOSCOPY N/A 8/25/2017    Procedure: COLONOSCOPY WITH ANESTHESIA;  Surgeon: Joaquín Neff MD;  Location: [facility] ENDOSCOPY;  Service:     ENDOSCOPY  03/27/2013     ENDOSCOPY N/A 8/25/2017    Procedure: ESOPHAGOGASTRODUODENOSCOPY WITH ANESTHESIA;  Surgeon: Joaquín Neff MD;  Location:  [facility] ENDOSCOPY;  Service:     EYE SURGERY Left     CATARACT    INGUINAL HERNIA REPAIR Bilateral 11/1/2019    Procedure: OPEN BILATERAL INGUINAL HERNIA REPAIR WITH MESH;  Surgeon: Caroline Loomis MD;  Location:  [facility] OR;  Service: General    INTERVENTIONAL RADIOLOGY PROCEDURE N/A 9/18/2017    Procedure: URETHRA DILITATION with dominguez catheter insertion;  Surgeon: Mamadou Paez MD;  Location:  [facility] OR;  Service:     PROSTATECTOMY      PROSTATECTOMY         Family History   Problem Relation Age of Onset    Colon cancer Neg Hx     Colon polyps Neg Hx        Social History     Socioeconomic History    Marital status:    Tobacco Use    Smoking status: Never    Smokeless tobacco: Never   Substance and Sexual Activity    Alcohol use: No    Drug use: No    Sexual activity: Defer           Objective   Physical Exam  Vitals and nursing note reviewed.   Constitutional:       Comments: Patient is awake and alert, alert to self only.  He is uncooperative, seems little agitated.   HENT:      Head: Normocephalic and atraumatic.      Comments: No external evidence for head trauma     Nose: Nose normal. No congestion or rhinorrhea.      Mouth/Throat:      Mouth: Mucous membranes are moist.   Eyes:      Extraocular Movements: Extraocular movements intact.      Conjunctiva/sclera: Conjunctivae normal.      Pupils: Pupils are equal, round, and reactive to light.   Cardiovascular:      Rate and Rhythm: Normal rate and regular rhythm.      Pulses: Normal pulses.      Heart sounds: Normal heart sounds.   Pulmonary:      Breath sounds: Normal breath sounds. No wheezing, rhonchi or rales.   Abdominal:      General: Abdomen is flat. Bowel sounds are normal.      Palpations: Abdomen is soft.   Musculoskeletal:      Cervical back: Normal range of motion and neck supple.      Right lower leg: No  edema.      Left lower leg: No edema.   Skin:     General: Skin is warm and dry.      Capillary Refill: Capillary refill takes less than 2 seconds.   Neurological:      Mental Status: He is disoriented.      Cranial Nerves: No cranial nerve deficit.      Sensory: No sensory deficit.      Motor: No weakness.         Procedures       Lab Results (last 24 hours)       Procedure Component Value Units Date/Time    CBC & Differential [[id number]]  (Abnormal) Collected: 05/04/25 1330    Specimen: Blood Updated: 05/04/25 1343    Narrative:      The following orders were created for panel order CBC & Differential.  Procedure                               Abnormality         Status                     ---------                               -----------         ------                     CBC Auto Differential[[id number]]        Abnormal            Final result                 Please view results for these tests on the individual orders.    Comprehensive Metabolic Panel [[id number]]  (Abnormal) Collected: 05/04/25 1330    Specimen: Blood Updated: 05/04/25 1403     Glucose 269 mg/dL      BUN 52 mg/dL      Creatinine 3.64 mg/dL      Sodium 135 mmol/L      Potassium 3.9 mmol/L      Chloride 104 mmol/L      CO2 15.0 mmol/L      Calcium 7.8 mg/dL      Total Protein 7.3 g/dL      Albumin 4.0 g/dL      ALT (SGPT) 6 U/L      AST (SGOT) 16 U/L      Alkaline Phosphatase 84 U/L      Total Bilirubin 0.3 mg/dL      Globulin 3.3 gm/dL      A/G Ratio 1.2 g/dL      BUN/Creatinine Ratio 14.3     Anion Gap 16.0 mmol/L      eGFR 14.7 mL/min/1.73     Narrative:      GFR Categories in Chronic Kidney Disease (CKD)              GFR Category          GFR (mL/min/1.73)    Interpretation  G1                    90 or greater        Normal or high (1)  G2                    60-89                Mild decrease (1)  G3a                   45-59                Mild to moderate decrease  G3b                   30-44                Moderate to severe decrease  G4                     15-29                Severe decrease  G5                    14 or less           Kidney failure    (1)In the absence of evidence of kidney disease, neither GFR category G1 or G2 fulfill the criteria for CKD.    eGFR calculation 2021 CKD-EPI creatinine equation, which does not include race as a factor    Protime-INR [[id number]]  (Normal) Collected: 05/04/25 1330    Specimen: Blood Updated: 05/04/25 1352     Protime 14.4 Seconds      INR 1.07    Magnesium [[id number]]  (Abnormal) Collected: 05/04/25 1330    Specimen: Blood Updated: 05/04/25 1403     Magnesium 1.5 mg/dL     CBC Auto Differential [[id number]]  (Abnormal) Collected: 05/04/25 1330    Specimen: Blood Updated: 05/04/25 1343     WBC 8.47 10*3/mm3      RBC 3.39 10*6/mm3      Hemoglobin 10.7 g/dL      Hematocrit 33.0 %      MCV 97.3 fL      MCH 31.6 pg      MCHC 32.4 g/dL      RDW 14.5 %      RDW-SD 51.7 fl      MPV 10.2 fL      Platelets 241 10*3/mm3      Neutrophil % 82.3 %      Lymphocyte % 11.1 %      Monocyte % 5.3 %      Eosinophil % 0.1 %      Basophil % 0.4 %      Immature Grans % 0.8 %      Neutrophils, Absolute 6.97 10*3/mm3      Lymphocytes, Absolute 0.94 10*3/mm3      Monocytes, Absolute 0.45 10*3/mm3      Eosinophils, Absolute 0.01 10*3/mm3      Basophils, Absolute 0.03 10*3/mm3      Immature Grans, Absolute 0.07 10*3/mm3      nRBC 0.0 /100 WBC     Acetaminophen Level [[id number]]  (Normal) Collected: 05/04/25 1330    Specimen: Blood Updated: 05/04/25 1406     Acetaminophen <5.0 mcg/mL     Ethanol [[id number]] Collected: 05/04/25 1330    Specimen: Blood Updated: 05/04/25 1358     Ethanol % <0.010 %     Narrative:      Not for legal purposes. Chain of Custody not followed.     Salicylate Level [[id number]]  (Normal) Collected: 05/04/25 1330    Specimen: Blood Updated: 05/04/25 1407     Salicylate <0.3 mg/dL     Urinalysis With Culture If Indicated - Urine, Clean Catch [[id number]]  (Abnormal) Collected: 05/04/25 1536    Specimen: Urine,  Clean Catch Updated: 05/04/25 1549     Color, UA Yellow     Appearance, UA Clear     pH, UA <=5.0     Specific Gravity, UA 1.010     Glucose,  mg/dL (Trace)     Ketones, UA Negative     Bilirubin, UA Negative     Blood, UA Negative     Protein,  mg/dL (2+)     Leuk Esterase, UA Negative     Nitrite, UA Negative     Urobilinogen, UA 0.2 E.U./dL    Narrative:      In absence of clinical symptoms, the presence of pyuria, bacteria, and/or nitrites on the urinalysis result does not correlate with infection.    Urine Drug Screen - Urine, Clean Catch [[id number]]  (Normal) Collected: 05/04/25 1536    Specimen: Urine, Clean Catch Updated: 05/04/25 1552     THC, Screen, Urine Negative     Phencyclidine (PCP), Urine Negative     Cocaine Screen, Urine Negative     Methamphetamine, Ur Negative     Opiate Screen Negative     Amphetamine Screen, Urine Negative     Benzodiazepine Screen, Urine Negative     Tricyclic Antidepressants Screen Negative     Methadone Screen, Urine Negative     Barbiturates Screen, Urine Negative     Oxycodone Screen, Urine Negative     Buprenorphine, Screen, Urine Negative    Narrative:      Cutoff For Drugs Screened:    Amphetamines               500 ng/ml  Barbiturates               200 ng/ml  Benzodiazepines            150 ng/ml  Cocaine                    150 ng/ml  Methadone                  200 ng/ml  Opiates                    100 ng/ml  Phencyclidine               25 ng/ml  THC                         50 ng/ml  Methamphetamine            500 ng/ml  Tricyclic Antidepressants  300 ng/ml  Oxycodone                  100 ng/ml  Buprenorphine               10 ng/ml    The normal value for all drugs tested is negative. This report includes unconfirmed screening results, with the cutoff values listed, to be used for medical treatment purposes only.  Unconfirmed results must not be used for non-medical purposes such as employment or legal testing.  Clinical consideration should be applied to  any drug of abuse test, particularly when unconfirmed results are used.      Fentanyl, Urine - Urine, Clean Catch [[id number]]  (Normal) Collected: 05/04/25 1536    Specimen: Urine, Clean Catch Updated: 05/04/25 1555     Fentanyl, Urine Negative    Narrative:      Negative Threshold:      Fentanyl 5 ng/mL     The normal value for the drug tested is negative. This report includes final unconfirmed screening results to be used for medical treatment purposes only. Unconfirmed results must not be used for non-medical purposes such as employment or legal testing. Clinical consideration should be applied to any drug of abuse test, particularly when unconfirmed results are used.           Urinalysis, Microscopic Only - Urine, Clean Catch [[id number]] Collected: 05/04/25 1536    Specimen: Urine, Clean Catch Updated: 05/04/25 1549     RBC, UA 0-2 /HPF      WBC, UA 0-2 /HPF      Comment: Urine culture not indicated.        Bacteria, UA None Seen /HPF      Squamous Epithelial Cells, UA 0-2 /HPF      Hyaline Casts, UA 0-2 /LPF      Methodology Automated Microscopy         CT Head Without Contrast  Result Date: 5/4/2025  EXAMINATION: CT HEAD WO CONTRAST-   5/4/2025 12:43 PM  HISTORY: Altered mental status. Worsening dementia. Aggressive behavior.  In order to have a CT radiation dose as low as reasonably achievable Automated Exposure Control was utilized for adjustment of the mA and/or KV according to patient size.  CT Dose DLP = 680.97 mGy.cm. (If there are multiple studies performed at the same time this represents the total dose).  Images are stored in PACS per institutional protocol.   Noncontrast head CT. Axial, sagittal, and coronal sequences.  The visualized paranasal sinuses are clear.  The brain and ventricles have an age appropriate appearance. Moderate atrophy and small vessel disease. There is no hemorrhage or mass-effect. No acute infarction is seen.  No calvarial abnormality.      1. Age-related chronic change. 2. No  acute intracranial abnormality is seen.    This report was signed and finalized on 5/4/2025 1:53 PM by Dr. Yifan Salazar MD.      XR Chest 1 View  Result Date: 5/4/2025  EXAMINATION: XR CHEST 1 VW-  5/4/2025 12:31 PM  HISTORY: Altered mental status.  Images are stored in PACS per institutional protocol.  1 view chest x-ray.  COMPARISON: 3/31/2025.  Heart size is normal. The mediastinum is within normal limits.  The lungs are mildly hyperexpanded with no pneumonia or pneumothorax. Mild chronic appearing interstitial disease with a few calcified granulomas. No congestive failure changes.      1. No acute disease.      This report was signed and finalized on 5/4/2025 1:41 PM by Dr. Yifan Salazar MD.         ED Course  ED Course as of 05/04/25 1634   Sun May 04, 2025   1632 89-year-old male with history of dementia, hypertension, poorly controlled diabetes presents to the ED with complaint of worsening confusion, aggressive behavior, unable to care for self and family's concerned about patient harming his wife.  In the ED, labs revealed worsening renal function.  No recent creatinine for comparison, 3 years ago creatinine around 2, bumped to 3.31-month ago and now 3.64.  BUN 52.  Glucose 269.  Bicarb 15.  Mild hypocalcemia.  Received some fluids in the ED.  CT head negative for acute process.  No evidence of urinary tract infection or pneumonia.  Case discussed with the patient's wife and daughter.  Will need admission for correction of renal dysfunction and further evaluation of metabolic acidosis.  I believe family unable to provide adequate care for the patient home and may need to be assessed for long-term placement. [AW]      ED Course User Index  [AW] Cabrera Robbins MD                                                       Medical Decision Making  Problems Addressed:  Acute renal failure, unspecified acute renal failure type: complicated acute illness or injury  Aggressive behavior due to dementia:  complicated acute illness or injury  Altered mental status, unspecified altered mental status type: complicated acute illness or injury  Hypomagnesemia: complicated acute illness or injury    Amount and/or Complexity of Data Reviewed  Labs: ordered.  Radiology: ordered.  ECG/medicine tests: ordered.    Risk  Prescription drug management.  Decision regarding hospitalization.        Final diagnoses:   Acute renal failure, unspecified acute renal failure type   Altered mental status, unspecified altered mental status type   Aggressive behavior due to dementia   Hypomagnesemia       ED Disposition  ED Disposition       ED Disposition   Decision to Admit    Condition   --    Comment   Level of Care: Med/Surg [1]   Diagnosis: Change in mental status [051154]   Admitting Physician: SUSANNAH CHANDLER [[id number]]   Certification: I Certify That Inpatient Hospital Services Are Medically Necessary For Greater Than 2 Midnights                 No follow-up provider specified.       Medication List      No changes were made to your prescriptions during this visit.            Cabrera Robbins MD  05/04/25 9225

## 2025-05-04 NOTE — Clinical Note
Level of Care: Remote Telemetry [26]   Diagnosis: Acute renal failure [637147]   Admitting Physician: SUSANNAH CHANDLER [[id number]]   Attending Physician: SUSANNAH CHANDLER [[id number]]   Certification: I Certify That Inpatient Hospital Services Are Medically Necessary For Greater Than 2 Midnights

## 2025-05-04 NOTE — H&P
[facility] Medicine Services  HISTORY AND PHYSICAL    Date of Admission: 5/4/2025  Primary Care Physician: David Barajas MD    Subjective       Chief Complaint: confusion aggressive behavior     Dementia    89-year-old male with PMH of end stage alzheimer dementia, CKD, DM, HTN who  presents to the ED with altered mental status. Patient was brought in by EMS change in his baseline level of confusion. Per family the patient has not been compliant with his diet medications. Patient lives at home with his wife who is 86 years old and frail. She states the patient became confused and increasingly aggressive and has made some threatening statements towards his wife and family. He was also engaging with animal control regarding some loose goats on the property. Glucose on scene 334. There is been no reports of unilateral deficits. No complaints of chest pain, shortness of breath, abdominal pain, nausea or vomiting. Patient is oriented to self only.   In ED head CT -ve acute, CMP Cr 3.5 unchanged from baseline, UA -ve, UDS -ve, CXR -ve, will admit IVF, US renal check b12 ammonia, TSH consult nephrology, seroquil qhs, consult palliative care/ ? Needs placement.., SSI, SCDS for DVT prophylaxis       Review of Systems   Otherwise complete ROS reviewed and negative except as mentioned in the HPI.    Past Medical History:   Past Medical History:   Diagnosis Date    Arthritis     AVM (arteriovenous malformation)     Cancer     PROSTATE    Cataract     Colon cancer     Colon polyp     Diabetes mellitus     Diverticulosis     Dizzinesses 10/20/2017    GERD (gastroesophageal reflux disease)     Hemorrhoids     History of transfusion     Hypertension     Inguinal hernia     Iron deficiency anemia due to chronic blood loss 7/26/2017    Prostate CA     Prostate hypertrophy     Rotator cuff syndrome      Past Surgical History:  Past Surgical History:   Procedure Laterality Date     APPENDECTOMY      CHOLECYSTECTOMY      COLON RESECTION N/A 9/18/2017    Procedure: LAPAROSCOPIC ASSISTED RIGHT COLECTOMY;  Surgeon: Caroline Loomis MD;  Location: [facility] OR;  Service:     COLONOSCOPY  04/29/2010    COLONOSCOPY N/A 8/25/2017    Procedure: COLONOSCOPY WITH ANESTHESIA;  Surgeon: Joaquín Neff MD;  Location: [facility] ENDOSCOPY;  Service:     ENDOSCOPY  03/27/2013    ENDOSCOPY N/A 8/25/2017    Procedure: ESOPHAGOGASTRODUODENOSCOPY WITH ANESTHESIA;  Surgeon: Joaquín Neff MD;  Location: [facility] ENDOSCOPY;  Service:     EYE SURGERY Left     CATARACT    INGUINAL HERNIA REPAIR Bilateral 11/1/2019    Procedure: OPEN BILATERAL INGUINAL HERNIA REPAIR WITH MESH;  Surgeon: Caroline Loomis MD;  Location: [facility] OR;  Service: General    INTERVENTIONAL RADIOLOGY PROCEDURE N/A 9/18/2017    Procedure: URETHRA DILITATION with dominguez catheter insertion;  Surgeon: Mamadou Paez MD;  Location: [facility] OR;  Service:     PROSTATECTOMY      PROSTATECTOMY       Social History:  reports that he has never smoked. He has never used smokeless tobacco. He reports that he does not drink alcohol and does not use drugs.    Family History: family history is not on file.       Allergies:  No Known Allergies    Medications:  Prior to Admission medications    Medication Sig Start Date End Date Taking? Authorizing Provider   amLODIPine (NORVASC) 10 MG tablet Take 10 mg by mouth Daily. 3/20/17   Roney Foster MD   cefdinir (OMNICEF) 300 MG capsule Take 1 capsule by mouth Daily. 3/31/25   Thomas Crisostomo Jr., MD   glipizide (glipiZIDE XL) 5 MG ER tablet Take 1 tablet by mouth Daily for 30 days. 3/11/23 4/10/23  David Barajas MD   glipiZIDE (GLUCOTROL) 5 MG ER tablet Take 5 mg by mouth Daily. 6/28/17   Roney Foster MD   HYDROcodone-acetaminophen (NORCO) 7.5-325 MG per tablet Take 1-2 tablets by mouth Every 4 (Four) Hours As Needed for Moderate Pain  (Pain). 11/1/19   Caroline Loomis MD   losartan  "(COZAAR) 100 MG tablet Take 100 mg by mouth Daily. 7/12/17   Provider, MD Roney   Nirmatrelvir & Ritonavir, 300mg/100mg, (PAXLOVID) Take 2 tablets by mouth 2 (Two) Times a Day. 3/31/25   Thomas Crisostomo Jr., MD     I have utilized all available immediate resources to obtain, update, or review the patient's current medications (including all prescriptions, over-the-counter products, herbals, cannabis/cannabidiol products, and vitamin/mineral/dietary (nutritional) supplements).    Objective     Vital Signs: /95   Pulse 97   Temp 98.1 °F (36.7 °C) (Oral)   Resp 18   Ht 167.6 cm (66\")   Wt 64 kg (141 lb)   SpO2 94%   BMI 22.76 kg/m²   Physical Exam  Constitutional:       Comments: Confused    HENT:      Head: Normocephalic.      Nose: Nose normal.   Cardiovascular:      Rate and Rhythm: Normal rate.   Pulmonary:      Breath sounds: Wheezing present.   Musculoskeletal:         General: Normal range of motion.      Cervical back: Normal range of motion.   Neurological:      General: No focal deficit present.              Results Reviewed:  Lab Results (last 24 hours)       Procedure Component Value Units Date/Time    Fentanyl, Urine - Urine, Clean Catch [[id number]]  (Normal) Collected: 05/04/25 1536    Specimen: Urine, Clean Catch Updated: 05/04/25 1555     Fentanyl, Urine Negative    Narrative:      Negative Threshold:      Fentanyl 5 ng/mL     The normal value for the drug tested is negative. This report includes final unconfirmed screening results to be used for medical treatment purposes only. Unconfirmed results must not be used for non-medical purposes such as employment or legal testing. Clinical consideration should be applied to any drug of abuse test, particularly when unconfirmed results are used.           Urine Drug Screen - Urine, Clean Catch [[id number]]  (Normal) Collected: 05/04/25 1536    Specimen: Urine, Clean Catch Updated: 05/04/25 1552     THC, Screen, Urine Negative     " Phencyclidine (PCP), Urine Negative     Cocaine Screen, Urine Negative     Methamphetamine, Ur Negative     Opiate Screen Negative     Amphetamine Screen, Urine Negative     Benzodiazepine Screen, Urine Negative     Tricyclic Antidepressants Screen Negative     Methadone Screen, Urine Negative     Barbiturates Screen, Urine Negative     Oxycodone Screen, Urine Negative     Buprenorphine, Screen, Urine Negative    Narrative:      Cutoff For Drugs Screened:    Amphetamines               500 ng/ml  Barbiturates               200 ng/ml  Benzodiazepines            150 ng/ml  Cocaine                    150 ng/ml  Methadone                  200 ng/ml  Opiates                    100 ng/ml  Phencyclidine               25 ng/ml  THC                         50 ng/ml  Methamphetamine            500 ng/ml  Tricyclic Antidepressants  300 ng/ml  Oxycodone                  100 ng/ml  Buprenorphine               10 ng/ml    The normal value for all drugs tested is negative. This report includes unconfirmed screening results, with the cutoff values listed, to be used for medical treatment purposes only.  Unconfirmed results must not be used for non-medical purposes such as employment or legal testing.  Clinical consideration should be applied to any drug of abuse test, particularly when unconfirmed results are used.      Urinalysis With Culture If Indicated - Urine, Clean Catch [[id number]]  (Abnormal) Collected: 05/04/25 1536    Specimen: Urine, Clean Catch Updated: 05/04/25 1549     Color, UA Yellow     Appearance, UA Clear     pH, UA <=5.0     Specific Gravity, UA 1.010     Glucose,  mg/dL (Trace)     Ketones, UA Negative     Bilirubin, UA Negative     Blood, UA Negative     Protein,  mg/dL (2+)     Leuk Esterase, UA Negative     Nitrite, UA Negative     Urobilinogen, UA 0.2 E.U./dL    Narrative:      In absence of clinical symptoms, the presence of pyuria, bacteria, and/or nitrites on the urinalysis result does not  correlate with infection.    Urinalysis, Microscopic Only - Urine, Clean Catch [[id number]] Collected: 05/04/25 1536    Specimen: Urine, Clean Catch Updated: 05/04/25 1549     RBC, UA 0-2 /HPF      WBC, UA 0-2 /HPF      Comment: Urine culture not indicated.        Bacteria, UA None Seen /HPF      Squamous Epithelial Cells, UA 0-2 /HPF      Hyaline Casts, UA 0-2 /LPF      Methodology Automated Microscopy    Salicylate Level [[id number]]  (Normal) Collected: 05/04/25 1330    Specimen: Blood Updated: 05/04/25 1407     Salicylate <0.3 mg/dL     Acetaminophen Level [[id number]]  (Normal) Collected: 05/04/25 1330    Specimen: Blood Updated: 05/04/25 1406     Acetaminophen <5.0 mcg/mL     Comprehensive Metabolic Panel [[id number]]  (Abnormal) Collected: 05/04/25 1330    Specimen: Blood Updated: 05/04/25 1403     Glucose 269 mg/dL      BUN 52 mg/dL      Creatinine 3.64 mg/dL      Sodium 135 mmol/L      Potassium 3.9 mmol/L      Chloride 104 mmol/L      CO2 15.0 mmol/L      Calcium 7.8 mg/dL      Total Protein 7.3 g/dL      Albumin 4.0 g/dL      ALT (SGPT) 6 U/L      AST (SGOT) 16 U/L      Alkaline Phosphatase 84 U/L      Total Bilirubin 0.3 mg/dL      Globulin 3.3 gm/dL      A/G Ratio 1.2 g/dL      BUN/Creatinine Ratio 14.3     Anion Gap 16.0 mmol/L      eGFR 14.7 mL/min/1.73     Narrative:      GFR Categories in Chronic Kidney Disease (CKD)              GFR Category          GFR (mL/min/1.73)    Interpretation  G1                    90 or greater        Normal or high (1)  G2                    60-89                Mild decrease (1)  G3a                   45-59                Mild to moderate decrease  G3b                   30-44                Moderate to severe decrease  G4                    15-29                Severe decrease  G5                    14 or less           Kidney failure    (1)In the absence of evidence of kidney disease, neither GFR category G1 or G2 fulfill the criteria for CKD.    eGFR calculation 2021  CKD-EPI creatinine equation, which does not include race as a factor    Magnesium [[id number]]  (Abnormal) Collected: 05/04/25 1330    Specimen: Blood Updated: 05/04/25 1403     Magnesium 1.5 mg/dL     Ethanol [[id number]] Collected: 05/04/25 1330    Specimen: Blood Updated: 05/04/25 1358     Ethanol % <0.010 %     Narrative:      Not for legal purposes. Chain of Custody not followed.     Protime-INR [[id number]]  (Normal) Collected: 05/04/25 1330    Specimen: Blood Updated: 05/04/25 1352     Protime 14.4 Seconds      INR 1.07    CBC & Differential [[id number]]  (Abnormal) Collected: 05/04/25 1330    Specimen: Blood Updated: 05/04/25 1343    Narrative:      The following orders were created for panel order CBC & Differential.  Procedure                               Abnormality         Status                     ---------                               -----------         ------                     CBC Auto Differential[[id number]]        Abnormal            Final result                 Please view results for these tests on the individual orders.    CBC Auto Differential [[id number]]  (Abnormal) Collected: 05/04/25 1330    Specimen: Blood Updated: 05/04/25 1343     WBC 8.47 10*3/mm3      RBC 3.39 10*6/mm3      Hemoglobin 10.7 g/dL      Hematocrit 33.0 %      MCV 97.3 fL      MCH 31.6 pg      MCHC 32.4 g/dL      RDW 14.5 %      RDW-SD 51.7 fl      MPV 10.2 fL      Platelets 241 10*3/mm3      Neutrophil % 82.3 %      Lymphocyte % 11.1 %      Monocyte % 5.3 %      Eosinophil % 0.1 %      Basophil % 0.4 %      Immature Grans % 0.8 %      Neutrophils, Absolute 6.97 10*3/mm3      Lymphocytes, Absolute 0.94 10*3/mm3      Monocytes, Absolute 0.45 10*3/mm3      Eosinophils, Absolute 0.01 10*3/mm3      Basophils, Absolute 0.03 10*3/mm3      Immature Grans, Absolute 0.07 10*3/mm3      nRBC 0.0 /100 WBC           Imaging Results (Last 24 Hours)       Procedure Component Value Units Date/Time    CT Head Without Contrast [[id number]]  Collected: 05/04/25 1352     Updated: 05/04/25 1356    Narrative:      EXAMINATION: CT HEAD WO CONTRAST-      5/4/2025 12:43 PM     HISTORY: Altered mental status. Worsening dementia. Aggressive behavior.     In order to have a CT radiation dose as low as reasonably achievable  Automated Exposure Control was utilized for adjustment of the mA and/or  KV according to patient size.     CT Dose DLP = 680.97 mGy.cm.  (If there are multiple studies performed at the same time this  represents the total dose).     Images are stored in PACS per institutional protocol.        Noncontrast head CT.  Axial, sagittal, and coronal sequences.     The visualized paranasal sinuses are clear.     The brain and ventricles have an age appropriate appearance.   Moderate atrophy and small vessel disease.  There is no hemorrhage or mass-effect.   No acute infarction is seen.     No calvarial abnormality.       Impression:      1. Age-related chronic change.  2. No acute intracranial abnormality is seen.           This report was signed and finalized on 5/4/2025 1:53 PM by Dr. Yifan Salazar MD.       XR Chest 1 View [[id number]] Collected: 05/04/25 1340     Updated: 05/04/25 1344    Narrative:      EXAMINATION: XR CHEST 1 VW-     5/4/2025 12:31 PM     HISTORY: Altered mental status.     Images are stored in PACS per institutional protocol.     1 view chest x-ray.     COMPARISON:  3/31/2025.     Heart size is normal.  The mediastinum is within normal limits.     The lungs are mildly hyperexpanded with no pneumonia or pneumothorax.  Mild chronic appearing interstitial disease with a few calcified  granulomas.  No congestive failure changes.       Impression:      1. No acute disease.                 This report was signed and finalized on 5/4/2025 1:41 PM by Dr. Yifan Salazar MD.             I have personally reviewed and interpreted the radiology studies and ECG obtained at time of admission.     Assessment / Plan   Assessment:   Active  Hospital Problems    Diagnosis     **Acute renal failure     Change in mental status    Dementia  Metabolic uremic encephalopathy    Treatment Plan  The patient will be admitted to my service here at [facility].   In ED head CT -ve acute, CMP Cr 3.5 unchanged from baseline, UA -ve, UDS -ve, CXR -ve, will admit IVF, US renal check b12 ammonia, TSH consult nephrology, seroquil qhs, consult palliative care/ ? Needs placement.., SSI, SCDS for DVT prophylaxis identify reconcile restart home meds once reconciled   Medical Decision Making  Number and Complexity of problems: 2 acute   Differential Diagnosis: as above     Conditions and Status        Condition is at treatment goal.     [facility] Data  External documents reviewed: yes  Cardiac tracing (EKG, telemetry) interpretation: yes  Radiology interpretation: yes  Labs reviewed: yes  Any tests that were considered but not ordered: no     Decision rules/scores evaluated (example [id number], Wells, etc): no     Discussed with: ED     Care Planning  Shared decision making: Patient apprised of current labs, vitals, imaging and treatment plan.  They are agreeable with proceeding with plans as discussed.   Code status and discussions: full     Disposition  Social Determinants of Health that impact treatment or disposition: no  Estimated length of stay is TBD.     I confirmed that the patient's advanced care plan is present, code status is documented, and a surrogate decision maker is listed in the patient's medical record.         The patient was seen and examined by me on 1650 at [facility] .    Electronically signed by Perla Mireles MD, 05/04/25, 16:34 CDT.

## 2025-05-04 NOTE — PLAN OF CARE
Goal Outcome Evaluation:  Plan of Care Reviewed With: patient        Progress: no change  Outcome Evaluation: Pt arrived from ED very irritated and demanding to speak with his wife; I contacted his wife and was able to calm him down by explaining why he was admitted to the hospital; He is currently calm and laying in bed; IVF initiated; Magnesium ordered per protocol; up with standby assist; voiding; bed check and video surveillance in use for enhanced safety.                              Hospital Progress Note     Patient: Nadine Quinones Date: 2/12/2018   YOB: 1954 Admission Date: 2/10/2018   MRN: [id number] Attending: Charlie Johnson DO     Chief Complaint: left hand pain    SUBJECTIVE  Nadine Quinones is a 63 year old female who was admitted on 2/10/2018 for left hand cellulitis after dog bite.  The patient had no acute overnight events. No fever. + headache/nausea. Still w/ significant left hand swelling    OBJECTIVE  Scheduled Medications     influenza virus quadrivalent vaccine inactivated (PRESERVATIVE FREE) 0.5 mL Once   pneumococcal 23-valent vaccine 0.5 mL Once   vancomycin (VANCOCIN) IVPB 1,000 mg 2 times per day   sodium chloride (PF) 2 mL 2 times per day   enoxaparin (LOVENOX) injection 40 mg Q24H   ampicillin-sulbactam (UNASYN) IVPB 1.5 g 4 times per day   VANCOMYCIN - PHARMACIST MONITORED  See Admin Instructions   busPIRone 10 mg BID   venlafaxine 150 mg BID   gabapentin 800 mg TID   pantoprazole 40 mg QAM AC     Continuous Infusions   • sodium chloride 0.45 % infusion 100 mL/hr at 02/11/18 2329       PHYSICAL EXAM  Vital signs:    Visit Vitals  /86 (BP Location: [facility], Patient Position: Semi-Estrada's)   Pulse 89   Temp 99.5 °F (37.5 °C) (Oral)   Resp 20   Ht 5' 6\" (1.676 m)   Wt 90.8 kg   SpO2 93%   BMI 32.31 kg/m²       I/Os:      Intake/Output Summary (Last 24 hours) at 02/12/18 0741  Last data filed at 02/12/18 0039   Gross per 24 hour   Intake          2989.15 ml   Output                0 ml   Net          2989.15 ml       General:  Alert and cooperative, female.   Cardiovascular:  RRR, no murmur, no B/L LE edema  Respiratory:  CTAB, no increased work of breathing  Gastrointestinal:  +BS, soft, NT/ND  Skin/MSK: Left hand/wrist with involved erythema/warmth. Limited motion of left wrist area and patient unable to make fist with left hand.  Neurologic: EOMI.  Psychiatric:  Appropriate mood/affect.    LAB RESULTS    Recent Labs  Lab 02/11/18  0620 02/10/18  8907    WBC 6.7 10.6   HCT 31.9* 33.0*   HGB 10.3* 11.0*    253       Recent Labs  Lab 02/11/18  0620 02/10/18  1435   SODIUM 143 138   POTASSIUM 4.0 4.4   CHLORIDE 115* 107   CO2 20* 24   GLUCOSE 98 106*   BUN 12 24*   CREATININE 0.62 1.15*       IMAGING  Xr Hand 3+ View Left    Result Date: 2/10/2018  XR HAND 3+ VIEW LEFT CLINICAL INFORMATION:  left thumb dog bite with left hand and arm cellulitis  COMPARISON:  None available. FINDINGS:  No acute fracture or malalignment. No radiopaque foreign body.  Mild degenerative changes are present involving the first carpometacarpal joint, the interphalangeal joint of thumb and first MCP joint.  No gas collections are identified within the soft tissues.     IMPRESSION:  No acute osseous findings.   No radiographic evidence for osteomyelitis.     Mri Hand Left    Result Date: 2/11/2018  MRI HAND NON-JT W WO CONTRAST LEFT HISTORY:  MASS OR LUMP, HAND TECHNIQUE:  Multiplanar images are performed in the left hand before and after the administration of 9 mL of gadavist. Images extend from the second proximal interphalangeal joint through the radiocarpal joint. The entire thumb is included. COMPARISON:  X-ray 2/10/2018 FINDINGS: No fracture. No dislocation. No avascular necrosis. There is moderate joint space narrowing and osteophytes at the first metacarpocarpal joint indicating moderate osteoarthritis. Mild osteoarthritis is seen elsewhere in the hand and wrist. There are mild joint effusions of the first metacarpocarpal joint and at the distal radioulnar joint. Minimal joint effusions are seen at the radiocarpal and midcarpal joint. No marrow infiltration, marrow edema, or marrow enhancement is identified to indicate osteomyelitis. There is a focal area of localized fluid superficial to the first metacarpal phalangeal joint measuring 1.0 x 0.6 x 1.7 cm. This does not demonstrate irregular rim enhancement. This is located in the subcutaneous tissues superficial to the extensor  tendons. (Series 5 image [id number]). There is diffuse strand-like increased T2 signal involving the subcutaneous tissues of the hand particularly dorsally which may be due to edema, bruising, or cellulitis. No well-defined rim-enhancing loculated abscess is identified. There is strand-like elevated T2 signal involving the thenar muscles which may be due to edema or myositis. The flexor and extensor tendons are intact. There is some mild fluid near the thumb and index finger flexor tendons indicating mild tenosynovitis.     IMPRESSION: There is a localized area of fluid dorsal to the first metacarpal phalangeal joint measuring 1.7 x 1.0 x 0.6 cm. This does not demonstrate a well-defined rim-enhancing margin. This could be due to a focal hematoma or nonspecific fluid collection. This could be due to an early forming abscess. However no rim enhancement or loculation is identified to definitively indicate abscess formation. Continued follow-up is recommended. No marrow infiltration, marrow edema, or marrow enhancement is identified within the hand to indicate osteomyelitis. Mild fluid is seen near the flexor tendons of the thumb and index finger, consistent with mild tenosynovitis. Strand-like elevated T2 signal in the thenar muscles may be due to mild muscle strains, edema, or myositis. Prominent strand-like increased T2 signal seen diffusely in the subcutaneous tissues, particularly dorsally, which may be due to edema, bruising, or cellulitis. Moderate osteoarthritis at the first metacarpocarpal joint with mild osteoarthritis elsewhere in the hand and wrist. Mild joint effusions at the first metacarpocarpal joint and distal radioulnar joint. Minimal joint effusions at the radiocarpal and midcarpal joints. No evidence of fracture or avascular necrosis.     Us Upper Extremity Venous Duplex Left    Result Date: 2/10/2018  EXAM:  US UPPER EXTREMITY VENOUS DUPLEX LEFT CLINICAL INDICATION:  Swelling/rule out DVT TECHNIQUE:   Gray-scale, color-flow, and spectral Doppler ultrasound of the bilateral subclavian, and left axillary, brachial, radial, ulnar, basilic, and cephalic veins was performed.  INTERPRETATION/FINDINGS:  All veins have patent lumens, with no identifiable thrombus or other abnormality.  All veins are easily compressible.  All veins have normal phasic spontaneous flow within them and good augmented flow with distal compression.     IMPRESSION:  Normal left upper extremity venous duplex scan. TECHNOLOGIST:  [facility]         ASSESSMENT/PLAN  Nadine Quinones is a 63 year old female who was admitted on 2/10/2018:     #Left hand cellulitis due to dog bite  Limited motion on exam. MRI noted. Dog has been euthanized. Hand surgery evaluated - no intervention  - Infectious disease for additional insight in management  - Continue Unasyn/vancomycin for now  - Continue Percocet/morphine p.r.n. Pain  - Stop IV fluids    #Acute kidney injury  Prerenal. Due to the above. Resolved  - Off IV fluids  - BMP daily  - Continue hold home ACE    #Chronic pain  - As above  - Home tizanidine/gabapentin    #Essential hypertension, controlled  - ACE as above    #Anxiety/depression  - Home Buspar/effexor    #Deep vein thrombosis prophylaxis  Patient ambulatory    Dispo: I am not satisfied with this patient's improvement in her left hand. She is still having much difficulty with making a fist w/ limited wrist motion. I will await ID consultation. She will remain on IV abx until seen by ID. Earliest dispo would be tomorrow, but due to her slow improvement - leaning more towards Wednesday.    Charlie Johnson,   Hospitalist

## 2025-05-05 ENCOUNTER — APPOINTMENT (OUTPATIENT)
Dept: ULTRASOUND IMAGING | Facility: HOSPITAL | Age: OVER 89
End: 2025-05-05
Payer: MEDICARE

## 2025-05-05 LAB
ALBUMIN SERPL-MCNC: 3.7 G/DL (ref 3.5–5.2)
ALBUMIN/GLOB SERPL: 1.2 G/DL
ALP SERPL-CCNC: 75 U/L (ref 39–117)
ALT SERPL W P-5'-P-CCNC: 6 U/L (ref 1–41)
AMMONIA BLD-SCNC: 12 UMOL/L (ref 16–60)
ANION GAP SERPL CALCULATED.3IONS-SCNC: 13 MMOL/L (ref 5–15)
AST SERPL-CCNC: 14 U/L (ref 1–40)
BASOPHILS # BLD AUTO: 0.02 10*3/MM3 (ref 0–0.2)
BASOPHILS NFR BLD AUTO: 0.3 % (ref 0–1.5)
BILIRUB SERPL-MCNC: 0.3 MG/DL (ref 0–1.2)
BUN SERPL-MCNC: 45 MG/DL (ref 8–23)
BUN/CREAT SERPL: 14.2 (ref 7–25)
CALCIUM SPEC-SCNC: 7.8 MG/DL (ref 8.2–9.6)
CHLORIDE SERPL-SCNC: 110 MMOL/L (ref 98–107)
CO2 SERPL-SCNC: 16 MMOL/L (ref 22–29)
CREAT SERPL-MCNC: 3.17 MG/DL (ref 0.76–1.27)
CREAT UR-MCNC: 55.7 MG/DL
DEPRECATED RDW RBC AUTO: 50.7 FL (ref 37–54)
EGFRCR SERPLBLD CKD-EPI 2021: 17.4 ML/MIN/1.73
EOSINOPHIL # BLD AUTO: 0.05 10*3/MM3 (ref 0–0.4)
EOSINOPHIL NFR BLD AUTO: 0.8 % (ref 0.3–6.2)
ERYTHROCYTE [DISTWIDTH] IN BLOOD BY AUTOMATED COUNT: 14.4 % (ref 12.3–15.4)
GLOBULIN UR ELPH-MCNC: 3 GM/DL
GLUCOSE BLDC GLUCOMTR-MCNC: 111 MG/DL (ref 70–130)
GLUCOSE BLDC GLUCOMTR-MCNC: 167 MG/DL (ref 70–130)
GLUCOSE BLDC GLUCOMTR-MCNC: 221 MG/DL (ref 70–130)
GLUCOSE SERPL-MCNC: 191 MG/DL (ref 65–99)
HCT VFR BLD AUTO: 31 % (ref 37.5–51)
HGB BLD-MCNC: 10.2 G/DL (ref 13–17.7)
IMM GRANULOCYTES # BLD AUTO: 0.02 10*3/MM3 (ref 0–0.05)
IMM GRANULOCYTES NFR BLD AUTO: 0.3 % (ref 0–0.5)
LYMPHOCYTES # BLD AUTO: 1.31 10*3/MM3 (ref 0.7–3.1)
LYMPHOCYTES NFR BLD AUTO: 22 % (ref 19.6–45.3)
MAGNESIUM SERPL-MCNC: 2 MG/DL (ref 1.7–2.3)
MCH RBC QN AUTO: 31.6 PG (ref 26.6–33)
MCHC RBC AUTO-ENTMCNC: 32.9 G/DL (ref 31.5–35.7)
MCV RBC AUTO: 96 FL (ref 79–97)
MONOCYTES # BLD AUTO: 0.35 10*3/MM3 (ref 0.1–0.9)
MONOCYTES NFR BLD AUTO: 5.9 % (ref 5–12)
NEUTROPHILS NFR BLD AUTO: 4.2 10*3/MM3 (ref 1.7–7)
NEUTROPHILS NFR BLD AUTO: 70.7 % (ref 42.7–76)
NRBC BLD AUTO-RTO: 0 /100 WBC (ref 0–0.2)
PLATELET # BLD AUTO: 213 10*3/MM3 (ref 140–450)
PMV BLD AUTO: 9.6 FL (ref 6–12)
POTASSIUM SERPL-SCNC: 3.4 MMOL/L (ref 3.5–5.2)
POTASSIUM SERPL-SCNC: 3.5 MMOL/L (ref 3.5–5.2)
PROT SERPL-MCNC: 6.7 G/DL (ref 6–8.5)
PTH-INTACT SERPL-MCNC: 172.9 PG/ML (ref 15–65)
RBC # BLD AUTO: 3.23 10*6/MM3 (ref 4.14–5.8)
SODIUM SERPL-SCNC: 139 MMOL/L (ref 136–145)
TSH SERPL DL<=0.05 MIU/L-ACNC: 1.23 UIU/ML (ref 0.27–4.2)
VIT B12 BLD-MCNC: <150 PG/ML (ref 211–946)
WBC NRBC COR # BLD AUTO: 5.95 10*3/MM3 (ref 3.4–10.8)

## 2025-05-05 PROCEDURE — 25010000002 ZIPRASIDONE MESYLATE PER 10 MG: Performed by: HOSPITALIST

## 2025-05-05 PROCEDURE — 85025 COMPLETE CBC W/AUTO DIFF WBC: CPT | Performed by: HOSPITALIST

## 2025-05-05 PROCEDURE — 84443 ASSAY THYROID STIM HORMONE: CPT | Performed by: HOSPITALIST

## 2025-05-05 PROCEDURE — 63710000001 INSULIN LISPRO (HUMAN) PER 5 UNITS: Performed by: HOSPITALIST

## 2025-05-05 PROCEDURE — 82607 VITAMIN B-12: CPT | Performed by: HOSPITALIST

## 2025-05-05 PROCEDURE — 84132 ASSAY OF SERUM POTASSIUM: CPT | Performed by: INTERNAL MEDICINE

## 2025-05-05 PROCEDURE — 82948 REAGENT STRIP/BLOOD GLUCOSE: CPT

## 2025-05-05 PROCEDURE — 80053 COMPREHEN METABOLIC PANEL: CPT | Performed by: INTERNAL MEDICINE

## 2025-05-05 PROCEDURE — 25010000002 HEPARIN (PORCINE) PER 1000 UNITS: Performed by: INTERNAL MEDICINE

## 2025-05-05 PROCEDURE — 82140 ASSAY OF AMMONIA: CPT | Performed by: HOSPITALIST

## 2025-05-05 PROCEDURE — 83735 ASSAY OF MAGNESIUM: CPT | Performed by: HOSPITALIST

## 2025-05-05 PROCEDURE — 83970 ASSAY OF PARATHORMONE: CPT | Performed by: INTERNAL MEDICINE

## 2025-05-05 PROCEDURE — 76775 US EXAM ABDO BACK WALL LIM: CPT

## 2025-05-05 PROCEDURE — 36415 COLL VENOUS BLD VENIPUNCTURE: CPT | Performed by: HOSPITALIST

## 2025-05-05 RX ORDER — HEPARIN SODIUM 5000 [USP'U]/ML
5000 INJECTION, SOLUTION INTRAVENOUS; SUBCUTANEOUS EVERY 12 HOURS SCHEDULED
Status: DISCONTINUED | OUTPATIENT
Start: 2025-05-05 | End: 2025-05-13 | Stop reason: HOSPADM

## 2025-05-05 RX ORDER — POTASSIUM CHLORIDE 1500 MG/1
20 TABLET, EXTENDED RELEASE ORAL ONCE
Status: COMPLETED | OUTPATIENT
Start: 2025-05-05 | End: 2025-05-05

## 2025-05-05 RX ADMIN — HEPARIN SODIUM 5000 UNITS: 5000 INJECTION, SOLUTION INTRAVENOUS; SUBCUTANEOUS at 20:47

## 2025-05-05 RX ADMIN — Medication 10 ML: at 01:51

## 2025-05-05 RX ADMIN — ZIPRASIDONE MESYLATE 20 MG: 20 INJECTION, POWDER, LYOPHILIZED, FOR SOLUTION INTRAMUSCULAR at 05:45

## 2025-05-05 RX ADMIN — ACETAMINOPHEN 650 MG: 325 TABLET, FILM COATED ORAL at 20:48

## 2025-05-05 RX ADMIN — INSULIN LISPRO 2 UNITS: 100 INJECTION, SOLUTION INTRAVENOUS; SUBCUTANEOUS at 08:38

## 2025-05-05 RX ADMIN — Medication 10 ML: at 08:39

## 2025-05-05 RX ADMIN — AMLODIPINE BESYLATE 10 MG: 10 TABLET ORAL at 08:38

## 2025-05-05 RX ADMIN — INSULIN LISPRO 3 UNITS: 100 INJECTION, SOLUTION INTRAVENOUS; SUBCUTANEOUS at 20:47

## 2025-05-05 RX ADMIN — Medication 10 ML: at 20:48

## 2025-05-05 RX ADMIN — POTASSIUM CHLORIDE 20 MEQ: 1500 TABLET, EXTENDED RELEASE ORAL at 17:43

## 2025-05-05 RX ADMIN — ZIPRASIDONE MESYLATE 20 MG: 20 INJECTION, POWDER, LYOPHILIZED, FOR SOLUTION INTRAMUSCULAR at 15:02

## 2025-05-05 RX ADMIN — QUETIAPINE FUMARATE 25 MG: 25 TABLET ORAL at 20:48

## 2025-05-05 NOTE — CASE MANAGEMENT/SOCIAL WORK
Continued Stay Note  [facility]     Patient Name: Maikel Mathews  MRN: [id number]  Today's Date: 5/5/2025    Admit Date: 5/4/2025        Discharge Plan       Row Name 05/05/25 1619       Plan    Plan Comments SW faxed referrals to [facility], and [facility].                   Discharge Codes    No documentation.                       LAURIE Colby

## 2025-05-05 NOTE — CASE MANAGEMENT/SOCIAL WORK
Continued Stay Note  [facility]     Patient Name: Maikel Mathews  MRN: [id number]  Today's Date: 5/5/2025    Admit Date: 5/4/2025        Discharge Plan       Row Name 05/05/25 4402       Plan    Plan Comments Events noted. Pt spouse is agreeable for referrals to be sent to [facility], [facility], & [facility]. Referrals will be sent after therapy evaluation. Pt insurance will require a precert once bed offered and accepted.                   Discharge Codes    No documentation.                       LAURIE Colby

## 2025-05-05 NOTE — CASE MANAGEMENT/SOCIAL WORK
Discharge Planning Assessment  Psychiatric     Patient Name: Maikel Mathews  MRN: [id number]  Today's Date: 5/5/2025    Admit Date: 5/4/2025        Discharge Needs Assessment       Row Name 05/05/25 1123       Living Environment    People in Home spouse    Current Living Arrangements home    Potentially Unsafe Housing Conditions unable to assess    In the past 12 months has the electric, gas, oil, or water company threatened to shut off services in your home? No    Primary Care Provided by self    Provides Primary Care For no one    Quality of Family Relationships involved    Able to Return to Prior Arrangements no       Resource/Environmental Concerns    Transportation Concerns none       Transportation Needs    In the past 12 months, has lack of transportation kept you from medical appointments or from getting medications? no    In the past 12 months, has lack of transportation kept you from meetings, work, or from getting things needed for daily living? No       Food Insecurity    Within the past 12 months, you worried that your food would run out before you got the money to buy more. Never true    Within the past 12 months, the food you bought just didn't last and you didn't have money to get more. Never true       Transition Planning    Patient/Family Anticipates Transition to long-term care facility    Patient/Family Anticipated Services at Transition ;skilled nursing;rehabilitation services    Transportation Anticipated other (see comments)       Discharge Needs Assessment    Equipment Currently Used at Home none    Concerns to be Addressed discharge planning;cognitive/perceptual;mental health;compliance issue    Do you want help finding or keeping work or a job? I do not need or want help    Do you want help with school or training? For example, starting or completing job training or getting a high school diploma, GED or equivalent No    Anticipated Changes Related to Illness inability to care for  self    Discharge Facility/Level of Care Needs nursing facility, intermediate;rehabilitation facility    Discharge Coordination/Progress Spoke by telephone with patient's wife Priyanka Mathews and stepdaughter Arabella Reid.  They both state they are unable to take care of him at home anymore due to documented confusion (advanced dementia), non-compliance. Wife is interested in SNF placement. She prefers the [facility]. Would consider [facility].  Absolutely No to [facility] and No to [facility].  Wife and daughter state, please call them anytime with any questions or to keep them informed. I will relay this info to my SW.                   Discharge Plan    No documentation.                      Demographic Summary    No documentation.                  Functional Status    No documentation.                  Psychosocial    No documentation.                  Abuse/Neglect    No documentation.                  Legal    No documentation.                  Substance Abuse    No documentation.                  Patient Forms    No documentation.                     Ekaterina Parker RN

## 2025-05-05 NOTE — DISCHARGE PLACEMENT REQUEST
"Itzel Lee (89 y.o. Male)       Date of Birth   08/17/1929    Social Security Number       Address   [address] [place] [address]    Home Phone   [phone]    MRN   [id number]       [facility]    Marital Status                               Admission Date   5/4/2025    Admission Type   Emergency    Admitting Provider   Brayan Meehan MD    Attending Provider   Brayan Meehan MD    Department, Room/Bed   [facility] 3C, 372/1       Discharge Date       Discharge Disposition       Discharge Destination                                 Attending Provider: Brayan Meehan MD    Allergies: No Known Allergies    Isolation: None   Infection: COVID (History) (05/05/25)   Code Status: CPR    Ht: 167.6 cm (66\")   Wt: 64 kg (141 lb)    Admission Cmt: None   Principal Problem: Acute renal failure [N17.9]                   Active Insurance as of 5/4/2025       Primary Coverage       Payor Plan Insurance Group Employer/Plan Group    [facility] MEDICARE REPLACEMENT AARP MEDICARE ADVANTAGE PPO [id number]       Payor Plan Address Payor Plan Phone Number Payor Plan Fax Number Effective Dates    [address]   1/1/2023 - None Entered    [facility] [id number]         Subscriber Name Subscriber Birth Date Member ID       ITZEL LEE 8/17/1929 [id number]                     Emergency Contacts        (Rel.) Home Phone Work Phone Mobile Phone    Priyanka Lee (Spouse) [phone] -- [phone]    JOLANTA EDWARDS (Daughter) -- -- [phone]    ROSA MARY (Son) -- -- [phone]              Insurance Information                  [employer] MEDICARE REPLACEMENT/AARP MEDICARE ADVANTAGE PPO Phone: --    Subscriber: Itzel Lee Subscriber#: [id number]    Group#: [id number] Precert#: --    Authorization#: [id number] Effective Date: --             History & Physical        Perla Mireles MD at 05/04/25 1634        "       [facility] Medicine Services  HISTORY AND PHYSICAL    Date of Admission: 5/4/2025  Primary Care Physician: David Barajas MD    Subjective       Chief Complaint: confusion aggressive behavior     Dementia    89-year-old male with PMH of end stage alzheimer dementia, CKD, DM, HTN who  presents to the ED with altered mental status. Patient was brought in by EMS change in his baseline level of confusion. Per family the patient has not been compliant with his diet medications. Patient lives at home with his wife who is 86 years old and frail. She states the patient became confused and increasingly aggressive and has made some threatening statements towards his wife and family. He was also engaging with animal control regarding some loose goats on the property. Glucose on scene 334. There is been no reports of unilateral deficits. No complaints of chest pain, shortness of breath, abdominal pain, nausea or vomiting. Patient is oriented to self only.   In ED head CT -ve acute, CMP Cr 3.5 unchanged from baseline, UA -ve, UDS -ve, CXR -ve, will admit IVF, US renal check b12 ammonia, TSH consult nephrology, seroquil qhs, consult palliative care/ ? Needs placement.., SSI, SCDS for DVT prophylaxis       Review of Systems   Otherwise complete ROS reviewed and negative except as mentioned in the HPI.    Past Medical History:   Past Medical History:   Diagnosis Date    Arthritis     AVM (arteriovenous malformation)     Cancer     PROSTATE    Cataract     Colon cancer     Colon polyp     Diabetes mellitus     Diverticulosis     Dizzinesses 10/20/2017    GERD (gastroesophageal reflux disease)     Hemorrhoids     History of transfusion     Hypertension     Inguinal hernia     Iron deficiency anemia due to chronic blood loss 7/26/2017    Prostate CA     Prostate hypertrophy     Rotator cuff syndrome      Past Surgical History:  Past Surgical History:   Procedure Laterality Date     APPENDECTOMY      CHOLECYSTECTOMY      COLON RESECTION N/A 9/18/2017    Procedure: LAPAROSCOPIC ASSISTED RIGHT COLECTOMY;  Surgeon: Caroline Loomis MD;  Location: [facility] OR;  Service:     COLONOSCOPY  04/29/2010    COLONOSCOPY N/A 8/25/2017    Procedure: COLONOSCOPY WITH ANESTHESIA;  Surgeon: Joaquín Neff MD;  Location: [facility] ENDOSCOPY;  Service:     ENDOSCOPY  03/27/2013    ENDOSCOPY N/A 8/25/2017    Procedure: ESOPHAGOGASTRODUODENOSCOPY WITH ANESTHESIA;  Surgeon: Joaquín Neff MD;  Location: [facility] ENDOSCOPY;  Service:     EYE SURGERY Left     CATARACT    INGUINAL HERNIA REPAIR Bilateral 11/1/2019    Procedure: OPEN BILATERAL INGUINAL HERNIA REPAIR WITH MESH;  Surgeon: Caroline Loomis MD;  Location: [facility] OR;  Service: General    INTERVENTIONAL RADIOLOGY PROCEDURE N/A 9/18/2017    Procedure: URETHRA DILITATION with dominguez catheter insertion;  Surgeon: Mamadou Paez MD;  Location: [facility] OR;  Service:     PROSTATECTOMY      PROSTATECTOMY       Social History:  reports that he has never smoked. He has never used smokeless tobacco. He reports that he does not drink alcohol and does not use drugs.    Family History: family history is not on file.       Allergies:  No Known Allergies    Medications:  Prior to Admission medications    Medication Sig Start Date End Date Taking? Authorizing Provider   amLODIPine (NORVASC) 10 MG tablet Take 10 mg by mouth Daily. 3/20/17   Roney Foster MD   cefdinir (OMNICEF) 300 MG capsule Take 1 capsule by mouth Daily. 3/31/25   Thomas Crisostomo Jr., MD   glipizide (glipiZIDE XL) 5 MG ER tablet Take 1 tablet by mouth Daily for 30 days. 3/11/23 4/10/23  David Barajas MD   glipiZIDE (GLUCOTROL) 5 MG ER tablet Take 5 mg by mouth Daily. 6/28/17   Roney Foster MD   HYDROcodone-acetaminophen (NORCO) 7.5-325 MG per tablet Take 1-2 tablets by mouth Every 4 (Four) Hours As Needed for Moderate Pain  (Pain). 11/1/19   Caroline Loomis MD   losartan  "(COZAAR) 100 MG tablet Take 100 mg by mouth Daily. 7/12/17   Provider, MD Roney   Nirmatrelvir & Ritonavir, 300mg/100mg, (PAXLOVID) Take 2 tablets by mouth 2 (Two) Times a Day. 3/31/25   Thomas Crisostomo Jr., MD     I have utilized all available immediate resources to obtain, update, or review the patient's current medications (including all prescriptions, over-the-counter products, herbals, cannabis/cannabidiol products, and vitamin/mineral/dietary (nutritional) supplements).    Objective     Vital Signs: /95   Pulse 97   Temp 98.1 °F (36.7 °C) (Oral)   Resp 18   Ht 167.6 cm (66\")   Wt 64 kg (141 lb)   SpO2 94%   BMI 22.76 kg/m²   Physical Exam  Constitutional:       Comments: Confused    HENT:      Head: Normocephalic.      Nose: Nose normal.   Cardiovascular:      Rate and Rhythm: Normal rate.   Pulmonary:      Breath sounds: Wheezing present.   Musculoskeletal:         General: Normal range of motion.      Cervical back: Normal range of motion.   Neurological:      General: No focal deficit present.              Results Reviewed:  Lab Results (last 24 hours)       Procedure Component Value Units Date/Time    Fentanyl, Urine - Urine, Clean Catch [[id number]]  (Normal) Collected: 05/04/25 1536    Specimen: Urine, Clean Catch Updated: 05/04/25 1555     Fentanyl, Urine Negative    Narrative:      Negative Threshold:      Fentanyl 5 ng/mL     The normal value for the drug tested is negative. This report includes final unconfirmed screening results to be used for medical treatment purposes only. Unconfirmed results must not be used for non-medical purposes such as employment or legal testing. Clinical consideration should be applied to any drug of abuse test, particularly when unconfirmed results are used.           Urine Drug Screen - Urine, Clean Catch [[id number]]  (Normal) Collected: 05/04/25 1536    Specimen: Urine, Clean Catch Updated: 05/04/25 1552     THC, Screen, Urine Negative     " Phencyclidine (PCP), Urine Negative     Cocaine Screen, Urine Negative     Methamphetamine, Ur Negative     Opiate Screen Negative     Amphetamine Screen, Urine Negative     Benzodiazepine Screen, Urine Negative     Tricyclic Antidepressants Screen Negative     Methadone Screen, Urine Negative     Barbiturates Screen, Urine Negative     Oxycodone Screen, Urine Negative     Buprenorphine, Screen, Urine Negative    Narrative:      Cutoff For Drugs Screened:    Amphetamines               500 ng/ml  Barbiturates               200 ng/ml  Benzodiazepines            150 ng/ml  Cocaine                    150 ng/ml  Methadone                  200 ng/ml  Opiates                    100 ng/ml  Phencyclidine               25 ng/ml  THC                         50 ng/ml  Methamphetamine            500 ng/ml  Tricyclic Antidepressants  300 ng/ml  Oxycodone                  100 ng/ml  Buprenorphine               10 ng/ml    The normal value for all drugs tested is negative. This report includes unconfirmed screening results, with the cutoff values listed, to be used for medical treatment purposes only.  Unconfirmed results must not be used for non-medical purposes such as employment or legal testing.  Clinical consideration should be applied to any drug of abuse test, particularly when unconfirmed results are used.      Urinalysis With Culture If Indicated - Urine, Clean Catch [[id number]]  (Abnormal) Collected: 05/04/25 1536    Specimen: Urine, Clean Catch Updated: 05/04/25 1549     Color, UA Yellow     Appearance, UA Clear     pH, UA <=5.0     Specific Gravity, UA 1.010     Glucose,  mg/dL (Trace)     Ketones, UA Negative     Bilirubin, UA Negative     Blood, UA Negative     Protein,  mg/dL (2+)     Leuk Esterase, UA Negative     Nitrite, UA Negative     Urobilinogen, UA 0.2 E.U./dL    Narrative:      In absence of clinical symptoms, the presence of pyuria, bacteria, and/or nitrites on the urinalysis result does not  correlate with infection.    Urinalysis, Microscopic Only - Urine, Clean Catch [[id number]] Collected: 05/04/25 1536    Specimen: Urine, Clean Catch Updated: 05/04/25 1549     RBC, UA 0-2 /HPF      WBC, UA 0-2 /HPF      Comment: Urine culture not indicated.        Bacteria, UA None Seen /HPF      Squamous Epithelial Cells, UA 0-2 /HPF      Hyaline Casts, UA 0-2 /LPF      Methodology Automated Microscopy    Salicylate Level [[id number]]  (Normal) Collected: 05/04/25 1330    Specimen: Blood Updated: 05/04/25 1407     Salicylate <0.3 mg/dL     Acetaminophen Level [[id number]]  (Normal) Collected: 05/04/25 1330    Specimen: Blood Updated: 05/04/25 1406     Acetaminophen <5.0 mcg/mL     Comprehensive Metabolic Panel [[id number]]  (Abnormal) Collected: 05/04/25 1330    Specimen: Blood Updated: 05/04/25 1403     Glucose 269 mg/dL      BUN 52 mg/dL      Creatinine 3.64 mg/dL      Sodium 135 mmol/L      Potassium 3.9 mmol/L      Chloride 104 mmol/L      CO2 15.0 mmol/L      Calcium 7.8 mg/dL      Total Protein 7.3 g/dL      Albumin 4.0 g/dL      ALT (SGPT) 6 U/L      AST (SGOT) 16 U/L      Alkaline Phosphatase 84 U/L      Total Bilirubin 0.3 mg/dL      Globulin 3.3 gm/dL      A/G Ratio 1.2 g/dL      BUN/Creatinine Ratio 14.3     Anion Gap 16.0 mmol/L      eGFR 14.7 mL/min/1.73     Narrative:      GFR Categories in Chronic Kidney Disease (CKD)              GFR Category          GFR (mL/min/1.73)    Interpretation  G1                    90 or greater        Normal or high (1)  G2                    60-89                Mild decrease (1)  G3a                   45-59                Mild to moderate decrease  G3b                   30-44                Moderate to severe decrease  G4                    15-29                Severe decrease  G5                    14 or less           Kidney failure    (1)In the absence of evidence of kidney disease, neither GFR category G1 or G2 fulfill the criteria for CKD.    eGFR calculation 2021  CKD-EPI creatinine equation, which does not include race as a factor    Magnesium [[id number]]  (Abnormal) Collected: 05/04/25 1330    Specimen: Blood Updated: 05/04/25 1403     Magnesium 1.5 mg/dL     Ethanol [[id number]] Collected: 05/04/25 1330    Specimen: Blood Updated: 05/04/25 1358     Ethanol % <0.010 %     Narrative:      Not for legal purposes. Chain of Custody not followed.     Protime-INR [[id number]]  (Normal) Collected: 05/04/25 1330    Specimen: Blood Updated: 05/04/25 1352     Protime 14.4 Seconds      INR 1.07    CBC & Differential [[id number]]  (Abnormal) Collected: 05/04/25 1330    Specimen: Blood Updated: 05/04/25 1343    Narrative:      The following orders were created for panel order CBC & Differential.  Procedure                               Abnormality         Status                     ---------                               -----------         ------                     CBC Auto Differential[[id number]]        Abnormal            Final result                 Please view results for these tests on the individual orders.    CBC Auto Differential [[id number]]  (Abnormal) Collected: 05/04/25 1330    Specimen: Blood Updated: 05/04/25 1343     WBC 8.47 10*3/mm3      RBC 3.39 10*6/mm3      Hemoglobin 10.7 g/dL      Hematocrit 33.0 %      MCV 97.3 fL      MCH 31.6 pg      MCHC 32.4 g/dL      RDW 14.5 %      RDW-SD 51.7 fl      MPV 10.2 fL      Platelets 241 10*3/mm3      Neutrophil % 82.3 %      Lymphocyte % 11.1 %      Monocyte % 5.3 %      Eosinophil % 0.1 %      Basophil % 0.4 %      Immature Grans % 0.8 %      Neutrophils, Absolute 6.97 10*3/mm3      Lymphocytes, Absolute 0.94 10*3/mm3      Monocytes, Absolute 0.45 10*3/mm3      Eosinophils, Absolute 0.01 10*3/mm3      Basophils, Absolute 0.03 10*3/mm3      Immature Grans, Absolute 0.07 10*3/mm3      nRBC 0.0 /100 WBC           Imaging Results (Last 24 Hours)       Procedure Component Value Units Date/Time    CT Head Without Contrast [[id number]]  Collected: 05/04/25 1352     Updated: 05/04/25 1356    Narrative:      EXAMINATION: CT HEAD WO CONTRAST-      5/4/2025 12:43 PM     HISTORY: Altered mental status. Worsening dementia. Aggressive behavior.     In order to have a CT radiation dose as low as reasonably achievable  Automated Exposure Control was utilized for adjustment of the mA and/or  KV according to patient size.     CT Dose DLP = 680.97 mGy.cm.  (If there are multiple studies performed at the same time this  represents the total dose).     Images are stored in PACS per institutional protocol.        Noncontrast head CT.  Axial, sagittal, and coronal sequences.     The visualized paranasal sinuses are clear.     The brain and ventricles have an age appropriate appearance.   Moderate atrophy and small vessel disease.  There is no hemorrhage or mass-effect.   No acute infarction is seen.     No calvarial abnormality.       Impression:      1. Age-related chronic change.  2. No acute intracranial abnormality is seen.           This report was signed and finalized on 5/4/2025 1:53 PM by Dr. Yifan Salazar MD.       XR Chest 1 View [[id number]] Collected: 05/04/25 1340     Updated: 05/04/25 1344    Narrative:      EXAMINATION: XR CHEST 1 VW-     5/4/2025 12:31 PM     HISTORY: Altered mental status.     Images are stored in PACS per institutional protocol.     1 view chest x-ray.     COMPARISON:  3/31/2025.     Heart size is normal.  The mediastinum is within normal limits.     The lungs are mildly hyperexpanded with no pneumonia or pneumothorax.  Mild chronic appearing interstitial disease with a few calcified  granulomas.  No congestive failure changes.       Impression:      1. No acute disease.                 This report was signed and finalized on 5/4/2025 1:41 PM by Dr. Yifan Salazar MD.             I have personally reviewed and interpreted the radiology studies and ECG obtained at time of admission.     Assessment / Plan   Assessment:   Active  Hospital Problems    Diagnosis     **Acute renal failure     Change in mental status    Dementia  Metabolic uremic encephalopathy    Treatment Plan  The patient will be admitted to my service here at [facility].   In ED head CT -ve acute, CMP Cr 3.5 unchanged from baseline, UA -ve, UDS -ve, CXR -ve, will admit IVF, US renal check b12 ammonia, TSH consult nephrology, seroquil qhs, consult palliative care/ ? Needs placement.., SSI, SCDS for DVT prophylaxis identify reconcile restart home meds once reconciled   Medical Decision Making  Number and Complexity of problems: 2 acute   Differential Diagnosis: as above     Conditions and Status        Condition is at treatment goal.     [facility] Data  External documents reviewed: yes  Cardiac tracing (EKG, telemetry) interpretation: yes  Radiology interpretation: yes  Labs reviewed: yes  Any tests that were considered but not ordered: no     Decision rules/scores evaluated (example [id number], Wells, etc): no     Discussed with: ED     Care Planning  Shared decision making: Patient apprised of current labs, vitals, imaging and treatment plan.  They are agreeable with proceeding with plans as discussed.   Code status and discussions: full     Disposition  Social Determinants of Health that impact treatment or disposition: no  Estimated length of stay is TBD.     I confirmed that the patient's advanced care plan is present, code status is documented, and a surrogate decision maker is listed in the patient's medical record.         The patient was seen and examined by me on 1650 at [facility] .    Electronically signed by Perla Mireles MD, 05/04/25, 16:34 CDT.              Electronically signed by Perla Mireles MD at 05/04/25 9722       Vital Signs (last day)       Date/Time Temp Temp src Pulse Resp BP Patient Position SpO2    05/05/25 1256 97.6 (36.4) Oral 104 16 139/83 Sitting 95    05/05/25 0714 98.1 (36.7) Oral 82 16 151/73 Lying 96    05/05/25 0339 98.2  (36.8) Oral 90 16 124/89 Lying 96    05/04/25 2220 97.8 (36.6) Oral 108 16 146/102 Lying 96    05/04/25 2023 97.6 (36.4) Oral 87 -- 199/82 Lying 97    05/04/25 1757 98.5 (36.9) Oral 76 18 197/79 Lying 97    05/04/25 1501 -- -- 97 18 175/95 -- 94    05/04/25 1312 98.1 (36.7) Oral 99 18 155/89 -- 90          Current Facility-Administered Medications   Medication Dose Route Frequency Provider Last Rate Last Admin    acetaminophen (TYLENOL) tablet 650 mg  650 mg Oral Q4H PRN Perla Mireles MD        Or    acetaminophen (TYLENOL) 160 MG/5ML oral solution 650 mg  650 mg Oral Q4H PRN Perla Mireles MD        Or    acetaminophen (TYLENOL) suppository 650 mg  650 mg Rectal Q4H PRN Perla Mireles MD        amLODIPine (NORVASC) tablet 10 mg  10 mg Oral Q24H Pablo Ortiz MD   10 mg at 05/05/25 0838    sennosides-docusate (PERICOLACE) 8.6-50 MG per tablet 2 tablet  2 tablet Oral BID PRN Perla Mireles MD        And    polyethylene glycol (MIRALAX) packet 17 g  17 g Oral Daily PRN Perla Mireles MD        And    bisacodyl (DULCOLAX) EC tablet 5 mg  5 mg Oral Daily PRN Perla Mireels MD        And    bisacodyl (DULCOLAX) suppository 10 mg  10 mg Rectal Daily PRN Perla Mireles MD        Calcium Replacement - Follow Nurse / BPA Driven Protocol   Not Applicable PRN Perla Mireles MD        dextrose (D50W) (25 g/50 mL) IV injection 25 g  25 g Intravenous Q15 Min PRN Perla Mireles MD        dextrose (GLUTOSE) oral gel 15 g  15 g Oral Q15 Min PRN Perla Mireles MD        glucagon (GLUCAGEN) injection 1 mg  1 mg Intramuscular Q15 Min PRN Perla Mireles MD        heparin (porcine) 5000 UNIT/ML injection 5,000 Units  5,000 Units Subcutaneous Q12H Brayan Meehan MD        hydrALAZINE (APRESOLINE) injection 20 mg  20 mg Intravenous Q4H PRN Perla Mireles MD   20 mg at 05/04/25 2030    Insulin Lispro (humaLOG) injection 2-7 Units  2-7 Units Subcutaneous 4x Daily AC & at Bedtime Perla Mireles MD   2 Units at  05/05/25 0838    Magnesium Standard Dose Replacement - Follow Nurse / BPA Driven Protocol   Not Applicable PRN Perla Mireles MD        ondansetron (ZOFRAN) injection 4 mg  4 mg Intravenous Q6H PRN Perla Mireles MD        oxyCODONE-acetaminophen (PERCOCET) 5-325 MG per tablet 1 tablet  1 tablet Oral Q8H PRN Perla Mireles MD        Phosphorus Replacement - Follow Nurse / BPA Driven Protocol   Not Applicable PRN Perla Mireles MD        potassium chloride (KLOR-CON M20) CR tablet 20 mEq  20 mEq Oral Once Brayan Meehan MD        Potassium Replacement - Follow Nurse / BPA Driven Protocol   Not Applicable PRN Perla Mireles MD        QUEtiapine (SEROquel) tablet 25 mg  25 mg Oral Nightly Perla Mireles MD   25 mg at 05/04/25 1947    sodium chloride 0.9 % flush 10 mL  10 mL Intravenous PRN Cabrera Robbins MD        sodium chloride 0.9 % flush 10 mL  10 mL Intravenous Q12H Perla Mireles MD   10 mL at 05/05/25 0839    sodium chloride 0.9 % flush 10 mL  10 mL Intravenous PRN Perla Mireles MD        sodium chloride 0.9 % infusion 40 mL  40 mL Intravenous PRN Perla Mireles MD        sodium chloride 0.9 % infusion  75 mL/hr Intravenous Continuous Perla Mireles MD 75 mL/hr at 05/04/25 1748 75 mL/hr at 05/04/25 1748    ziprasidone (GEODON) injection 20 mg  20 mg Intramuscular Q4H PRN Perla Mireles MD   20 mg at 05/05/25 1502     Operative/Procedure Notes (last 7 days)  Notes from 04/28/25 1618 through 05/05/25 1618   No notes of this type exist for this encounter.          Physician Progress Notes (last 24 hours)        Brayan Meehan MD at 05/05/25 1126              [facility] Medicine Services  INPATIENT PROGRESS NOTE    Patient Name: Maikel Mathews  Date of Admission: 5/4/2025  Today's Date: 05/05/25  Length of Stay: 1  Primary Care Physician: David Barajas MD    Subjective   Chief Complaint: Follow-up  HPI   I went to see the patient with  "nurse May who advised me to bring her along.  Patient does not appear to have any insight.  He is confused  He states that he is 89-year-old but his COVID age is 89 \"I recommend I have a wife but none.\"    He states he lives with his wife.  \"I want to go home.\"  Patient brought in because of confusion and aggressive behavior    Review of Systems   Limited information from the patient.  All pertinent negatives and positives are as above. All other systems have been reviewed and are negative unless otherwise stated.     Objective    Temp:  [97.6 °F (36.4 °C)-98.5 °F (36.9 °C)] 98.1 °F (36.7 °C)  Heart Rate:  [] 82  Resp:  [16-18] 16  BP: (124-199)/() 151/73  Physical Exam  Seated comfortably in recliner not in any distress  GEN: Awake, alert, interactive, in NAD, confused and without insight  HEENT: Atraumatic, EOMI, Anicteric, Trachea midline  Lungs: CTAB, no wheezing/rales/rhonchi  Heart: RRR, +S1/s2, no gross murmur  ABD: soft, nt/nd, +BS, no guarding/rebound  Extremities: atraumatic, no cyanosis, no edema  Skin: no rashes or lesions  Neuro: Alert, grossly no focal deficits  Psych: normal mood & affect    Results Review:  I have reviewed the labs, radiology results, and diagnostic studies.    Laboratory Data:   Results from last 7 days   Lab Units 05/05/25  0850 05/04/25  1330   WBC 10*3/mm3 5.95 8.47   HEMOGLOBIN g/dL 10.2* 10.7*   HEMATOCRIT % 31.0* 33.0*   PLATELETS 10*3/mm3 213 241        Results from last 7 days   Lab Units 05/05/25  0850 05/04/25  1330   SODIUM mmol/L 139 135*   POTASSIUM mmol/L 3.4* 3.9   CHLORIDE mmol/L 110* 104   CO2 mmol/L 16.0* 15.0*   BUN mg/dL 45* 52*   CREATININE mg/dL 3.17* 3.64*   CALCIUM mg/dL 7.8* 7.8*   BILIRUBIN mg/dL 0.3 0.3   ALK PHOS U/L 75 84   ALT (SGPT) U/L 6 6   AST (SGOT) U/L 14 16   GLUCOSE mg/dL 191* 269*       Culture Data:   No results found for: \"BLOODCX\", \"URINECX\", \"WOUNDCX\", \"MRSACX\", \"RESPCX\", \"STOOLCX\"    Radiology Data:   Imaging Results (Last " 24 Hours)       Procedure Component Value Units Date/Time    CT Head Without Contrast [[id number]] Collected: 05/04/25 1352     Updated: 05/04/25 1356    Narrative:      EXAMINATION: CT HEAD WO CONTRAST-      5/4/2025 12:43 PM     HISTORY: Altered mental status. Worsening dementia. Aggressive behavior.     In order to have a CT radiation dose as low as reasonably achievable  Automated Exposure Control was utilized for adjustment of the mA and/or  KV according to patient size.     CT Dose DLP = 680.97 mGy.cm.  (If there are multiple studies performed at the same time this  represents the total dose).     Images are stored in PACS per institutional protocol.        Noncontrast head CT.  Axial, sagittal, and coronal sequences.     The visualized paranasal sinuses are clear.     The brain and ventricles have an age appropriate appearance.   Moderate atrophy and small vessel disease.  There is no hemorrhage or mass-effect.   No acute infarction is seen.     No calvarial abnormality.       Impression:      1. Age-related chronic change.  2. No acute intracranial abnormality is seen.           This report was signed and finalized on 5/4/2025 1:53 PM by Dr. Yifan Salazar MD.       XR Chest 1 View [[id number]] Collected: 05/04/25 1340     Updated: 05/04/25 1344    Narrative:      EXAMINATION: XR CHEST 1 VW-     5/4/2025 12:31 PM     HISTORY: Altered mental status.     Images are stored in PACS per institutional protocol.     1 view chest x-ray.     COMPARISON:  3/31/2025.     Heart size is normal.  The mediastinum is within normal limits.     The lungs are mildly hyperexpanded with no pneumonia or pneumothorax.  Mild chronic appearing interstitial disease with a few calcified  granulomas.  No congestive failure changes.       Impression:      1. No acute disease.                 This report was signed and finalized on 5/4/2025 1:41 PM by Dr. Yifan Salazar MD.               I have reviewed the patient's current medications.      Assessment/Plan   Assessment  Active Hospital Problems    Diagnosis     **Acute renal failure     Change in mental status            Medical Decision Making  Number and Complexity of problems:   Problem list  Confused  Lack of insight  ?  Dementia  Reportedly had aggressive behavior but none since admission  Hypomagnesemia resolved  Hypertension  Hypokalemia  CKD stage IV with acute kidney injury  Anion gap metabolic acidosis likely from kidney injury  Diabetes mellitus type 2  Advanced age            Treatment Plan  Renal ultrasound pending  Potassium replacement  Continue antihypertensive medication  Continue sliding scale insulin with as needed hypoglycemic protocol  I we will seek assistance of social service in checking goals of care by family.  Nephrology following.  Appreciate assistance.  Ultrasound pending  Refer to PT for evaluation  amLODIPine, 10 mg, Oral, Q24H  insulin lispro, 2-7 Units, Subcutaneous, 4x Daily AC & at Bedtime  potassium chloride ER, 20 mEq, Oral, Once  QUEtiapine, 25 mg, Oral, Nightly  sodium chloride, 10 mL, Intravenous, Q12H          Conditions and Status  Fair     [facility] Data  External documents reviewed: Reviewed epic record  Cardiac tracing (EKG, telemetry) interpretation: -  Radiology interpretation: y  Labs reviewed: y   Any tests that were considered but not ordered: None     Decision rules/scores evaluated (example [id number], Wells, etc):-     Discussed with: Patient and nurse May     Care Planning  Shared decision making: Patient  Code status and discussions: CODE STATUS indicates full support/CPR    Disposition  Social Determinants of Health that impact treatment or disposition: None identified at this time.  I expect the patient to be discharged to (?)          Electronically signed by Brayan Meehan MD, 05/05/25, 11:26 CDT.      Electronically signed by Brayan Meehan MD at 05/05/25 3390       Jona Mckeon, APRN at 05/05/25 3279           Nephrology ([facility]) Progress Note      Patient:  Maikel Mathews  YOB: 1929  Date of Service: 5/5/2025  MRN: [id number]   Acct: [id number]   Primary Care Physician: David Barajas MD  Advance Directive:   Code Status and Medical Interventions: CPR (Attempt to Resuscitate); Full Support   Ordered at: 05/04/25 1634     Code Status (Patient has no pulse and is not breathing):    CPR (Attempt to Resuscitate)     Medical Interventions (Patient has pulse or is breathing):    Full Support     Admit Date: 5/4/2025       Hospital Day: 1  Referring Provider: Perla Mireles MD      Patient personally seen and examined.  Complete chart including Consults, Notes, Operative Reports, Labs, Cardiology, and Radiology studies reviewed as able.        Subjective:  Maikel Mathews is a 89 y.o. male for whom we were consulted for evaluation and treatment of acute kidney injury. Baseline chronic kidney disease stage 4, creatinine 3.1 in January 2025. Does not follow with nephrology. History of dementia, type 2 diabetes, hypertension. Brought to ER for change in mental status. Reportedly has been aggressive and making threatening statements toward his family. Per family, patient has not been compliant with medications and has not been eating or drinking well. Labs in ER revealed creatinine 3.64, BUN 52. Admitted to medical floor. Patient was poor historian and not able to provide any detail or recent medical history. Denied n/v/d.    Today is awake and oriented X 2. Answers simple questions appropriately but seems unaware of recent events and unable to provide any significant medical history. Denies pain. No new overnight issues. Urine output nonoliguric    Allergies:  Patient has no known allergies.    Home Meds:  Medications Prior to Admission   Medication Sig Dispense Refill Last Dose/Taking    amLODIPine (NORVASC) 10 MG tablet Take 10 mg by mouth Daily.  5     cefdinir (OMNICEF) 300  MG capsule Take 1 capsule by mouth Daily. 10 capsule 0     glipizide (glipiZIDE XL) 5 MG ER tablet Take 1 tablet by mouth Daily for 30 days. 30 tablet 0     glipiZIDE (GLUCOTROL) 5 MG ER tablet Take 5 mg by mouth Daily.  2     HYDROcodone-acetaminophen (NORCO) 7.5-325 MG per tablet Take 1-2 tablets by mouth Every 4 (Four) Hours As Needed for Moderate Pain  (Pain). 30 tablet 0     losartan (COZAAR) 100 MG tablet Take 100 mg by mouth Daily.       Nirmatrelvir & Ritonavir, 300mg/100mg, (PAXLOVID) Take 2 tablets by mouth 2 (Two) Times a Day. 20 tablet 0        Medicines:  Current Facility-Administered Medications   Medication Dose Route Frequency Provider Last Rate Last Admin    acetaminophen (TYLENOL) tablet 650 mg  650 mg Oral Q4H PRN Perla Mireles MD        Or    acetaminophen (TYLENOL) 160 MG/5ML oral solution 650 mg  650 mg Oral Q4H PRN Perla Mireles MD        Or    acetaminophen (TYLENOL) suppository 650 mg  650 mg Rectal Q4H PRN Perla Mireles MD        amLODIPine (NORVASC) tablet 10 mg  10 mg Oral Q24H Pablo Ortiz MD   10 mg at 05/05/25 0838    sennosides-docusate (PERICOLACE) 8.6-50 MG per tablet 2 tablet  2 tablet Oral BID PRN Perla Mireles MD        And    polyethylene glycol (MIRALAX) packet 17 g  17 g Oral Daily PRN Perla Mireles MD        And    bisacodyl (DULCOLAX) EC tablet 5 mg  5 mg Oral Daily PRN Perla Mireles MD        And    bisacodyl (DULCOLAX) suppository 10 mg  10 mg Rectal Daily PRN Perla Mireles MD        Calcium Replacement - Follow Nurse / BPA Driven Protocol   Not Applicable PRN Perla Mireles MD        dextrose (D50W) (25 g/50 mL) IV injection 25 g  25 g Intravenous Q15 Min PRN Perla Mireles MD        dextrose (GLUTOSE) oral gel 15 g  15 g Oral Q15 Min PRN Perla Mireles MD        glucagon (GLUCAGEN) injection 1 mg  1 mg Intramuscular Q15 Min PRN Perla Mireles MD        hydrALAZINE (APRESOLINE) injection 20 mg  20 mg Intravenous Q4H PRN Perla Mireles MD   20 mg at  05/04/25 2030    Insulin Lispro (humaLOG) injection 2-7 Units  2-7 Units Subcutaneous 4x Daily AC & at Bedtime Perla Mireles MD   2 Units at 05/05/25 0838    Magnesium Standard Dose Replacement - Follow Nurse / BPA Driven Protocol   Not Applicable PRPerla Peter MD        ondansetron (ZOFRAN) injection 4 mg  4 mg Intravenous Q6H PRN Perla Mireles MD        oxyCODONE-acetaminophen (PERCOCET) 5-325 MG per tablet 1 tablet  1 tablet Oral Q8H PRN Perla Mireles MD        Phosphorus Replacement - Follow Nurse / BPA Driven Protocol   Not Applicable PRN Perla Mireles MD        Potassium Replacement - Follow Nurse / BPA Driven Protocol   Not Applicable PRN Perla Mireles MD        QUEtiapine (SEROquel) tablet 25 mg  25 mg Oral Nightly Perla Mireles MD   25 mg at 05/04/25 1947    sodium chloride 0.9 % flush 10 mL  10 mL Intravenous PRN Cabrera Robbins MD        sodium chloride 0.9 % flush 10 mL  10 mL Intravenous Q12H Perla Mireles MD   10 mL at 05/05/25 0839    sodium chloride 0.9 % flush 10 mL  10 mL Intravenous PRN Perla Mireles MD        sodium chloride 0.9 % infusion 40 mL  40 mL Intravenous PRN Perla Mireles MD        sodium chloride 0.9 % infusion  75 mL/hr Intravenous Continuous Perla Mireles MD 75 mL/hr at 05/04/25 1748 75 mL/hr at 05/04/25 1748    ziprasidone (GEODON) injection 20 mg  20 mg Intramuscular Q4H PRN Perla Mireles MD   20 mg at 05/05/25 0545       Past Medical History:  Past Medical History:   Diagnosis Date    Arthritis     AVM (arteriovenous malformation)     Cancer     PROSTATE    Cataract     Colon cancer     Colon polyp     Diabetes mellitus     Diverticulosis     Dizzinesses 10/20/2017    GERD (gastroesophageal reflux disease)     Hemorrhoids     History of transfusion     Hypertension     Inguinal hernia     Iron deficiency anemia due to chronic blood loss 7/26/2017    Prostate CA     Prostate hypertrophy     Rotator cuff syndrome        Past Surgical History:  Past  Surgical History:   Procedure Laterality Date    APPENDECTOMY      CHOLECYSTECTOMY      COLON RESECTION N/A 9/18/2017    Procedure: LAPAROSCOPIC ASSISTED RIGHT COLECTOMY;  Surgeon: Caroline Loomis MD;  Location: [facility] OR;  Service:     COLONOSCOPY  04/29/2010    COLONOSCOPY N/A 8/25/2017    Procedure: COLONOSCOPY WITH ANESTHESIA;  Surgeon: Joaquín Neff MD;  Location: [facility] ENDOSCOPY;  Service:     ENDOSCOPY  03/27/2013    ENDOSCOPY N/A 8/25/2017    Procedure: ESOPHAGOGASTRODUODENOSCOPY WITH ANESTHESIA;  Surgeon: Joaquín Neff MD;  Location: [facility] ENDOSCOPY;  Service:     EYE SURGERY Left     CATARACT    INGUINAL HERNIA REPAIR Bilateral 11/1/2019    Procedure: OPEN BILATERAL INGUINAL HERNIA REPAIR WITH MESH;  Surgeon: Caroline Loomis MD;  Location: [facility] OR;  Service: General    INTERVENTIONAL RADIOLOGY PROCEDURE N/A 9/18/2017    Procedure: URETHRA DILITATION with dominguez catheter insertion;  Surgeon: Mamadou Paez MD;  Location: [facility] OR;  Service:     PROSTATECTOMY      PROSTATECTOMY         Family History  Family History   Problem Relation Age of Onset    Colon cancer Neg Hx     Colon polyps Neg Hx        Social History  Social History     Socioeconomic History    Marital status:    Tobacco Use    Smoking status: Never    Smokeless tobacco: Never   Substance and Sexual Activity    Alcohol use: No    Drug use: No    Sexual activity: Defer       Review of Systems:  History obtained from chart review and the patient  General ROS: No fever or chills  Respiratory ROS: No cough, shortness of breath, wheezing  Cardiovascular ROS: No chest pain or palpitations  Gastrointestinal ROS: No abdominal pain or melena  Genito-Urinary ROS: No dysuria or hematuria  Psych ROS: No anxiety and depression  14 point ROS reviewed with the patient and negative except as noted above and in the HPI unless unable to obtain.    Objective:  Patient Vitals for the past 24 hrs:   BP Temp Temp src Pulse Resp SpO2  "Height Weight   05/05/25 0714 151/73 98.1 °F (36.7 °C) Oral 82 16 96 % -- --   05/05/25 0339 124/89 98.2 °F (36.8 °C) Oral 90 16 96 % -- --   05/04/25 2220 (!) 146/102 97.8 °F (36.6 °C) Oral 108 16 96 % -- --   05/04/25 2023 (!) 199/82 97.6 °F (36.4 °C) Oral 87 -- 97 % -- --   05/04/25 1757 (!) 197/79 98.5 °F (36.9 °C) Oral 76 18 97 % -- --   05/04/25 1501 175/95 -- -- 97 18 94 % -- --   05/04/25 1312 155/89 98.1 °F (36.7 °C) Oral 99 18 90 % 167.6 cm (66\") 64 kg (141 lb)       Intake/Output Summary (Last 24 hours) at 5/5/2025 0947  Last data filed at 5/5/2025 0900  Gross per 24 hour   Intake 120 ml   Output 300 ml   Net -180 ml     General: awake/alert   Chest:  clear to auscultation bilaterally without respiratory distress  CVS: regular rate and rhythm  Abdominal: soft, nontender, positive bowel sounds  Extremities: no cyanosis or edema  Skin: warm and dry without rash      Labs:  Results from last 7 days   Lab Units 05/05/25  0850 05/04/25  1330   WBC 10*3/mm3 5.95 8.47   HEMOGLOBIN g/dL 10.2* 10.7*   HEMATOCRIT % 31.0* 33.0*   PLATELETS 10*3/mm3 213 241         Results from last 7 days   Lab Units 05/05/25  0850 05/04/25  1330   SODIUM mmol/L 139 135*   POTASSIUM mmol/L 3.4* 3.9   CHLORIDE mmol/L 110* 104   CO2 mmol/L 16.0* 15.0*   BUN mg/dL 45* 52*   CREATININE mg/dL 3.17* 3.64*   CALCIUM mg/dL 7.8* 7.8*   EGFR mL/min/1.73 17.4* 14.7*   BILIRUBIN mg/dL 0.3 0.3   ALK PHOS U/L 75 84   ALT (SGPT) U/L 6 6   AST (SGOT) U/L 14 16   GLUCOSE mg/dL 191* 269*       Radiology:   Imaging Results (Last 72 Hours)       Procedure Component Value Units Date/Time    CT Head Without Contrast [[id number]] Collected: 05/04/25 1352     Updated: 05/04/25 1356    Narrative:      EXAMINATION: CT HEAD WO CONTRAST-      5/4/2025 12:43 PM     HISTORY: Altered mental status. Worsening dementia. Aggressive behavior.     In order to have a CT radiation dose as low as reasonably achievable  Automated Exposure Control was utilized for " "adjustment of the mA and/or  KV according to patient size.     CT Dose DLP = 680.97 mGy.cm.  (If there are multiple studies performed at the same time this  represents the total dose).     Images are stored in PACS per institutional protocol.        Noncontrast head CT.  Axial, sagittal, and coronal sequences.     The visualized paranasal sinuses are clear.     The brain and ventricles have an age appropriate appearance.   Moderate atrophy and small vessel disease.  There is no hemorrhage or mass-effect.   No acute infarction is seen.     No calvarial abnormality.       Impression:      1. Age-related chronic change.  2. No acute intracranial abnormality is seen.           This report was signed and finalized on 5/4/2025 1:53 PM by Dr. Yifan Salazar MD.       XR Chest 1 View [[id number]] Collected: 05/04/25 1340     Updated: 05/04/25 1344    Narrative:      EXAMINATION: XR CHEST 1 VW-     5/4/2025 12:31 PM     HISTORY: Altered mental status.     Images are stored in PACS per institutional protocol.     1 view chest x-ray.     COMPARISON:  3/31/2025.     Heart size is normal.  The mediastinum is within normal limits.     The lungs are mildly hyperexpanded with no pneumonia or pneumothorax.  Mild chronic appearing interstitial disease with a few calcified  granulomas.  No congestive failure changes.       Impression:      1. No acute disease.                 This report was signed and finalized on 5/4/2025 1:41 PM by Dr. Yifan Salazar MD.               Culture:  No results found for: \"BLOODCX\", \"URINECX\", \"WOUNDCX\", \"MRSACX\", \"RESPCX\", \"STOOLCX\"      Assessment    Acute kidney injury, prerenal--resolving  Baseline chronic kidney disease stage 4  Type 2 diabetes  Hypertension  History of dementia  Metabolic acidosis  Anemia of CKD  Hypokalemia     Plan:   Renal US pending  Replace potassium  Renal function improved with IV fluids, approaching baseline level.  Will continue IV fluids for now but should be able to DC " once patient demonstrates ability to maintain adequate PO intake.      CHITO Gutiérrez  5/5/2025  09:47 CDT      Electronically signed by Jona Mckeon APRN at 05/05/25 0949          Consult Notes (last 24 hours)        Pablo Ortiz MD at 05/04/25 2006          Nephrology ([facility]) Consult Note      Patient:  Maikel Mathews  YOB: 1929  Date of Service: 5/4/2025  MRN: [id number]   Acct: [id number]   Primary Care Physician: David Barajas MD  Advance Directive:   Code Status and Medical Interventions: CPR (Attempt to Resuscitate); Full Support   Ordered at: 05/04/25 1634     Code Status (Patient has no pulse and is not breathing):    CPR (Attempt to Resuscitate)     Medical Interventions (Patient has pulse or is breathing):    Full Support     Admit Date: 5/4/2025       Hospital Day: 0  Referring Provider: Perla Mireles MD      Patient Seen, Chart, Consults, Notes, Labs, Radiology studies reviewed.    Chief complaint: Abnormal labs.    Subjective:  Maikel Mathews is a 89 y.o. male  whom we were consulted for acute kidney injury/chronic kidney disease.  Patient has history of stage IV CKD, type 2 diabetes, hypertension, history of dementia.  He was brought in by ambulance as he was found to have change in mental status.  He has not been compliant with medication or intake of fluid or food.  His wife who is 86 years old also has multiple medical problem and she is also frail.  Patient has been showing some aggressive behavior with threatening statements towards his wife.  On arrival his blood sugar was 334 mg.  CT scan of the head did not show any any acute pathology.  Routine lab data indicated serum creatinine is 3.5 mg which is close to his baseline.  He denies any nausea vomiting or diarrhea.  Nephrology is consulted for management of acute on chronic kidney disease.    This afternoon he was seen in the room, still very confused and  agitated.    Allergies:  Patient has no known allergies.    Home Meds:  Medications Prior to Admission   Medication Sig Dispense Refill Last Dose/Taking    amLODIPine (NORVASC) 10 MG tablet Take 10 mg by mouth Daily.  5     cefdinir (OMNICEF) 300 MG capsule Take 1 capsule by mouth Daily. 10 capsule 0     glipizide (glipiZIDE XL) 5 MG ER tablet Take 1 tablet by mouth Daily for 30 days. 30 tablet 0     glipiZIDE (GLUCOTROL) 5 MG ER tablet Take 5 mg by mouth Daily.  2     HYDROcodone-acetaminophen (NORCO) 7.5-325 MG per tablet Take 1-2 tablets by mouth Every 4 (Four) Hours As Needed for Moderate Pain  (Pain). 30 tablet 0     losartan (COZAAR) 100 MG tablet Take 100 mg by mouth Daily.       Nirmatrelvir & Ritonavir, 300mg/100mg, (PAXLOVID) Take 2 tablets by mouth 2 (Two) Times a Day. 20 tablet 0        Medicines:  Current Facility-Administered Medications   Medication Dose Route Frequency Provider Last Rate Last Admin    acetaminophen (TYLENOL) tablet 650 mg  650 mg Oral Q4H PRN Perla Mireles MD        Or    acetaminophen (TYLENOL) 160 MG/5ML oral solution 650 mg  650 mg Oral Q4H PRN Perla Mireles MD        Or    acetaminophen (TYLENOL) suppository 650 mg  650 mg Rectal Q4H PRN Perla Mireles MD        sennosides-docusate (PERICOLACE) 8.6-50 MG per tablet 2 tablet  2 tablet Oral BID PRN Perla Mireles MD        And    polyethylene glycol (MIRALAX) packet 17 g  17 g Oral Daily PRN Perla Mireles MD        And    bisacodyl (DULCOLAX) EC tablet 5 mg  5 mg Oral Daily PRN Perla Mireles MD        And    bisacodyl (DULCOLAX) suppository 10 mg  10 mg Rectal Daily PRN Perla Mireles MD        Calcium Replacement - Follow Nurse / BPA Driven Protocol   Not Applicable PRN Perla Mireles MD        dextrose (D50W) (25 g/50 mL) IV injection 25 g  25 g Intravenous Q15 Min PRN Perla Mireles MD        dextrose (GLUTOSE) oral gel 15 g  15 g Oral Q15 Min PRN Perla Mireles MD        glucagon (GLUCAGEN) injection 1 mg  1 mg  Intramuscular Q15 Min PRN Perla Mireles MD        hydrALAZINE (APRESOLINE) injection 20 mg  20 mg Intravenous Q4H PRN Perla Mireles MD        Insulin Lispro (humaLOG) injection 2-7 Units  2-7 Units Subcutaneous 4x Daily AC & at Bedtime Perla Mireles MD        Magnesium Standard Dose Replacement - Follow Nurse / BPA Driven Protocol   Not Applicable PRN Perla Mireles MD        magnesium sulfate in D5W 1g/100mL (PREMIX)  1 g Intravenous Q1H Perla Mireles MD   1 g at 05/04/25 1906    ondansetron (ZOFRAN) injection 4 mg  4 mg Intravenous Q6H PRN Perla Mireles MD        oxyCODONE-acetaminophen (PERCOCET) 5-325 MG per tablet 1 tablet  1 tablet Oral Q8H PRN Perla Mireles MD        Phosphorus Replacement - Follow Nurse / BPA Driven Protocol   Not Applicable PRN Perla Mireles MD        Potassium Replacement - Follow Nurse / BPA Driven Protocol   Not Applicable PRN Perla Mireles MD        QUEtiapine (SEROquel) tablet 25 mg  25 mg Oral Nightly Perla Mireles MD   25 mg at 05/04/25 1947    sodium chloride 0.9 % flush 10 mL  10 mL Intravenous PRN Cabrera Robbins MD        sodium chloride 0.9 % flush 10 mL  10 mL Intravenous Q12H Perla Mireles MD        sodium chloride 0.9 % flush 10 mL  10 mL Intravenous PRN Perla Mireles MD        sodium chloride 0.9 % infusion 40 mL  40 mL Intravenous PRN Perla Mireles MD        sodium chloride 0.9 % infusion  75 mL/hr Intravenous Continuous Perla Mireles MD 75 mL/hr at 05/04/25 1748 75 mL/hr at 05/04/25 1748    ziprasidone (GEODON) injection 20 mg  20 mg Intramuscular Q4H PRN Perla Mireles MD   20 mg at 05/04/25 1834       Past Medical History:  Past Medical History:   Diagnosis Date    Arthritis     AVM (arteriovenous malformation)     Cancer     PROSTATE    Cataract     Colon cancer     Colon polyp     Diabetes mellitus     Diverticulosis     Dizzinesses 10/20/2017    GERD (gastroesophageal reflux disease)     Hemorrhoids     History of transfusion      Hypertension     Inguinal hernia     Iron deficiency anemia due to chronic blood loss 7/26/2017    Prostate CA     Prostate hypertrophy     Rotator cuff syndrome        Past Surgical History:  Past Surgical History:   Procedure Laterality Date    APPENDECTOMY      CHOLECYSTECTOMY      COLON RESECTION N/A 9/18/2017    Procedure: LAPAROSCOPIC ASSISTED RIGHT COLECTOMY;  Surgeon: Caroline Loomis MD;  Location: [facility] OR;  Service:     COLONOSCOPY  04/29/2010    COLONOSCOPY N/A 8/25/2017    Procedure: COLONOSCOPY WITH ANESTHESIA;  Surgeon: Joaquín Neff MD;  Location: [facility] ENDOSCOPY;  Service:     ENDOSCOPY  03/27/2013    ENDOSCOPY N/A 8/25/2017    Procedure: ESOPHAGOGASTRODUODENOSCOPY WITH ANESTHESIA;  Surgeon: Joaquín Neff MD;  Location: [facility] ENDOSCOPY;  Service:     EYE SURGERY Left     CATARACT    INGUINAL HERNIA REPAIR Bilateral 11/1/2019    Procedure: OPEN BILATERAL INGUINAL HERNIA REPAIR WITH MESH;  Surgeon: Caroline Loomis MD;  Location: [facility] OR;  Service: General    INTERVENTIONAL RADIOLOGY PROCEDURE N/A 9/18/2017    Procedure: URETHRA DILITATION with dominguez catheter insertion;  Surgeon: Mamadou Paez MD;  Location:  [facility] OR;  Service:     PROSTATECTOMY      PROSTATECTOMY         Family History  Family History   Problem Relation Age of Onset    Colon cancer Neg Hx     Colon polyps Neg Hx        Social History  Social History     Socioeconomic History    Marital status:    Tobacco Use    Smoking status: Never    Smokeless tobacco: Never   Substance and Sexual Activity    Alcohol use: No    Drug use: No    Sexual activity: Defer         Review of Systems:  History obtained from chart review and the patient  General ROS: No fever or chills  Respiratory ROS: No cough, shortness of breath, wheezing  Cardiovascular ROS: no chest pain or dyspnea on exertion  Gastrointestinal ROS: No abdominal pain or melena  Genito-Urinary ROS: No dysuria or hematuria  14 point ROS reviewed with the  "patient and negative except as noted above and in the HPI unless unable to obtain.    Objective:  BP (!) 197/79 (BP Location: Right arm, Patient Position: Lying) Comment: RN notified  Pulse 76   Temp 98.5 °F (36.9 °C) (Oral)   Resp 18   Ht 167.6 cm (66\")   Wt 64 kg (141 lb)   SpO2 97%   BMI 22.76 kg/m²   No intake or output data in the 24 hours ending 05/04/25 2006  General: awake/alert   HEENT: Normocephalic atraumatic  Neck: Supple with no JVD or carotid bruits.  Chest:  clear to auscultation bilaterally without respiratory distress  CVS: regular rate and rhythm  Abdominal: soft, nontender, normal bowel sounds  Extremities: no cyanosis or edema  Skin: warm and dry without rash      Labs:    Results from last 7 days   Lab Units 05/04/25  1330   WBC 10*3/mm3 8.47   HEMOGLOBIN g/dL 10.7*   HEMATOCRIT % 33.0*   PLATELETS 10*3/mm3 241       Results from last 7 days   Lab Units 05/04/25  1330   SODIUM mmol/L 135*   POTASSIUM mmol/L 3.9   CHLORIDE mmol/L 104   CO2 mmol/L 15.0*   BUN mg/dL 52*   CREATININE mg/dL 3.64*   CALCIUM mg/dL 7.8*   BILIRUBIN mg/dL 0.3   ALK PHOS U/L 84   ALT (SGPT) U/L 6   AST (SGOT) U/L 16   GLUCOSE mg/dL 269*   EGFR mL/min/1.73 14.7*         Radiology:   Imaging Results (Last 24 Hours)       Procedure Component Value Units Date/Time    CT Head Without Contrast [[id number]] Collected: 05/04/25 1352     Updated: 05/04/25 1356    Narrative:      EXAMINATION: CT HEAD WO CONTRAST-      5/4/2025 12:43 PM     HISTORY: Altered mental status. Worsening dementia. Aggressive behavior.     In order to have a CT radiation dose as low as reasonably achievable  Automated Exposure Control was utilized for adjustment of the mA and/or  KV according to patient size.     CT Dose DLP = 680.97 mGy.cm.  (If there are multiple studies performed at the same time this  represents the total dose).     Images are stored in PACS per institutional protocol.        Noncontrast head CT.  Axial, sagittal, and coronal " "sequences.     The visualized paranasal sinuses are clear.     The brain and ventricles have an age appropriate appearance.   Moderate atrophy and small vessel disease.  There is no hemorrhage or mass-effect.   No acute infarction is seen.     No calvarial abnormality.       Impression:      1. Age-related chronic change.  2. No acute intracranial abnormality is seen.           This report was signed and finalized on 5/4/2025 1:53 PM by Dr. Yifan Salazar MD.       XR Chest 1 View [[id number]] Collected: 05/04/25 1340     Updated: 05/04/25 1344    Narrative:      EXAMINATION: XR CHEST 1 VW-     5/4/2025 12:31 PM     HISTORY: Altered mental status.     Images are stored in PACS per institutional protocol.     1 view chest x-ray.     COMPARISON:  3/31/2025.     Heart size is normal.  The mediastinum is within normal limits.     The lungs are mildly hyperexpanded with no pneumonia or pneumothorax.  Mild chronic appearing interstitial disease with a few calcified  granulomas.  No congestive failure changes.       Impression:      1. No acute disease.                 This report was signed and finalized on 5/4/2025 1:41 PM by Dr. Yifan Salazar MD.               Culture:  No components found for: \"WOUNDCUL\", \"3\"  No components found for: \"CSFCUL\", \"3\"  No components found for: \"BC\", \"3\"  No components found for: \"URINECUL\", \"3\"      Assessment   1.  Acute kidney injury versus progression of renal disease.  2.  Type II diabetic nephropathy.  3.  Metabolic acidemia  4.  Anemia of chronic kidney disease.  5.  Poorly controlled systolic and diastolic hypertension.  6.  Poorly controlled type 2 diabetes.    Plan:  1.  Agree with IV fluid trial.  2.  Initiate sodium bicarbonate 650 mg p.o. 3 times daily.  3.  Urinary electrolytes.  4.  Baseline renal ultrasound.  5.  Increased blood pressure medications.  6.  I will continue to monitor his renal function.      Thank you for the consult, we appreciate the opportunity to provide " care to your patients.  Feel free to contact me if I can be of any further assistance.      Pablo Ortiz MD  5/4/2025  20:06 CDT    Electronically signed by Pablo Ortiz MD at 05/04/25 2012       Physical Therapy Notes (last 24 hours)  Notes from 05/04/25 1618 through 05/05/25 1618   No notes exist for this encounter.       Occupational Therapy Notes (last 24 hours)  Notes from 05/04/25 1618 through 05/05/25 1618   No notes exist for this encounter.       Speech Language Pathology Notes (last 24 hours)  Notes from 05/04/25 1618 through 05/05/25 1618   No notes exist for this encounter.

## 2025-05-05 NOTE — PLAN OF CARE
Goal Outcome Evaluation:  Plan of Care Reviewed With: patient        Progress: no change  Outcome Evaluation: Pt has still been wanting to go home; Potassium given per protocol; renal US completed; safety maintained.

## 2025-05-05 NOTE — CONSULTS
Nephrology ([facility]) Consult Note      Patient:  Maikel Mathews  YOB: 1929  Date of Service: 5/4/2025  MRN: [id number]   Acct: [id number]   Primary Care Physician: David Barajas MD  Advance Directive:   Code Status and Medical Interventions: CPR (Attempt to Resuscitate); Full Support   Ordered at: 05/04/25 1634     Code Status (Patient has no pulse and is not breathing):    CPR (Attempt to Resuscitate)     Medical Interventions (Patient has pulse or is breathing):    Full Support     Admit Date: 5/4/2025       Hospital Day: 0  Referring Provider: Perla Mireles MD      Patient Seen, Chart, Consults, Notes, Labs, Radiology studies reviewed.    Chief complaint: Abnormal labs.    Subjective:  Maikel Mathews is a 89 y.o. male  whom we were consulted for acute kidney injury/chronic kidney disease.  Patient has history of stage IV CKD, type 2 diabetes, hypertension, history of dementia.  He was brought in by ambulance as he was found to have change in mental status.  He has not been compliant with medication or intake of fluid or food.  His wife who is 86 years old also has multiple medical problem and she is also frail.  Patient has been showing some aggressive behavior with threatening statements towards his wife.  On arrival his blood sugar was 334 mg.  CT scan of the head did not show any any acute pathology.  Routine lab data indicated serum creatinine is 3.5 mg which is close to his baseline.  He denies any nausea vomiting or diarrhea.  Nephrology is consulted for management of acute on chronic kidney disease.    This afternoon he was seen in the room, still very confused and agitated.    Allergies:  Patient has no known allergies.    Home Meds:  Medications Prior to Admission   Medication Sig Dispense Refill Last Dose/Taking    amLODIPine (NORVASC) 10 MG tablet Take 10 mg by mouth Daily.  5     cefdinir (OMNICEF) 300 MG capsule Take 1 capsule by mouth Daily. 10  capsule 0     glipizide (glipiZIDE XL) 5 MG ER tablet Take 1 tablet by mouth Daily for 30 days. 30 tablet 0     glipiZIDE (GLUCOTROL) 5 MG ER tablet Take 5 mg by mouth Daily.  2     HYDROcodone-acetaminophen (NORCO) 7.5-325 MG per tablet Take 1-2 tablets by mouth Every 4 (Four) Hours As Needed for Moderate Pain  (Pain). 30 tablet 0     losartan (COZAAR) 100 MG tablet Take 100 mg by mouth Daily.       Nirmatrelvir & Ritonavir, 300mg/100mg, (PAXLOVID) Take 2 tablets by mouth 2 (Two) Times a Day. 20 tablet 0        Medicines:  Current Facility-Administered Medications   Medication Dose Route Frequency Provider Last Rate Last Admin    acetaminophen (TYLENOL) tablet 650 mg  650 mg Oral Q4H PRN Perla Mireles MD        Or    acetaminophen (TYLENOL) 160 MG/5ML oral solution 650 mg  650 mg Oral Q4H PRN Perla Mireles MD        Or    acetaminophen (TYLENOL) suppository 650 mg  650 mg Rectal Q4H PRN Perla Mireles MD        sennosides-docusate (PERICOLACE) 8.6-50 MG per tablet 2 tablet  2 tablet Oral BID PRN Perla Mireles MD        And    polyethylene glycol (MIRALAX) packet 17 g  17 g Oral Daily PRN Perla Mireles MD        And    bisacodyl (DULCOLAX) EC tablet 5 mg  5 mg Oral Daily PRN Perla Mireles MD        And    bisacodyl (DULCOLAX) suppository 10 mg  10 mg Rectal Daily PRN Perla Mireles MD        Calcium Replacement - Follow Nurse / BPA Driven Protocol   Not Applicable PRN Perla Mireles MD        dextrose (D50W) (25 g/50 mL) IV injection 25 g  25 g Intravenous Q15 Min PRN Perla Mireles MD        dextrose (GLUTOSE) oral gel 15 g  15 g Oral Q15 Min PRN Perla Mireles MD        glucagon (GLUCAGEN) injection 1 mg  1 mg Intramuscular Q15 Min PRN Perla Mireles MD        hydrALAZINE (APRESOLINE) injection 20 mg  20 mg Intravenous Q4H PRN Perla Mireles MD        Insulin Lispro (humaLOG) injection 2-7 Units  2-7 Units Subcutaneous 4x Daily AC & at Bedtime Perla Mireles MD        Magnesium Standard Dose  Replacement - Follow Nurse / BPA Driven Protocol   Not Applicable PRN Perla Mireles MD        magnesium sulfate in D5W 1g/100mL (PREMIX)  1 g Intravenous Q1H Perla Mireles MD   1 g at 05/04/25 1906    ondansetron (ZOFRAN) injection 4 mg  4 mg Intravenous Q6H PRN Perla Mireles MD        oxyCODONE-acetaminophen (PERCOCET) 5-325 MG per tablet 1 tablet  1 tablet Oral Q8H PRN Perla Mireles MD        Phosphorus Replacement - Follow Nurse / BPA Driven Protocol   Not Applicable PRN Perla Mireles MD        Potassium Replacement - Follow Nurse / BPA Driven Protocol   Not Applicable PRN Perla Mireles MD        QUEtiapine (SEROquel) tablet 25 mg  25 mg Oral Nightly Perla Mireles MD   25 mg at 05/04/25 1947    sodium chloride 0.9 % flush 10 mL  10 mL Intravenous PRN Cabrera Robbins MD        sodium chloride 0.9 % flush 10 mL  10 mL Intravenous Q12H Perla Mireles MD        sodium chloride 0.9 % flush 10 mL  10 mL Intravenous PRN Perla Mireles MD        sodium chloride 0.9 % infusion 40 mL  40 mL Intravenous PRN Perla Mireles MD        sodium chloride 0.9 % infusion  75 mL/hr Intravenous Continuous Perla Mireles MD 75 mL/hr at 05/04/25 1748 75 mL/hr at 05/04/25 1748    ziprasidone (GEODON) injection 20 mg  20 mg Intramuscular Q4H PRN Perla Mireles MD   20 mg at 05/04/25 1834       Past Medical History:  Past Medical History:   Diagnosis Date    Arthritis     AVM (arteriovenous malformation)     Cancer     PROSTATE    Cataract     Colon cancer     Colon polyp     Diabetes mellitus     Diverticulosis     Dizzinesses 10/20/2017    GERD (gastroesophageal reflux disease)     Hemorrhoids     History of transfusion     Hypertension     Inguinal hernia     Iron deficiency anemia due to chronic blood loss 7/26/2017    Prostate CA     Prostate hypertrophy     Rotator cuff syndrome        Past Surgical History:  Past Surgical History:   Procedure Laterality Date    APPENDECTOMY      CHOLECYSTECTOMY      COLON  "RESECTION N/A 9/18/2017    Procedure: LAPAROSCOPIC ASSISTED RIGHT COLECTOMY;  Surgeon: Caroline Loomis MD;  Location: [facility] OR;  Service:     COLONOSCOPY  04/29/2010    COLONOSCOPY N/A 8/25/2017    Procedure: COLONOSCOPY WITH ANESTHESIA;  Surgeon: Joaquín Neff MD;  Location: [facility] ENDOSCOPY;  Service:     ENDOSCOPY  03/27/2013    ENDOSCOPY N/A 8/25/2017    Procedure: ESOPHAGOGASTRODUODENOSCOPY WITH ANESTHESIA;  Surgeon: Joaquín Neff MD;  Location: [facility] ENDOSCOPY;  Service:     EYE SURGERY Left     CATARACT    INGUINAL HERNIA REPAIR Bilateral 11/1/2019    Procedure: OPEN BILATERAL INGUINAL HERNIA REPAIR WITH MESH;  Surgeon: Caroline Loomis MD;  Location:  [facility] OR;  Service: General    INTERVENTIONAL RADIOLOGY PROCEDURE N/A 9/18/2017    Procedure: URETHRA DILITATION with dominguez catheter insertion;  Surgeon: Mamadou Paez MD;  Location:  [facility] OR;  Service:     PROSTATECTOMY      PROSTATECTOMY         Family History  Family History   Problem Relation Age of Onset    Colon cancer Neg Hx     Colon polyps Neg Hx        Social History  Social History     Socioeconomic History    Marital status:    Tobacco Use    Smoking status: Never    Smokeless tobacco: Never   Substance and Sexual Activity    Alcohol use: No    Drug use: No    Sexual activity: Defer         Review of Systems:  History obtained from chart review and the patient  General ROS: No fever or chills  Respiratory ROS: No cough, shortness of breath, wheezing  Cardiovascular ROS: no chest pain or dyspnea on exertion  Gastrointestinal ROS: No abdominal pain or melena  Genito-Urinary ROS: No dysuria or hematuria  14 point ROS reviewed with the patient and negative except as noted above and in the HPI unless unable to obtain.    Objective:  BP (!) 197/79 (BP Location: Right arm, Patient Position: Lying) Comment: RN notified  Pulse 76   Temp 98.5 °F (36.9 °C) (Oral)   Resp 18   Ht 167.6 cm (66\")   Wt 64 kg (141 lb)   SpO2 97%   " BMI 22.76 kg/m²   No intake or output data in the 24 hours ending 05/04/25 2006  General: awake/alert   HEENT: Normocephalic atraumatic  Neck: Supple with no JVD or carotid bruits.  Chest:  clear to auscultation bilaterally without respiratory distress  CVS: regular rate and rhythm  Abdominal: soft, nontender, normal bowel sounds  Extremities: no cyanosis or edema  Skin: warm and dry without rash      Labs:    Results from last 7 days   Lab Units 05/04/25  1330   WBC 10*3/mm3 8.47   HEMOGLOBIN g/dL 10.7*   HEMATOCRIT % 33.0*   PLATELETS 10*3/mm3 241       Results from last 7 days   Lab Units 05/04/25  1330   SODIUM mmol/L 135*   POTASSIUM mmol/L 3.9   CHLORIDE mmol/L 104   CO2 mmol/L 15.0*   BUN mg/dL 52*   CREATININE mg/dL 3.64*   CALCIUM mg/dL 7.8*   BILIRUBIN mg/dL 0.3   ALK PHOS U/L 84   ALT (SGPT) U/L 6   AST (SGOT) U/L 16   GLUCOSE mg/dL 269*   EGFR mL/min/1.73 14.7*         Radiology:   Imaging Results (Last 24 Hours)       Procedure Component Value Units Date/Time    CT Head Without Contrast [[id number]] Collected: 05/04/25 1352     Updated: 05/04/25 1356    Narrative:      EXAMINATION: CT HEAD WO CONTRAST-      5/4/2025 12:43 PM     HISTORY: Altered mental status. Worsening dementia. Aggressive behavior.     In order to have a CT radiation dose as low as reasonably achievable  Automated Exposure Control was utilized for adjustment of the mA and/or  KV according to patient size.     CT Dose DLP = 680.97 mGy.cm.  (If there are multiple studies performed at the same time this  represents the total dose).     Images are stored in PACS per institutional protocol.        Noncontrast head CT.  Axial, sagittal, and coronal sequences.     The visualized paranasal sinuses are clear.     The brain and ventricles have an age appropriate appearance.   Moderate atrophy and small vessel disease.  There is no hemorrhage or mass-effect.   No acute infarction is seen.     No calvarial abnormality.       Impression:      1.  "Age-related chronic change.  2. No acute intracranial abnormality is seen.           This report was signed and finalized on 5/4/2025 1:53 PM by Dr. Yifan Salazar MD.       XR Chest 1 View [[id number]] Collected: 05/04/25 1340     Updated: 05/04/25 1344    Narrative:      EXAMINATION: XR CHEST 1 VW-     5/4/2025 12:31 PM     HISTORY: Altered mental status.     Images are stored in PACS per institutional protocol.     1 view chest x-ray.     COMPARISON:  3/31/2025.     Heart size is normal.  The mediastinum is within normal limits.     The lungs are mildly hyperexpanded with no pneumonia or pneumothorax.  Mild chronic appearing interstitial disease with a few calcified  granulomas.  No congestive failure changes.       Impression:      1. No acute disease.                 This report was signed and finalized on 5/4/2025 1:41 PM by Dr. Yifan Salazar MD.               Culture:  No components found for: \"WOUNDCUL\", \"3\"  No components found for: \"CSFCUL\", \"3\"  No components found for: \"BC\", \"3\"  No components found for: \"URINECUL\", \"3\"      Assessment   1.  Acute kidney injury versus progression of renal disease.  2.  Type II diabetic nephropathy.  3.  Metabolic acidemia  4.  Anemia of chronic kidney disease.  5.  Poorly controlled systolic and diastolic hypertension.  6.  Poorly controlled type 2 diabetes.    Plan:  1.  Agree with IV fluid trial.  2.  Initiate sodium bicarbonate 650 mg p.o. 3 times daily.  3.  Urinary electrolytes.  4.  Baseline renal ultrasound.  5.  Increased blood pressure medications.  6.  I will continue to monitor his renal function.      Thank you for the consult, we appreciate the opportunity to provide care to your patients.  Feel free to contact me if I can be of any further assistance.      Pablo Ortiz MD  5/4/2025  20:06 CDT  "

## 2025-05-05 NOTE — PROGRESS NOTES
Nephrology ([facility]) Progress Note      Patient:  Maikel Mathews  YOB: 1929  Date of Service: 5/5/2025  MRN: [id number]   Acct: [id number]   Primary Care Physician: David Barajas MD  Advance Directive:   Code Status and Medical Interventions: CPR (Attempt to Resuscitate); Full Support   Ordered at: 05/04/25 1634     Code Status (Patient has no pulse and is not breathing):    CPR (Attempt to Resuscitate)     Medical Interventions (Patient has pulse or is breathing):    Full Support     Admit Date: 5/4/2025       Hospital Day: 1  Referring Provider: Perla Mireles MD      Patient personally seen and examined.  Complete chart including Consults, Notes, Operative Reports, Labs, Cardiology, and Radiology studies reviewed as able.        Subjective:  Maikel Mathews is a 89 y.o. male for whom we were consulted for evaluation and treatment of acute kidney injury. Baseline chronic kidney disease stage 4, creatinine 3.1 in January 2025. Does not follow with nephrology. History of dementia, type 2 diabetes, hypertension. Brought to ER for change in mental status. Reportedly has been aggressive and making threatening statements toward his family. Per family, patient has not been compliant with medications and has not been eating or drinking well. Labs in ER revealed creatinine 3.64, BUN 52. Admitted to medical floor. Patient was poor historian and not able to provide any detail or recent medical history. Denied n/v/d.    Today is awake and oriented X 2. Answers simple questions appropriately but seems unaware of recent events and unable to provide any significant medical history. Denies pain. No new overnight issues. Urine output nonoliguric    Allergies:  Patient has no known allergies.    Home Meds:  Medications Prior to Admission   Medication Sig Dispense Refill Last Dose/Taking    amLODIPine (NORVASC) 10 MG tablet Take 10 mg by mouth Daily.  5     cefdinir (OMNICEF) 300 MG  capsule Take 1 capsule by mouth Daily. 10 capsule 0     glipizide (glipiZIDE XL) 5 MG ER tablet Take 1 tablet by mouth Daily for 30 days. 30 tablet 0     glipiZIDE (GLUCOTROL) 5 MG ER tablet Take 5 mg by mouth Daily.  2     HYDROcodone-acetaminophen (NORCO) 7.5-325 MG per tablet Take 1-2 tablets by mouth Every 4 (Four) Hours As Needed for Moderate Pain  (Pain). 30 tablet 0     losartan (COZAAR) 100 MG tablet Take 100 mg by mouth Daily.       Nirmatrelvir & Ritonavir, 300mg/100mg, (PAXLOVID) Take 2 tablets by mouth 2 (Two) Times a Day. 20 tablet 0        Medicines:  Current Facility-Administered Medications   Medication Dose Route Frequency Provider Last Rate Last Admin    acetaminophen (TYLENOL) tablet 650 mg  650 mg Oral Q4H PRN Perla Mireles MD        Or    acetaminophen (TYLENOL) 160 MG/5ML oral solution 650 mg  650 mg Oral Q4H PRN Perla Mireles MD        Or    acetaminophen (TYLENOL) suppository 650 mg  650 mg Rectal Q4H PRN Perla Mireles MD        amLODIPine (NORVASC) tablet 10 mg  10 mg Oral Q24H Pablo Ortiz MD   10 mg at 05/05/25 0838    sennosides-docusate (PERICOLACE) 8.6-50 MG per tablet 2 tablet  2 tablet Oral BID PRN Perla Mireles MD        And    polyethylene glycol (MIRALAX) packet 17 g  17 g Oral Daily PRN Perla Mireles MD        And    bisacodyl (DULCOLAX) EC tablet 5 mg  5 mg Oral Daily PRN Perla Mireles MD        And    bisacodyl (DULCOLAX) suppository 10 mg  10 mg Rectal Daily PRN Perla Mireles MD        Calcium Replacement - Follow Nurse / BPA Driven Protocol   Not Applicable PRN Perla Mireles MD        dextrose (D50W) (25 g/50 mL) IV injection 25 g  25 g Intravenous Q15 Min PRN Perla Mireles MD        dextrose (GLUTOSE) oral gel 15 g  15 g Oral Q15 Min PRN Perla Mireles MD        glucagon (GLUCAGEN) injection 1 mg  1 mg Intramuscular Q15 Min PRN Perla Mireles MD        hydrALAZINE (APRESOLINE) injection 20 mg  20 mg Intravenous Q4H PRN Perla Mireles MD   20 mg at  05/04/25 2030    Insulin Lispro (humaLOG) injection 2-7 Units  2-7 Units Subcutaneous 4x Daily AC & at Bedtime Perla Mireles MD   2 Units at 05/05/25 0838    Magnesium Standard Dose Replacement - Follow Nurse / BPA Driven Protocol   Not Applicable PRPerla Peter MD        ondansetron (ZOFRAN) injection 4 mg  4 mg Intravenous Q6H PRN Perla Mireles MD        oxyCODONE-acetaminophen (PERCOCET) 5-325 MG per tablet 1 tablet  1 tablet Oral Q8H PRN Perla Mireles MD        Phosphorus Replacement - Follow Nurse / BPA Driven Protocol   Not Applicable PRN Perla Mireles MD        Potassium Replacement - Follow Nurse / BPA Driven Protocol   Not Applicable PRN Perla Mireles MD        QUEtiapine (SEROquel) tablet 25 mg  25 mg Oral Nightly Perla Mireles MD   25 mg at 05/04/25 1947    sodium chloride 0.9 % flush 10 mL  10 mL Intravenous PRN Cabrera Robbins MD        sodium chloride 0.9 % flush 10 mL  10 mL Intravenous Q12H Perla Mireles MD   10 mL at 05/05/25 0839    sodium chloride 0.9 % flush 10 mL  10 mL Intravenous PRN Perla Mireles MD        sodium chloride 0.9 % infusion 40 mL  40 mL Intravenous PRN Perla Mireles MD        sodium chloride 0.9 % infusion  75 mL/hr Intravenous Continuous Perla Mireles MD 75 mL/hr at 05/04/25 1748 75 mL/hr at 05/04/25 1748    ziprasidone (GEODON) injection 20 mg  20 mg Intramuscular Q4H PRN Perla Mireles MD   20 mg at 05/05/25 0545       Past Medical History:  Past Medical History:   Diagnosis Date    Arthritis     AVM (arteriovenous malformation)     Cancer     PROSTATE    Cataract     Colon cancer     Colon polyp     Diabetes mellitus     Diverticulosis     Dizzinesses 10/20/2017    GERD (gastroesophageal reflux disease)     Hemorrhoids     History of transfusion     Hypertension     Inguinal hernia     Iron deficiency anemia due to chronic blood loss 7/26/2017    Prostate CA     Prostate hypertrophy     Rotator cuff syndrome        Past Surgical History:  Past  Surgical History:   Procedure Laterality Date    APPENDECTOMY      CHOLECYSTECTOMY      COLON RESECTION N/A 9/18/2017    Procedure: LAPAROSCOPIC ASSISTED RIGHT COLECTOMY;  Surgeon: Caroline Loomis MD;  Location: [facility] OR;  Service:     COLONOSCOPY  04/29/2010    COLONOSCOPY N/A 8/25/2017    Procedure: COLONOSCOPY WITH ANESTHESIA;  Surgeon: Joaquín Neff MD;  Location: [facility] ENDOSCOPY;  Service:     ENDOSCOPY  03/27/2013    ENDOSCOPY N/A 8/25/2017    Procedure: ESOPHAGOGASTRODUODENOSCOPY WITH ANESTHESIA;  Surgeon: Joaquín Neff MD;  Location: [facility] ENDOSCOPY;  Service:     EYE SURGERY Left     CATARACT    INGUINAL HERNIA REPAIR Bilateral 11/1/2019    Procedure: OPEN BILATERAL INGUINAL HERNIA REPAIR WITH MESH;  Surgeon: Caroline Loomis MD;  Location: [facility] OR;  Service: General    INTERVENTIONAL RADIOLOGY PROCEDURE N/A 9/18/2017    Procedure: URETHRA DILITATION with dominguez catheter insertion;  Surgeon: Mamadou Paez MD;  Location: [facility] OR;  Service:     PROSTATECTOMY      PROSTATECTOMY         Family History  Family History   Problem Relation Age of Onset    Colon cancer Neg Hx     Colon polyps Neg Hx        Social History  Social History     Socioeconomic History    Marital status:    Tobacco Use    Smoking status: Never    Smokeless tobacco: Never   Substance and Sexual Activity    Alcohol use: No    Drug use: No    Sexual activity: Defer       Review of Systems:  History obtained from chart review and the patient  General ROS: No fever or chills  Respiratory ROS: No cough, shortness of breath, wheezing  Cardiovascular ROS: No chest pain or palpitations  Gastrointestinal ROS: No abdominal pain or melena  Genito-Urinary ROS: No dysuria or hematuria  Psych ROS: No anxiety and depression  14 point ROS reviewed with the patient and negative except as noted above and in the HPI unless unable to obtain.    Objective:  Patient Vitals for the past 24 hrs:   BP Temp Temp src Pulse Resp SpO2  "Height Weight   05/05/25 0714 151/73 98.1 °F (36.7 °C) Oral 82 16 96 % -- --   05/05/25 0339 124/89 98.2 °F (36.8 °C) Oral 90 16 96 % -- --   05/04/25 2220 (!) 146/102 97.8 °F (36.6 °C) Oral 108 16 96 % -- --   05/04/25 2023 (!) 199/82 97.6 °F (36.4 °C) Oral 87 -- 97 % -- --   05/04/25 1757 (!) 197/79 98.5 °F (36.9 °C) Oral 76 18 97 % -- --   05/04/25 1501 175/95 -- -- 97 18 94 % -- --   05/04/25 1312 155/89 98.1 °F (36.7 °C) Oral 99 18 90 % 167.6 cm (66\") 64 kg (141 lb)       Intake/Output Summary (Last 24 hours) at 5/5/2025 0947  Last data filed at 5/5/2025 0900  Gross per 24 hour   Intake 120 ml   Output 300 ml   Net -180 ml     General: awake/alert   Chest:  clear to auscultation bilaterally without respiratory distress  CVS: regular rate and rhythm  Abdominal: soft, nontender, positive bowel sounds  Extremities: no cyanosis or edema  Skin: warm and dry without rash      Labs:  Results from last 7 days   Lab Units 05/05/25  0850 05/04/25  1330   WBC 10*3/mm3 5.95 8.47   HEMOGLOBIN g/dL 10.2* 10.7*   HEMATOCRIT % 31.0* 33.0*   PLATELETS 10*3/mm3 213 241         Results from last 7 days   Lab Units 05/05/25  0850 05/04/25  1330   SODIUM mmol/L 139 135*   POTASSIUM mmol/L 3.4* 3.9   CHLORIDE mmol/L 110* 104   CO2 mmol/L 16.0* 15.0*   BUN mg/dL 45* 52*   CREATININE mg/dL 3.17* 3.64*   CALCIUM mg/dL 7.8* 7.8*   EGFR mL/min/1.73 17.4* 14.7*   BILIRUBIN mg/dL 0.3 0.3   ALK PHOS U/L 75 84   ALT (SGPT) U/L 6 6   AST (SGOT) U/L 14 16   GLUCOSE mg/dL 191* 269*       Radiology:   Imaging Results (Last 72 Hours)       Procedure Component Value Units Date/Time    CT Head Without Contrast [[id number]] Collected: 05/04/25 1352     Updated: 05/04/25 1356    Narrative:      EXAMINATION: CT HEAD WO CONTRAST-      5/4/2025 12:43 PM     HISTORY: Altered mental status. Worsening dementia. Aggressive behavior.     In order to have a CT radiation dose as low as reasonably achievable  Automated Exposure Control was utilized for " "adjustment of the mA and/or  KV according to patient size.     CT Dose DLP = 680.97 mGy.cm.  (If there are multiple studies performed at the same time this  represents the total dose).     Images are stored in PACS per institutional protocol.        Noncontrast head CT.  Axial, sagittal, and coronal sequences.     The visualized paranasal sinuses are clear.     The brain and ventricles have an age appropriate appearance.   Moderate atrophy and small vessel disease.  There is no hemorrhage or mass-effect.   No acute infarction is seen.     No calvarial abnormality.       Impression:      1. Age-related chronic change.  2. No acute intracranial abnormality is seen.           This report was signed and finalized on 5/4/2025 1:53 PM by Dr. Yifan Salazar MD.       XR Chest 1 View [[id number]] Collected: 05/04/25 1340     Updated: 05/04/25 1344    Narrative:      EXAMINATION: XR CHEST 1 VW-     5/4/2025 12:31 PM     HISTORY: Altered mental status.     Images are stored in PACS per institutional protocol.     1 view chest x-ray.     COMPARISON:  3/31/2025.     Heart size is normal.  The mediastinum is within normal limits.     The lungs are mildly hyperexpanded with no pneumonia or pneumothorax.  Mild chronic appearing interstitial disease with a few calcified  granulomas.  No congestive failure changes.       Impression:      1. No acute disease.                 This report was signed and finalized on 5/4/2025 1:41 PM by Dr. Yifan Salazar MD.               Culture:  No results found for: \"BLOODCX\", \"URINECX\", \"WOUNDCX\", \"MRSACX\", \"RESPCX\", \"STOOLCX\"      Assessment    Acute kidney injury, prerenal--resolving  Baseline chronic kidney disease stage 4  Type 2 diabetes  Hypertension  History of dementia  Metabolic acidosis  Anemia of CKD  Hypokalemia     Plan:   Renal US pending  Replace potassium  Renal function improved with IV fluids, approaching baseline level.  Will continue IV fluids for now but should be able to DC " once patient demonstrates ability to maintain adequate PO intake.      Jona Mckeon, APRN  5/5/2025  09:47 CDT

## 2025-05-05 NOTE — PLAN OF CARE
Goal Outcome Evaluation:              Outcome Evaluation: ALOX3. RA. up X2. voiding via urinal. bed alarm on. on video survelience. mg replaced per protocol. Renal US today.

## 2025-05-05 NOTE — NURSING NOTE
Pt was admitted to the floor close to 1800. Tried to complete his admission but pt is a poor historian. Pt was not able to answer admission questions. CN notified.Will continue to monitor.

## 2025-05-05 NOTE — PROGRESS NOTES
"    [facility]  INPATIENT PROGRESS NOTE    Patient Name: Maikel Mathews  Date of Admission: 5/4/2025  Today's Date: 05/05/25  Length of Stay: 1  Primary Care Physician: David Barajas MD    Subjective   Chief Complaint: Follow-up  HPI   I went to see the patient with nurse May who advised me to bring her along.  Patient does not appear to have any insight.  He is confused  He states that he is 89-year-old but his COVID age is 89 \"I recommend I have a wife but none.\"    He states he lives with his wife.  \"I want to go home.\"  Patient brought in because of confusion and aggressive behavior    Review of Systems   Limited information from the patient.  All pertinent negatives and positives are as above. All other systems have been reviewed and are negative unless otherwise stated.     Objective    Temp:  [97.6 °F (36.4 °C)-98.5 °F (36.9 °C)] 98.1 °F (36.7 °C)  Heart Rate:  [] 82  Resp:  [16-18] 16  BP: (124-199)/() 151/73  Physical Exam  Seated comfortably in recliner not in any distress  GEN: Awake, alert, interactive, in NAD, confused and without insight  HEENT: Atraumatic, EOMI, Anicteric, Trachea midline  Lungs: CTAB, no wheezing/rales/rhonchi  Heart: RRR, +S1/s2, no gross murmur  ABD: soft, nt/nd, +BS, no guarding/rebound  Extremities: atraumatic, no cyanosis, no edema  Skin: no rashes or lesions  Neuro: Alert, grossly no focal deficits  Psych: normal mood & affect    Results Review:  I have reviewed the labs, radiology results, and diagnostic studies.    Laboratory Data:   Results from last 7 days   Lab Units 05/05/25  0850 05/04/25  1330   WBC 10*3/mm3 5.95 8.47   HEMOGLOBIN g/dL 10.2* 10.7*   HEMATOCRIT % 31.0* 33.0*   PLATELETS 10*3/mm3 213 241        Results from last 7 days   Lab Units 05/05/25  0850 05/04/25  1330   SODIUM mmol/L 139 135*   POTASSIUM mmol/L 3.4* 3.9   CHLORIDE mmol/L 110* 104   CO2 mmol/L 16.0* 15.0*   BUN mg/dL 45* 52* " "  CREATININE mg/dL 3.17* 3.64*   CALCIUM mg/dL 7.8* 7.8*   BILIRUBIN mg/dL 0.3 0.3   ALK PHOS U/L 75 84   ALT (SGPT) U/L 6 6   AST (SGOT) U/L 14 16   GLUCOSE mg/dL 191* 269*       Culture Data:   No results found for: \"BLOODCX\", \"URINECX\", \"WOUNDCX\", \"MRSACX\", \"RESPCX\", \"STOOLCX\"    Radiology Data:   Imaging Results (Last 24 Hours)       Procedure Component Value Units Date/Time    CT Head Without Contrast [[id number]] Collected: 05/04/25 1352     Updated: 05/04/25 1356    Narrative:      EXAMINATION: CT HEAD WO CONTRAST-      5/4/2025 12:43 PM     HISTORY: Altered mental status. Worsening dementia. Aggressive behavior.     In order to have a CT radiation dose as low as reasonably achievable  Automated Exposure Control was utilized for adjustment of the mA and/or  KV according to patient size.     CT Dose DLP = 680.97 mGy.cm.  (If there are multiple studies performed at the same time this  represents the total dose).     Images are stored in PACS per institutional protocol.        Noncontrast head CT.  Axial, sagittal, and coronal sequences.     The visualized paranasal sinuses are clear.     The brain and ventricles have an age appropriate appearance.   Moderate atrophy and small vessel disease.  There is no hemorrhage or mass-effect.   No acute infarction is seen.     No calvarial abnormality.       Impression:      1. Age-related chronic change.  2. No acute intracranial abnormality is seen.           This report was signed and finalized on 5/4/2025 1:53 PM by Dr. Yifan Salazar MD.       XR Chest 1 View [[id number]] Collected: 05/04/25 1340     Updated: 05/04/25 1344    Narrative:      EXAMINATION: XR CHEST 1 VW-     5/4/2025 12:31 PM     HISTORY: Altered mental status.     Images are stored in PACS per institutional protocol.     1 view chest x-ray.     COMPARISON:  3/31/2025.     Heart size is normal.  The mediastinum is within normal limits.     The lungs are mildly hyperexpanded with no pneumonia or " pneumothorax.  Mild chronic appearing interstitial disease with a few calcified  granulomas.  No congestive failure changes.       Impression:      1. No acute disease.                 This report was signed and finalized on 5/4/2025 1:41 PM by Dr. Yifan Salazar MD.               I have reviewed the patient's current medications.     Assessment/Plan   Assessment  Active Hospital Problems    Diagnosis     **Acute renal failure     Change in mental status            Medical Decision Making  Number and Complexity of problems:   Problem list  Confused  Lack of insight  ?  Dementia  Reportedly had aggressive behavior but none since admission  Hypomagnesemia resolved  Hypertension  Hypokalemia  CKD stage IV with acute kidney injury  Anion gap metabolic acidosis likely from kidney injury  Diabetes mellitus type 2  Advanced age            Treatment Plan  Renal ultrasound pending  Potassium replacement  Continue antihypertensive medication  Continue sliding scale insulin with as needed hypoglycemic protocol  I we will seek assistance of social service in checking goals of care by family.  Nephrology following.  Appreciate assistance.  Ultrasound pending  Refer to PT for evaluation  amLODIPine, 10 mg, Oral, Q24H  insulin lispro, 2-7 Units, Subcutaneous, 4x Daily AC & at Bedtime  potassium chloride ER, 20 mEq, Oral, Once  QUEtiapine, 25 mg, Oral, Nightly  sodium chloride, 10 mL, Intravenous, Q12H          Conditions and Status  Fair     [facility] Data  External documents reviewed: Reviewed epic record  Cardiac tracing (EKG, telemetry) interpretation: -  Radiology interpretation: y  Labs reviewed: y   Any tests that were considered but not ordered: None     Decision rules/scores evaluated (example [id number], Wells, etc):-     Discussed with: Patient and nurse May     Care Planning  Shared decision making: Patient  Code status and discussions: CODE STATUS indicates full support/CPR    Disposition  Social Determinants of Health  that impact treatment or disposition: None identified at this time.  I expect the patient to be discharged to (?)          Electronically signed by Brayan Meehan MD, 05/05/25, 11:26 CDT.

## 2025-05-06 LAB
25(OH)D3 SERPL-MCNC: 12.2 NG/ML (ref 30–100)
ANION GAP SERPL CALCULATED.3IONS-SCNC: 10 MMOL/L (ref 5–15)
BASOPHILS # BLD AUTO: 0.04 10*3/MM3 (ref 0–0.2)
BASOPHILS NFR BLD AUTO: 0.6 % (ref 0–1.5)
BUN SERPL-MCNC: 46 MG/DL (ref 8–23)
BUN/CREAT SERPL: 14.3 (ref 7–25)
CALCIUM SPEC-SCNC: 8 MG/DL (ref 8.2–9.6)
CHLORIDE SERPL-SCNC: 113 MMOL/L (ref 98–107)
CO2 SERPL-SCNC: 17 MMOL/L (ref 22–29)
CREAT SERPL-MCNC: 3.22 MG/DL (ref 0.76–1.27)
DEPRECATED RDW RBC AUTO: 52.2 FL (ref 37–54)
EGFRCR SERPLBLD CKD-EPI 2021: 17 ML/MIN/1.73
EOSINOPHIL # BLD AUTO: 0.03 10*3/MM3 (ref 0–0.4)
EOSINOPHIL NFR BLD AUTO: 0.5 % (ref 0.3–6.2)
ERYTHROCYTE [DISTWIDTH] IN BLOOD BY AUTOMATED COUNT: 14.8 % (ref 12.3–15.4)
GLUCOSE BLDC GLUCOMTR-MCNC: 140 MG/DL (ref 70–130)
GLUCOSE BLDC GLUCOMTR-MCNC: 156 MG/DL (ref 70–130)
GLUCOSE BLDC GLUCOMTR-MCNC: 222 MG/DL (ref 70–130)
GLUCOSE SERPL-MCNC: 129 MG/DL (ref 65–99)
HBA1C MFR BLD: 6.6 % (ref 4.8–5.6)
HCT VFR BLD AUTO: 29.3 % (ref 37.5–51)
HGB BLD-MCNC: 9.5 G/DL (ref 13–17.7)
IMM GRANULOCYTES # BLD AUTO: 0.02 10*3/MM3 (ref 0–0.05)
IMM GRANULOCYTES NFR BLD AUTO: 0.3 % (ref 0–0.5)
LYMPHOCYTES # BLD AUTO: 1.2 10*3/MM3 (ref 0.7–3.1)
LYMPHOCYTES NFR BLD AUTO: 18.8 % (ref 19.6–45.3)
MAGNESIUM SERPL-MCNC: 1.9 MG/DL (ref 1.7–2.3)
MCH RBC QN AUTO: 31.5 PG (ref 26.6–33)
MCHC RBC AUTO-ENTMCNC: 32.4 G/DL (ref 31.5–35.7)
MCV RBC AUTO: 97 FL (ref 79–97)
MONOCYTES # BLD AUTO: 0.4 10*3/MM3 (ref 0.1–0.9)
MONOCYTES NFR BLD AUTO: 6.3 % (ref 5–12)
NEUTROPHILS NFR BLD AUTO: 4.68 10*3/MM3 (ref 1.7–7)
NEUTROPHILS NFR BLD AUTO: 73.5 % (ref 42.7–76)
NRBC BLD AUTO-RTO: 0 /100 WBC (ref 0–0.2)
PHOSPHATE SERPL-MCNC: 4 MG/DL (ref 2.5–4.5)
PLATELET # BLD AUTO: 206 10*3/MM3 (ref 140–450)
PMV BLD AUTO: 10.4 FL (ref 6–12)
POTASSIUM SERPL-SCNC: 4 MMOL/L (ref 3.5–5.2)
RBC # BLD AUTO: 3.02 10*6/MM3 (ref 4.14–5.8)
SODIUM SERPL-SCNC: 140 MMOL/L (ref 136–145)
WBC NRBC COR # BLD AUTO: 6.37 10*3/MM3 (ref 3.4–10.8)

## 2025-05-06 PROCEDURE — 83036 HEMOGLOBIN GLYCOSYLATED A1C: CPT | Performed by: INTERNAL MEDICINE

## 2025-05-06 PROCEDURE — 25010000002 LORAZEPAM PER 2 MG

## 2025-05-06 PROCEDURE — 83735 ASSAY OF MAGNESIUM: CPT | Performed by: INTERNAL MEDICINE

## 2025-05-06 PROCEDURE — 82306 VITAMIN D 25 HYDROXY: CPT | Performed by: INTERNAL MEDICINE

## 2025-05-06 PROCEDURE — 25010000002 ZIPRASIDONE MESYLATE PER 10 MG: Performed by: HOSPITALIST

## 2025-05-06 PROCEDURE — 25810000003 SODIUM CHLORIDE 0.9 % SOLUTION: Performed by: HOSPITALIST

## 2025-05-06 PROCEDURE — 84100 ASSAY OF PHOSPHORUS: CPT | Performed by: INTERNAL MEDICINE

## 2025-05-06 PROCEDURE — 97166 OT EVAL MOD COMPLEX 45 MIN: CPT | Performed by: OCCUPATIONAL THERAPIST

## 2025-05-06 PROCEDURE — 63710000001 INSULIN LISPRO (HUMAN) PER 5 UNITS: Performed by: HOSPITALIST

## 2025-05-06 PROCEDURE — 25010000002 HEPARIN (PORCINE) PER 1000 UNITS: Performed by: INTERNAL MEDICINE

## 2025-05-06 PROCEDURE — 82948 REAGENT STRIP/BLOOD GLUCOSE: CPT

## 2025-05-06 PROCEDURE — 92610 EVALUATE SWALLOWING FUNCTION: CPT | Performed by: SPEECH-LANGUAGE PATHOLOGIST

## 2025-05-06 PROCEDURE — 80048 BASIC METABOLIC PNL TOTAL CA: CPT | Performed by: HOSPITALIST

## 2025-05-06 PROCEDURE — 85025 COMPLETE CBC W/AUTO DIFF WBC: CPT | Performed by: HOSPITALIST

## 2025-05-06 PROCEDURE — 36415 COLL VENOUS BLD VENIPUNCTURE: CPT | Performed by: HOSPITALIST

## 2025-05-06 PROCEDURE — 97162 PT EVAL MOD COMPLEX 30 MIN: CPT

## 2025-05-06 RX ORDER — QUETIAPINE FUMARATE 25 MG/1
50 TABLET, FILM COATED ORAL NIGHTLY
Status: DISCONTINUED | OUTPATIENT
Start: 2025-05-06 | End: 2025-05-13 | Stop reason: HOSPADM

## 2025-05-06 RX ORDER — LORAZEPAM 2 MG/ML
0.5 INJECTION INTRAMUSCULAR ONCE AS NEEDED
Status: COMPLETED | OUTPATIENT
Start: 2025-05-06 | End: 2025-05-06

## 2025-05-06 RX ORDER — CHOLECALCIFEROL (VITAMIN D3) 25 MCG
2000 TABLET ORAL DAILY
Status: DISCONTINUED | OUTPATIENT
Start: 2025-05-07 | End: 2025-05-13 | Stop reason: HOSPADM

## 2025-05-06 RX ORDER — LORAZEPAM 2 MG/ML
1 INJECTION INTRAMUSCULAR EVERY 4 HOURS PRN
Status: ACTIVE | OUTPATIENT
Start: 2025-05-06 | End: 2025-05-11

## 2025-05-06 RX ORDER — SODIUM BICARBONATE 650 MG/1
650 TABLET ORAL 2 TIMES DAILY
Status: DISCONTINUED | OUTPATIENT
Start: 2025-05-06 | End: 2025-05-13 | Stop reason: HOSPADM

## 2025-05-06 RX ORDER — CYANOCOBALAMIN 1000 UG/ML
1000 INJECTION, SOLUTION INTRAMUSCULAR; SUBCUTANEOUS DAILY
Status: DISPENSED | OUTPATIENT
Start: 2025-05-06 | End: 2025-05-13

## 2025-05-06 RX ORDER — CHOLECALCIFEROL (VITAMIN D3) 25 MCG
1000 TABLET ORAL DAILY
Status: DISCONTINUED | OUTPATIENT
Start: 2025-05-06 | End: 2025-05-06

## 2025-05-06 RX ADMIN — LORAZEPAM 0.5 MG: 2 INJECTION INTRAMUSCULAR; INTRAVENOUS at 05:49

## 2025-05-06 RX ADMIN — AMLODIPINE BESYLATE 10 MG: 10 TABLET ORAL at 08:17

## 2025-05-06 RX ADMIN — HEPARIN SODIUM 5000 UNITS: 5000 INJECTION, SOLUTION INTRAVENOUS; SUBCUTANEOUS at 21:07

## 2025-05-06 RX ADMIN — ZIPRASIDONE MESYLATE 20 MG: 20 INJECTION, POWDER, LYOPHILIZED, FOR SOLUTION INTRAMUSCULAR at 06:33

## 2025-05-06 RX ADMIN — HEPARIN SODIUM 5000 UNITS: 5000 INJECTION, SOLUTION INTRAVENOUS; SUBCUTANEOUS at 08:17

## 2025-05-06 RX ADMIN — SODIUM CHLORIDE 75 ML/HR: 9 INJECTION, SOLUTION INTRAVENOUS at 00:25

## 2025-05-06 RX ADMIN — ZIPRASIDONE MESYLATE 20 MG: 20 INJECTION, POWDER, LYOPHILIZED, FOR SOLUTION INTRAMUSCULAR at 17:09

## 2025-05-06 RX ADMIN — INSULIN LISPRO 3 UNITS: 100 INJECTION, SOLUTION INTRAVENOUS; SUBCUTANEOUS at 21:08

## 2025-05-06 NOTE — PLAN OF CARE
Goal Outcome Evaluation:              Outcome Evaluation: Pt was admitted due to acute renal failure and altered mental status. Per provider notes, pt is a poor historian, and no family is present at the bedside. PMH includes dementia, DM, and CKD stage IV. Per MST, pt is unsure about recent wt loss; however, recorded weights have remained stable between 138-140 lb. Provider documentation indicates poor oral intake at home. While inpatient, pt's intake over the past 72 hours has been 58%, with PO fluids totaling 490 mL. IV fluids are currently running at 75 mL/hr. No current A1C is on file; dietitian will order an updated A1C. Past A1Cs have been under 7. To encourage PO intake, diet will be liberalized, and Boost TID along with orange sherbets will be included with meals to help increase both calorie and fluid intake. Blood glucose levels will be monitored. Will continue to monitor nutritional status, intake, and response to interventions.

## 2025-05-06 NOTE — PLAN OF CARE
Goal Outcome Evaluation:  Plan of Care Reviewed With: (P) patient, caregiver        Progress: (P) no change       Anticipated Discharge Disposition (SLP): (P) skilled nursing facility          SLP Swallowing Diagnosis: (P) R/O pharyngeal dysphagia, suspect chronic, pharyngeal dysphagia (05/06/25 1240)           Initial swallow evaluation completed. Patient was eating lunch with supervision. SEBASTIAN Thornton, nursing educator Marcy, and nursing aide were present. Patient appeared confused and wanted to leave the bed. Patient was able to be redirected and continue with eating lunch. Trials of thin liquids, regular solids, and soft to chew solids were completed. No s/s of aspiration were observed. No changes in vocal quality was noted. SEBASTIAN Thornton reported that breakfast was difficult for the patient to eat due to harder solids and cognitive status. Patient's diet has been modified to soft to chew (chopped) and thin liquids due to cognitive status and baseline deficits. OME was unable to be assessed due to cognitive status. If patient demonstrates reduced alertness, it is recommended to hold PO and medications. SLP will continue to monitor for diet tolerance.      Completed by Atiya Zhang - SLP Student

## 2025-05-06 NOTE — PROGRESS NOTES
[facility]  INPATIENT PROGRESS NOTE    Patient Name: Maikel Mathews  Date of Admission: 5/4/2025  Today's Date: 05/06/25  Length of Stay: 2  Primary Care Physician: David Barajas MD    Subjective   Chief Complaint: Follow-up  HPI   Patient is not in any apparent distress  He is confused but pleasant  He thinks he is feeding the hog and collecting some eggs.    Review of Systems   Limited information from the patient.  All pertinent negatives and positives are as above. All other systems have been reviewed and are negative unless otherwise stated.     Objective    Temp:  [97.6 °F (36.4 °C)-98.3 °F (36.8 °C)] 98.1 °F (36.7 °C)  Heart Rate:  [] 92  Resp:  [16-18] 18  BP: (139-158)/(67-89) 158/89  Physical Exam  Laying comfortably in bed  No gross tremors  He was not really much interactive to me nor has he acknowledged my presence.  GEN: Awake, alert in NAD, confused and without insight  HEENT: Atraumatic, EOMI, Anicteric, Trachea midline  Lungs normal respiratory effort    ABD: soft, nt/nd, +BS, no guarding/rebound  Extremities: atraumatic, no cyanosis, no edema  Skin: no rashes or lesions  Neuro: Awake grossly no focal deficits  Psych: normal mood & affect    Results Review:  I have reviewed the labs, radiology results, and diagnostic studies.    Laboratory Data:   Results from last 7 days   Lab Units 05/06/25  0354 05/05/25  0850 05/04/25  1330   WBC 10*3/mm3 6.37 5.95 8.47   HEMOGLOBIN g/dL 9.5* 10.2* 10.7*   HEMATOCRIT % 29.3* 31.0* 33.0*   PLATELETS 10*3/mm3 206 213 241        Results from last 7 days   Lab Units 05/06/25  0354 05/05/25  1559 05/05/25  0850 05/04/25  1330   SODIUM mmol/L 140  --  139 135*   POTASSIUM mmol/L 4.0 3.5 3.4* 3.9   CHLORIDE mmol/L 113*  --  110* 104   CO2 mmol/L 17.0*  --  16.0* 15.0*   BUN mg/dL 46*  --  45* 52*   CREATININE mg/dL 3.22*  --  3.17* 3.64*   CALCIUM mg/dL 8.0*  --  7.8* 7.8*   BILIRUBIN mg/dL  --   --   "0.3 0.3   ALK PHOS U/L  --   --  75 84   ALT (SGPT) U/L  --   --  6 6   AST (SGOT) U/L  --   --  14 16   GLUCOSE mg/dL 129*  --  191* 269*       Culture Data:   No results found for: \"BLOODCX\", \"URINECX\", \"WOUNDCX\", \"MRSACX\", \"RESPCX\", \"STOOLCX\"    Radiology Data:   Imaging Results (Last 24 Hours)       Procedure Component Value Units Date/Time    US Renal Bilateral [[id number]] Collected: 05/05/25 1646     Updated: 05/05/25 1650    Narrative:      US RENAL BILATERAL- 5/5/2025 2:53 PM     HISTORY: Chronic kidney disease; N17.9-Acute kidney failure,  unspecified; R41.82-Altered mental status, unspecified;  F03.918-Unspecified dementia, unspecified severity, with other  behavioral disturbance; E83.42-Hypomagnesemia     COMPARISON: None available.       TECHNIQUE: Multiple longitudinal and transverse real-time sonographic  images of the kidneys and urinary bladder are obtained. Report and  images stored per institutional and state regulations.           FINDINGS:     Visualized proximal abdominal aorta and IVC are unremarkable.        RIGHT KIDNEY: The right kidney measures 7.9 cm in length. Renal cortex  is echogenic. There is no hydronephrosis..     LEFT KIDNEY: Left kidney is 7.5 cm in length. Renal cortex is echogenic.  There is no left hydronephrosis. There is a simple anechoic cyst at the  left renal cortex lower pole measuring 1.3 cm.     PELVIS: Urinary bladder is grossly unremarkable.          Impression:         1. Small echogenic kidneys consistent with medical renal disease. There  is no hydronephrosis.  2. Simple left renal cyst.           This report was signed and finalized on 5/5/2025 4:47 PM by Viral Mcfarland.               I have reviewed the patient's current medications.     Assessment/Plan   Assessment  Active Hospital Problems    Diagnosis     **Acute renal failure     Change in mental status            Medical Decision Making  Number and Complexity of problems:   Problem list  Confused  Lack of " insight  ?  Dementia  Reportedly had aggressive behavior but none since admission  Hypomagnesemia resolved  Hypertension  Hypokalemia-corrected  CKD stage IV with acute kidney injury  Anion gap metabolic acidosis likely from kidney injury  Diabetes mellitus type 2  Advanced age            Treatment Plan  Renal ultrasound report reviewed  Continue antihypertensive medication  Continue sliding scale insulin with as needed hypoglycemic protocol  Social service to assist in discharge disposition    Nephrology following.  Appreciate assistance.    PT recommended skilled rehab placement.  Continue present management.    amLODIPine, 10 mg, Oral, Q24H  heparin (porcine), 5,000 Units, Subcutaneous, Q12H  insulin lispro, 2-7 Units, Subcutaneous, 4x Daily AC & at Bedtime  QUEtiapine, 25 mg, Oral, Nightly  sodium chloride, 10 mL, Intravenous, Q12H          Conditions and Status  Fair     MDM Data  External documents reviewed: Reviewed epic record  Cardiac tracing (EKG, telemetry) interpretation: -  Radiology interpretation: y  Labs reviewed: y   Any tests that were considered but not ordered: None     Decision rules/scores evaluated (example [id number], [place], etc):-     Discussed with: Patient and nurse May     Care Planning  Shared decision making: Patient  Code status and discussions: CODE STATUS indicates full support/CPR    Disposition  Social Determinants of Health that impact treatment or disposition: None identified at this time.  I expect the patient to be discharged to (?)          Electronically signed by Brayan Meehan MD, 05/06/25, 10:40 CDT.

## 2025-05-06 NOTE — THERAPY EVALUATION
Patient Name: Maikel Mathews  : 1929    MRN: [id number]                              Today's Date: 2025       Admit Date: 2025    Visit Dx:     ICD-10-CM ICD-9-CM   1. Acute renal failure, unspecified acute renal failure type  N17.9 584.9   2. Altered mental status, unspecified altered mental status type  R41.82 780.97   3. Aggressive behavior due to dementia  F03.918 294.21   4. Hypomagnesemia  E83.42 275.2     Patient Active Problem List   Diagnosis    Anemia associated with nutritional deficiency    Iron deficiency anemia due to chronic blood loss    Cancer of right colon    Dizzinesses    Stage 3 chronic kidney disease    Encounter for hydration prior to CT scan    Low vitamin B12 level    Iron deficiency anemia    Cat bite    Cough    Low back pain    Pharyngitis    Spondylolisthesis, grade 1    Bilateral inguinal hernia    Type 2 diabetes mellitus with other specified complication    Acute renal failure    Change in mental status     Past Medical History:   Diagnosis Date    Arthritis     AVM (arteriovenous malformation)     Cancer     PROSTATE    Cataract     Colon cancer     Colon polyp     Diabetes mellitus     Diverticulosis     Dizzinesses 10/20/2017    GERD (gastroesophageal reflux disease)     Hemorrhoids     History of transfusion     Hypertension     Inguinal hernia     Iron deficiency anemia due to chronic blood loss 2017    Prostate CA     Prostate hypertrophy     Rotator cuff syndrome      Past Surgical History:   Procedure Laterality Date    APPENDECTOMY      CHOLECYSTECTOMY      COLON RESECTION N/A 2017    Procedure: LAPAROSCOPIC ASSISTED RIGHT COLECTOMY;  Surgeon: Caroline Loomis MD;  Location: [facility] OR;  Service:     COLONOSCOPY  2010    COLONOSCOPY N/A 2017    Procedure: COLONOSCOPY WITH ANESTHESIA;  Surgeon: Joaquín Neff MD;  Location: [facility] ENDOSCOPY;  Service:     ENDOSCOPY  2013    ENDOSCOPY N/A 2017    Procedure:  ESOPHAGOGASTRODUODENOSCOPY WITH ANESTHESIA;  Surgeon: Joaquín Neff MD;  Location:  [facility] ENDOSCOPY;  Service:     EYE SURGERY Left     CATARACT    INGUINAL HERNIA REPAIR Bilateral 11/1/2019    Procedure: OPEN BILATERAL INGUINAL HERNIA REPAIR WITH MESH;  Surgeon: Caroline Loomis MD;  Location:  [facility] OR;  Service: General    INTERVENTIONAL RADIOLOGY PROCEDURE N/A 9/18/2017    Procedure: URETHRA DILITATION with dominguez catheter insertion;  Surgeon: Mamadou Paez MD;  Location:  [facility] OR;  Service:     PROSTATECTOMY      PROSTATECTOMY        General Information       Row Name 05/06/25 0800          OT Time and Intention    Document Type evaluation  -     Mode of Treatment occupational therapy  -     Patient Effort good  -       Row Name 05/06/25 0800          General Information    Patient Profile Reviewed yes  -     Existing Precautions/Restrictions fall  -     Barriers to Rehab medically complex;previous functional deficit;cognitive status  -       Row Name 05/06/25 0800          Occupational Profile    Reason for Services/Referral (Occupational Profile) Acute renal failure, AMS  -       Row Name 05/06/25 0800          Living Environment    Current Living Arrangements home  -     People in Home spouse  -       Row Name 05/06/25 0800          Cognition    Orientation Status (Cognition) verbal cues/prompts needed for orientation  -       Row Name 05/06/25 0800          Safety Issues/Impairments Affecting Functional Mobility    Safety Issues Affecting Function (Mobility) ability to follow commands;at risk behavior observed;awareness of need for assistance;friction/shear risk;impulsivity;insight into deficits/self-awareness;judgment;safety precaution awareness;safety precautions follow-through/compliance;sequencing abilities  -     Impairments Affecting Function (Mobility) balance;cognition;endurance/activity tolerance;strength  -     Cognitive Impairments, Mobility Safety/Performance  attention;insight into deficits/self-awareness;safety precaution follow-through;problem-solving/reasoning;judgment;safety precaution awareness;sequencing abilities;impulsivity;awareness, need for assistance  -               User Key  (r) = Recorded By, (t) = Taken By, (c) = Cosigned By      Initials Name Provider Type     Rayne Lake, OTR/L Occupational Therapist                     Mobility/ADL's       Row Name 05/06/25 0800          Bed Mobility    Bed Mobility supine-sit;sit-supine;scooting/bridging  -     Scooting/Bridging D Lo (Bed Mobility) maximum assist (25% patient effort);2 person assist;verbal cues;nonverbal cues (demo/gesture)  -     Supine-Sit D Lo (Bed Mobility) moderate assist (50% patient effort);verbal cues;nonverbal cues (demo/gesture)  -     Sit-Supine D Lo (Bed Mobility) maximum assist (25% patient effort);2 person assist;verbal cues;nonverbal cues (demo/gesture)  -     Bed Mobility, Safety Issues cognitive deficits limit understanding  -     Assistive Device (Bed Mobility) head of bed elevated  -       Row Name 05/06/25 0800          Transfers    Comment, (Transfers) not able to attempt at this time  -       Row Name 05/06/25 0800          Activities of Daily Living    BADL Assessment/Intervention grooming;feeding  -       Row Name 05/06/25 0800          Grooming Assessment/Training    D Lo Level (Grooming) dependent (less than 25% patient effort);wash face, hands  -     Position (Grooming) sitting up in bed  -       Row Name 05/06/25 0800          Self-Feeding Assessment/Training    D Lo Level (Feeding) maximum assist (25% patient effort);liquids to mouth;finger foods;scoop food and bring to mouth;dependent (less than 25% patient effort);prepare tray/open items  -     Position (Feeding) supported sitting;unsupported sitting  -               User Key  (r) = Recorded By, (t) = Taken By, (c) = Cosigned By      Initials Name  Provider Type     Rayne Lake OTR/L Occupational Therapist                   Obj/Interventions       Row Name 05/06/25 0800          Vision Assessment/Intervention    Vision Assessment Comment not able to formally assess  -       Row Name 05/06/25 0800          Range of Motion Comprehensive    Comment, General Range of Motion AAROM WFL  -       Row Name 05/06/25 0800          Strength Comprehensive (MMT)    Comment, General Manual Muscle Testing (MMT) Assessment not able to formally assess UE strength, pt able to move bilat UEs against gravity  -       Row Name 05/06/25 0800          Balance    Balance Assessment sitting static balance  -     Static Sitting Balance minimal assist;moderate assist  -               User Key  (r) = Recorded By, (t) = Taken By, (c) = Cosigned By      Initials Name Provider Type     Rayne Lake OTR/L Occupational Therapist                   Goals/Plan       Row Name 05/06/25 0800          Grooming Goal 1 (OT)    Activity/Device (Grooming Goal 1, OT) hair care;oral care;wash face, hands  -     [place] (Grooming Goal 1, OT) supervision required;minimum assist (75% or more patient effort)  -     Time Frame (Grooming Goal 1, OT) long term goal (LTG);10 days  -     Progress/Outcome (Grooming Goal 1, OT) new goal  -       Row Name 05/06/25 0800          Self-Feeding Goal 1 (OT)    Activity/Device (Self-Feeding Goal 1, OT) finger foods;liquids to mouth;scoop food and bring to mouth  -     [place] Level/Cues Needed (Self-Feeding Goal 1, OT) set-up required;supervision required  -     Time Frame (Self-Feeding Goal 1, OT) long term goal (LTG);by discharge  -     Progress/Outcomes (Self-Feeding Goal 1, OT) new goal  -       Row Name 05/06/25 0800          Problem Specific Goal 1 (OT)    Problem Specific Goal 1 (OT) Pt will follow one step commands to improve fxl participation in ADLs & reduce caregiver burden  -     Time Frame (Problem Specific Goal  1, OT) long term goal (LTG);by discharge  -     Progress/Outcome (Problem Specific Goal 1, OT) new goal  -       Row Name 05/06/25 0800          Therapy Assessment/Plan (OT)    Planned Therapy Interventions (OT) activity tolerance training;adaptive equipment training;BADL retraining;edema control/reduction;functional balance retraining;patient/caregiver education/training;ROM/therapeutic exercise;occupation/activity based interventions;strengthening exercise;transfer/mobility retraining;passive ROM/stretching  -               User Key  (r) = Recorded By, (t) = Taken By, (c) = Cosigned By      Initials Name Provider Type     Rayne Lake, OTR/L Occupational Therapist                   Clinical Impression       Row Name 05/06/25 0800          Pain Assessment    Additional Documentation Pain Scale: FACES Pre/Post-Treatment (Group)  -       Row Name 05/06/25 0800          Pain Scale: FACES Pre/Post-Treatment    Pain: FACES Scale, Pretreatment 0-->no hurt  -     Posttreatment Pain Rating 0-->no hurt  -       Row Name 05/06/25 0800          Plan of Care Review    Plan of Care Reviewed With patient  -     Progress no change  -     Outcome Evaluation OT eval complete.  Upon entering room, pt with RN who was attempting to assist him to EOB.  Min A to come to EOB and Min/Mod A to maintain EOB sitting.  RN attempt to increase pt alert state for AM meal after receiving medication for agitation earlier in AM.  OTR assisted with self feeding at EOB, determined pt was not safe to sit without support so assisted him back to bed & in upright sitting.  Mr. Mathews cont to require Max A for self feeding.  [facility] and [facility] & bites.  Requested to consult ST.  Not able to formally assess range or strength but obs AAROM WFL & pt able to move BUEs against gravity to complete desired tasks.  Not able to obtain PLOF or home environment; however, per chart plan is to DC to SNF.  Rec  memory care.  Cont wiht OT tx to improve pt safe ADL participation.  -       Row Name 05/06/25 0800          Therapy Assessment/Plan (OT)    Patient/Family Therapy Goal Statement (OT) no goals stated  -     Rehab Potential (OT) limited  -     Criteria for Skilled Therapeutic Interventions Met (OT) yes;skilled treatment is necessary  -     Therapy Frequency (OT) 3 times/wk  -     Predicted Duration of Therapy Intervention (OT) until DC from this faciity  -       Row Name 05/06/25 0800          Therapy Plan Review/Discharge Plan (OT)    Anticipated Discharge Disposition (OT) skilled nursing facility  -       Row Name 05/06/25 0800          Positioning and Restraints    Pre-Treatment Position in bed  -     Post Treatment Position bed  -     In Bed fowlers;call light within reach;encouraged to call for assist;exit alarm on;side rails up x3;notified nsg  -               User Key  (r) = Recorded By, (t) = Taken By, (c) = Cosigned By      Initials Name Provider Type    Rayne Sheridan OTR/L Occupational Therapist                   Outcome Measures       Row Name 05/06/25 0800          How much help from another is currently needed...    Putting on and taking off regular lower body clothing? 1  -CH     Bathing (including washing, rinsing, and drying) 1  -CH     Toileting (which includes using toilet bed pan or urinal) 1  -CH     Putting on and taking off regular upper body clothing 2  -CH     Taking care of personal grooming (such as brushing teeth) 2  -CH     Eating meals 2  -CH     AM-PAC 6 Clicks Score (OT) 9  -       Row Name 05/06/25 0800          Functional Assessment    Outcome Measure Options AM-PAC 6 Clicks Daily Activity (OT)  -               User Key  (r) = Recorded By, (t) = Taken By, (c) = Cosigned By      Initials Name Provider Type    Rayne Sheridan OTR/L Occupational Therapist                    Occupational Therapy Education       Title: PT OT SLP Therapies (In Progress)        Topic: Occupational Therapy (In Progress)       Point: ADL training (In Progress)       Learning Progress Summary            Patient Nonacceptance, E,D, NR by  at 5/6/2025 0857                      Point: Precautions (In Progress)       Learning Progress Summary            Patient Nonacceptance, E,D, NR by  at 5/6/2025 0857                      Point: Body mechanics (In Progress)       Learning Progress Summary            Patient Nonacceptance, E,D, NR by  at 5/6/2025 0857                                      User Key       Initials Effective Dates Name Provider Type Discipline     07/11/23 -  Rayne Lake, OTR/L Occupational Therapist OT                  OT Recommendation and Plan  Planned Therapy Interventions (OT): activity tolerance training, adaptive equipment training, BADL retraining, edema control/reduction, functional balance retraining, patient/caregiver education/training, ROM/therapeutic exercise, occupation/activity based interventions, strengthening exercise, transfer/mobility retraining, passive ROM/stretching  Therapy Frequency (OT): 3 times/wk  Plan of Care Review  Plan of Care Reviewed With: patient  Progress: no change  Outcome Evaluation: OT eval complete.  Upon entering room, pt with RN who was attempting to assist him to EOB.  Min A to come to EOB and Min/Mod A to maintain EOB sitting.  RN attempt to increase pt alert state for AM meal after receiving medication for agitation earlier in AM.  OTR assisted with self feeding at EOB, determined pt was not safe to sit without support so assisted him back to bed & in upright sitting.  Mr. Mathews cont to require Max A for self feeding.  Benefitted from mech soft texture and alternating sips & bites.  Requested to consult ST.  Not able to formally assess range or strength but obs AAROM WFL & pt able to move BUEs against gravity to complete desired tasks.  Not able to obtain PLOF or home environment; however, per chart plan is to DC to SNF.   Rec memory care.  Cont wiht OT tx to improve pt safe ADL participation.     Time Calculation:         Time Calculation- OT       Row Name 05/06/25 0800             Time Calculation- OT    OT Start Time 0800  -CH      OT Stop Time 0843  -CH      OT Time Calculation (min) 43 min  -CH      OT Received On 05/06/25  -CH      OT Goal Re-Cert Due Date 05/16/25  -CH         Untimed Charges    OT Eval/Re-eval Minutes 43  -CH         Total Minutes    Untimed Charges Total Minutes 43  -CH       Total Minutes 43  -CH                User Key  (r) = Recorded By, (t) = Taken By, (c) = Cosigned By      Initials Name Provider Type    CH Rayne Lake, OTR/L Occupational Therapist                  Therapy Charges for Today       Code Description Service Date Service Provider Modifiers Qty    [id number] HC OT EVAL MOD COMPLEXITY 3 5/6/2025 Rayne Lake OTR/L GO 1                 Rayne Lake OTR/ALINA  5/6/2025

## 2025-05-06 NOTE — THERAPY EVALUATION
Patient Name: Maikel Mathews  : 1929    MRN: [id number]                              Today's Date: 2025       Admit Date: 2025    Visit Dx:     ICD-10-CM ICD-9-CM   1. Acute renal failure, unspecified acute renal failure type  N17.9 584.9   2. Altered mental status, unspecified altered mental status type  R41.82 780.97   3. Aggressive behavior due to dementia  F03.918 294.21   4. Hypomagnesemia  E83.42 275.2   5. Dysphagia, unspecified type  R13.10 787.20   6. Impaired functional mobility and activity tolerance [Z74.09]  Z74.09 V49.89     Patient Active Problem List   Diagnosis    Anemia associated with nutritional deficiency    Iron deficiency anemia due to chronic blood loss    Cancer of right colon    Dizzinesses    Stage 3 chronic kidney disease    Encounter for hydration prior to CT scan    Low vitamin B12 level    Iron deficiency anemia    Cat bite    Cough    Low back pain    Pharyngitis    Spondylolisthesis, grade 1    Bilateral inguinal hernia    Type 2 diabetes mellitus with other specified complication    Acute renal failure    Change in mental status     Past Medical History:   Diagnosis Date    Arthritis     AVM (arteriovenous malformation)     Cancer     PROSTATE    Cataract     Colon cancer     Colon polyp     Diabetes mellitus     Diverticulosis     Dizzinesses 10/20/2017    GERD (gastroesophageal reflux disease)     Hemorrhoids     History of transfusion     Hypertension     Inguinal hernia     Iron deficiency anemia due to chronic blood loss 2017    Prostate CA     Prostate hypertrophy     Rotator cuff syndrome      Past Surgical History:   Procedure Laterality Date    APPENDECTOMY      CHOLECYSTECTOMY      COLON RESECTION N/A 2017    Procedure: LAPAROSCOPIC ASSISTED RIGHT COLECTOMY;  Surgeon: Caroline Loomis MD;  Location: [facility];  Service:     COLONOSCOPY  2010    COLONOSCOPY N/A 2017    Procedure: COLONOSCOPY WITH ANESTHESIA;  Surgeon: Joaquín Neff,  MD;  Location:  [facility];  Service:     ENDOSCOPY  03/27/2013    ENDOSCOPY N/A 8/25/2017    Procedure: ESOPHAGOGASTRODUODENOSCOPY WITH ANESTHESIA;  Surgeon: Joaquín Neff MD;  Location:  [facility];  Service:     EYE SURGERY Left     CATARACT    INGUINAL HERNIA REPAIR Bilateral 11/1/2019    Procedure: OPEN BILATERAL INGUINAL HERNIA REPAIR WITH MESH;  Surgeon: Caroline Loomis MD;  Location:  [facility];  Service: General    INTERVENTIONAL RADIOLOGY PROCEDURE N/A 9/18/2017    Procedure: URETHRA DILITATION with dominguez catheter insertion;  Surgeon: Mamadou Paez MD;  Location:  [facility];  Service:     PROSTATECTOMY      PROSTATECTOMY        General Information       Row Name 05/06/25 1636          Physical Therapy Time and Intention    Document Type evaluation;other (see comments)  see MAR  -     Mode of Treatment physical therapy  -       Row Name 05/06/25 1636          General Information    Patient Profile Reviewed yes  -JE     Prior Level of Function --  pt got around by himself, because he denied the help from others, family reports he fell a lot  -JE     Existing Precautions/Restrictions fall  -     Barriers to Rehab medically complex;cognitive status;visual deficit;hearing deficit;previous functional deficit  -       Row Name 05/06/25 1636          Living Environment    Current Living Arrangements home  -JE     People in Home spouse  -JE     Name(s) of People in Home lives w/ spouse, family assist  -       Row Name 05/06/25 1636          Home Main Entrance    Number of Stairs, Main Entrance five  -JE     Stair Railings, Main Entrance railing on right side (ascending);railings not in good condition, need repair for safe use  -       Row Name 05/06/25 1636          Stairs Within Home, Primary    Stairs, Within Home, Primary to sunken room, no rails; steps enter this den at 3 different locations in the home  -JE     Number of Stairs, Within Home, Primary two  -JE     Stair Railings,  Within Home, Primary none  -       Row Name 05/06/25 1636          Cognition    Orientation Status (Cognition) disoriented to;person;situation;place;other (see comments)  time not formally assessed, however conversationally not appropriate for situation or time  -       Row Name 05/06/25 1636          Safety Issues/Impairments Affecting Functional Mobility    Safety Issues Affecting Function (Mobility) ability to follow commands;at risk behavior observed;awareness of need for assistance;friction/shear risk;impulsivity;insight into deficits/self-awareness;judgment;safety precaution awareness;safety precautions follow-through/compliance  -     Impairments Affecting Function (Mobility) balance;cognition;endurance/activity tolerance;strength  -     Cognitive Impairments, Mobility Safety/Performance attention;awareness, need for assistance;impulsivity;insight into deficits/self-awareness;judgment;safety precaution awareness;safety precaution follow-through  -               User Key  (r) = Recorded By, (t) = Taken By, (c) = Cosigned By      Initials Name Provider Type     Theresa Cuevas, PT Physical Therapist                   Mobility       Row Name 05/06/25 1636          Bed Mobility    Bed Mobility supine-sit;sit-supine  -     Supine-Sit [place] (Bed Mobility) standby assist;verbal cues;contact guard  -     Sit-Supine [place] (Bed Mobility) moderate assist (50% patient effort);minimum assist (75% patient effort);verbal cues  -     Assistive Device (Bed Mobility) head of bed elevated;bed rails  -       Row Name 05/06/25 1636          Sit-Stand Transfer    Sit-Stand [place] (Transfers) contact guard;minimum assist (75% patient effort);verbal cues  -       Row Name 05/06/25 1636          Gait/Stairs (Locomotion)    [place] Level (Gait) minimum assist (75% patient effort);verbal cues  -     Assistive Device (Gait) other (see comments)  A  -     Distance in Feet (Gait) 80   -     Deviations/Abnormal Patterns (Gait) base of support, narrow;scissoring;stride length decreased  -     Bilateral Gait Deviations forward flexed posture  -               User Key  (r) = Recorded By, (t) = Taken By, (c) = Cosigned By      Initials Name Provider Type    Theresa Moreno, PT Physical Therapist                   Obj/Interventions       Row Name 05/06/25 1636          Range of Motion Comprehensive    Comment, General Range of Motion AROM all 4 extremities WFLs  -[facility] Name 05/06/25 1636          Strength Comprehensive (MMT)    Comment, General Manual Muscle Testing (MMT) Assessment no formal strength assessment due to decrease cognition, however moves all 4 extremities against gravity w/out difficulty  -[facility] Name 05/06/25 1636          Balance    Balance Assessment sitting static balance;sitting dynamic balance;standing static balance;standing dynamic balance  -     Static Sitting Balance standby assist;contact guard;verbal cues  -     Dynamic Sitting Balance contact guard;verbal cues  -     Position, Sitting Balance supported;sitting edge of bed  -     Static Standing Balance contact guard;minimal assist;verbal cues  -     Dynamic Standing Balance minimal assist;verbal cues  -     Position/Device Used, Standing Balance supported  -[facility] Name 05/06/25 1636          Sensory Assessment (Somatosensory)    Sensory Assessment (Somatosensory) unable/difficult to assess  -               User Key  (r) = Recorded By, (t) = Taken By, (c) = Cosigned By      Initials Name Provider Type    Threesa Moreno, PT Physical Therapist                   Goals/Plan       [facility] Name 05/06/25 1636          Bed Mobility Goal 1 (PT)    Activity/Assistive Device (Bed Mobility Goal 1, PT) bed mobility activities, all  -     [place] Level/Cues Needed (Bed Mobility Goal 1, PT) independent  -     Time Frame (Bed Mobility Goal 1, PT) long term goal (LTG);10 days  -      Progress/Outcomes (Bed Mobility Goal 1, PT) goal ongoing  -[facility] Name 05/06/25 1636          Transfer Goal 1 (PT)    Activity/Assistive Device (Transfer Goal 1, PT) sit-to-stand/stand-to-sit;bed-to-chair/chair-to-bed  -     [place] Level/Cues Needed (Transfer Goal 1, PT) standby assist  -JE     Time Frame (Transfer Goal 1, PT) long term goal (LTG);10 days  -     Progress/Outcome (Transfer Goal 1, PT) goal ongoing  -[facility] Name 05/06/25 1636          Gait Training Goal 1 (PT)    Activity/Assistive Device (Gait Training Goal 1, PT) gait (walking locomotion);decrease fall risk;diminish gait deviation;improve balance and speed;increase endurance/gait distance  -     [place] Level (Gait Training Goal 1, PT) contact guard required;standby assist  -JE     Distance (Gait Training Goal 1, PT) 100 ft  -     Time Frame (Gait Training Goal 1, PT) long term goal (LTG);10 days  -     Progress/Outcome (Gait Training Goal 1, PT) goal ongoing  -JE       Row Name 05/06/25 1636          Therapy Assessment/Plan (PT)    Planned Therapy Interventions (PT) balance training;bed mobility training;gait training;patient/family education;strengthening;transfer training;other (see comments)  safety/falls prevention  -               User Key  (r) = Recorded By, (t) = Taken By, (c) = Cosigned By      Initials Name Provider Type    Theresa Moreno, PT Physical Therapist                   Clinical Impression       Row Name 05/06/25 1636          Pain    Additional Documentation Pain Scale: FACES Pre/Post-Treatment (Group)  -JE       Row Name 05/06/25 1636          Pain Scale: FACES Pre/Post-Treatment    Pain: FACES Scale, Pretreatment 0-->no hurt  -     Posttreatment Pain Rating 0-->no hurt  -JE       Row Name 05/06/25 1636          Plan of Care Review    Plan of Care Reviewed With patient;family  -     Progress no change  -     Outcome Evaluation PT eval completed.  Pt pleasant, however confused.   Difficulty following commands and impulsive w/ activity.  Decrease awareness of deficits w/ noted generalized weakness and decrease balance w/ increase fall risk.  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  Pt performs bed mobility w/ CGA to SBA.  Tfers w/ min to CGA and performed gait w/ a single UE support w/ min assist tolerating ~ 80 ft w/ narrow ABRAM, decrease step length and heel strike.  Pt will benefit from continued PT services to improve activity tolerance, to improve gait mechanics, overall strength. balance and I w/ functional mobility reducing fall risk.  Pt will need placement at SNF for continued skilled care at discharge.  -ARTEMIO       Row Name 05/06/25 1636          Therapy Assessment/Plan (PT)    Patient/Family Therapy Goals Statement (PT) family request placement  -     Rehab Potential (PT) fair  -     Criteria for Skilled Interventions Met (PT) yes;meets criteria;skilled treatment is necessary  -     Therapy Frequency (PT) 2 times/day  -     Predicted Duration of Therapy Intervention (PT) until discharge or goals achieved  -       Row Name 05/06/25 1636          Vital Signs    O2 Delivery Pre Treatment room air  -JE     O2 Delivery Intra Treatment room air  -JE     O2 Delivery Post Treatment room air  -     Pre Patient Position Supine  -     Intra Patient Position Standing  -     Post Patient Position Supine  -       Row Name 05/06/25 1636          Positioning and Restraints    Pre-Treatment Position in bed  -     Post Treatment Position bed  -JE     In Bed fowlers;call light within reach;encouraged to call for assist;exit alarm on;with family/caregiver;side rails up x3  eating supper  -               User Key  (r) = Recorded By, (t) = Taken By, (c) = Cosigned By      Initials Name Provider Type    Theresa Moreno, PT Physical Therapist                   Outcome Measures       Row Name 05/06/25 1636 05/06/25 0831       How much help from  another person do you currently need...    Turning from your back to your side while in flat bed without using bedrails? -- 4  -AW    Moving from lying on back to sitting on the side of a flat bed without bedrails? 3  - 4  -AW    Moving to and from a bed to a chair (including a wheelchair)? 3  - 3  -AW    Standing up from a chair using your arms (e.g., wheelchair, bedside chair)? 3  - 3  -AW    Climbing 3-5 steps with a railing? 2  - 3  -AW    To walk in hospital room? 3  - 3  -AW    AM-PAC 6 Clicks Score (PT) -- 20  -AW    Highest Level of Mobility Goal -- 6 --> Walk 10 steps or more  -      Row Name 05/06/25 1636 05/06/25 0800       Functional Assessment    Outcome Measure Options AM-PAC 6 Clicks Basic Mobility (PT)  - AM-PAC 6 Clicks Daily Activity (OT)  -              User Key  (r) = Recorded By, (t) = Taken By, (c) = Cosigned By      Initials Name Provider Type    [facility] [place], OTR/L Occupational Therapist    Theresa Moreno, PT Physical Therapist    Mary Brandt, RN Registered Nurse                                 Physical Therapy Education       Title: PT OT SLP Therapies (In Progress)       Topic: Physical Therapy (In Progress)       Point: Mobility training (Done)       Learning Progress Summary            Patient Acceptance, E,TB,D, VU,NR by  at 5/6/2025 1708    Comment: education re: purpose of PT/importance of activity, safety awareness w/ mobility; increase awareness of gait mechanics   Family Acceptance, E,TB,D, VU,NR by  at 5/6/2025 1708    Comment: education re: purpose of PT/importance of activity, safety awareness w/ mobility; increase awareness of gait mechanics                      Point: Home exercise program (Not Started)       Learner Progress:  Not documented in this visit.              Point: Precautions (Done)       Learning Progress Summary            Patient Acceptance, E,TB,D, VU,NR by  at 5/6/2025 1708    Comment: education re: purpose of  PT/importance of activity, safety awareness w/ mobility; increase awareness of gait mechanics   Family Acceptance, E,TB,D, VU,NR by  at 5/6/2025 1708    Comment: education re: purpose of PT/importance of activity, safety awareness w/ mobility; increase awareness of gait mechanics                                      User Key       Initials Effective Dates Name Provider Type [facility] 08/02/18 -  Theresa Cuevas, PT Physical Therapist PT                  PT Recommendation and Plan  Planned Therapy Interventions (PT): balance training, bed mobility training, gait training, patient/family education, strengthening, transfer training, other (see comments) (safety/falls prevention)  Progress: no change  Outcome Evaluation: PT eval completed.  Pt pleasant, however confused.  Difficulty following commands and impulsive w/ activity.  Decrease awareness of deficits w/ noted generalized weakness and decrease balance w/ increase fall risk.  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  Pt performs bed mobility w/ CGA to SBA.  Tfers w/ min to CGA and performed gait w/ a single UE support w/ min assist tolerating ~ 80 ft w/ narrow ABRAM, decrease step length and heel strike.  Pt will benefit from continued PT services to improve activity tolerance, to improve gait mechanics, overall strength. balance and I w/ functional mobility reducing fall risk.  Pt will need placement at SNF for continued skilled care at discharge.     Time Calculation:         PT Charges       Row Name 05/06/25 1706             Time Calculation    Start Time 1636  -      Stop Time 1705  -      Time Calculation (min) 29 min  -      PT Received On 05/06/25  -      PT Goal Re-Cert Due Date 05/16/25  -                User Key  (r) = Recorded By, (t) = Taken By, (c) = Cosigned By      Initials Name Provider Type     Theresa Cuevas, PT Physical Therapist                  Therapy Charges for Today       Code  Description Service Date Service Provider Modifiers Qty    [id number] HC PT EVAL MOD COMPLEXITY 2 5/6/2025 Theresa Cuevas, PT GP 1            PT G-Codes  Outcome Measure Options: AM-PAC 6 Clicks Basic Mobility (PT)  AM-PAC 6 Clicks Score (PT): 20  AM-PAC 6 Clicks Score (OT): 9  PT Discharge Summary  Anticipated Discharge Disposition (PT): skilled nursing facility    Theresa Cuevas, PT  5/6/2025

## 2025-05-06 NOTE — PLAN OF CARE
Goal Outcome Evaluation:  Plan of Care Reviewed With: patient        Progress: no change  Outcome Evaluation: OT eval complete.  Upon entering room, pt with RN who was attempting to assist him to EOB.  Min A to come to EOB and Min/Mod A to maintain EOB sitting.  RN attempt to increase pt alert state for AM meal after receiving medication for agitation earlier in AM.  OTR assisted with self feeding at EOB, determined pt was not safe to sit without support so assisted him back to bed & in upright sitting.  Mr. Mathews cont to require Max A for self feeding.  Benefitted from mech soft texture and alternating sips & bites.  Requested to consult ST.  Not able to formally assess range or strength but obs AAROM WFL & pt able to move BUEs against gravity to complete desired tasks.  Not able to obtain PLOF or home environment; however, per chart plan is to DC to SNF.  Rec memory care.  Cont wiht OT tx to improve pt safe ADL participation.    Anticipated Discharge Disposition (OT): skilled nursing facility

## 2025-05-06 NOTE — PROGRESS NOTES
Nephrology ([facility]) Progress Note      Patient:  Maikel Mathews  YOB: 1929  Date of Service: 5/6/2025  MRN: [id number]   Acct: [id number]   Primary Care Physician: David Barajas MD  Advance Directive:   Code Status and Medical Interventions: CPR (Attempt to Resuscitate); Full Support   Ordered at: 05/04/25 1634     Code Status (Patient has no pulse and is not breathing):    CPR (Attempt to Resuscitate)     Medical Interventions (Patient has pulse or is breathing):    Full Support     Admit Date: 5/4/2025       Hospital Day: 2  Referring Provider: Perla Mireles MD      Patient personally seen and examined.  Complete chart including Consults, Notes, Operative Reports, Labs, Cardiology, and Radiology studies reviewed as able.        Subjective:  Maikel Mathews is a 89 y.o. male for whom we were consulted for evaluation and treatment of acute kidney injury.  He has a history of baseline chronic kidney disease stage 4 with creatinine 3.1 in January 2025.  He does not follow with nephrology.  He has a history of dementia, type 2 diabetes, hypertension.  He was brought to ER for change in mental status. Reportedly, he has been aggressive and making threatening statements toward his family. Per family, patient has not been compliant with medications and has not been eating or drinking well. Labs in ER revealed creatinine 3.64 and BUN 52.  He was admitted to medical floor. Patient was poor initial historian and not able to provide any detail or recent medical history. Denied nausea, vomiting, diarrhea.     Today, no overnight events.  Remains confused.  Seen with nursing.  Denied new complaints upon questioning.  No family present at the bedside.    Allergies:  Patient has no known allergies.    Home Meds:  Medications Prior to Admission   Medication Sig Dispense Refill Last Dose/Taking    amLODIPine (NORVASC) 10 MG tablet Take 1 tablet by mouth Daily.  5 More than a month     glipiZIDE (GLUCOTROL) 5 MG ER tablet Take 1 tablet by mouth Daily.  2 More than a month       Medicines:  Current Facility-Administered Medications   Medication Dose Route Frequency Provider Last Rate Last Admin    acetaminophen (TYLENOL) tablet 650 mg  650 mg Oral Q4H PRN Perla Mireles MD   650 mg at 05/05/25 2048    Or    acetaminophen (TYLENOL) 160 MG/5ML oral solution 650 mg  650 mg Oral Q4H PRN Perla Mireles MD        Or    acetaminophen (TYLENOL) suppository 650 mg  650 mg Rectal Q4H PRN Perla Mireles MD        amLODIPine (NORVASC) tablet 10 mg  10 mg Oral Q24H Pablo Ortiz MD   10 mg at 05/06/25 0817    sennosides-docusate (PERICOLACE) 8.6-50 MG per tablet 2 tablet  2 tablet Oral BID PRN Perla Mireles MD        And    polyethylene glycol (MIRALAX) packet 17 g  17 g Oral Daily PRN Perla Mireles MD        And    bisacodyl (DULCOLAX) EC tablet 5 mg  5 mg Oral Daily PRN Perla Mireles MD        And    bisacodyl (DULCOLAX) suppository 10 mg  10 mg Rectal Daily PRN Perla Mireles MD        Calcium Replacement - Follow Nurse / BPA Driven Protocol   Not Applicable PRN Perla Mireles MD        cholecalciferol (VITAMIN D3) tablet 1,000 Units  1,000 Units Oral Daily Brayan Meehan MD        cyanocobalamin injection 1,000 mcg  1,000 mcg Intramuscular Daily Brayan Meehan MD        dextrose (D50W) (25 g/50 mL) IV injection 25 g  25 g Intravenous Q15 Min PRN Perla Mireles MD        dextrose (GLUTOSE) oral gel 15 g  15 g Oral Q15 Min PRN Perla Mireles MD        glucagon (GLUCAGEN) injection 1 mg  1 mg Intramuscular Q15 Min PRN Perla Mireles MD        heparin (porcine) 5000 UNIT/ML injection 5,000 Units  5,000 Units Subcutaneous Q12H Brayan Meehan MD   5,000 Units at 05/06/25 0817    hydrALAZINE (APRESOLINE) injection 20 mg  20 mg Intravenous Q4H PRN Perla Mireles MD   20 mg at 05/04/25 2030    Insulin Lispro (humaLOG) injection 2-7 Units  2-7 Units Subcutaneous 4x  Daily AC & at Bedtime Perla Mireles MD   3 Units at 05/05/25 2047    Magnesium Standard Dose Replacement - Follow Nurse / BPA Driven Protocol   Not Applicable PRN Perla Mireles MD        ondansetron (ZOFRAN) injection 4 mg  4 mg Intravenous Q6H PRN Perla Mireles MD        oxyCODONE-acetaminophen (PERCOCET) 5-325 MG per tablet 1 tablet  1 tablet Oral Q8H PRN Perla Mireles MD        Phosphorus Replacement - Follow Nurse / BPA Driven Protocol   Not Applicable PRN Perla Mireles MD        Potassium Replacement - Follow Nurse / BPA Driven Protocol   Not Applicable PRN Perla Mireles MD        QUEtiapine (SEROquel) tablet 50 mg  50 mg Oral Nightly Brayan Meehan MD        sodium bicarbonate tablet 650 mg  650 mg Oral BID Brayan Meehan MD        sodium chloride 0.9 % flush 10 mL  10 mL Intravenous PRN Cabrera Robbins MD        sodium chloride 0.9 % flush 10 mL  10 mL Intravenous Q12H Perla Mireles MD   10 mL at 05/05/25 2048    sodium chloride 0.9 % flush 10 mL  10 mL Intravenous PRN Perla Mireles MD        sodium chloride 0.9 % infusion 40 mL  40 mL Intravenous PRN Perla Mireles MD        sodium chloride 0.9 % infusion  75 mL/hr Intravenous Continuous Perla Mireles MD 75 mL/hr at 05/06/25 0025 75 mL/hr at 05/06/25 0025    ziprasidone (GEODON) injection 20 mg  20 mg Intramuscular Q4H PRN Perla Mireles MD   20 mg at 05/06/25 0633       Past Medical History:  Past Medical History:   Diagnosis Date    Arthritis     AVM (arteriovenous malformation)     Cancer     PROSTATE    Cataract     Colon cancer     Colon polyp     Diabetes mellitus     Diverticulosis     Dizzinesses 10/20/2017    GERD (gastroesophageal reflux disease)     Hemorrhoids     History of transfusion     Hypertension     Inguinal hernia     Iron deficiency anemia due to chronic blood loss 7/26/2017    Prostate CA     Prostate hypertrophy     Rotator cuff syndrome        Past Surgical History:  Past Surgical  History:   Procedure Laterality Date    APPENDECTOMY      CHOLECYSTECTOMY      COLON RESECTION N/A 9/18/2017    Procedure: LAPAROSCOPIC ASSISTED RIGHT COLECTOMY;  Surgeon: Caroline Loomis MD;  Location: [facility] OR;  Service:     COLONOSCOPY  04/29/2010    COLONOSCOPY N/A 8/25/2017    Procedure: COLONOSCOPY WITH ANESTHESIA;  Surgeon: Joaquín Neff MD;  Location: [facility] ENDOSCOPY;  Service:     ENDOSCOPY  03/27/2013    ENDOSCOPY N/A 8/25/2017    Procedure: ESOPHAGOGASTRODUODENOSCOPY WITH ANESTHESIA;  Surgeon: Joaquín Neff MD;  Location: [facility] ENDOSCOPY;  Service:     EYE SURGERY Left     CATARACT    INGUINAL HERNIA REPAIR Bilateral 11/1/2019    Procedure: OPEN BILATERAL INGUINAL HERNIA REPAIR WITH MESH;  Surgeon: Caroline Loomis MD;  Location: [facility] OR;  Service: General    INTERVENTIONAL RADIOLOGY PROCEDURE N/A 9/18/2017    Procedure: URETHRA DILITATION with dominguez catheter insertion;  Surgeon: Mamadou Paez MD;  Location: [facility] OR;  Service:     PROSTATECTOMY      PROSTATECTOMY         Family History  Family History   Problem Relation Age of Onset    Colon cancer Neg Hx     Colon polyps Neg Hx        Social History  Social History     Socioeconomic History    Marital status:    Tobacco Use    Smoking status: Never    Smokeless tobacco: Never   Substance and Sexual Activity    Alcohol use: No    Drug use: No    Sexual activity: Defer       Review of Systems:  History obtained from chart review and the patient  General ROS: No fever or chills  Respiratory ROS: No cough, shortness of breath, wheezing  Cardiovascular ROS: No chest pain or palpitations  Gastrointestinal ROS: No abdominal pain or melena  Genito-Urinary ROS: No dysuria or hematuria  Psych ROS: No anxiety and depression  14 point ROS reviewed with the patient and negative except as noted above and in the HPI unless unable to obtain.    Objective:  Patient Vitals for the past 24 hrs:   BP Temp Temp src Pulse Resp SpO2   05/06/25  1438 138/85 98.2 °F (36.8 °C) Oral 80 18 98 %   05/06/25 1058 152/85 98 °F (36.7 °C) Oral 80 18 98 %   05/06/25 0831 158/89 97.5 °F (36.4 °C) Oral 92 18 100 %   05/06/25 0656 146/78 -- -- -- -- --   05/06/25 0407 151/86 98.1 °F (36.7 °C) Oral 92 18 98 %   05/05/25 2016 151/69 98.3 °F (36.8 °C) Oral 96 18 98 %   05/05/25 1701 147/67 97.9 °F (36.6 °C) Oral 67 16 96 %       Intake/Output Summary (Last 24 hours) at 5/6/2025 1443  Last data filed at 5/6/2025 1300  Gross per 24 hour   Intake 771 ml   Output 100 ml   Net 671 ml     General: awake / confused  Chest:  clear to auscultation bilaterally without respiratory distress  CVS: regular rate and rhythm  Abdominal: soft, nontender, positive bowel sounds  Extremities: no cyanosis or edema  Skin: warm and dry without rash      Labs:  Results from last 7 days   Lab Units 05/06/25  0354 05/05/25  0850 05/04/25  1330   WBC 10*3/mm3 6.37 5.95 8.47   HEMOGLOBIN g/dL 9.5* 10.2* 10.7*   HEMATOCRIT % 29.3* 31.0* 33.0*   PLATELETS 10*3/mm3 206 213 241         Results from last 7 days   Lab Units 05/06/25  0354 05/05/25  1559 05/05/25  0850 05/04/25  1330   SODIUM mmol/L 140  --  139 135*   POTASSIUM mmol/L 4.0 3.5 3.4* 3.9   CHLORIDE mmol/L 113*  --  110* 104   CO2 mmol/L 17.0*  --  16.0* 15.0*   BUN mg/dL 46*  --  45* 52*   CREATININE mg/dL 3.22*  --  3.17* 3.64*   CALCIUM mg/dL 8.0*  --  7.8* 7.8*   EGFR mL/min/1.73 17.0*  --  17.4* 14.7*   BILIRUBIN mg/dL  --   --  0.3 0.3   ALK PHOS U/L  --   --  75 84   ALT (SGPT) U/L  --   --  6 6   AST (SGOT) U/L  --   --  14 16   GLUCOSE mg/dL 129*  --  191* 269*       Radiology:   Imaging Results (Last 72 Hours)       Procedure Component Value Units Date/Time    US Renal Bilateral [[id number]] Collected: 05/05/25 1646     Updated: 05/05/25 1650    Narrative:      US RENAL BILATERAL- 5/5/2025 2:53 PM     HISTORY: Chronic kidney disease; N17.9-Acute kidney failure,  unspecified; R41.82-Altered mental status,  unspecified;  F03.918-Unspecified dementia, unspecified severity, with other  behavioral disturbance; E83.42-Hypomagnesemia     COMPARISON: None available.       TECHNIQUE: Multiple longitudinal and transverse real-time sonographic  images of the kidneys and urinary bladder are obtained. Report and  images stored per institutional and state regulations.           FINDINGS:     Visualized proximal abdominal aorta and IVC are unremarkable.        RIGHT KIDNEY: The right kidney measures 7.9 cm in length. Renal cortex  is echogenic. There is no hydronephrosis..     LEFT KIDNEY: Left kidney is 7.5 cm in length. Renal cortex is echogenic.  There is no left hydronephrosis. There is a simple anechoic cyst at the  left renal cortex lower pole measuring 1.3 cm.     PELVIS: Urinary bladder is grossly unremarkable.          Impression:         1. Small echogenic kidneys consistent with medical renal disease. There  is no hydronephrosis.  2. Simple left renal cyst.           This report was signed and finalized on 5/5/2025 4:47 PM by Viral Mcfarland.       CT Head Without Contrast [[id number]] Collected: 05/04/25 1352     Updated: 05/04/25 1356    Narrative:      EXAMINATION: CT HEAD WO CONTRAST-      5/4/2025 12:43 PM     HISTORY: Altered mental status. Worsening dementia. Aggressive behavior.     In order to have a CT radiation dose as low as reasonably achievable  Automated Exposure Control was utilized for adjustment of the mA and/or  KV according to patient size.     CT Dose DLP = 680.97 mGy.cm.  (If there are multiple studies performed at the same time this  represents the total dose).     Images are stored in PACS per institutional protocol.        Noncontrast head CT.  Axial, sagittal, and coronal sequences.     The visualized paranasal sinuses are clear.     The brain and ventricles have an age appropriate appearance.   Moderate atrophy and small vessel disease.  There is no hemorrhage or mass-effect.   No acute  "infarction is seen.     No calvarial abnormality.       Impression:      1. Age-related chronic change.  2. No acute intracranial abnormality is seen.           This report was signed and finalized on 5/4/2025 1:53 PM by Dr. Yifan Salazar MD.       XR Chest 1 View [[id number]] Collected: 05/04/25 1340     Updated: 05/04/25 1344    Narrative:      EXAMINATION: XR CHEST 1 VW-     5/4/2025 12:31 PM     HISTORY: Altered mental status.     Images are stored in PACS per institutional protocol.     1 view chest x-ray.     COMPARISON:  3/31/2025.     Heart size is normal.  The mediastinum is within normal limits.     The lungs are mildly hyperexpanded with no pneumonia or pneumothorax.  Mild chronic appearing interstitial disease with a few calcified  granulomas.  No congestive failure changes.       Impression:      1. No acute disease.                 This report was signed and finalized on 5/4/2025 1:41 PM by Dr. Yifan Salazar MD.               Culture:  No results found for: \"BLOODCX\", \"URINECX\", \"WOUNDCX\", \"MRSACX\", \"RESPCX\", \"STOOLCX\"      Assessment   Acute kidney injury  Chronic kidney disease stage IV  Hypertension  Diabetes type 2  Dementia  Metabolic acidosis  Anemia and chronic kidney disease  Hypokalemia  Secondary hyperparathyroidism  Vitamin D deficiency    Plan:  Discussed with patient, nursing  Workup reviewed today  Monitor labs  Ultrasound for acute obstruction  Replace vitamin D  IV fluids with adjustments as per orders          Fahad Cool MD  5/6/2025  14:43 CDT      "

## 2025-05-06 NOTE — PLAN OF CARE
Goal Outcome Evaluation:  Plan of Care Reviewed With: patient, family           Outcome Evaluation: Pt alert to self only, requiring constant reorientation and redirection, family called to sit at bedside to aide in redirection and reorientation. incontinent of urine at times. IVF rate decreased per nephro. SQ Heparin. PT/OT eval today. SLP eval due to difficulty eating breakfast, changed to soft to chew diet, meds given whole in AS. mepliex added to coccyx for prevention; skin intact. fall precautions, bed alarm on.

## 2025-05-06 NOTE — PLAN OF CARE
Admitted 5/4 for confusion aggressive behavior  Consult nephro for DELLA (CKD 4); renal ultrasound (Small echogenic kidneys, simple L renal cyst)   IVF NS @ 75 ml/hr  Heparin subq for dvt prophylaxis  Problem: Adult Inpatient Plan of Care  Goal: Plan of Care Review  Outcome: Progressing  Flowsheets (Taken 5/6/2025 0342)  Plan of Care Reviewed With: patient     Problem: Acute Kidney Injury/Impairment  Goal: Fluid and Electrolyte Balance  Outcome: Progressing  Goal: Improved Oral Intake  Outcome: Progressing  Goal: Effective Renal Function  Outcome: Progressing     Problem: Adult Inpatient Plan of Care  Goal: Absence of Hospital-Acquired Illness or Injury  Intervention: Identify and Manage Fall Risk  Recent Flowsheet Documentation  Taken 5/6/2025 0300 by Rayray Simmons RN  Safety Promotion/Fall Prevention: safety round/check completed  Taken 5/6/2025 0100 by Rayray Simmons RN  Safety Promotion/Fall Prevention: safety round/check completed  Taken 5/6/2025 0000 by Rayray Simmons RN  Safety Promotion/Fall Prevention: safety round/check completed  Taken 5/5/2025 2048 by Rayray Simmons RN  Safety Promotion/Fall Prevention: safety round/check completed  Intervention: Prevent Skin Injury  Recent Flowsheet Documentation  Taken 5/5/2025 2048 by Rayray Simmons RN  Body Position: position changed independently  Intervention: Prevent and Manage VTE (Venous Thromboembolism) Risk  Recent Flowsheet Documentation  Taken 5/5/2025 2048 by Rayray Simmons RN  VTE Prevention/Management: (heparin) other (see comments)     Problem: Comorbidity Management  Goal: Maintenance of Osteoarthritis Symptom Control  Intervention: Maintain Osteoarthritis Symptom Control  Recent Flowsheet Documentation  Taken 5/6/2025 0000 by Rayray Simmons RN  Activity Management:   ambulated to bathroom   back to bed  Taken 5/5/2025 2000 by Rayray Simmons RN  Activity Management:   ambulated to bathroom   back to bed   Goal Outcome Evaluation:  Plan of Care Reviewed With: patient

## 2025-05-06 NOTE — CASE MANAGEMENT/SOCIAL WORK
Continued Stay Note  [facility]     Patient Name: Maikel Mathews  MRN: [id number]  Today's Date: 5/6/2025    Admit Date: 5/4/2025    Plan: SNF   Discharge Plan       Row Name 05/06/25 0932       Plan    Plan SNF    Patient/Family in Agreement with Plan yes    Plan Comments Spoke with Ashley at [facility] and they are reviewing referral now and will call back decision.  Spoke with both Jessica from [facility] and Tish from [facility] and they both will follow and want some improvement in behaviors before considering offering a bed. Will follow.    Ashley from [facility] called back and they are declining pt clinically.  If behaviors expected to take awhile to improve please inform as could possibly look at [facility]. Will follow.     Spoke with Sunday ngo [phone]  to update. She is requesting a sitters list and was provided her email so this SW will send to her [email or web address].  She is looking at this for sitter in hospital setting, still wants placement pursued as going home is not an option. Also, informed Jessica that family wants them to follow at [facility] as option as well.                    Discharge Codes    No documentation.                       AMANDA TaylorW

## 2025-05-06 NOTE — PLAN OF CARE
Goal Outcome Evaluation:  Plan of Care Reviewed With: patient, family        Progress: no change  Outcome Evaluation: PT eval completed.  Pt pleasant, however confused.  Difficulty following commands and impulsive w/ activity.  Decrease awareness of deficits w/ noted generalized weakness and decrease balance w/ increase fall risk.  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  Pt performs bed mobility w/ CGA to SBA.  Tfers w/ min to CGA and performed gait w/ a single UE support w/ min assist tolerating ~ 80 ft w/ narrow ABRAM, decrease step length and heel strike.  Pt will benefit from continued PT services to improve activity tolerance, to improve gait mechanics, overall strength. balance and I w/ functional mobility reducing fall risk.  Pt will need placement at SNF for continued skilled care at discharge.    Anticipated Discharge Disposition (PT): skilled nursing facility

## 2025-05-07 LAB
ANION GAP SERPL CALCULATED.3IONS-SCNC: 16 MMOL/L (ref 5–15)
BUN SERPL-MCNC: 42 MG/DL (ref 8–23)
BUN/CREAT SERPL: 15.4 (ref 7–25)
CALCIUM SPEC-SCNC: 8.3 MG/DL (ref 8.2–9.6)
CALCIUM SPEC-SCNC: 8.3 MG/DL (ref 8.2–9.6)
CHLORIDE SERPL-SCNC: 112 MMOL/L (ref 98–107)
CO2 SERPL-SCNC: 10 MMOL/L (ref 22–29)
CREAT SERPL-MCNC: 2.73 MG/DL (ref 0.76–1.27)
EGFRCR SERPLBLD CKD-EPI 2021: 20.8 ML/MIN/1.73
GLUCOSE BLDC GLUCOMTR-MCNC: 104 MG/DL (ref 70–130)
GLUCOSE BLDC GLUCOMTR-MCNC: 143 MG/DL (ref 70–130)
GLUCOSE BLDC GLUCOMTR-MCNC: 216 MG/DL (ref 70–130)
GLUCOSE BLDC GLUCOMTR-MCNC: 85 MG/DL (ref 70–130)
GLUCOSE SERPL-MCNC: 114 MG/DL (ref 65–99)
MAGNESIUM SERPL-MCNC: 1.8 MG/DL (ref 1.7–2.3)
PHOSPHATE SERPL-MCNC: 3.7 MG/DL (ref 2.5–4.5)
POTASSIUM SERPL-SCNC: 4.9 MMOL/L (ref 3.5–5.2)
POTASSIUM SERPL-SCNC: 4.9 MMOL/L (ref 3.5–5.2)
SODIUM SERPL-SCNC: 138 MMOL/L (ref 136–145)

## 2025-05-07 PROCEDURE — 84100 ASSAY OF PHOSPHORUS: CPT | Performed by: INTERNAL MEDICINE

## 2025-05-07 PROCEDURE — 83735 ASSAY OF MAGNESIUM: CPT | Performed by: INTERNAL MEDICINE

## 2025-05-07 PROCEDURE — 25010000002 HYDRALAZINE PER 20 MG: Performed by: HOSPITALIST

## 2025-05-07 PROCEDURE — 63710000001 INSULIN LISPRO (HUMAN) PER 5 UNITS: Performed by: HOSPITALIST

## 2025-05-07 PROCEDURE — 97116 GAIT TRAINING THERAPY: CPT

## 2025-05-07 PROCEDURE — 82948 REAGENT STRIP/BLOOD GLUCOSE: CPT

## 2025-05-07 PROCEDURE — 25010000002 CYANOCOBALAMIN PER 1000 MCG: Performed by: INTERNAL MEDICINE

## 2025-05-07 PROCEDURE — 80048 BASIC METABOLIC PNL TOTAL CA: CPT | Performed by: NURSE PRACTITIONER

## 2025-05-07 PROCEDURE — 25010000002 HEPARIN (PORCINE) PER 1000 UNITS: Performed by: INTERNAL MEDICINE

## 2025-05-07 RX ORDER — HYDRALAZINE HYDROCHLORIDE 50 MG/1
50 TABLET, FILM COATED ORAL EVERY 4 HOURS PRN
Status: DISCONTINUED | OUTPATIENT
Start: 2025-05-07 | End: 2025-05-13 | Stop reason: HOSPADM

## 2025-05-07 RX ADMIN — Medication 2000 UNITS: at 08:14

## 2025-05-07 RX ADMIN — AMLODIPINE BESYLATE 10 MG: 10 TABLET ORAL at 08:14

## 2025-05-07 RX ADMIN — Medication 10 ML: at 21:05

## 2025-05-07 RX ADMIN — HEPARIN SODIUM 5000 UNITS: 5000 INJECTION, SOLUTION INTRAVENOUS; SUBCUTANEOUS at 08:14

## 2025-05-07 RX ADMIN — HYDRALAZINE HYDROCHLORIDE 20 MG: 20 INJECTION INTRAMUSCULAR; INTRAVENOUS at 01:32

## 2025-05-07 RX ADMIN — SODIUM BICARBONATE 650 MG: 650 TABLET ORAL at 21:04

## 2025-05-07 RX ADMIN — OXYCODONE HYDROCHLORIDE AND ACETAMINOPHEN 1 TABLET: 5; 325 TABLET ORAL at 11:55

## 2025-05-07 RX ADMIN — INSULIN LISPRO 3 UNITS: 100 INJECTION, SOLUTION INTRAVENOUS; SUBCUTANEOUS at 11:45

## 2025-05-07 RX ADMIN — QUETIAPINE FUMARATE 50 MG: 25 TABLET ORAL at 21:04

## 2025-05-07 RX ADMIN — Medication 10 ML: at 08:24

## 2025-05-07 RX ADMIN — SODIUM BICARBONATE 650 MG: 650 TABLET ORAL at 08:14

## 2025-05-07 RX ADMIN — HEPARIN SODIUM 5000 UNITS: 5000 INJECTION, SOLUTION INTRAVENOUS; SUBCUTANEOUS at 21:04

## 2025-05-07 RX ADMIN — CYANOCOBALAMIN 1000 MCG: 1000 INJECTION, SOLUTION INTRAMUSCULAR at 08:14

## 2025-05-07 NOTE — THERAPY TREATMENT NOTE
Acute Care - Physical Therapy Treatment Note  [facility]     Patient Name: Maikel Mathews  : 1929  MRN: [id number]  Today's Date: 2025      Visit Dx:     ICD-10-CM ICD-9-CM   1. Acute renal failure, unspecified acute renal failure type  N17.9 584.9   2. Altered mental status, unspecified altered mental status type  R41.82 780.97   3. Aggressive behavior due to dementia  F03.918 294.21   4. Hypomagnesemia  E83.42 275.2   5. Dysphagia, unspecified type  R13.10 787.20   6. Impaired functional mobility and activity tolerance [Z74.09]  Z74.09 V49.89     Patient Active Problem List   Diagnosis    Anemia associated with nutritional deficiency    Iron deficiency anemia due to chronic blood loss    Cancer of right colon    Dizzinesses    Stage 3 chronic kidney disease    Encounter for hydration prior to CT scan    Low vitamin B12 level    Iron deficiency anemia    Cat bite    Cough    Low back pain    Pharyngitis    Spondylolisthesis, grade 1    Bilateral inguinal hernia    Type 2 diabetes mellitus with other specified complication    Acute renal failure    Change in mental status     Past Medical History:   Diagnosis Date    Arthritis     AVM (arteriovenous malformation)     Cancer     PROSTATE    Cataract     Colon cancer     Colon polyp     Diabetes mellitus     Diverticulosis     Dizzinesses 10/20/2017    GERD (gastroesophageal reflux disease)     Hemorrhoids     History of transfusion     Hypertension     Inguinal hernia     Iron deficiency anemia due to chronic blood loss 2017    Prostate CA     Prostate hypertrophy     Rotator cuff syndrome      Past Surgical History:   Procedure Laterality Date    APPENDECTOMY      CHOLECYSTECTOMY      COLON RESECTION N/A 2017    Procedure: LAPAROSCOPIC ASSISTED RIGHT COLECTOMY;  Surgeon: Caroline Loomis MD;  Location: [facility];  Service:     COLONOSCOPY  2010    COLONOSCOPY N/A 2017    Procedure: COLONOSCOPY WITH ANESTHESIA;  Surgeon: Joaquín KATHLEEN  MD Tawanda;  Location: [facility] ENDOSCOPY;  Service:     ENDOSCOPY  03/27/2013    ENDOSCOPY N/A 8/25/2017    Procedure: ESOPHAGOGASTRODUODENOSCOPY WITH ANESTHESIA;  Surgeon: Joaquín Neff MD;  Location: [facility] ENDOSCOPY;  Service:     EYE SURGERY Left     CATARACT    INGUINAL HERNIA REPAIR Bilateral 11/1/2019    Procedure: OPEN BILATERAL INGUINAL HERNIA REPAIR WITH MESH;  Surgeon: Caroline Loomis MD;  Location: [facility] OR;  Service: General    INTERVENTIONAL RADIOLOGY PROCEDURE N/A 9/18/2017    Procedure: URETHRA DILITATION with dominguez catheter insertion;  Surgeon: Mamadou Paez MD;  Location: [facility] OR;  Service:     PROSTATECTOMY      PROSTATECTOMY       PT Assessment (Last 12 Hours)       PT Evaluation and Treatment       Row Name 05/07/25 0855          Physical Therapy Time and Intention    Subjective Information no complaints  -     Document Type therapy note (daily note)  -     Mode of Treatment physical therapy  -       Row Name 05/07/25 0855          General Information    Existing Precautions/Restrictions fall  -       Row Name 05/07/25 0855          Pain    Pretreatment Pain Rating 0/10 - no pain  -     Posttreatment Pain Rating 0/10 - no pain  -       Row Name 05/07/25 0855          Bed Mobility    Scooting/Bridging [place] (Bed Mobility) standby assist  -     Supine-Sit [place] (Bed Mobility) standby assist  -     Sit-Supine [place] (Bed Mobility) contact guard;verbal cues  -[facility] Name 05/07/25 0855          Transfers    Transfers sit-stand transfer;stand-sit transfer  -[facility] Name 05/07/25 0855          Sit-Stand Transfer    Sit-Stand [place] (Transfers) contact guard;verbal cues  -       Row Name 05/07/25 0855          Stand-Sit Transfer    Stand-Sit [place] (Transfers) contact guard;verbal cues  -       Row Name 05/07/25 0855          Gait/Stairs (Locomotion)    [place] Level (Gait) contact guard;verbal cues  [facility]     Assistive  Device (Gait) walker, front-wheeled  -     Distance in Feet (Gait) 80  80 x 2  -       Row Name 05/07/25 0855          Plan of Care Review    Plan of Care Reviewed With patient  -     Progress improving  -     Outcome Evaluation pt very pleasant and cooperative, trans to EOB sba, sit-stand cga, pt amb short distances in the hallway with rwx cga, trans back to bed cga, pt would benefit form SNF for balnce,strength and endurance  [facility]       Row Name 05/07/25 0855          Positioning and Restraints    Pre-Treatment Position in bed  -     Post Treatment Position bed  -     In Bed fowlers;call light within reach;encouraged to call for assist;exit alarm on;with family/caregiver  -               User Key  (r) = Recorded By, (t) = Taken By, (c) = Cosigned By      Initials Name Provider Type    Mary Jewell, PTA Physical Therapist Assistant                    Physical Therapy Education       Title: PT OT SLP Therapies (In Progress)       Topic: Physical Therapy (In Progress)       Point: Mobility training (Done)       Learning Progress Summary            Patient Acceptance, E,TB,D, VU,NR by  at 5/6/2025 1708    Comment: education re: purpose of PT/importance of activity, safety awareness w/ mobility; increase awareness of gait mechanics   Family Acceptance, E,TB,D, VU,NR by  at 5/6/2025 1708    Comment: education re: purpose of PT/importance of activity, safety awareness w/ mobility; increase awareness of gait mechanics                      Point: Home exercise program (Not Started)       Learner Progress:  Not documented in this visit.              Point: Precautions (Done)       Learning Progress Summary            Patient Acceptance, E,TB,D, VU,NR by  at 5/6/2025 1708    Comment: education re: purpose of PT/importance of activity, safety awareness w/ mobility; increase awareness of gait mechanics   Family Acceptance, E,TB,D, VU,NR by  at 5/6/2025 1708    Comment: education re: purpose of  PT/importance of activity, safety awareness w/ mobility; increase awareness of gait mechanics                                      User Key       Initials Effective Dates Name Provider Type [facility] 08/02/18 -  Theresa Cuevas, PT Physical Therapist PT                  PT Recommendation and Plan     Plan of Care Reviewed With: patient  Progress: improving  Outcome Evaluation: pt very pleasant and cooperative, trans to EOB sba, sit-stand cga, pt amb short distances in the hallway with rwx cga, trans back to bed cga, pt would benefit form SNF for balnce,strength and endurance   Outcome Measures       Row Name 05/07/25 0900             How much help from another person do you currently need...    Turning from your back to your side while in flat bed without using bedrails? 4  -AH      Moving from lying on back to sitting on the side of a flat bed without bedrails? 4  -AH      Moving to and from a bed to a chair (including a wheelchair)? 4  -AH      Standing up from a chair using your arms (e.g., wheelchair, bedside chair)? 4  -AH      Climbing 3-5 steps with a railing? 3  -AH      To walk in hospital room? 3  -AH      AM-PAC 6 Clicks Score (PT) 22  -AH         Functional Assessment    Outcome Measure Options AM-PAC 6 Clicks Basic Mobility (PT)  -AH                User Key  (r) = Recorded By, (t) = Taken By, (c) = Cosigned By      Initials Name Provider Type     Mary Thomas, PTA Physical Therapist Assistant                     Time Calculation:    PT Charges       Row Name 05/07/25 0918             Time Calculation    Start Time 0855  -      Stop Time 0918  -      Time Calculation (min) 23 min  -      PT Received On 05/07/25  -         Time Calculation- PT    Total Timed Code Minutes- PT 23 minute(s)  -         Timed Charges    13775 - Gait Training Minutes  23  -AH         Total Minutes    Timed Charges Total Minutes 23  -AH       Total Minutes 23  -AH                User Key  (r) = Recorded  By, (t) = Taken By, (c) = Cosigned By      Initials Name Provider Type     Mary Thomas PTA Physical Therapist Assistant                  Therapy Charges for Today       Code Description Service Date Service Provider Modifiers Qty    [id number] HC GAIT TRAINING EA 15 MIN 5/7/2025 Mary Thomas PTA GP 2            PT G-Codes  Outcome Measure Options: AM-PAC 6 Clicks Basic Mobility (PT)  AM-PAC 6 Clicks Score (PT): 22  AM-PAC 6 Clicks Score (OT): 9    Mary Thomas PTA  5/7/2025

## 2025-05-07 NOTE — PROGRESS NOTES
Automatic IV to PO Pharmacy Note - General    Maikel Mathews is a 89 y.o. male who meets the following criteria for IV to PO therapy conversion :     Tolerating oral fluids or 40ml/hour of enteral nutrition and oral route not otherwise compromised  Receiving other oral medications on a scheduled basis    Assessment/Plan  Based on this criteria, Hydralazine 20 mg IV Q4H prn SBP > 160 has been changed to Hydralazine 50 mg PO Q4H prn SBP > 160 per the directives and guidelines established by [facility] and Therapeutics Committee and [facility] Medical Executive Committee .       Will Raygoza, PharmD  05/07/2515:00 CDT

## 2025-05-07 NOTE — PLAN OF CARE
Goal Outcome Evaluation:           Progress: no change  Outcome Evaluation: Patient resting family at bedside . Sitter at bedside. IVF infusing. Accu check. Disoriented to place time situation

## 2025-05-07 NOTE — PROGRESS NOTES
"    [facility] Medicine Services  INPATIENT PROGRESS NOTE    Patient Name: Maikel Mathews  Date of Admission: 5/4/2025  Today's Date: 05/07/25  Length of Stay: 3  Primary Care Physician: David Barajas MD    Subjective   Chief Complaint: Follow-up  HPI   Patient is not in any apparent distress  He is confused but pleasant    Discussed with social service today.  Awaiting disposition.  \"They are still watching his behavior.\"  Meanwhile continue PT OT    Renal function improving    Daughter-in-law at bedside.  She felt that it is not safe for him to go back at home thus they are seeking placement.  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  Furthermore, daughter-in-law told me that his behavior (cursing, etc.) has been bad for couple of years now.    Review of Systems   Limited information from the patient.  All pertinent negatives and positives are as above. All other systems have been reviewed and are negative unless otherwise stated.     Objective    Temp:  [97.4 °F (36.3 °C)-98.6 °F (37 °C)] 98.6 °F (37 °C)  Heart Rate:  [72-95] 95  Resp:  [16-18] 16  BP: (133-174)/(65-85) 133/65  Physical Exam  Laying comfortably in bed  No gross tremors  He was not really much interactive to me nor has he acknowledged my presence.  GEN: Patient resting.  Received Percocet at 1155  Last time patient received Geodon was 1709 yesterday.  Last Seroquel was on May 5.  Seroquel length increased for tonight at 50 mg.  HEENT: Atraumatic, rachea midline  Lungs normal respiratory effort    ABD: soft, nt/nd, +BS, no guarding/rebound  Extremities: atraumatic, no cyanosis, no edema  Skin: no rashes or lesions    Psych: normal mood & affect    Results Review:  I have reviewed the labs, radiology results, and diagnostic studies.    Laboratory Data:   Results from last 7 days   Lab Units 05/06/25  0354 05/05/25  0850 05/04/25  1330 " "  WBC 10*3/mm3 6.37 5.95 8.47   HEMOGLOBIN g/dL 9.5* 10.2* 10.7*   HEMATOCRIT % 29.3* 31.0* 33.0*   PLATELETS 10*3/mm3 206 213 241        Results from last 7 days   Lab Units 05/07/25  0430 05/06/25  0354 05/05/25  1559 05/05/25  0850 05/04/25  1330   SODIUM mmol/L 138 140  --  139 135*   POTASSIUM mmol/L 4.9  4.9 4.0 3.5 3.4* 3.9   CHLORIDE mmol/L 112* 113*  --  110* 104   CO2 mmol/L 10.0* 17.0*  --  16.0* 15.0*   BUN mg/dL 42* 46*  --  45* 52*   CREATININE mg/dL 2.73* 3.22*  --  3.17* 3.64*   CALCIUM mg/dL 8.3  8.3 8.0*  --  7.8* 7.8*   BILIRUBIN mg/dL  --   --   --  0.3 0.3   ALK PHOS U/L  --   --   --  75 84   ALT (SGPT) U/L  --   --   --  6 6   AST (SGOT) U/L  --   --   --  14 16   GLUCOSE mg/dL 114* 129*  --  191* 269*       Culture Data:   No results found for: \"BLOODCX\", \"URINECX\", \"WOUNDCX\", \"MRSACX\", \"RESPCX\", \"STOOLCX\"    Radiology Data:   Imaging Results (Last 24 Hours)       ** No results found for the last 24 hours. **            I have reviewed the patient's current medications.     Assessment/Plan   Assessment  Active Hospital Problems    Diagnosis     **Acute renal failure     Change in mental status            Medical Decision Making  Number and Complexity of problems:   Problem list  Confused  Lack of insight  ?  Dementia  Reportedly had aggressive behavior but none since admission  Hypomagnesemia resolved  Hypertension-stable  Hypokalemia-corrected  CKD stage IV with acute kidney injury  Anion gap metabolic acidosis likely from kidney injury  Diabetes mellitus type 2  Advanced age  B12 deficiency-started replacement 7 days.  Subsequent dosing will be decided at a later time.            Treatment Plan  Renal ultrasound report reviewed  Continue antihypertensive medication  Continue sliding scale insulin with as needed hypoglycemic protocol  Social service to assist in discharge disposition    Nephrology following.  Appreciate assistance.    PT recommended skilled rehab placement.  Continue " present management.    amLODIPine, 10 mg, Oral, Q24H  cholecalciferol, 2,000 Units, Oral, Daily  cyanocobalamin, 1,000 mcg, Intramuscular, Daily  heparin (porcine), 5,000 Units, Subcutaneous, Q12H  insulin lispro, 2-7 Units, Subcutaneous, 4x Daily AC & at Bedtime  QUEtiapine, 50 mg, Oral, Nightly  sodium bicarbonate, 650 mg, Oral, BID  sodium chloride, 10 mL, Intravenous, Q12H          Conditions and Status  Fair     [facility] Data  External documents reviewed: Reviewed epic record  Cardiac tracing (EKG, telemetry) interpretation: -  Radiology interpretation: y  Labs reviewed: y   Any tests that were considered but not ordered: None     Decision rules/scores evaluated (example [id number], [place], etc):-     Discussed with: Patient and nurse May     Care Planning  Shared decision making: Patient  Code status and discussions: CODE STATUS indicates full support/CPR    Disposition  Social Determinants of Health that impact treatment or disposition: None identified at this time.  I expect the patient to be discharged to (?)          Electronically signed by Brayan Meehan MD, 05/07/25, 13:32 CDT.

## 2025-05-07 NOTE — PROGRESS NOTES
Nephrology ([facility]) Progress Note      Patient:  Maikel Mathews  YOB: 1929  Date of Service: 5/7/2025  MRN: [id number]   Acct: [id number]   Primary Care Physician: David Barajas MD  Advance Directive:   Code Status and Medical Interventions: CPR (Attempt to Resuscitate); Full Support   Ordered at: 05/04/25 1634     Code Status (Patient has no pulse and is not breathing):    CPR (Attempt to Resuscitate)     Medical Interventions (Patient has pulse or is breathing):    Full Support     Admit Date: 5/4/2025       Hospital Day: 3  Referring Provider: Perla Mireels MD      Patient personally seen and examined.  Complete chart including Consults, Notes, Operative Reports, Labs, Cardiology, and Radiology studies reviewed as able.        Subjective:  Maikel Mathews is a 89 y.o. male for whom we were consulted for evaluation and treatment of acute kidney injury.  He has a history of baseline chronic kidney disease stage 4 with creatinine 3.1 in January 2025.  He does not follow with nephrology.  He has a history of dementia, type 2 diabetes, hypertension.  He was brought to ER for change in mental status. Reportedly, he has been aggressive and making threatening statements toward his family. Per family, patient has not been compliant with medications and has not been eating or drinking well. Labs in ER revealed creatinine 3.64 and BUN 52.  He was admitted to medical floor. Patient was poor initial historian and not able to provide any detail or recent medical history. Denied nausea, vomiting, diarrhea.     Today, no overnight events.  Remains confused at times.   Denied new complaints upon questioning.  No family present at the bedside.    Allergies:  Patient has no known allergies.    Home Meds:  Medications Prior to Admission   Medication Sig Dispense Refill Last Dose/Taking    amLODIPine (NORVASC) 10 MG tablet Take 1 tablet by mouth Daily.  5 More than a month     glipiZIDE (GLUCOTROL) 5 MG ER tablet Take 1 tablet by mouth Daily.  2 More than a month       Medicines:  Current Facility-Administered Medications   Medication Dose Route Frequency Provider Last Rate Last Admin    acetaminophen (TYLENOL) tablet 650 mg  650 mg Oral Q4H PRN Perla Mireles MD   650 mg at 05/05/25 2048    Or    acetaminophen (TYLENOL) 160 MG/5ML oral solution 650 mg  650 mg Oral Q4H PRN Perla Mireles MD        Or    acetaminophen (TYLENOL) suppository 650 mg  650 mg Rectal Q4H PRN Perla Mireles MD        amLODIPine (NORVASC) tablet 10 mg  10 mg Oral Q24H Pablo Ortiz MD   10 mg at 05/07/25 0814    sennosides-docusate (PERICOLACE) 8.6-50 MG per tablet 2 tablet  2 tablet Oral BID PRN Perla Mireles MD        And    polyethylene glycol (MIRALAX) packet 17 g  17 g Oral Daily PRN Perla Mireles MD        And    bisacodyl (DULCOLAX) EC tablet 5 mg  5 mg Oral Daily PRN Perla Mireles MD        And    bisacodyl (DULCOLAX) suppository 10 mg  10 mg Rectal Daily PRN Perla Mireles MD        Calcium Replacement - Follow Nurse / BPA Driven Protocol   Not Applicable PRN Perla Mireles MD        cholecalciferol (VITAMIN D3) tablet 2,000 Units  2,000 Units Oral Daily Fahad Cool MD   2,000 Units at 05/07/25 0814    cyanocobalamin injection 1,000 mcg  1,000 mcg Intramuscular Daily Brayan Meehan MD   1,000 mcg at 05/07/25 0814    dextrose (D50W) (25 g/50 mL) IV injection 25 g  25 g Intravenous Q15 Min PRN Perla Mireles MD        dextrose (GLUTOSE) oral gel 15 g  15 g Oral Q15 Min PRN Perla Mireles MD        glucagon (GLUCAGEN) injection 1 mg  1 mg Intramuscular Q15 Min PRN Perla Mireles MD        heparin (porcine) 5000 UNIT/ML injection 5,000 Units  5,000 Units Subcutaneous Q12H Brayan Meehan MD   5,000 Units at 05/07/25 0814    hydrALAZINE (APRESOLINE) injection 20 mg  20 mg Intravenous Q4H PRN Perla Mireles MD   20 mg at 05/07/25 0132    Insulin Lispro  (humaLOG) injection 2-7 Units  2-7 Units Subcutaneous 4x Daily AC & at Bedtime Perla Mireles MD   3 Units at 05/07/25 1145    LORazepam (ATIVAN) injection 1 mg  1 mg Intravenous Q4H PRN Cuate Hurd MD        Magnesium Standard Dose Replacement - Follow Nurse / BPA Driven Protocol   Not Applicable PRN Perla Mireles MD        ondansetron (ZOFRAN) injection 4 mg  4 mg Intravenous Q6H PRN Perla Mireles MD        oxyCODONE-acetaminophen (PERCOCET) 5-325 MG per tablet 1 tablet  1 tablet Oral Q8H PRN Perla Mireles MD   1 tablet at 05/07/25 1155    Phosphorus Replacement - Follow Nurse / BPA Driven Protocol   Not Applicable PRN Perla Mireles MD        Potassium Replacement - Follow Nurse / BPA Driven Protocol   Not Applicable PRN Perla Mireles MD        QUEtiapine (SEROquel) tablet 50 mg  50 mg Oral Nightly Brayan Meehan MD        sodium bicarbonate tablet 650 mg  650 mg Oral BID Brayan Meehan MD   650 mg at 05/07/25 0814    sodium chloride 0.9 % flush 10 mL  10 mL Intravenous PRN Cabrera Robbins MD        sodium chloride 0.9 % flush 10 mL  10 mL Intravenous Q12H Perla Mireles MD   10 mL at 05/07/25 0824    sodium chloride 0.9 % flush 10 mL  10 mL Intravenous PRN Perla Mireles MD        sodium chloride 0.9 % infusion 40 mL  40 mL Intravenous PRN Perla Mireles MD        sodium chloride 0.9 % infusion  40 mL/hr Intravenous Continuous Fahad Cool MD 40 mL/hr at 05/06/25 1538 40 mL/hr at 05/06/25 1538    ziprasidone (GEODON) injection 20 mg  20 mg Intramuscular Q4H PRN Perla Mireles MD   20 mg at 05/06/25 1709       Past Medical History:  Past Medical History:   Diagnosis Date    Arthritis     AVM (arteriovenous malformation)     Cancer     PROSTATE    Cataract     Colon cancer     Colon polyp     Diabetes mellitus     Diverticulosis     Dizzinesses 10/20/2017    GERD (gastroesophageal reflux disease)     Hemorrhoids     History of transfusion      Hypertension     Inguinal hernia     Iron deficiency anemia due to chronic blood loss 7/26/2017    Prostate CA     Prostate hypertrophy     Rotator cuff syndrome        Past Surgical History:  Past Surgical History:   Procedure Laterality Date    APPENDECTOMY      CHOLECYSTECTOMY      COLON RESECTION N/A 9/18/2017    Procedure: LAPAROSCOPIC ASSISTED RIGHT COLECTOMY;  Surgeon: Caroline Loomis MD;  Location: [facility] OR;  Service:     COLONOSCOPY  04/29/2010    COLONOSCOPY N/A 8/25/2017    Procedure: COLONOSCOPY WITH ANESTHESIA;  Surgeon: Joaquín Neff MD;  Location: [facility] ENDOSCOPY;  Service:     ENDOSCOPY  03/27/2013    ENDOSCOPY N/A 8/25/2017    Procedure: ESOPHAGOGASTRODUODENOSCOPY WITH ANESTHESIA;  Surgeon: Joaquín Neff MD;  Location: [facility] ENDOSCOPY;  Service:     EYE SURGERY Left     CATARACT    INGUINAL HERNIA REPAIR Bilateral 11/1/2019    Procedure: OPEN BILATERAL INGUINAL HERNIA REPAIR WITH MESH;  Surgeon: Caroline Loomis MD;  Location: [facility] OR;  Service: General    INTERVENTIONAL RADIOLOGY PROCEDURE N/A 9/18/2017    Procedure: URETHRA DILITATION with dominguez catheter insertion;  Surgeon: Mamadou Paez MD;  Location:  [facility] OR;  Service:     PROSTATECTOMY      PROSTATECTOMY         Family History  Family History   Problem Relation Age of Onset    Colon cancer Neg Hx     Colon polyps Neg Hx        Social History  Social History     Socioeconomic History    Marital status:    Tobacco Use    Smoking status: Never    Smokeless tobacco: Never   Substance and Sexual Activity    Alcohol use: No    Drug use: No    Sexual activity: Defer       Review of Systems:  History obtained from chart review and the patient  General ROS: No fever or chills  Respiratory ROS: No cough, shortness of breath, wheezing  Cardiovascular ROS: No chest pain or palpitations  Gastrointestinal ROS: No abdominal pain or melena  Genito-Urinary ROS: No dysuria or hematuria  Psych ROS: No anxiety and depression  14  point ROS reviewed with the patient and negative except as noted above and in the HPI unless unable to obtain.    Objective:  Patient Vitals for the past 24 hrs:   BP Temp Temp src Pulse Resp SpO2   05/07/25 1206 133/65 98.6 °F (37 °C) Oral 95 16 95 %   05/07/25 0735 147/70 97.8 °F (36.6 °C) Oral 95 16 96 %   05/07/25 0544 151/70 -- -- -- -- --   05/07/25 0127 174/69 97.4 °F (36.3 °C) Oral 72 16 98 %   05/06/25 1438 138/85 98.2 °F (36.8 °C) Oral 80 18 98 %       Intake/Output Summary (Last 24 hours) at 5/7/2025 1433  Last data filed at 5/7/2025 0858  Gross per 24 hour   Intake 1435 ml   Output 750 ml   Net 685 ml     General: awake / confused  Chest:  clear to auscultation bilaterally without respiratory distress  CVS: regular rate and rhythm  Abdominal: soft, nontender, positive bowel sounds  Extremities: no cyanosis or edema  Skin: warm and dry without rash      Labs:  Results from last 7 days   Lab Units 05/06/25  0354 05/05/25  0850 05/04/25  1330   WBC 10*3/mm3 6.37 5.95 8.47   HEMOGLOBIN g/dL 9.5* 10.2* 10.7*   HEMATOCRIT % 29.3* 31.0* 33.0*   PLATELETS 10*3/mm3 206 213 241         Results from last 7 days   Lab Units 05/07/25  0430 05/06/25  0354 05/05/25  1559 05/05/25  0850 05/04/25  1330   SODIUM mmol/L 138 140  --  139 135*   POTASSIUM mmol/L 4.9  4.9 4.0 3.5 3.4* 3.9   CHLORIDE mmol/L 112* 113*  --  110* 104   CO2 mmol/L 10.0* 17.0*  --  16.0* 15.0*   BUN mg/dL 42* 46*  --  45* 52*   CREATININE mg/dL 2.73* 3.22*  --  3.17* 3.64*   CALCIUM mg/dL 8.3  8.3 8.0*  --  7.8* 7.8*   EGFR mL/min/1.73 20.8* 17.0*  --  17.4* 14.7*   BILIRUBIN mg/dL  --   --   --  0.3 0.3   ALK PHOS U/L  --   --   --  75 84   ALT (SGPT) U/L  --   --   --  6 6   AST (SGOT) U/L  --   --   --  14 16   GLUCOSE mg/dL 114* 129*  --  191* 269*       Radiology:   Imaging Results (Last 72 Hours)       Procedure Component Value Units Date/Time    US Renal Bilateral [[id number]] Collected: 05/05/25 1646     Updated: 05/05/25 1650     "Narrative:      US RENAL BILATERAL- 5/5/2025 2:53 PM     HISTORY: Chronic kidney disease; N17.9-Acute kidney failure,  unspecified; R41.82-Altered mental status, unspecified;  F03.918-Unspecified dementia, unspecified severity, with other  behavioral disturbance; E83.42-Hypomagnesemia     COMPARISON: None available.       TECHNIQUE: Multiple longitudinal and transverse real-time sonographic  images of the kidneys and urinary bladder are obtained. Report and  images stored per institutional and state regulations.           FINDINGS:     Visualized proximal abdominal aorta and IVC are unremarkable.        RIGHT KIDNEY: The right kidney measures 7.9 cm in length. Renal cortex  is echogenic. There is no hydronephrosis..     LEFT KIDNEY: Left kidney is 7.5 cm in length. Renal cortex is echogenic.  There is no left hydronephrosis. There is a simple anechoic cyst at the  left renal cortex lower pole measuring 1.3 cm.     PELVIS: Urinary bladder is grossly unremarkable.          Impression:         1. Small echogenic kidneys consistent with medical renal disease. There  is no hydronephrosis.  2. Simple left renal cyst.           This report was signed and finalized on 5/5/2025 4:47 PM by Viral Mcfarland.               Culture:  No results found for: \"BLOODCX\", \"URINECX\", \"WOUNDCX\", \"MRSACX\", \"RESPCX\", \"STOOLCX\"      Assessment   Acute kidney injury  Chronic kidney disease stage IV  Hypertension  Diabetes type 2  Dementia  Metabolic acidosis  Anemia and chronic kidney disease  Hypokalemia  Secondary hyperparathyroidism  Vitamin D deficiency    Plan:  Discussed with patient, nursing, family  Workup reviewed today  Monitor labs  Ultrasound for further evaluation demonstrated no acute obstruction  Replace vitamin D  IV fluids with adjustments as per orders-continue to wean           Fahad Cool MD  5/7/2025  14:33 CDT      "

## 2025-05-07 NOTE — CASE MANAGEMENT/SOCIAL WORK
Continued Stay Note  DEV Rossi     Patient Name: Maikel Mathews  MRN: [id number]  Today's Date: 5/7/2025    Admit Date: 5/4/2025    Plan: SNF   Discharge Plan       Row Name 05/07/25 1340       Plan    Plan SNF    Patient/Family in Agreement with Plan yes    Plan Comments [facility] and [facility] are still both following to see how pt does today. Will check back tomorrow.                   Discharge Codes    No documentation.                       JAZMIN Gavin

## 2025-05-07 NOTE — PLAN OF CARE
Admitted 5/4 for confusion aggressive behavior  Consult nephro for DELLA; renal ultrasound (Small echogenic kidneys, simple L renal cyst)   IVF NS @ 40 ml/hr  Sitter bedside  Refused night meds (seroquel and sodium bicarb), max daily geodon dose hit; notified Md; prn ativan ordered  Heparin subq for dvt prophylaxis    Goal Outcome Evaluation:  Plan of Care Reviewed With: patient        Progress: no change

## 2025-05-08 LAB
ALBUMIN SERPL-MCNC: 3.4 G/DL (ref 3.5–5.2)
ALBUMIN/GLOB SERPL: 1.3 G/DL
ALP SERPL-CCNC: 66 U/L (ref 39–117)
ALT SERPL W P-5'-P-CCNC: <5 U/L (ref 1–41)
ANION GAP SERPL CALCULATED.3IONS-SCNC: 10 MMOL/L (ref 5–15)
AST SERPL-CCNC: 18 U/L (ref 1–40)
BILIRUB SERPL-MCNC: 0.5 MG/DL (ref 0–1.2)
BUN SERPL-MCNC: 43 MG/DL (ref 8–23)
BUN/CREAT SERPL: 13.5 (ref 7–25)
CALCIUM SPEC-SCNC: 8 MG/DL (ref 8.2–9.6)
CHLORIDE SERPL-SCNC: 113 MMOL/L (ref 98–107)
CO2 SERPL-SCNC: 17 MMOL/L (ref 22–29)
CREAT SERPL-MCNC: 3.19 MG/DL (ref 0.76–1.27)
EGFRCR SERPLBLD CKD-EPI 2021: 17.2 ML/MIN/1.73
GLOBULIN UR ELPH-MCNC: 2.6 GM/DL
GLUCOSE BLDC GLUCOMTR-MCNC: 310 MG/DL (ref 70–130)
GLUCOSE BLDC GLUCOMTR-MCNC: 97 MG/DL (ref 70–130)
GLUCOSE BLDC GLUCOMTR-MCNC: 98 MG/DL (ref 70–130)
GLUCOSE SERPL-MCNC: 97 MG/DL (ref 65–99)
MAGNESIUM SERPL-MCNC: 1.8 MG/DL (ref 1.7–2.3)
PHOSPHATE SERPL-MCNC: 3.9 MG/DL (ref 2.5–4.5)
POTASSIUM SERPL-SCNC: 4.2 MMOL/L (ref 3.5–5.2)
PROT SERPL-MCNC: 6 G/DL (ref 6–8.5)
SODIUM SERPL-SCNC: 140 MMOL/L (ref 136–145)

## 2025-05-08 PROCEDURE — 25010000002 HEPARIN (PORCINE) PER 1000 UNITS: Performed by: INTERNAL MEDICINE

## 2025-05-08 PROCEDURE — 63710000001 INSULIN LISPRO (HUMAN) PER 5 UNITS: Performed by: HOSPITALIST

## 2025-05-08 PROCEDURE — 97116 GAIT TRAINING THERAPY: CPT

## 2025-05-08 PROCEDURE — 83735 ASSAY OF MAGNESIUM: CPT | Performed by: INTERNAL MEDICINE

## 2025-05-08 PROCEDURE — 82948 REAGENT STRIP/BLOOD GLUCOSE: CPT

## 2025-05-08 PROCEDURE — 97530 THERAPEUTIC ACTIVITIES: CPT

## 2025-05-08 PROCEDURE — 84100 ASSAY OF PHOSPHORUS: CPT | Performed by: INTERNAL MEDICINE

## 2025-05-08 PROCEDURE — 97535 SELF CARE MNGMENT TRAINING: CPT

## 2025-05-08 PROCEDURE — 80053 COMPREHEN METABOLIC PANEL: CPT | Performed by: NURSE PRACTITIONER

## 2025-05-08 PROCEDURE — 25010000002 CYANOCOBALAMIN PER 1000 MCG: Performed by: INTERNAL MEDICINE

## 2025-05-08 RX ADMIN — INSULIN LISPRO 5 UNITS: 100 INJECTION, SOLUTION INTRAVENOUS; SUBCUTANEOUS at 12:31

## 2025-05-08 RX ADMIN — HEPARIN SODIUM 5000 UNITS: 5000 INJECTION, SOLUTION INTRAVENOUS; SUBCUTANEOUS at 08:50

## 2025-05-08 RX ADMIN — Medication 2000 UNITS: at 08:46

## 2025-05-08 RX ADMIN — Medication 10 ML: at 08:52

## 2025-05-08 RX ADMIN — HEPARIN SODIUM 5000 UNITS: 5000 INJECTION, SOLUTION INTRAVENOUS; SUBCUTANEOUS at 21:07

## 2025-05-08 RX ADMIN — QUETIAPINE FUMARATE 50 MG: 25 TABLET ORAL at 21:07

## 2025-05-08 RX ADMIN — AMLODIPINE BESYLATE 10 MG: 10 TABLET ORAL at 08:46

## 2025-05-08 RX ADMIN — Medication 10 ML: at 21:07

## 2025-05-08 RX ADMIN — SODIUM BICARBONATE 650 MG: 650 TABLET ORAL at 21:07

## 2025-05-08 RX ADMIN — CYANOCOBALAMIN 1000 MCG: 1000 INJECTION, SOLUTION INTRAMUSCULAR at 08:49

## 2025-05-08 RX ADMIN — SODIUM BICARBONATE 650 MG: 650 TABLET ORAL at 08:46

## 2025-05-08 RX ADMIN — ACETAMINOPHEN 650 MG: 325 TABLET, FILM COATED ORAL at 12:31

## 2025-05-08 NOTE — PROGRESS NOTES
Nephrology ([facility]) Progress Note      Patient:  Maikel Mathews  YOB: 1929  Date of Service: 5/8/2025  MRN: [id number]   Acct: [id number]   Primary Care Physician: David Barajas MD  Advance Directive:   Code Status and Medical Interventions: CPR (Attempt to Resuscitate); Full Support   Ordered at: 05/04/25 1634     Code Status (Patient has no pulse and is not breathing):    CPR (Attempt to Resuscitate)     Medical Interventions (Patient has pulse or is breathing):    Full Support     Admit Date: 5/4/2025       Hospital Day: 4  Referring Provider: Perla Mireles MD      Patient personally seen and examined.  Complete chart including Consults, Notes, Operative Reports, Labs, Cardiology, and Radiology studies reviewed as able.        Subjective:  Maikel Mathews is a 89 y.o. male for whom we were consulted for evaluation and treatment of acute kidney injury.  He has a history of baseline chronic kidney disease stage 4 with creatinine 3.1 in January 2025.  He does not follow with nephrology.  He has a history of dementia, type 2 diabetes, hypertension.  He was brought to ER for change in mental status. Reportedly, he has been aggressive and making threatening statements toward his family. Per family, patient has not been compliant with medications and has not been eating or drinking well. Labs in ER revealed creatinine 3.64 and BUN 52.  He was admitted to medical floor. Patient was poor initial historian and not able to provide any detail or recent medical history. Denied nausea, vomiting, diarrhea.     Today, no overnight events.  Remains confused at times.  Seen with family.  Denied new complaints upon questioning.     Allergies:  Patient has no known allergies.    Home Meds:  Medications Prior to Admission   Medication Sig Dispense Refill Last Dose/Taking    amLODIPine (NORVASC) 10 MG tablet Take 1 tablet by mouth Daily.  5 More than a month    glipiZIDE (GLUCOTROL)  5 MG ER tablet Take 1 tablet by mouth Daily.  2 More than a month       Medicines:  Current Facility-Administered Medications   Medication Dose Route Frequency Provider Last Rate Last Admin    acetaminophen (TYLENOL) tablet 650 mg  650 mg Oral Q4H PRN Perla Mireles MD   650 mg at 05/08/25 1231    Or    acetaminophen (TYLENOL) 160 MG/5ML oral solution 650 mg  650 mg Oral Q4H PRN Perla Mireles MD        Or    acetaminophen (TYLENOL) suppository 650 mg  650 mg Rectal Q4H PRN Perla Mireles MD        amLODIPine (NORVASC) tablet 10 mg  10 mg Oral Q24H Pablo Ortiz MD   10 mg at 05/08/25 0846    sennosides-docusate (PERICOLACE) 8.6-50 MG per tablet 2 tablet  2 tablet Oral BID PRN Perla Mireles MD        And    polyethylene glycol (MIRALAX) packet 17 g  17 g Oral Daily PRN Perla Mireles MD        And    bisacodyl (DULCOLAX) EC tablet 5 mg  5 mg Oral Daily PRN Perla Mireles MD        And    bisacodyl (DULCOLAX) suppository 10 mg  10 mg Rectal Daily PRN Perla Mireles MD        Calcium Replacement - Follow Nurse / BPA Driven Protocol   Not Applicable PRN Perla Mireles MD        cholecalciferol (VITAMIN D3) tablet 2,000 Units  2,000 Units Oral Daily Fahad Cool MD   2,000 Units at 05/08/25 0846    cyanocobalamin injection 1,000 mcg  1,000 mcg Intramuscular Daily Brayan Meehan MD   1,000 mcg at 05/08/25 0849    dextrose (D50W) (25 g/50 mL) IV injection 25 g  25 g Intravenous Q15 Min PRN Perla Mireles MD        dextrose (GLUTOSE) oral gel 15 g  15 g Oral Q15 Min PRN Perla Mireles MD        glucagon (GLUCAGEN) injection 1 mg  1 mg Intramuscular Q15 Min PRN Perla Mireles MD        heparin (porcine) 5000 UNIT/ML injection 5,000 Units  5,000 Units Subcutaneous Q12H Brayan Meehan MD   5,000 Units at 05/08/25 0850    hydrALAZINE (APRESOLINE) tablet 50 mg  50 mg Oral Q4H CHRISTOPHN Brayan Meehan MD        Insulin Lispro (humaLOG) injection 2-7 Units  2-7 Units  Subcutaneous 4x Daily AC & at Bedtime Perla Mireles MD   5 Units at 05/08/25 1231    LORazepam (ATIVAN) injection 1 mg  1 mg Intravenous Q4H PRN Cuate Hudr MD        Magnesium Standard Dose Replacement - Follow Nurse / BPA Driven Protocol   Not Applicable Perla Cyr MD        ondansetron (ZOFRAN) injection 4 mg  4 mg Intravenous Q6H PRN Perla Mireles MD        oxyCODONE-acetaminophen (PERCOCET) 5-325 MG per tablet 1 tablet  1 tablet Oral Q8H PRN Perla Mireles MD   1 tablet at 05/07/25 1155    Phosphorus Replacement - Follow Nurse / BPA Driven Protocol   Not Applicable PRN Perla Mireles MD        Potassium Replacement - Follow Nurse / BPA Driven Protocol   Not Applicable PRN Perla Mireles MD        QUEtiapine (SEROquel) tablet 50 mg  50 mg Oral Nightly Baryan Meehan MD   50 mg at 05/07/25 2104    sodium bicarbonate tablet 650 mg  650 mg Oral BID Brayan Meehan MD   650 mg at 05/08/25 0846    sodium chloride 0.9 % flush 10 mL  10 mL Intravenous PRN Cabrera Robbins MD        sodium chloride 0.9 % flush 10 mL  10 mL Intravenous Q12H Perla Mireles MD   10 mL at 05/08/25 0852    sodium chloride 0.9 % flush 10 mL  10 mL Intravenous PRN Perla Mireles MD        sodium chloride 0.9 % infusion 40 mL  40 mL Intravenous PRN Perla Mireles MD        ziprasidone (GEODON) injection 20 mg  20 mg Intramuscular Q4H PRN Perla Mireles MD   20 mg at 05/06/25 1709       Past Medical History:  Past Medical History:   Diagnosis Date    Arthritis     AVM (arteriovenous malformation)     Cancer     PROSTATE    Cataract     Colon cancer     Colon polyp     Diabetes mellitus     Diverticulosis     Dizzinesses 10/20/2017    GERD (gastroesophageal reflux disease)     Hemorrhoids     History of transfusion     Hypertension     Inguinal hernia     Iron deficiency anemia due to chronic blood loss 7/26/2017    Prostate CA     Prostate hypertrophy     Rotator cuff syndrome         Past Surgical History:  Past Surgical History:   Procedure Laterality Date    APPENDECTOMY      CHOLECYSTECTOMY      COLON RESECTION N/A 9/18/2017    Procedure: LAPAROSCOPIC ASSISTED RIGHT COLECTOMY;  Surgeon: Caroline Loomis MD;  Location: [facility] OR;  Service:     COLONOSCOPY  04/29/2010    COLONOSCOPY N/A 8/25/2017    Procedure: COLONOSCOPY WITH ANESTHESIA;  Surgeon: Joaquín Neff MD;  Location: [facility] ENDOSCOPY;  Service:     ENDOSCOPY  03/27/2013    ENDOSCOPY N/A 8/25/2017    Procedure: ESOPHAGOGASTRODUODENOSCOPY WITH ANESTHESIA;  Surgeon: Joaquín Neff MD;  Location: [facility] ENDOSCOPY;  Service:     EYE SURGERY Left     CATARACT    INGUINAL HERNIA REPAIR Bilateral 11/1/2019    Procedure: OPEN BILATERAL INGUINAL HERNIA REPAIR WITH MESH;  Surgeon: Caroline Loomis MD;  Location: [facility] OR;  Service: General    INTERVENTIONAL RADIOLOGY PROCEDURE N/A 9/18/2017    Procedure: URETHRA DILITATION with dominguez catheter insertion;  Surgeon: Mamadou Paez MD;  Location: [facility] OR;  Service:     PROSTATECTOMY      PROSTATECTOMY         Family History  Family History   Problem Relation Age of Onset    Colon cancer Neg Hx     Colon polyps Neg Hx        Social History  Social History     Socioeconomic History    Marital status:    Tobacco Use    Smoking status: Never    Smokeless tobacco: Never   Substance and Sexual Activity    Alcohol use: No    Drug use: No    Sexual activity: Defer       Review of Systems:  History obtained from chart review and the patient  General ROS: No fever or chills  Respiratory ROS: No cough, shortness of breath, wheezing  Cardiovascular ROS: No chest pain or palpitations  Gastrointestinal ROS: No abdominal pain or melena  Genito-Urinary ROS: No dysuria or hematuria  Psych ROS: No anxiety and depression  14 point ROS reviewed with the patient and negative except as noted above and in the HPI unless unable to obtain.    Objective:  Patient Vitals for the past 24 hrs:   BP  Temp Temp src Pulse Resp SpO2   05/08/25 1140 148/81 99 °F (37.2 °C) Oral 94 16 97 %   05/08/25 0823 154/82 98.9 °F (37.2 °C) Oral 90 16 96 %   05/08/25 0500 142/85 99.2 °F (37.3 °C) Oral 92 18 94 %   05/07/25 1911 160/95 98.4 °F (36.9 °C) Oral 93 16 96 %       Intake/Output Summary (Last 24 hours) at 5/8/2025 1353  Last data filed at 5/8/2025 0905  Gross per 24 hour   Intake 120 ml   Output 425 ml   Net -305 ml     General: awake / confused  Chest:  clear to auscultation bilaterally without respiratory distress  CVS: regular rate and rhythm  Abdominal: soft, nontender, positive bowel sounds  Extremities: no cyanosis or edema  Skin: warm and dry without rash      Labs:  Results from last 7 days   Lab Units 05/06/25  0354 05/05/25  0850 05/04/25  1330   WBC 10*3/mm3 6.37 5.95 8.47   HEMOGLOBIN g/dL 9.5* 10.2* 10.7*   HEMATOCRIT % 29.3* 31.0* 33.0*   PLATELETS 10*3/mm3 206 213 241         Results from last 7 days   Lab Units 05/08/25  0343 05/07/25  0430 05/06/25  0354 05/05/25  1559 05/05/25  0850 05/04/25  1330   SODIUM mmol/L 140 138 140  --  139 135*   POTASSIUM mmol/L 4.2 4.9  4.9 4.0   < > 3.4* 3.9   CHLORIDE mmol/L 113* 112* 113*  --  110* 104   CO2 mmol/L 17.0* 10.0* 17.0*  --  16.0* 15.0*   BUN mg/dL 43* 42* 46*  --  45* 52*   CREATININE mg/dL 3.19* 2.73* 3.22*  --  3.17* 3.64*   CALCIUM mg/dL 8.0* 8.3  8.3 8.0*  --  7.8* 7.8*   EGFR mL/min/1.73 17.2* 20.8* 17.0*  --  17.4* 14.7*   BILIRUBIN mg/dL 0.5  --   --   --  0.3 0.3   ALK PHOS U/L 66  --   --   --  75 84   ALT (SGPT) U/L <5  --   --   --  6 6   AST (SGOT) U/L 18  --   --   --  14 16   GLUCOSE mg/dL 97 114* 129*  --  191* 269*    < > = values in this interval not displayed.       Radiology:   Imaging Results (Last 72 Hours)       Procedure Component Value Units Date/Time    US Renal Bilateral [[id number]] Collected: 05/05/25 1646     Updated: 05/05/25 1650    Narrative:      US RENAL BILATERAL- 5/5/2025 2:53 PM     HISTORY: Chronic kidney disease;  "N17.9-Acute kidney failure,  unspecified; R41.82-Altered mental status, unspecified;  F03.918-Unspecified dementia, unspecified severity, with other  behavioral disturbance; E83.42-Hypomagnesemia     COMPARISON: None available.       TECHNIQUE: Multiple longitudinal and transverse real-time sonographic  images of the kidneys and urinary bladder are obtained. Report and  images stored per institutional and state regulations.           FINDINGS:     Visualized proximal abdominal aorta and IVC are unremarkable.        RIGHT KIDNEY: The right kidney measures 7.9 cm in length. Renal cortex  is echogenic. There is no hydronephrosis..     LEFT KIDNEY: Left kidney is 7.5 cm in length. Renal cortex is echogenic.  There is no left hydronephrosis. There is a simple anechoic cyst at the  left renal cortex lower pole measuring 1.3 cm.     PELVIS: Urinary bladder is grossly unremarkable.          Impression:         1. Small echogenic kidneys consistent with medical renal disease. There  is no hydronephrosis.  2. Simple left renal cyst.           This report was signed and finalized on 5/5/2025 4:47 PM by Viral Mcfarland.               Culture:  No results found for: \"BLOODCX\", \"URINECX\", \"WOUNDCX\", \"MRSACX\", \"RESPCX\", \"STOOLCX\"      Assessment   Acute kidney injury  Chronic kidney disease stage IV  Hypertension  Diabetes type 2  Dementia  Metabolic acidosis  Anemia and chronic kidney disease  Hypokalemia  Secondary hyperparathyroidism  Vitamin D deficiency    Plan:  Discussed with patient, nursing, family  Workup reviewed today  Monitor labs  Ultrasound for further evaluation demonstrated no acute obstruction  Replace vitamin D  Continue to encourage oral intake off fluids          Fahad Cool MD  5/8/2025  13:53 CDT      "

## 2025-05-08 NOTE — PLAN OF CARE
Goal Outcome Evaluation:              Outcome Evaluation: Pt is in the room with family, pleasantly confused but able to answer some questions. Appetite noted as good, and pt reports feeling hungry. Denies any problems chewing/swallowing. Intake over the past 72 hrs is ~50%, with total PO fluids of 283 mL. Pt is receiving Boost and orange sherbet for additional nutrition. IV fluids d/c’d. Renal US unremarkable with no obstructions. New labs: BUN 43, Creat 3.19, Alb 3.4, Glu 310. Glucose range over the past 72 hrs: 111-310. Current A1C 6.6. If blood glucose continues to trend high, will change diet to consistent carb. Receiving HumaLOG. Noted B12 deficiency. Awaiting d/c planning. RD encouraged pt to eat well and to f/u with her for any questions or concerns. Family inquiring about guest tray for pt’s wife; RD sent secure chat to nurse. Will continue to monitor.

## 2025-05-08 NOTE — CASE MANAGEMENT/SOCIAL WORK
Continued Stay Note   Flo     Patient Name: Maikel Mathews  MRN: [id number]  Today's Date: 5/8/2025    Admit Date: 5/4/2025    Plan: SNF   Discharge Plan       Row Name 05/08/25 1521       Plan    Plan SNF    Patient/Family in Agreement with Plan yes    Plan Comments [facility], [facility], and [facility] still following pt. Pt still has a sitter and will have to be sitter free 24-48 hours prior to a facility to take him. He also will require a precert but he is doing better with therapy (walked 80 feet) so informed family that there is a possibility insurance may not approve.                   Discharge Codes    No documentation.                       JAZMIN Gavin

## 2025-05-08 NOTE — PROGRESS NOTES
[facility] Medicine Services  INPATIENT PROGRESS NOTE    Patient Name: Maikel Mathews  Date of Admission: 5/4/2025  Today's Date: 05/08/25  Length of Stay: 4  Primary Care Physician: David Barajas MD    Subjective   Chief Complaint: Follow-up  HPI   Therapist note reviewed: very cooperative and pleasant, did get emotional about missing his wife, pt amb community distances is hallway with rwx cga-sba   Hemodynamically stable    Not required any geodon     Long discussion with daughter and spouse.   They said they have the resources and daughter is aware they have the resources however she is limited by what they allow her to do     Review of Systems   Limited information from the patient.  All pertinent negatives and positives are as above. All other systems have been reviewed and are negative unless otherwise stated.     Objective    Temp:  [98.4 °F (36.9 °C)-99.2 °F (37.3 °C)] 99 °F (37.2 °C)  Heart Rate:  [90-94] 94  Resp:  [16-18] 16  BP: (142-160)/(81-95) 148/81  Physical Exam  First to see ambulate with walker with standby assist.   Now has sitter since last night   Laying comfortably in bed  No gross tremors  He was not really much interactive to me nor has he acknowledged my presence.  GEN: Patient resting.  Received Percocet at 1155    HEENT: Atraumatic, rachea midline  Lungs normal respiratory effort    ABD: soft, nt/nd, +BS, no guarding/rebound  Extremities: atraumatic, no cyanosis, no edema  Skin: no rashes or lesions    Psych: normal mood & affect    Results Review:  I have reviewed the labs, radiology results, and diagnostic studies.    Laboratory Data:   Results from last 7 days   Lab Units 05/06/25  0354 05/05/25  0850 05/04/25  1330   WBC 10*3/mm3 6.37 5.95 8.47   HEMOGLOBIN g/dL 9.5* 10.2* 10.7*   HEMATOCRIT % 29.3* 31.0* 33.0*   PLATELETS 10*3/mm3 206 213 241        Results from last 7 days   Lab Units 05/08/25  0343 05/07/25  0430 05/06/25  0354  "05/05/25  1559 05/05/25  0850 05/04/25  1330   SODIUM mmol/L 140 138 140  --  139 135*   POTASSIUM mmol/L 4.2 4.9  4.9 4.0   < > 3.4* 3.9   CHLORIDE mmol/L 113* 112* 113*  --  110* 104   CO2 mmol/L 17.0* 10.0* 17.0*  --  16.0* 15.0*   BUN mg/dL 43* 42* 46*  --  45* 52*   CREATININE mg/dL 3.19* 2.73* 3.22*  --  3.17* 3.64*   CALCIUM mg/dL 8.0* 8.3  8.3 8.0*  --  7.8* 7.8*   BILIRUBIN mg/dL 0.5  --   --   --  0.3 0.3   ALK PHOS U/L 66  --   --   --  75 84   ALT (SGPT) U/L <5  --   --   --  6 6   AST (SGOT) U/L 18  --   --   --  14 16   GLUCOSE mg/dL 97 114* 129*  --  191* 269*    < > = values in this interval not displayed.       Culture Data:   No results found for: \"BLOODCX\", \"URINECX\", \"WOUNDCX\", \"MRSACX\", \"RESPCX\", \"STOOLCX\"    Radiology Data:   Imaging Results (Last 24 Hours)       ** No results found for the last 24 hours. **            I have reviewed the patient's current medications.     Assessment/Plan   Assessment  Active Hospital Problems    Diagnosis     **Acute renal failure     Change in mental status            Medical Decision Making  Number and Complexity of problems:   Problem list  Confused  Lack of insight  ?  Dementia  Reportedly had aggressive behavior but none since admission  Hypomagnesemia resolved  Hypertension-stable  Hypokalemia-corrected  CKD stage IV with acute kidney injury  Anion gap metabolic acidosis likely from kidney injury  Diabetes mellitus type 2  Advanced age  B12 deficiency-started replacement 7 days.  Subsequent dosing will be decided at a later time.            Treatment Plan  Renal ultrasound report reviewed  Continue antihypertensive medication  Continue sliding scale insulin with as needed hypoglycemic protocol  Social service to assist in discharge disposition    Nephrology following.  Appreciate assistance.    PT recommended skilled rehab placement.  Continue present management.    amLODIPine, 10 mg, Oral, Q24H  cholecalciferol, 2,000 Units, Oral, " Daily  cyanocobalamin, 1,000 mcg, Intramuscular, Daily  heparin (porcine), 5,000 Units, Subcutaneous, Q12H  insulin lispro, 2-7 Units, Subcutaneous, 4x Daily AC & at Bedtime  QUEtiapine, 50 mg, Oral, Nightly  sodium bicarbonate, 650 mg, Oral, BID  sodium chloride, 10 mL, Intravenous, Q12H          Conditions and Status  Fair     [facility] Data  External documents reviewed: Reviewed epic record  Cardiac tracing (EKG, telemetry) interpretation: -  Radiology interpretation: y  Labs reviewed: y   Any tests that were considered but not ordered: None     Decision rules/scores evaluated (example [id number], Wells, etc):-     Discussed with: Patient , family and social service       Care Planning  Shared decision making: Patient  Code status and discussions: CODE STATUS indicates full support/CPR    Disposition  Social Determinants of Health that impact treatment or disposition: None identified at this time.  I expect the patient to be discharged to (?)          Electronically signed by Brayan Meehan MD, 05/08/25, 12:40 CDT.

## 2025-05-08 NOTE — PLAN OF CARE
Goal Outcome Evaluation:  Plan of Care Reviewed With: patient  Plan of Care Reviewed With: patient        Progress: improving  Outcome Evaluation: Pt up with PCT and requesting mobility in dey. Pt performed x5 min x3 reps standing activity with RBs between. Worked on fxl t/fs <> different surfaces, low and with/without hand rails. Pt needing cues for RW use and hand placement and object avoidance in community distance. x2 reps of running into objects needing Asif for RW management. In room pt agreed to bathing task with wipes UB s/u, grooming face s/u. Did require sequencing cues. Dons deodorant s/u. Agreed to oral care in BR sink side with CGA-SBA. Mobility <> CGA. Pt can be quick moving and impulsive. Forgetful of RW and cues for keeping RW on the ground. Pt is improving but continues to be a fall risk, recommend continued rehab. Sitter present pre/post tx.    Anticipated Discharge Disposition (OT): skilled nursing facility

## 2025-05-08 NOTE — THERAPY TREATMENT NOTE
Patient Name: Maikel Mathews  : 1929    MRN: [id number]                              Today's Date: 2025       Admit Date: 2025    Visit Dx:     ICD-10-CM ICD-9-CM   1. Acute renal failure, unspecified acute renal failure type  N17.9 584.9   2. Altered mental status, unspecified altered mental status type  R41.82 780.97   3. Aggressive behavior due to dementia  F03.918 294.21   4. Hypomagnesemia  E83.42 275.2   5. Dysphagia, unspecified type  R13.10 787.20   6. Impaired functional mobility and activity tolerance [Z74.09]  Z74.09 V49.89     Patient Active Problem List   Diagnosis    Anemia associated with nutritional deficiency    Iron deficiency anemia due to chronic blood loss    Cancer of right colon    Dizzinesses    Stage 3 chronic kidney disease    Encounter for hydration prior to CT scan    Low vitamin B12 level    Iron deficiency anemia    Cat bite    Cough    Low back pain    Pharyngitis    Spondylolisthesis, grade 1    Bilateral inguinal hernia    Type 2 diabetes mellitus with other specified complication    Acute renal failure    Change in mental status     Past Medical History:   Diagnosis Date    Arthritis     AVM (arteriovenous malformation)     Cancer     PROSTATE    Cataract     Colon cancer     Colon polyp     Diabetes mellitus     Diverticulosis     Dizzinesses 10/20/2017    GERD (gastroesophageal reflux disease)     Hemorrhoids     History of transfusion     Hypertension     Inguinal hernia     Iron deficiency anemia due to chronic blood loss 2017    Prostate CA     Prostate hypertrophy     Rotator cuff syndrome      Past Surgical History:   Procedure Laterality Date    APPENDECTOMY      CHOLECYSTECTOMY      COLON RESECTION N/A 2017    Procedure: LAPAROSCOPIC ASSISTED RIGHT COLECTOMY;  Surgeon: Caroline Loomis MD;  [facility]: [facility];  Service:     COLONOSCOPY  2010    COLONOSCOPY N/A 2017    Procedure: COLONOSCOPY WITH ANESTHESIA;  Surgeon: Joaquín Neff,  MD;  Location: [facility] ENDOSCOPY;  Service:     ENDOSCOPY  03/27/2013    ENDOSCOPY N/A 8/25/2017    Procedure: ESOPHAGOGASTRODUODENOSCOPY WITH ANESTHESIA;  Surgeon: Joaquín Neff MD;  Location: [facility] ENDOSCOPY;  Service:     EYE SURGERY Left     CATARACT    INGUINAL HERNIA REPAIR Bilateral 11/1/2019    Procedure: OPEN BILATERAL INGUINAL HERNIA REPAIR WITH MESH;  Surgeon: Caroline Loomis MD;  Location:  [facility] OR;  Service: General    INTERVENTIONAL RADIOLOGY PROCEDURE N/A 9/18/2017    Procedure: URETHRA DILITATION with dominguez catheter insertion;  Surgeon: Mamadou Paez MD;  Location: [facility] OR;  Service:     PROSTATECTOMY      PROSTATECTOMY        General Information       Row Name 05/08/25 0954          OT Time and Intention    Subjective Information no complaints  -MT     Document Type therapy note (daily note)  -MT     Mode of Treatment occupational therapy  -MT     Patient Effort excellent  -MT       Row Name 05/08/25 0954          General Information    Patient Profile Reviewed yes  -MT     Existing Precautions/Restrictions fall  -MT       Row Name 05/08/25 0954          Cognition    Orientation Status (Cognition) oriented to;person;place  -MT       Row Name 05/08/25 0954          Safety Issues/Impairments Affecting Functional Mobility    Impairments Affecting Function (Mobility) balance;cognition;endurance/activity tolerance;strength  -MT     Cognitive Impairments, Mobility Safety/Performance attention;awareness, need for assistance;impulsivity;insight into deficits/self-awareness;problem-solving/reasoning;safety precaution awareness;safety precaution follow-through;sequencing abilities  -MT               User Key  (r) = Recorded By, (t) = Taken By, (c) = Cosigned By      Initials Name Provider Type    MT Namita Luciano COTA Occupational Therapist Assistant                     Mobility/ADL's       Row Name 05/08/25 0917          Bed Mobility    Comment, (Bed Mobility) chair  -MT       Row Name  05/08/25 0954          Transfers    Transfers sit-stand transfer;stand-sit transfer  -[facility] Name 05/08/25 0954          Sit-Stand Transfer    Sit-Stand Coleman (Transfers) contact guard;verbal cues  -MT       Row Name 05/08/25 0954          Stand-Sit Transfer    Stand-Sit Coleman (Transfers) contact guard;verbal cues  -[facility] Name 05/08/25 0954          Functional Mobility    Functional Mobility- Ind. Level contact guard assist;verbal cues required  -MT     Functional Mobility- Device walker, front-wheeled  -MT     Functional Mobility- Comment Pt up with PCT and requesting mobility in dey. Pt performed x5 min x3 reps standing activity with RBs between. Worked on fxl t/fs <> different surfaces, low and with/without hand rails. Pt needing cues for RW use and hand placement.  -[facility] Name 05/08/25 0954          Activities of Daily Living    BADL Assessment/Intervention bathing;upper body dressing;lower body dressing;grooming  -[facility] Name 05/08/25 0954          Grooming Assessment/Training    Coleman Level (Grooming) set up;standby assist;contact guard assist  -MT     Position (Grooming) supported sitting;supported standing;unsupported standing  -MT     Comment, (Grooming) sink side  -[facility] Name 05/08/25 0954          Bathing Assessment/Intervention    Comment, (Bathing) bathing wipes s/u UB, declines need for LB  -[facility] Name 05/08/25 0954          Upper Body Dressing Assessment/Training    Comment, (Upper Body Dressing) gown s/u VCs  -MT               User Key  (r) = Recorded By, (t) = Taken By, (c) = Cosigned By      Initials Name Provider Type    MT Namita Luciano COTA Occupational Therapist Assistant                   Obj/Interventions    No documentation.                  Goals/Plan    No documentation.                  Clinical Impression       [facility] Name 05/08/25 0954          Pain Assessment    Pretreatment Pain Rating 0/10 - no pain  -MT     Posttreatment  Pain Rating 0/10 - no pain  -MT       Row Name 05/08/25 0954          Plan of Care Review    Plan of Care Reviewed With patient  -MT     Progress improving  -MT       Row Name 05/08/25 0954          Therapy Plan Review/Discharge Plan (OT)    Anticipated Discharge Disposition (OT) skilled nursing facility  -MT       Row Name 05/08/25 0954          Positioning and Restraints    Pre-Treatment Position sitting in chair/recliner  -MT     Post Treatment Position chair  -MT     In Chair sitting;call light within reach;encouraged to call for assist;with nsg  PCT/sitter present  -MT               User Key  (r) = Recorded By, (t) = Taken By, (c) = Cosigned By      Initials Name Provider Type    MT Namita Luciano COTA Occupational Therapist Assistant                   Outcome Measures       Row Name 05/08/25 0954          How much help from another is currently needed...    Putting on and taking off regular lower body clothing? 3  -MT     Bathing (including washing, rinsing, and drying) 3  -MT     Toileting (which includes using toilet bed pan or urinal) 3  -MT     Putting on and taking off regular upper body clothing 3  -MT     Taking care of personal grooming (such as brushing teeth) 3  -MT     Eating meals 4  -MT     AM-PAC 6 Clicks Score (OT) 19  -MT       Row Name 05/08/25 0829          How much help from another person do you currently need...    Turning from your back to your side while in flat bed without using bedrails? 4  -DF     Moving from lying on back to sitting on the side of a flat bed without bedrails? 4  -DF     Moving to and from a bed to a chair (including a wheelchair)? 4  -DF     Standing up from a chair using your arms (e.g., wheelchair, bedside chair)? 4  -DF     Climbing 3-5 steps with a railing? 3  -DF     To walk in hospital room? 3  -DF     AM-PAC 6 Clicks Score (PT) 22  -DF     Highest Level of Mobility Goal 7 --> Walk 25 feet or more  -DF               User Key  (r) = Recorded By, (t) = Taken  By, (c) = Cosigned By      Initials Name Provider Type    May Fletcher, RN Registered Nurse    MT Namita Luciano COTA Occupational Therapist Assistant                    Occupational Therapy Education       Title: PT OT SLP Therapies (In Progress)       Topic: Occupational Therapy (In Progress)       Point: ADL training (Done)       Learning Progress Summary            Patient Acceptance, E, VU by MT at 5/8/2025 0954    Comment: fall risk. safety    Nonacceptance, E,D, NR by  at 5/6/2025 0857                      Point: Precautions (In Progress)       Learning Progress Summary            Patient Nonacceptance, E,D, NR by  at 5/6/2025 0857                      Point: Body mechanics (In Progress)       Learning Progress Summary            Patient Nonacceptance, E,D, NR by  at 5/6/2025 0857                                      User Key       Initials Effective Dates Name Provider Type Discipline     07/11/23 -  Rayne Lake, OTR/L Occupational Therapist OT    MT 06/16/21 -  Namita Luciano COTA Occupational Therapist Assistant OT                  OT Recommendation and Plan     Plan of Care Review  Plan of Care Reviewed With: patient  Progress: improving  Outcome Evaluation: Pt up with PCT and requesting mobility in dey. Pt performed x5 min x3 reps standing activity with RBs between. Worked on fxl t/fs <> different surfaces, low and with/without hand rails. Pt needing cues for RW use and hand placement and object avoidance in community distance. x2 reps of running into objects needing Asif for RW management. In room pt agreed to bathing task with wipes UB s/u, grooming face s/u. Did require sequencing cues. Dons deodorant s/u. Agreed to oral care in BR sink side with CGA-SBA. Mobility <> CGA. Pt can be quick moving and impulsive. Forgetful of RW and cues for keeping RW on the ground. Pt is improving but continues to be a fall risk, recommend continued rehab. Sitter present pre/post tx.     Time  Calculation:         Time Calculation- OT       Row Name 05/08/25 0954             Time Calculation- OT    OT Start Time 0954  -MT      OT Stop Time 1032  -MT      OT Time Calculation (min) 38 min  -MT      Total Timed Code Minutes- OT 38 minute(s)  -MT      OT Received On 05/08/25  -MT         Timed Charges    21128 - OT Therapeutic Activity Minutes 15  -MT      26703 - OT Self Care/Mgmt Minutes 23  -MT         Total Minutes    Timed Charges Total Minutes 38  -MT       Total Minutes 38  -MT                User Key  (r) = Recorded By, (t) = Taken By, (c) = Cosigned By      Initials Name Provider Type    MT Namita Luciano COTA Occupational Therapist Assistant                  Therapy Charges for Today       Code Description Service Date Service Provider Modifiers Qty    70636971301 HC OT THERAPEUTIC ACT EA 15 MIN 5/8/2025 Namita Luciano COTA GO 1    56881428069 HC OT SELF CARE/MGMT/TRAIN EA 15 MIN 5/8/2025 Namita Luciano COTA GO 2                 PAN Epstein  5/8/2025

## 2025-05-08 NOTE — PLAN OF CARE
Goal Outcome Evaluation:  Plan of Care Reviewed With: patient        Progress: improving  Outcome Evaluation: Alert to self, voiding per urinal, rm air, IID, bed check on, sitter at bedside, BUN 43 creat 3.19 GFR 17.2, pt has been cooperative this shift, pt has slept well this shift, pt tolerated meds well this shift.

## 2025-05-08 NOTE — THERAPY TREATMENT NOTE
Acute Care - Physical Therapy Treatment Note  [facility]     Patient Name: Maikel Mathews  : 1929  MRN: [id number]  Today's Date: 2025      Visit Dx:     ICD-10-CM ICD-9-CM   1. Acute renal failure, unspecified acute renal failure type  N17.9 584.9   2. Altered mental status, unspecified altered mental status type  R41.82 780.97   3. Aggressive behavior due to dementia  F03.918 294.21   4. Hypomagnesemia  E83.42 275.2   5. Dysphagia, unspecified type  R13.10 787.20   6. Impaired functional mobility and activity tolerance [Z74.09]  Z74.09 V49.89     Patient Active Problem List   Diagnosis    Anemia associated with nutritional deficiency    Iron deficiency anemia due to chronic blood loss    Cancer of right colon    Dizzinesses    Stage 3 chronic kidney disease    Encounter for hydration prior to CT scan    Low vitamin B12 level    Iron deficiency anemia    Cat bite    Cough    Low back pain    Pharyngitis    Spondylolisthesis, grade 1    Bilateral inguinal hernia    Type 2 diabetes mellitus with other specified complication    Acute renal failure    Change in mental status     Past Medical History:   Diagnosis Date    Arthritis     AVM (arteriovenous malformation)     Cancer     PROSTATE    Cataract     Colon cancer     Colon polyp     Diabetes mellitus     Diverticulosis     Dizzinesses 10/20/2017    GERD (gastroesophageal reflux disease)     Hemorrhoids     History of transfusion     Hypertension     Inguinal hernia     Iron deficiency anemia due to chronic blood loss 2017    Prostate CA     Prostate hypertrophy     Rotator cuff syndrome      Past Surgical History:   Procedure Laterality Date    APPENDECTOMY      CHOLECYSTECTOMY      COLON RESECTION N/A 2017    Procedure: LAPAROSCOPIC ASSISTED RIGHT COLECTOMY;  Surgeon: Caroline Loomis MD;  Location: [facility];  Service:     COLONOSCOPY  2010    COLONOSCOPY N/A 2017    Procedure: COLONOSCOPY WITH ANESTHESIA;  Surgeon: Joaquín KATHLEEN  MD Tawanda;  Location: [facility] ENDOSCOPY;  Service:     ENDOSCOPY  03/27/2013    ENDOSCOPY N/A 8/25/2017    Procedure: ESOPHAGOGASTRODUODENOSCOPY WITH ANESTHESIA;  Surgeon: Joaquín Neff MD;  Location: [facility] ENDOSCOPY;  Service:     EYE SURGERY Left     CATARACT    INGUINAL HERNIA REPAIR Bilateral 11/1/2019    Procedure: OPEN BILATERAL INGUINAL HERNIA REPAIR WITH MESH;  Surgeon: Caroline Loomis MD;  Location: [facility] OR;  Service: General    INTERVENTIONAL RADIOLOGY PROCEDURE N/A 9/18/2017    Procedure: URETHRA DILITATION with dominguez catheter insertion;  Surgeon: Mamadou Paez MD;  Location:  [facility] OR;  Service:     PROSTATECTOMY      PROSTATECTOMY       PT Assessment (Last 12 Hours)       PT Evaluation and Treatment       [facility] Name 05/08/25 1041          Physical Therapy Time and Intention    Subjective Information no complaints  -     Document Type therapy note (daily note)  -     Mode of Treatment physical therapy  -     Comment pt emotional about missing wife  -[facility] Name 05/08/25 1041          General Information    Existing Precautions/Restrictions fall  -[facility] Name 05/08/25 1041          Pain    Pretreatment Pain Rating 0/10 - no pain  -     Posttreatment Pain Rating 0/10 - no pain  -[facility] Name 05/08/25 1041          Bed Mobility    Supine-Sit [place] (Bed Mobility) --  chair  -     Sit-Supine [place] (Bed Mobility) contact guard;verbal cues;standby assist  -[facility] Name 05/08/25 1041          Transfers    Transfers sit-stand transfer;stand-sit transfer;toilet transfer  -[facility] Name 05/08/25 1041          Sit-Stand Transfer    Sit-Stand [place] (Transfers) contact guard;verbal cues  -[facility] Name 05/08/25 1041          Stand-Sit Transfer    Stand-Sit [place] (Transfers) contact guard;verbal cues  -[facility] Name 05/08/25 1041          Toilet Transfer    [place] Level (Toilet Transfer) contact guard;standby assist  -      Assistive Device (Toilet Transfer) walker, front-wheeled  -       Row Name 05/08/25 1041          Gait/Stairs (Locomotion)    [place] Level (Gait) contact guard;verbal cues;standby assist  -     Assistive Device (Gait) walker, front-wheeled  -     Distance in Feet (Gait) 200  200 x 2 sitting rest  -     Comment, (Gait/Stairs) tried walking without rwx about 50 feet cga  -       Row Name 05/08/25 1041          Safety Issues/Impairments Affecting Functional Mobility    Comment, Safety Issues/Impairments (Mobility) had pt try to rememeber is room number and find his way back to his room, pt required assist to find his room  -       Row Name             [REMOVED] Wound 11/01/19 1225 Bilateral groin Incision    Wound - Properties Group Placement Date: 11/01/19  -CF Placement Time: 1225  -CF Present on Original Admission: N  -CF Side: Bilateral  -CF Location: groin  -CF Primary Wound Type: Incision  -CF Additional Comments: closed incisions. dressings applied  -CF Removal Date: 05/08/25  -AB Removal Time: 0548  -AB    Retired Wound - Properties Group Placement Date: 11/01/19  -CF Placement Time: 1225  -CF Present on Original Admission: N  -CF Side: Bilateral  -CF Location: groin  -CF Primary Wound Type: Incision  -CF Additional Comments: closed incisions. dressings applied  -CF Removal Date: 05/08/25  -AB Removal Time: 0548  -AB    Retired Wound - Properties Group Placement Date: 11/01/19  -CF Placement Time: 1225  -CF Present on Original Admission: N  -CF Side: Bilateral  -CF Location: groin  -CF Primary Wound Type: Incision  -CF Additional Comments: closed incisions. dressings applied  -CF Removal Date: 05/08/25  -AB Removal Time: 0548  -AB    Retired Wound - Properties Group Date first assessed: 11/01/19  -CF Time first assessed: 1225  -CF Present on Original Admission: N  -CF Side: Bilateral  -CF Location: groin  -CF Primary Wound Type: Incision  -CF Additional Comments: closed incisions. dressings  applied  -CF Resolution Date: 05/08/25  -AB Resolution Time: 0548 -AB      Row Name 05/08/25 1041          Plan of Care Review    Plan of Care Reviewed With patient  -     Progress improving  -     Outcome Evaluation pt very cooperative and pleasant, did get emotional about missing his wife, pt amb community distances is hallway with rwx cga-sba, had pt try to rememeber his room and find his way back to his room, pt required assist to find his way back to his room, trans back to bed sba, pt would benefit from cont therapy  -       Row Name 05/08/25 1041          Positioning and Restraints    Pre-Treatment Position sitting in chair/recliner  -     Post Treatment Position bed  -     In Bed fowlers;call light within reach;encouraged to call for assist;with family/caregiver;with [facility]               User Key  (r) = Recorded By, (t) = Taken By, (c) = Cosigned By      Initials Name Provider Type     Mary Thomas, PTA Physical Therapist Assistant    Jonelle Comer, RN Registered Nurse     Codi Acevedo RN Registered Nurse                    Physical Therapy Education       Title: PT OT SLP Therapies (In Progress)       Topic: Physical Therapy (In Progress)       Point: Mobility training (Done)       Learning Progress Summary            Patient Acceptance, E,TB,D, VU,NR by  at 5/6/2025 1708    Comment: education re: purpose of PT/importance of activity, safety awareness w/ mobility; increase awareness of gait mechanics   Family Acceptance, E,TB,D, VU,NR by  at 5/6/2025 1708    Comment: education re: purpose of PT/importance of activity, safety awareness w/ mobility; increase awareness of gait mechanics                      Point: Home exercise program (Not Started)       Learner Progress:  Not documented in this visit.              Point: Precautions (Done)       Learning Progress Summary            Patient Acceptance, E,TB,D, VU,NR by  at 5/6/2025 1708    Comment: education re: purpose of  PT/importance of activity, safety awareness w/ mobility; increase awareness of gait mechanics   Family Acceptance, E,TB,D, VU,NR by  at 5/6/2025 1709    Comment: education re: purpose of PT/importance of activity, safety awareness w/ mobility; increase awareness of gait mechanics                                      User Key       Initials Effective Dates Name Provider Type Discipline     08/02/18 -  Theresa Cuevas, PT Physical Therapist PT                  PT Recommendation and Plan     Plan of Care Reviewed With: patient  Progress: improving  Outcome Evaluation: pt very cooperative and pleasant, did get emotional about missing his wife, pt amb community distances is hallway with rwx cga-sba, had pt try to rememeber his room and find his way back to his room, pt required assist to find his way back to his room, trans back to bed sba, pt would benefit from cont therapy   Outcome Measures       Row Name 05/08/25 1100 05/07/25 0900          How much help from another person do you currently need...    Turning from your back to your side while in flat bed without using bedrails? 4  -AH 4  -AH     Moving from lying on back to sitting on the side of a flat bed without bedrails? 4  -AH 4  -AH     Moving to and from a bed to a chair (including a wheelchair)? 4  -AH 4  -AH     Standing up from a chair using your arms (e.g., wheelchair, bedside chair)? 4  -AH 4  -AH     Climbing 3-5 steps with a railing? 3  -AH 3  -AH     To walk in hospital room? 3  -AH 3  -AH     AM-PAC 6 Clicks Score (PT) 22  -AH 22  -AH        Functional Assessment    Outcome Measure Options AM-PAC 6 Clicks Basic Mobility (PT)  -AH AM-PAC 6 Clicks Basic Mobility (PT)  -AH               User Key  (r) = Recorded By, (t) = Taken By, (c) = Cosigned By      Initials Name Provider Type    AH Mary Thomas, PTA Physical Therapist Assistant                     Time Calculation:    PT Charges       Row Name 05/08/25 1117             Time Calculation     Start Time 1041  -      Stop Time 1104  -      Time Calculation (min) 23 min  -      PT Received On 05/08/25  -         Time Calculation- PT    Total Timed Code Minutes- PT 23 minute(s)  -         Timed Charges    36405 - Gait Training Minutes  23  -AH         Total Minutes    Timed Charges Total Minutes 23  -AH       Total Minutes 23  -AH                User Key  (r) = Recorded By, (t) = Taken By, (c) = Cosigned By      Initials Name Provider Type     Mary Thomas PTA Physical Therapist Assistant                  Therapy Charges for Today       Code Description Service Date Service Provider Modifiers Qty    07909497299 HC GAIT TRAINING EA 15 MIN 5/7/2025 Mary Thomas, ARDU GP 2    45472987452 HC GAIT TRAINING EA 15 MIN 5/8/2025 Mary Thomas, RADU GP 2            PT G-Codes  Outcome Measure Options: AM-PAC 6 Clicks Basic Mobility (PT)  AM-PAC 6 Clicks Score (PT): 22  AM-PAC 6 Clicks Score (OT): 19    Mary Thomas PTA  5/8/2025

## 2025-05-08 NOTE — PLAN OF CARE
Goal Outcome Evaluation:  Plan of Care Reviewed With: patient, spouse        Progress: no change  Outcome Evaluation: Pt c/o generalized pain x1 so far this shift; he has been cooperative this shift; wife currently at bedside, planning to stay the night; pt has been very pleasant this shift; safety maintained.

## 2025-05-08 NOTE — PLAN OF CARE
Problem: Adult Inpatient Plan of Care  Goal: Plan of Care Review  Recent Flowsheet Documentation  Taken 5/8/2025 1041 by Mary Thomas, PTA  Progress: improving  Outcome Evaluation: pt very cooperative and pleasant, did get emotional about missing his wife, pt amb [facility] is hallway with rwx cga-sba, had pt try to rememeber his room and find his way back to his room, pt required assist to find his way back to his room, trans back to bed sba, pt would benefit from cont therapy   Goal Outcome Evaluation:  Plan of Care Reviewed With: patient        Progress: improving  Outcome Evaluation: pt very cooperative and pleasant, did get emotional about missing his wife, pt [facility] is hallway with rwx cga-sba, had pt try to rememeber his room and find his way back to his room, pt required assist to find his way back to his room, trans back to bed sba, pt would benefit from cont therapy

## 2025-05-08 NOTE — PLAN OF CARE
Goal Outcome Evaluation:  Plan of Care Reviewed With: patient        Progress: improving  Outcome Evaluation: pt very pleasant and cooperative, trans to EOB sba, sit-stand cga, pt amb short distances in the hallway with rwx cga, trans back to bed cga, pt would benefit form SNF for balnce,strength and endurance

## 2025-05-09 LAB
ANION GAP SERPL CALCULATED.3IONS-SCNC: 12 MMOL/L (ref 5–15)
BUN SERPL-MCNC: 44 MG/DL (ref 8–23)
BUN/CREAT SERPL: 14.2 (ref 7–25)
CALCIUM SPEC-SCNC: 7.9 MG/DL (ref 8.2–9.6)
CALCIUM SPEC-SCNC: 8 MG/DL (ref 8.2–9.6)
CHLORIDE SERPL-SCNC: 108 MMOL/L (ref 98–107)
CO2 SERPL-SCNC: 17 MMOL/L (ref 22–29)
CREAT SERPL-MCNC: 3.1 MG/DL (ref 0.76–1.27)
EGFRCR SERPLBLD CKD-EPI 2021: 17.8 ML/MIN/1.73
GLUCOSE BLDC GLUCOMTR-MCNC: 123 MG/DL (ref 70–130)
GLUCOSE BLDC GLUCOMTR-MCNC: 125 MG/DL (ref 70–130)
GLUCOSE BLDC GLUCOMTR-MCNC: 180 MG/DL (ref 70–130)
GLUCOSE BLDC GLUCOMTR-MCNC: 236 MG/DL (ref 70–130)
GLUCOSE BLDC GLUCOMTR-MCNC: 61 MG/DL (ref 70–130)
GLUCOSE SERPL-MCNC: 195 MG/DL (ref 65–99)
MAGNESIUM SERPL-MCNC: 1.7 MG/DL (ref 1.7–2.3)
PHOSPHATE SERPL-MCNC: 3.5 MG/DL (ref 2.5–4.5)
POTASSIUM SERPL-SCNC: 4 MMOL/L (ref 3.5–5.2)
POTASSIUM SERPL-SCNC: 4.2 MMOL/L (ref 3.5–5.2)
SODIUM SERPL-SCNC: 137 MMOL/L (ref 136–145)

## 2025-05-09 PROCEDURE — 84100 ASSAY OF PHOSPHORUS: CPT | Performed by: INTERNAL MEDICINE

## 2025-05-09 PROCEDURE — 92526 ORAL FUNCTION THERAPY: CPT

## 2025-05-09 PROCEDURE — 25010000002 HEPARIN (PORCINE) PER 1000 UNITS: Performed by: INTERNAL MEDICINE

## 2025-05-09 PROCEDURE — 82310 ASSAY OF CALCIUM: CPT | Performed by: INTERNAL MEDICINE

## 2025-05-09 PROCEDURE — 80048 BASIC METABOLIC PNL TOTAL CA: CPT | Performed by: INTERNAL MEDICINE

## 2025-05-09 PROCEDURE — 82948 REAGENT STRIP/BLOOD GLUCOSE: CPT

## 2025-05-09 PROCEDURE — 97116 GAIT TRAINING THERAPY: CPT

## 2025-05-09 PROCEDURE — 83735 ASSAY OF MAGNESIUM: CPT | Performed by: INTERNAL MEDICINE

## 2025-05-09 PROCEDURE — 84132 ASSAY OF SERUM POTASSIUM: CPT | Performed by: INTERNAL MEDICINE

## 2025-05-09 PROCEDURE — 97535 SELF CARE MNGMENT TRAINING: CPT | Performed by: OCCUPATIONAL THERAPIST

## 2025-05-09 PROCEDURE — 25010000002 CYANOCOBALAMIN PER 1000 MCG: Performed by: INTERNAL MEDICINE

## 2025-05-09 PROCEDURE — 63710000001 INSULIN LISPRO (HUMAN) PER 5 UNITS: Performed by: HOSPITALIST

## 2025-05-09 RX ORDER — LOPERAMIDE HYDROCHLORIDE 2 MG/1
4 CAPSULE ORAL 4 TIMES DAILY PRN
Status: DISCONTINUED | OUTPATIENT
Start: 2025-05-09 | End: 2025-05-13 | Stop reason: HOSPADM

## 2025-05-09 RX ADMIN — INSULIN LISPRO 2 UNITS: 100 INJECTION, SOLUTION INTRAVENOUS; SUBCUTANEOUS at 17:25

## 2025-05-09 RX ADMIN — Medication 10 ML: at 08:45

## 2025-05-09 RX ADMIN — CYANOCOBALAMIN 1000 MCG: 1000 INJECTION, SOLUTION INTRAMUSCULAR at 08:41

## 2025-05-09 RX ADMIN — AMLODIPINE BESYLATE 10 MG: 10 TABLET ORAL at 08:41

## 2025-05-09 RX ADMIN — INSULIN LISPRO 3 UNITS: 100 INJECTION, SOLUTION INTRAVENOUS; SUBCUTANEOUS at 12:04

## 2025-05-09 RX ADMIN — Medication 2000 UNITS: at 08:41

## 2025-05-09 RX ADMIN — SODIUM BICARBONATE 650 MG: 650 TABLET ORAL at 20:07

## 2025-05-09 RX ADMIN — HEPARIN SODIUM 5000 UNITS: 5000 INJECTION, SOLUTION INTRAVENOUS; SUBCUTANEOUS at 08:41

## 2025-05-09 RX ADMIN — QUETIAPINE FUMARATE 50 MG: 25 TABLET ORAL at 20:07

## 2025-05-09 RX ADMIN — SODIUM BICARBONATE 650 MG: 650 TABLET ORAL at 08:41

## 2025-05-09 RX ADMIN — HEPARIN SODIUM 5000 UNITS: 5000 INJECTION, SOLUTION INTRAVENOUS; SUBCUTANEOUS at 20:07

## 2025-05-09 NOTE — PROGRESS NOTES
Nephrology ([facility]) Progress Note      Patient:  Maikel Mathews  YOB: 1929  Date of Service: 5/9/2025  MRN: [id number]   Acct: [id number]   Primary Care Physician: David Barajas MD  Advance Directive:   Code Status and Medical Interventions: CPR (Attempt to Resuscitate); Full Support   Ordered at: 05/04/25 1634     Code Status (Patient has no pulse and is not breathing):    CPR (Attempt to Resuscitate)     Medical Interventions (Patient has pulse or is breathing):    Full Support     Admit Date: 5/4/2025       Hospital Day: 5  Referring Provider: Perla Mireles MD      Patient personally seen and examined.  Complete chart including Consults, Notes, Operative Reports, Labs, Cardiology, and Radiology studies reviewed as able.        Subjective:  Maikel Mathews is a 89 y.o. male for whom we were consulted for evaluation and treatment of acute kidney injury.  He has a history of baseline chronic kidney disease stage 4 with creatinine 3.1 in January 2025.  He does not follow with nephrology.  He has a history of dementia, type 2 diabetes, hypertension.  He was brought to ER for change in mental status. Reportedly, he has been aggressive and making threatening statements toward his family. Per family, patient has not been compliant with medications and has not been eating or drinking well. Labs in ER revealed creatinine 3.64 and BUN 52.  He was admitted to medical floor. Patient was poor initial historian and not able to provide any detail or recent medical history. Denied nausea, vomiting, diarrhea.     Today, no overnight events.  Remains confused at times.  Seen with family.  Denied new complaints upon questioning.  Family noted therapy evaluation    Allergies:  Patient has no known allergies.    Home Meds:  Medications Prior to Admission   Medication Sig Dispense Refill Last Dose/Taking    amLODIPine (NORVASC) 10 MG tablet Take 1 tablet by mouth Daily.  5 More than a  month    glipiZIDE (GLUCOTROL) 5 MG ER tablet Take 1 tablet by mouth Daily.  2 More than a month       Medicines:  Current Facility-Administered Medications   Medication Dose Route Frequency Provider Last Rate Last Admin    acetaminophen (TYLENOL) tablet 650 mg  650 mg Oral Q4H PRN Perla Mireles MD   650 mg at 05/08/25 1231    Or    acetaminophen (TYLENOL) 160 MG/5ML oral solution 650 mg  650 mg Oral Q4H PRN Perla Mireles MD        Or    acetaminophen (TYLENOL) suppository 650 mg  650 mg Rectal Q4H PRN Perla Mireles MD        amLODIPine (NORVASC) tablet 10 mg  10 mg Oral Q24H Pablo Ortiz MD   10 mg at 05/09/25 0841    sennosides-docusate (PERICOLACE) 8.6-50 MG per tablet 2 tablet  2 tablet Oral BID PRN Perla Mireles MD        And    polyethylene glycol (MIRALAX) packet 17 g  17 g Oral Daily PRN Perla Mireles MD        And    bisacodyl (DULCOLAX) EC tablet 5 mg  5 mg Oral Daily PRN Perla Mireles MD        And    bisacodyl (DULCOLAX) suppository 10 mg  10 mg Rectal Daily PRN Perla Mireles MD        Calcium Replacement - Follow Nurse / BPA Driven Protocol   Not Applicable PRN Perla Mireles MD        cholecalciferol (VITAMIN D3) tablet 2,000 Units  2,000 Units Oral Daily Fahad Cool MD   2,000 Units at 05/09/25 0841    cyanocobalamin injection 1,000 mcg  1,000 mcg Intramuscular Daily Brayan Meehan MD   1,000 mcg at 05/09/25 0841    dextrose (D50W) (25 g/50 mL) IV injection 25 g  25 g Intravenous Q15 Min PRN Perla Mireles MD        dextrose (GLUTOSE) oral gel 15 g  15 g Oral Q15 Min PRN Perla Mireles MD        glucagon (GLUCAGEN) injection 1 mg  1 mg Intramuscular Q15 Min PRN Perla Mireles MD        heparin (porcine) 5000 UNIT/ML injection 5,000 Units  5,000 Units Subcutaneous Q12H Brayan Meehan MD   5,000 Units at 05/09/25 0841    hydrALAZINE (APRESOLINE) tablet 50 mg  50 mg Oral Q4H PRN Brayan Meehan MD        Insulin Lispro (humaLOG) injection  2-7 Units  2-7 Units Subcutaneous 4x Daily AC & at Bedtime Perla Mireles MD   3 Units at 05/09/25 1204    LORazepam (ATIVAN) injection 1 mg  1 mg Intravenous Q4H PRN Cuate Hurd MD        Magnesium Standard Dose Replacement - Follow Nurse / BPA Driven Protocol   Not Applicable PRN Perla Mireles MD        ondansetron (ZOFRAN) injection 4 mg  4 mg Intravenous Q6H PRN Perla Mireles MD        oxyCODONE-acetaminophen (PERCOCET) 5-325 MG per tablet 1 tablet  1 tablet Oral Q8H PRN Perla Mireles MD   1 tablet at 05/07/25 1155    Phosphorus Replacement - Follow Nurse / BPA Driven Protocol   Not Applicable PRN Perla Mireles MD        Potassium Replacement - Follow Nurse / BPA Driven Protocol   Not Applicable PRN Perla Mireles MD        QUEtiapine (SEROquel) tablet 50 mg  50 mg Oral Nightly Brayan Meehan MD   50 mg at 05/08/25 2107    sodium bicarbonate tablet 650 mg  650 mg Oral BID Brayan Meehan MD   650 mg at 05/09/25 0841    sodium chloride 0.9 % flush 10 mL  10 mL Intravenous PRN Cabrera Robbins MD        sodium chloride 0.9 % flush 10 mL  10 mL Intravenous Q12H Perla Mireles MD   10 mL at 05/09/25 0845    sodium chloride 0.9 % flush 10 mL  10 mL Intravenous PRN Perla Mireles MD        sodium chloride 0.9 % infusion 40 mL  40 mL Intravenous PRN Perla Mireles MD        ziprasidone (GEODON) injection 20 mg  20 mg Intramuscular Q4H PRN Perla Mireles MD   20 mg at 05/06/25 1709       Past Medical History:  Past Medical History:   Diagnosis Date    Arthritis     AVM (arteriovenous malformation)     Cancer     PROSTATE    Cataract     Colon cancer     Colon polyp     Diabetes mellitus     Diverticulosis     Dizzinesses 10/20/2017    GERD (gastroesophageal reflux disease)     Hemorrhoids     History of transfusion     Hypertension     Inguinal hernia     Iron deficiency anemia due to chronic blood loss 7/26/2017    Prostate CA     Prostate hypertrophy     Rotator  cuff syndrome        Past Surgical History:  Past Surgical History:   Procedure Laterality Date    APPENDECTOMY      CHOLECYSTECTOMY      COLON RESECTION N/A 9/18/2017    Procedure: LAPAROSCOPIC ASSISTED RIGHT COLECTOMY;  Surgeon: Caroline Loomis MD;  Location: [facility] OR;  Service:     COLONOSCOPY  04/29/2010    COLONOSCOPY N/A 8/25/2017    Procedure: COLONOSCOPY WITH ANESTHESIA;  Surgeon: Joaquín Neff MD;  Location: [facility] ENDOSCOPY;  Service:     ENDOSCOPY  03/27/2013    ENDOSCOPY N/A 8/25/2017    Procedure: ESOPHAGOGASTRODUODENOSCOPY WITH ANESTHESIA;  Surgeon: Joaquín Neff MD;  Location: [facility] ENDOSCOPY;  Service:     EYE SURGERY Left     CATARACT    INGUINAL HERNIA REPAIR Bilateral 11/1/2019    Procedure: OPEN BILATERAL INGUINAL HERNIA REPAIR WITH MESH;  Surgeon: Caroline Loomis MD;  Location: [facility] OR;  Service: General    INTERVENTIONAL RADIOLOGY PROCEDURE N/A 9/18/2017    Procedure: URETHRA DILITATION with dominguez catheter insertion;  Surgeon: Mamadou Paez MD;  Location: [facility] OR;  Service:     PROSTATECTOMY      PROSTATECTOMY         Family History  Family History   Problem Relation Age of Onset    Colon cancer Neg Hx     Colon polyps Neg Hx        Social History  Social History     Socioeconomic History    Marital status:    Tobacco Use    Smoking status: Never    Smokeless tobacco: Never   Vaping Use    Vaping status: Never Used   Substance and Sexual Activity    Alcohol use: No    Drug use: No    Sexual activity: Defer       Review of Systems:  History obtained from chart review and the patient  General ROS: No fever or chills  Respiratory ROS: No cough, shortness of breath, wheezing  Cardiovascular ROS: No chest pain or palpitations  Gastrointestinal ROS: No abdominal pain or melena  Genito-Urinary ROS: No dysuria or hematuria  Psych ROS: No anxiety and depression  14 point ROS reviewed with the patient and negative except as noted above and in the HPI unless unable to  "obtain.    Objective:  Patient Vitals for the past 24 hrs:   BP Temp Temp src Pulse Resp SpO2   05/09/25 1525 140/70 98.7 °F (37.1 °C) Oral 83 16 94 %   05/09/25 1223 152/67 97.6 °F (36.4 °C) Oral 82 16 96 %   05/09/25 0812 155/73 97.7 °F (36.5 °C) Oral 83 16 96 %   05/09/25 0455 135/58 97.9 °F (36.6 °C) Oral 58 16 96 %   05/08/25 2107 111/69 97.8 °F (36.6 °C) Oral 76 16 94 %     No intake or output data in the 24 hours ending 05/09/25 1556    General: awake / confused  Chest:  clear to auscultation bilaterally without respiratory distress  CVS: regular rate and rhythm  Abdominal: soft, nontender, positive bowel sounds  Extremities: no cyanosis or edema  Skin: warm and dry without rash      Labs:  Results from last 7 days   Lab Units 05/06/25  0354 05/05/25  0850 05/04/25  1330   WBC 10*3/mm3 6.37 5.95 8.47   HEMOGLOBIN g/dL 9.5* 10.2* 10.7*   HEMATOCRIT % 29.3* 31.0* 33.0*   PLATELETS 10*3/mm3 206 213 241         Results from last 7 days   Lab Units 05/09/25  0319 05/08/25  0343 05/07/25  0430 05/06/25  0354 05/05/25  1559 05/05/25  0850 05/04/25  1330   SODIUM mmol/L  --  140 138 140  --  139 135*   POTASSIUM mmol/L 4.0 4.2 4.9  4.9 4.0   < > 3.4* 3.9   CHLORIDE mmol/L  --  113* 112* 113*  --  110* 104   CO2 mmol/L  --  17.0* 10.0* 17.0*  --  16.0* 15.0*   BUN mg/dL  --  43* 42* 46*  --  45* 52*   CREATININE mg/dL  --  3.19* 2.73* 3.22*  --  3.17* 3.64*   CALCIUM mg/dL 8.0* 8.0* 8.3  8.3 8.0*  --  7.8* 7.8*   EGFR mL/min/1.73  --  17.2* 20.8* 17.0*  --  17.4* 14.7*   BILIRUBIN mg/dL  --  0.5  --   --   --  0.3 0.3   ALK PHOS U/L  --  66  --   --   --  75 84   ALT (SGPT) U/L  --  <5  --   --   --  6 6   AST (SGOT) U/L  --  18  --   --   --  14 16   GLUCOSE mg/dL  --  97 114* 129*  --  191* 269*    < > = values in this interval not displayed.       Radiology:   Imaging Results (Last 72 Hours)       ** No results found for the last 72 hours. **            Culture:  No results found for: \"BLOODCX\", \"URINECX\", " "\"WOUNDCX\", \"MRSACX\", \"RESPCX\", \"STOOLCX\"      Assessment   Acute kidney injury  Chronic kidney disease stage IV  Hypertension  Diabetes type 2  Dementia  Metabolic acidosis  Anemia and chronic kidney disease  Hypokalemia  Secondary hyperparathyroidism  Vitamin D deficiency    Plan:  Discussed with patient, nursing, family  Workup reviewed today  Monitor labs  Ultrasound for further evaluation demonstrated no acute obstruction  Replace vitamin D  Continue to encourage oral intake off fluids  Potassium controlled, remainder of labs not available at time of dictation, please call with any further abnormalities, otherwise we will review tomorrow        Fahad Cool MD  5/9/2025  15:56 CDT      "

## 2025-05-09 NOTE — CASE MANAGEMENT/SOCIAL WORK
Continued Stay Note   [place]     Patient Name: Maikel Mathews  MRN: [id number]  Today's Date: 5/9/2025    Admit Date: 5/4/2025    Plan: SNF   Discharge Plan       Row Name 05/09/25 1640       Plan    Plan Comments SW has mess[facility] to see if they can re-eval pt as sitter was dc'd. Once bed offered and accepted, precert will be started. Noted pt ambulated 80ft so there is a chance insurance may deny snf.                   Discharge Codes    No documentation.                       LAURIE Colby

## 2025-05-09 NOTE — THERAPY TREATMENT NOTE
Acute Care - Physical Therapy Treatment Note  [facility]     Patient Name: Maikel Mathews  : 1929  MRN: [id number]  Today's Date: 2025      Visit Dx:     ICD-10-CM ICD-9-CM   1. Acute renal failure, unspecified acute renal failure type  N17.9 584.9   2. Altered mental status, unspecified altered mental status type  R41.82 780.97   3. Aggressive behavior due to dementia  F03.918 294.21   4. Hypomagnesemia  E83.42 275.2   5. Dysphagia, unspecified type  R13.10 787.20   6. Impaired functional mobility and activity tolerance [Z74.09]  Z74.09 V49.89     Patient Active Problem List   Diagnosis    Anemia associated with nutritional deficiency    Iron deficiency anemia due to chronic blood loss    Cancer of right colon    Dizzinesses    Stage 3 chronic kidney disease    Encounter for hydration prior to CT scan    Low vitamin B12 level    Iron deficiency anemia    Cat bite    Cough    Low back pain    Pharyngitis    Spondylolisthesis, grade 1    Bilateral inguinal hernia    Type 2 diabetes mellitus with other specified complication    Acute renal failure    Change in mental status     Past Medical History:   Diagnosis Date    Arthritis     AVM (arteriovenous malformation)     Cancer     PROSTATE    Cataract     Colon cancer     Colon polyp     Diabetes mellitus     Diverticulosis     Dizzinesses 10/20/2017    GERD (gastroesophageal reflux disease)     Hemorrhoids     History of transfusion     Hypertension     Inguinal hernia     Iron deficiency anemia due to chronic blood loss 2017    Prostate CA     Prostate hypertrophy     Rotator cuff syndrome      Past Surgical History:   Procedure Laterality Date    APPENDECTOMY      CHOLECYSTECTOMY      COLON RESECTION N/A 2017    Procedure: LAPAROSCOPIC ASSISTED RIGHT COLECTOMY;  Surgeon: Caroline Loomis MD;  Location: [facility];  Service:     COLONOSCOPY  2010    COLONOSCOPY N/A 2017    Procedure: COLONOSCOPY WITH ANESTHESIA;  Surgeon: Joaquín KATHLEEN  MD Tawanda;  Location: [facility] ENDOSCOPY;  Service:     ENDOSCOPY  03/27/2013    ENDOSCOPY N/A 8/25/2017    Procedure: ESOPHAGOGASTRODUODENOSCOPY WITH ANESTHESIA;  Surgeon: Joaquín Neff MD;  Location: [facility] ENDOSCOPY;  Service:     EYE SURGERY Left     CATARACT    INGUINAL HERNIA REPAIR Bilateral 11/1/2019    Procedure: OPEN BILATERAL INGUINAL HERNIA REPAIR WITH MESH;  Surgeon: Caroline Loomis MD;  Location: [facility] OR;  Service: General    INTERVENTIONAL RADIOLOGY PROCEDURE N/A 9/18/2017    Procedure: URETHRA DILITATION with dominguez catheter insertion;  Surgeon: Mamadou Paez MD;  Location:  [facility] OR;  Service:     PROSTATECTOMY      PROSTATECTOMY       PT Assessment (Last 12 Hours)       PT Evaluation and Treatment       Row Name 05/09/25 1349          Physical Therapy Time and Intention    Subjective Information no complaints  -     Document Type therapy note (daily note)  -     Mode of Treatment physical therapy  -       Row Name 05/09/25 1349          General Information    Patient/Family/Caregiver Comments/Observations WIFE  -     Existing Precautions/Restrictions fall  -       Row Name 05/09/25 1349          Pain    Pretreatment Pain Rating 0/10 - no pain  -     Posttreatment Pain Rating 0/10 - no pain  -       Row Name 05/09/25 1349          Bed Mobility    Supine-Sit [place] (Bed Mobility) supervision  -     Sit-Supine [place] (Bed Mobility) supervision  -       Row Name 05/09/25 1349          Transfers    Transfers sit-stand transfer;stand-sit transfer;toilet transfer  -       Row Name 05/09/25 1349          Sit-Stand Transfer    Sit-Stand [place] (Transfers) supervision  -       Row Name 05/09/25 1349          Stand-Sit Transfer    Stand-Sit [place] (Transfers) supervision  -       Row Name 05/09/25 1349          Gait/Stairs (Locomotion)    [place] Level (Gait) verbal cues;standby assist  -     Assistive Device (Gait) walker, front-wheeled  -      Distance in Feet (Gait) 150  150 X 2, one sitting rest  -       Row Name 05/09/25 1349          Positioning and Restraints    Pre-Treatment Position in bed  -     Post Treatment Position bed  -     In Bed fowlers;call light within reach;encouraged to call for assist;with family/caregiver  -               User Key  (r) = Recorded By, (t) = Taken By, (c) = Cosigned By      Initials Name Provider Type     Mary Thomas, PTA Physical Therapist Assistant                    Physical Therapy Education       Title: PT OT SLP Therapies (In Progress)       Topic: Physical Therapy (In Progress)       Point: Mobility training (Done)       Learning Progress Summary            Patient Acceptance, E,TB,D, VU,NR by  at 5/6/2025 1708    Comment: education re: purpose of PT/importance of activity, safety awareness w/ mobility; increase awareness of gait mechanics   Family Acceptance, E,TB,D, VU,NR by  at 5/6/2025 1708    Comment: education re: purpose of PT/importance of activity, safety awareness w/ mobility; increase awareness of gait mechanics                      Point: Home exercise program (Not Started)       Learner Progress:  Not documented in this visit.              Point: Precautions (Done)       Learning Progress Summary            Patient Acceptance, E,TB,D, VU,NR by  at 5/6/2025 1708    Comment: education re: purpose of PT/importance of activity, safety awareness w/ mobility; increase awareness of gait mechanics   Family Acceptance, E,TB,D, VU,NR by  at 5/6/2025 1708    Comment: education re: purpose of PT/importance of activity, safety awareness w/ mobility; increase awareness of gait mechanics                                      User Key       Initials Effective Dates Name Provider Type [facility] 08/02/18 -  Theresa Cuevas, PT Physical Therapist PT                  PT Recommendation and Plan     Plan of Care Reviewed With: patient  Progress: improving  Outcome Evaluation: pt very  cooperative and pleasant, did get emotional about missing his wife, pt amb community distances is hallway with rwx cga-sba, had pt try to rememeber his room and find his way back to his room, pt required assist to find his way back to his room, trans back to bed sba, pt would benefit from cont therapy   Outcome Measures       Row Name 05/09/25 1400 05/08/25 1100 05/07/25 0900       How much help from another person do you currently need...    Turning from your back to your side while in flat bed without using bedrails? 4  -AH 4  -AH 4  -AH    Moving from lying on back to sitting on the side of a flat bed without bedrails? 4  -AH 4  -AH 4  -AH    Moving to and from a bed to a chair (including a wheelchair)? 4  -AH 4  -AH 4  -AH    Standing up from a chair using your arms (e.g., wheelchair, bedside chair)? 4  -AH 4  -AH 4  -AH    Climbing 3-5 steps with a railing? 3  -AH 3  -AH 3  -AH    To walk in hospital room? 3  -AH 3  -AH 3  -AH    AM-PAC 6 Clicks Score (PT) 22  -AH 22  -AH 22  -AH       Functional Assessment    Outcome Measure Options AM-PAC 6 Clicks Basic Mobility (PT)  -AH AM-PAC 6 Clicks Basic Mobility (PT)  -AH AM-PAC 6 Clicks Basic Mobility (PT)  -AH              User Key  (r) = Recorded By, (t) = Taken By, (c) = Cosigned By      Initials Name Provider Type    Mary Jewell PTA Physical Therapist Assistant                     Time Calculation:    PT Charges       Row Name 05/09/25 1404             Time Calculation    Start Time 1349  -      Stop Time 1404  -      Time Calculation (min) 15 min  -AH      PT Received On 05/09/25  -         Time Calculation- PT    Total Timed Code Minutes- PT 15 minute(s)  -         Timed Charges    74118 - Gait Training Minutes  15  -AH         Total Minutes    Timed Charges Total Minutes 15  -AH       Total Minutes 15  -AH                User Key  (r) = Recorded By, (t) = Taken By, (c) = Cosigned By      Initials Name Provider Type    Mary Jewell PTA  Physical Therapist Assistant                  Therapy Charges for Today       Code Description Service Date Service Provider Modifiers Qty    13454560425 HC GAIT TRAINING EA 15 MIN 5/8/2025 Mary Thomas, PTA GP 2    75561955818 HC GAIT TRAINING EA 15 MIN 5/9/2025 Mary Thomas, PTA GP 1            PT G-Codes  Outcome Measure Options: AM-PAC 6 Clicks Basic Mobility (PT)  AM-PAC 6 Clicks Score (PT): 22  AM-PAC 6 Clicks Score (OT): 19    Mary Thomas, RADU  5/9/2025

## 2025-05-09 NOTE — PLAN OF CARE
Goal Outcome Evaluation:           Progress: improving     VSS.  Up with SBA and walker.  Safety maintained.  Alert to person only.  Cooperative with care today.  Wife at bedside.  Tolerating soft to chew diet. Family contemplating home with help vs. SNF.

## 2025-05-09 NOTE — THERAPY TREATMENT NOTE
Patient Name: Maikel Mathews  : 1929    MRN: [id number]                              Today's Date: 2025       Admit Date: 2025    Visit Dx:     ICD-10-CM ICD-9-CM   1. Acute renal failure, unspecified acute renal failure type  N17.9 584.9   2. Altered mental status, unspecified altered mental status type  R41.82 780.97   3. Aggressive behavior due to dementia  F03.918 294.21   4. Hypomagnesemia  E83.42 275.2   5. Dysphagia, unspecified type  R13.10 787.20   6. Impaired functional mobility and activity tolerance [Z74.09]  Z74.09 V49.89     Patient Active Problem List   Diagnosis    Anemia associated with nutritional deficiency    Iron deficiency anemia due to chronic blood loss    Cancer of right colon    Dizzinesses    Stage 3 chronic kidney disease    Encounter for hydration prior to CT scan    Low vitamin B12 level    Iron deficiency anemia    Cat bite    Cough    Low back pain    Pharyngitis    Spondylolisthesis, grade 1    Bilateral inguinal hernia    Type 2 diabetes mellitus with other specified complication    Acute renal failure    Change in mental status     Past Medical History:   Diagnosis Date    Arthritis     AVM (arteriovenous malformation)     Cancer     PROSTATE    Cataract     Colon cancer     Colon polyp     Diabetes mellitus     Diverticulosis     Dizzinesses 10/20/2017    GERD (gastroesophageal reflux disease)     Hemorrhoids     History of transfusion     Hypertension     Inguinal hernia     Iron deficiency anemia due to chronic blood loss 2017    Prostate CA     Prostate hypertrophy     Rotator cuff syndrome      Past Surgical History:   Procedure Laterality Date    APPENDECTOMY      CHOLECYSTECTOMY      COLON RESECTION N/A 2017    Procedure: LAPAROSCOPIC ASSISTED RIGHT COLECTOMY;  Surgeon: Caroline Loomis MD;  Location: [facility];  Service:     COLONOSCOPY  2010    COLONOSCOPY N/A 2017    Procedure: COLONOSCOPY WITH ANESTHESIA;  Surgeon: Joaquín Nfef,  MD;  Location: [facility] ENDOSCOPY;  Service:     ENDOSCOPY  03/27/2013    ENDOSCOPY N/A 8/25/2017    Procedure: ESOPHAGOGASTRODUODENOSCOPY WITH ANESTHESIA;  Surgeon: Joaquín Neff MD;  Location: [facility] ENDOSCOPY;  Service:     EYE SURGERY Left     CATARACT    INGUINAL HERNIA REPAIR Bilateral 11/1/2019    Procedure: OPEN BILATERAL INGUINAL HERNIA REPAIR WITH MESH;  Surgeon: Caroline Loomis MD;  Location: [facility] OR;  Service: General    INTERVENTIONAL RADIOLOGY PROCEDURE N/A 9/18/2017    Procedure: URETHRA DILITATION with dominguez catheter insertion;  Surgeon: Mamadou Paez MD;  Location: [facility] OR;  Service:     PROSTATECTOMY      PROSTATECTOMY        General Information       Row Name 05/09/25 1415          OT Time and Intention    Subjective Information no complaints  -MM     Document Type therapy note (daily note)  -MM     Mode of Treatment occupational therapy  -MM       Row Name 05/09/25 1415          General Information    Patient Profile Reviewed yes  -MM     Existing Precautions/Restrictions fall  -MM     Barriers to Rehab medically complex;previous functional deficit;cognitive status;hearing deficit  -MM       Row Name 05/09/25 1415          Cognition    Orientation Status (Cognition) oriented to;person  -MM       Row Name 05/09/25 1415          Safety Issues/Impairments Affecting Functional Mobility    Safety Issues Affecting Function (Mobility) at risk behavior observed;awareness of need for assistance;insight into deficits/self-awareness;judgment;problem-solving;safety precaution awareness;safety precautions follow-through/compliance  -MM     Impairments Affecting Function (Mobility) balance;cognition;endurance/activity tolerance;strength  -MM     Cognitive Impairments, Mobility Safety/Performance attention;awareness, need for assistance;insight into deficits/self-awareness;judgment;problem-solving/reasoning;safety precaution awareness;safety precaution follow-through  -MM               User Key   (r) = Recorded By, (t) = Taken By, (c) = Cosigned By      Initials Name Provider Type    Dominick Pfeiffer, OTR/L Occupational Therapist                     Mobility/ADL's       Row Name 05/09/25 1415          Activities of Daily Living    BADL Assessment/Intervention grooming;feeding  -MM       Row Name 05/09/25 1415          Grooming Assessment/Training    [place] Level (Grooming) oral care regimen;wash face, hands;supervision;set up;verbal cues;hair care, combing/brushing;maximum assist (25% patient effort)  -MM     Position (Grooming) sitting up in bed  -       Row Name 05/09/25 1415          Self-Feeding Assessment/Training    [place] Level (Feeding) liquids to mouth;supervision  -MM     Position (Feeding) sitting up in bed  -               User Key  (r) = Recorded By, (t) = Taken By, (c) = Cosigned By      Initials Name Provider Type    Dominick Pfeiffer, OTR/L Occupational Therapist                   Obj/Interventions    No documentation.                  Goals/Plan    No documentation.                  Clinical Impression       Row Name 05/09/25 [facility]5          Pain Assessment    Pretreatment Pain Rating 0/10 - no pain  -MM     Posttreatment Pain Rating 0/10 - no pain  -MM       [facility] Name 05/09/25 [facility]          Plan of Care Review    Plan of Care Reviewed With patient;spouse  -MM     Progress no change  -MM     Outcome Evaluation OT tx completed. Pt is alert and oriented to self. Pt denies OOB activity d/t recently walking with PT. Pt's spouse is present. Pt requires encouragement to participate in grooming tasks, but once presented with toothbrush pt was pleasant and agreeable to therapy. Pt was supervision with set up and verbal cues for oral hygiene tasks and to wash face. Pt was min A to comb hair. Pt was supervision for liquids to mouth. Continue OT POC.  -MM       Row Name 05/09/25 1415          Therapy Plan Review/Discharge Plan (OT)    Anticipated Discharge Disposition (OT) skilled  nursing facility  -MM       Row Name 05/09/25 1415          Positioning and Restraints    Pre-Treatment Position in bed  -MM     Post Treatment Position bed  -MM     In Bed fowlers;call light within reach;encouraged to call for assist;with family/caregiver;side rails up x2  -MM               User Key  (r) = Recorded By, (t) = Taken By, (c) = Cosigned By      Initials Name Provider Type    MM Dominick Tay, OTR/L Occupational Therapist                   Outcome Measures       Row Name 05/09/25 1415          How much help from another is currently needed...    Putting on and taking off regular lower body clothing? 3  -MM     Bathing (including washing, rinsing, and drying) 3  -MM     Toileting (which includes using toilet bed pan or urinal) 3  -MM     Putting on and taking off regular upper body clothing 3  -MM     Taking care of personal grooming (such as brushing teeth) 3  -MM     Eating meals 4  -MM     AM-PAC 6 Clicks Score (OT) 19  -MM       Row Name 05/09/25 1400 05/09/25 0826       How much help from another person do you currently need...    Turning from your back to your side while in flat bed without using bedrails? 4  -AH 4  -PB    Moving from lying on back to sitting on the side of a flat bed without bedrails? 4  -AH 4  -PB    Moving to and from a bed to a chair (including a wheelchair)? 4  -AH 4  -PB    Standing up from a chair using your arms (e.g., wheelchair, bedside chair)? 4  -AH 4  -PB    Climbing 3-5 steps with a railing? 3  -AH 3  -PB    To walk in hospital room? 3  -AH 3  -PB    AM-PAC 6 Clicks Score (PT) 22  -AH 22  -PB    Highest Level of Mobility Goal 7 --> Walk 25 feet or more  -AH 7 --> Walk 25 feet or more  -PB      Row Name 05/09/25 1415 05/09/25 1400       Functional Assessment    Outcome Measure Options AM-PAC 6 Clicks Daily Activity (OT)  -MM AM-PAC 6 Clicks Basic Mobility (PT)  -AH              User Key  (r) = Recorded By, (t) = Taken By, (c) = Cosigned By      Initials Name  Provider Type     Mary Thomas, PTA Physical Therapist Assistant    Jen Welch, RN Registered Nurse    MM Dominick Tay, OTR/L Occupational Therapist                    Occupational Therapy Education       Title: PT OT SLP Therapies (In Progress)       Topic: Occupational Therapy (In Progress)       Point: ADL training (Done)       Learning Progress Summary            Patient Acceptance, E, VU,NR by  at 5/9/2025 1503    Acceptance, E, VU by MT at 5/8/2025 0954    Comment: fall risk. safety    Nonacceptance, E,D, NR by  at 5/6/2025 0857   Family Acceptance, E, VU,NR by  at 5/9/2025 1503                      Point: Precautions (In Progress)       Learning Progress Summary            Patient Nonacceptance, E,D, NR by  at 5/6/2025 0857                      Point: Body mechanics (In Progress)       Learning Progress Summary            Patient Nonacceptance, E,D, NR by  at 5/6/2025 0857                                      User Key       Initials Effective Dates Name Provider Type Discipline     07/11/23 -  Rayne Lake OTR/L Occupational Therapist OT     07/11/23 -  Dominick Tay, OTR/L Occupational Therapist OT    MT 06/16/21 -  Namita Luciano COTA Occupational Therapist Assistant OT                  OT Recommendation and Plan     Plan of Care Review  Plan of Care Reviewed With: patient, spouse  Progress: no change  Outcome Evaluation: OT tx completed. Pt is alert and oriented to self. Pt denies OOB activity d/t recently walking with PT. Pt's spouse is present. Pt requires encouragement to participate in grooming tasks, but once presented with toothbrush pt was pleasant and agreeable to therapy. Pt was supervision with set up and verbal cues for oral hygiene tasks and to wash face. Pt was min A to comb hair. Pt was supervision for liquids to mouth. Continue OT POC.     Time Calculation:         Time Calculation- OT       Row Name 05/09/25 1415 05/09/25 1404          Time  Calculation- OT    OT Start Time 1415  -MM --     OT Stop Time 1440  -MM --     OT Time Calculation (min) 25 min  -MM --     Total Timed Code Minutes- OT 25 minute(s)  -MM --     OT Received On 05/09/25  -MM --        Timed Charges    87700 - Gait Training Minutes  -- 15  -AH     11386 - OT Self Care/Mgmt Minutes 25  -MM --        Total Minutes    Timed Charges Total Minutes 25  -MM 15  -AH      Total Minutes 25  -MM 15  -AH               User Key  (r) = Recorded By, (t) = Taken By, (c) = Cosigned By      Initials Name Provider Type     Mary Thomas, PTA Physical Therapist Assistant    MM Domniick Tay, OTR/L Occupational Therapist                  Therapy Charges for Today       Code Description Service Date Service Provider Modifiers Qty    69748970700 HC OT SELF CARE/MGMT/TRAIN EA 15 MIN 5/9/2025 Dominick Tay, OTR/L GO 2                 Dominick Tay OTR/ALINA  5/9/2025

## 2025-05-09 NOTE — PLAN OF CARE
Goal Outcome Evaluation:  Plan of Care Reviewed With: patient, spouse        Progress: no change  Outcome Evaluation: See note                  Treatment Assessment (SLP): improved (05/09/25 1430)  Treatment Assessment Comments (SLP): Discharge (05/09/25 1430)  Plan for Continued Treatment (SLP): treatment no longer indicated as all goals met (05/09/25 1430)

## 2025-05-09 NOTE — PLAN OF CARE
Goal Outcome Evaluation:  Plan of Care Reviewed With: patient        Progress: no change  Outcome Evaluation: Patient denies pain, voiding, up w assist x1 and walker, alert to self, spouse at bedside, bed check on, rm air, pt has been calm and cooperative, pt has slept well this shift.

## 2025-05-09 NOTE — CASE MANAGEMENT/SOCIAL WORK
Continued Stay Note  [facility]     Patient Name: Maikel Mathews  MRN: [id number]  Today's Date: 5/9/2025    Admit Date: 5/4/2025    Plan: SNF   Discharge Plan       Row Name 05/09/25 1644       Plan    Plan Comments Sitter was dc'd yesterday. Noted pt was able to ambulate 300ft with therapy and insurance will not cover snf/rehab. Only option is home with family when medically stable.      Row Name 05/09/25 1640       Plan    Plan Comments  [facility] to see if they can re-eval pt as sitter was dc'd. Once bed offered and accepted, precert will be started. Noted pt ambulated 80ft so there is a chance insurance may deny snf.                   Discharge Codes    No documentation.                       LAURIE Colby

## 2025-05-09 NOTE — PLAN OF CARE
Problem: Adult Inpatient Plan of Care  Goal: Plan of Care Review  Recent Flowsheet Documentation  Taken 5/9/2025 1415 by Dominick Tay, OTR/L  Progress: no change  Outcome Evaluation: OT tx completed. Pt is alert and oriented to self. Pt denies OOB activity d/t recently walking with PT. Pt's spouse is present. Pt requires encouragement to participate in grooming tasks, but once presented with toothbrush pt was pleasant and agreeable to therapy. Pt was supervision with set up and verbal cues for oral hygiene tasks and to wash face. Pt was min A to comb hair. Pt was supervision for liquids to mouth. Continue OT POC.

## 2025-05-09 NOTE — THERAPY DISCHARGE NOTE
Acute Care - Speech Language Pathology   Swallow Treatment Note/Discharge  Flo     Patient Name: Maikel Mathews  : 1929  MRN: [id number]  Today's Date: 2025               Admit Date: 2025  Pt completed trials of thin liquids with no overt s/s of aspiration. Pt and wife report no difficulty with food or drink. Pt likely at baseline with soft solids. Continue soft solids and thin liquids. SLP will sign off.   Juan Royal CCC-SLP 2025 15:02 CDT    Visit Dx:    ICD-10-CM ICD-9-CM   1. Acute renal failure, unspecified acute renal failure type  N17.9 584.9   2. Altered mental status, unspecified altered mental status type  R41.82 780.97   3. Aggressive behavior due to dementia  F03.918 294.21   4. Hypomagnesemia  E83.42 275.2   5. Dysphagia, unspecified type  R13.10 787.20   6. Impaired functional mobility and activity tolerance [Z74.09]  Z74.09 V49.89     Patient Active Problem List   Diagnosis    Anemia associated with nutritional deficiency    Iron deficiency anemia due to chronic blood loss    Cancer of right colon    Dizzinesses    Stage 3 chronic kidney disease    Encounter for hydration prior to CT scan    Low vitamin B12 level    Iron deficiency anemia    Cat bite    Cough    Low back pain    Pharyngitis    Spondylolisthesis, grade 1    Bilateral inguinal hernia    Type 2 diabetes mellitus with other specified complication    Acute renal failure    Change in mental status     Past Medical History:   Diagnosis Date    Arthritis     AVM (arteriovenous malformation)     Cancer     PROSTATE    Cataract     Colon cancer     Colon polyp     Diabetes mellitus     Diverticulosis     Dizzinesses 10/20/2017    GERD (gastroesophageal reflux disease)     Hemorrhoids     History of transfusion     Hypertension     Inguinal hernia     Iron deficiency anemia due to chronic blood loss 2017    Prostate CA     Prostate hypertrophy     Rotator cuff syndrome      Past Surgical History:    Procedure Laterality Date    APPENDECTOMY      CHOLECYSTECTOMY      COLON RESECTION N/A 9/18/2017    Procedure: LAPAROSCOPIC ASSISTED RIGHT COLECTOMY;  Surgeon: Caroline Loomis MD;  Location: [facility] OR;  Service:     COLONOSCOPY  04/29/2010    COLONOSCOPY N/A 8/25/2017    Procedure: COLONOSCOPY WITH ANESTHESIA;  Surgeon: Joaquín Neff MD;  Location:  [facility] ENDOSCOPY;  Service:     ENDOSCOPY  03/27/2013    ENDOSCOPY N/A 8/25/2017    Procedure: ESOPHAGOGASTRODUODENOSCOPY WITH ANESTHESIA;  Surgeon: Joaquín Neff MD;  Location:  [facility];  Service:     EYE SURGERY Left     CATARACT    INGUINAL HERNIA REPAIR Bilateral 11/1/2019    Procedure: OPEN BILATERAL INGUINAL HERNIA REPAIR WITH MESH;  Surgeon: Caroline Loomis MD;  Location: [facility] OR;  Service: General    INTERVENTIONAL RADIOLOGY PROCEDURE N/A 9/18/2017    Procedure: URETHRA DILITATION with dominguez catheter insertion;  Surgeon: Mamadou Paez MD;  Location:  [facility] OR;  Service:     PROSTATECTOMY      PROSTATECTOMY         SLP Recommendation and Plan                    Anticipated Discharge Disposition (SLP): skilled nursing facility (05/09/25 1502)              Daily Summary of Progress (SLP): progress toward functional goals as expected (05/09/25 1430)     Anticipated Discharge Disposition (SLP): skilled nursing facility (05/09/25 1502)           Reason for Discharge: all goals and outcomes met, no further needs identified (05/09/25 1502)     Treatment Assessment (SLP): improved (05/09/25 1430)  Treatment Assessment Comments (SLP): Discharge (05/09/25 1430)  Plan for Continued Treatment (SLP): treatment no longer indicated as all goals met (05/09/25 1430)    Progress: no change (05/09/25 1500)  Outcome Evaluation: See note (05/09/25 1500)    SWALLOW EVALUATION (Last 72 Hours)       SLP Adult Swallow Evaluation       Row Name 05/09/25 1430 05/07/25 1240                Rehab Evaluation    Document Type discharge treatment  -MB --        Subjective Information no complaints  -MB --       Patient Observations alert;cooperative  -MB --       Patient/Family/Caregiver Comments/Observations Wife present  -MB --       Session Not Performed -- patient/family declined, not feeling well  -MD       Patient Effort good  -MB --       Comment -- Patient family refused treatment.  -MD          General Information    Patient Profile Reviewed yes  -MB --          Pain    Additional Documentation Pain Scale: FACES Pre/Post-Treatment (Group)  -MB --          Pain Scale: FACES Pre/Post-Treatment    Pain: FACES Scale, Pretreatment 0-->no hurt  -MB --          SLP Treatment Clinical Impressions    Treatment Assessment (SLP) improved  -MB --       Treatment Assessment Comments (SLP) Discharge  -MB --       Daily Summary of Progress (SLP) progress toward functional goals as expected  -MB --       Plan for Continued Treatment (SLP) treatment no longer indicated as all goals met  -MB --          (LTG) Swallow    (LTG) Swallow Patient will tolerate LRD without s/s of aspiration  -MB --       [place]offe[place] (Swallow Long Term Goal) independently (over 90% accuracy)  -MB --       Time Frame (Swallow Long Term Goal) by discharge  -MB --       Barriers (Swallow Long Term Goal) Cognitive Status  -MB --       Progress/Outcomes (Swallow Long Term Goal) goal met  -MB --          (STG) Patient will tolerate trials of    Consistencies Trialed (Tolerate trials) soft to chew (chopped) textures;thin liquids  -MB --       Desired Outcome (Tolerate trials) without signs/symptoms of aspiration  -MB --       [place]offey (Tolerate trials) independently (over 90% accuracy)  -MB --       Time Frame (Tolerate trials) by discharge  -MB --       Progress/Outcomes (Tolerate trials) goal met  -MB --                 User Key  (r) = Recorded By, (t) = Taken By, (c) = Cosigned By      Initials Name Effective Dates    Juan Ragsdale, CCC-SLP 02/03/23 -     Humera Valencia, SLP 06/21/22 -                      EDUCATION  The patient has been educated in the following areas:   Dysphagia (Swallowing Impairment).         SLP GOALS       Row Name 05/09/25 1430             (LTG) Swallow    (LTG) Swallow Patient will tolerate LRD without s/s of aspiration  -MB      [place]Donald (Swallow Long Term Goal) independently (over 90% accuracy)  -MB      Time Frame (Swallow Long Term Goal) by discharge  -MB      Barriers (Swallow Long Term Goal) Cognitive Status  -MB      Progress/Outcomes (Swallow Long Term Goal) goal met  -MB         (STG) Patient will tolerate trials of    Consistencies Trialed (Tolerate trials) soft to chew (chopped) textures;thin liquids  -MB      Desired Outcome (Tolerate trials) without signs/symptoms of aspiration  -MB      [place] (Tolerate trials) independently (over 90% accuracy)  -MB      Time Frame (Tolerate trials) by discharge  -MB      Progress/Outcomes (Tolerate trials) goal met  -MB                User Key  (r) = Recorded By, (t) = Taken By, (c) = Cosigned By      Initials Name Provider Type    Juan Ragsdale CCC-SLP Speech and Language Pathologist                           Time Calculation:    Time Calculation- SLP       Row Name 05/09/25 1502             Time Calculation- SLP    SLP Start Time 1431  -MB      SLP Stop Time 1502  -MB      SLP Time Calculation (min) 31 min  -MB      SLP Received On 05/09/25  -MB         Untimed Charges    25434-MA Treatment Swallow Minutes 31  -MB         Total Minutes    Untimed Charges Total Minutes 31  -MB       Total Minutes 31  -MB                User Key  (r) = Recorded By, (t) = Taken By, (c) = Cosigned By      Initials Name Provider Type    Juan Ragsdale CCC-SLP Speech and Language Pathologist                    Therapy Charges for Today       Code Description Service Date Service Provider Modifiers Qty    69352239386  ST TREATMENT SWALLOW 2 5/9/2025 Juan Royal CCC-SLP GN 1                 SLP Discharge  Summary  Anticipated Discharge Disposition (SLP): skilled nursing facility  Reason for Discharge: all goals and outcomes met, no further needs identified  Progress Toward Achieving Short/long Term Goals: all goals met within established timelines  Discharge Destination: other (see comments) (Still in acute care)    Juan Royal, DAE-SLP  5/9/2025

## 2025-05-10 LAB
ANION GAP SERPL CALCULATED.3IONS-SCNC: 9 MMOL/L (ref 5–15)
BUN SERPL-MCNC: 41 MG/DL (ref 8–23)
BUN/CREAT SERPL: 13.5 (ref 7–25)
CALCIUM SPEC-SCNC: 7.8 MG/DL (ref 8.2–9.6)
CHLORIDE SERPL-SCNC: 108 MMOL/L (ref 98–107)
CO2 SERPL-SCNC: 19 MMOL/L (ref 22–29)
CREAT SERPL-MCNC: 3.04 MG/DL (ref 0.76–1.27)
EGFRCR SERPLBLD CKD-EPI 2021: 18.3 ML/MIN/1.73
GLUCOSE BLDC GLUCOMTR-MCNC: 135 MG/DL (ref 70–130)
GLUCOSE BLDC GLUCOMTR-MCNC: 158 MG/DL (ref 70–130)
GLUCOSE BLDC GLUCOMTR-MCNC: 164 MG/DL (ref 70–130)
GLUCOSE BLDC GLUCOMTR-MCNC: 179 MG/DL (ref 70–130)
GLUCOSE SERPL-MCNC: 167 MG/DL (ref 65–99)
POTASSIUM SERPL-SCNC: 4.1 MMOL/L (ref 3.5–5.2)
SODIUM SERPL-SCNC: 136 MMOL/L (ref 136–145)

## 2025-05-10 PROCEDURE — 97110 THERAPEUTIC EXERCISES: CPT

## 2025-05-10 PROCEDURE — 25010000002 HEPARIN (PORCINE) PER 1000 UNITS: Performed by: INTERNAL MEDICINE

## 2025-05-10 PROCEDURE — 97116 GAIT TRAINING THERAPY: CPT

## 2025-05-10 PROCEDURE — 63710000001 INSULIN LISPRO (HUMAN) PER 5 UNITS: Performed by: HOSPITALIST

## 2025-05-10 PROCEDURE — 25010000002 CYANOCOBALAMIN PER 1000 MCG: Performed by: INTERNAL MEDICINE

## 2025-05-10 PROCEDURE — 80048 BASIC METABOLIC PNL TOTAL CA: CPT | Performed by: INTERNAL MEDICINE

## 2025-05-10 PROCEDURE — 82948 REAGENT STRIP/BLOOD GLUCOSE: CPT

## 2025-05-10 RX ADMIN — INSULIN LISPRO 2 UNITS: 100 INJECTION, SOLUTION INTRAVENOUS; SUBCUTANEOUS at 13:02

## 2025-05-10 RX ADMIN — INSULIN LISPRO 2 UNITS: 100 INJECTION, SOLUTION INTRAVENOUS; SUBCUTANEOUS at 21:02

## 2025-05-10 RX ADMIN — AMLODIPINE BESYLATE 10 MG: 10 TABLET ORAL at 08:36

## 2025-05-10 RX ADMIN — HEPARIN SODIUM 5000 UNITS: 5000 INJECTION, SOLUTION INTRAVENOUS; SUBCUTANEOUS at 08:36

## 2025-05-10 RX ADMIN — QUETIAPINE FUMARATE 50 MG: 25 TABLET ORAL at 20:55

## 2025-05-10 RX ADMIN — SODIUM BICARBONATE 650 MG: 650 TABLET ORAL at 08:36

## 2025-05-10 RX ADMIN — INSULIN LISPRO 2 UNITS: 100 INJECTION, SOLUTION INTRAVENOUS; SUBCUTANEOUS at 17:36

## 2025-05-10 RX ADMIN — SODIUM BICARBONATE 650 MG: 650 TABLET ORAL at 20:55

## 2025-05-10 RX ADMIN — Medication 2000 UNITS: at 08:36

## 2025-05-10 RX ADMIN — CYANOCOBALAMIN 1000 MCG: 1000 INJECTION, SOLUTION INTRAMUSCULAR at 08:36

## 2025-05-10 RX ADMIN — HEPARIN SODIUM 5000 UNITS: 5000 INJECTION, SOLUTION INTRAVENOUS; SUBCUTANEOUS at 20:55

## 2025-05-10 NOTE — THERAPY TREATMENT NOTE
Acute Care - Physical Therapy Treatment Note  [facility]     Patient Name: Maikel Mathews  : 1929  MRN: [id number]  Today's Date: 5/10/2025      Visit Dx:     ICD-10-CM ICD-9-CM   1. Acute renal failure, unspecified acute renal failure type  N17.9 584.9   2. Altered mental status, unspecified altered mental status type  R41.82 780.97   3. Aggressive behavior due to dementia  F03.918 294.21   4. Hypomagnesemia  E83.42 275.2   5. Dysphagia, unspecified type  R13.10 787.20   6. Impaired functional mobility and activity tolerance [Z74.09]  Z74.09 V49.89     Patient Active Problem List   Diagnosis    Anemia associated with nutritional deficiency    Iron deficiency anemia due to chronic blood loss    Cancer of right colon    Dizzinesses    Stage 3 chronic kidney disease    Encounter for hydration prior to CT scan    Low vitamin B12 level    Iron deficiency anemia    Cat bite    Cough    Low back pain    Pharyngitis    Spondylolisthesis, grade 1    Bilateral inguinal hernia    Type 2 diabetes mellitus with other specified complication    Acute renal failure    Change in mental status     Past Medical History:   Diagnosis Date    Arthritis     AVM (arteriovenous malformation)     Cancer     PROSTATE    Cataract     Colon cancer     Colon polyp     Diabetes mellitus     Diverticulosis     Dizzinesses 10/20/2017    GERD (gastroesophageal reflux disease)     Hemorrhoids     History of transfusion     Hypertension     Inguinal hernia     Iron deficiency anemia due to chronic blood loss 2017    Prostate CA     Prostate hypertrophy     Rotator cuff syndrome      Past Surgical History:   Procedure Laterality Date    APPENDECTOMY      CHOLECYSTECTOMY      COLON RESECTION N/A 2017    Procedure: LAPAROSCOPIC ASSISTED RIGHT COLECTOMY;  Surgeon: Caroline Loomis MD;  Location: [facility];  Service:     COLONOSCOPY  2010    COLONOSCOPY N/A 2017    Procedure: COLONOSCOPY WITH ANESTHESIA;  Surgeon: Joaquín KATHLEEN  MD Tawanda;  Location: [facility] ENDOSCOPY;  Service:     ENDOSCOPY  03/27/2013    ENDOSCOPY N/A 8/25/2017    Procedure: ESOPHAGOGASTRODUODENOSCOPY WITH ANESTHESIA;  Surgeon: Joaquín Neff MD;  Location: [facility] ENDOSCOPY;  Service:     EYE SURGERY Left     CATARACT    INGUINAL HERNIA REPAIR Bilateral 11/1/2019    Procedure: OPEN BILATERAL INGUINAL HERNIA REPAIR WITH MESH;  Surgeon: Caroline Loomis MD;  Location: [facility] OR;  Service: General    INTERVENTIONAL RADIOLOGY PROCEDURE N/A 9/18/2017    Procedure: URETHRA DILITATION with dominguez catheter insertion;  Surgeon: Mamadou Paez MD;  Location:  [facility] OR;  Service:     PROSTATECTOMY      PROSTATECTOMY       PT Assessment (Last 12 Hours)       PT Evaluation and Treatment       Row Name 05/10/25 1023          Physical Therapy Time and Intention    Subjective Information no complaints  -     Document Type therapy note (daily note)  -     Mode of Treatment physical therapy  -[facility] Name 05/10/25 1023          General Information    Existing Precautions/Restrictions fall  -[facility] Name 05/10/25 1023          Pain    Pretreatment Pain Rating 0/10 - no pain  -     Posttreatment Pain Rating 0/10 - no pain  -[facility] Name 05/10/25 1023          Bed Mobility    Supine-Sit [place] (Bed Mobility) supervision  -[facility] Name 05/10/25 1023          Sit-Stand Transfer    Sit-Stand [place] (Transfers) verbal cues;contact guard  -[facility] Name 05/10/25 1023          Stand-Sit Transfer    Stand-Sit [place] (Transfers) verbal cues;contact guard  -[facility] Name 05/10/25 1023          Toilet Transfer    Type (Toilet Transfer) sit-stand;stand-sit  -     [place] Level (Toilet Transfer) verbal cues;contact guard  -     Assistive Device (Toilet Transfer) commode;grab bars/safety frame;walker, front-wheeled  -       Row Name 05/10/25 1023          Gait/Stairs (Locomotion)    [place] Level (Gait) verbal cues;contact guard   -CATY     Assistive Device (Gait) walker, front-wheeled  -CATY     Distance in Feet (Gait) 100  x 2 with sitting rest  -CATY     Deviations/Abnormal Patterns (Gait) base of support, narrow  -CATY     Bilateral Gait Deviations forward flexed posture  -CATY       Row Name 05/10/25 1023          Positioning and Restraints    Pre-Treatment Position in bed  -CATY     Post Treatment Position chair  -CATY     In Chair sitting;call light within reach;encouraged to call for assist;exit alarm on  -CATY               User Key  (r) = Recorded By, (t) = Taken By, (c) = Cosigned By      Initials Name Provider Type    Faisal Rosales PTA Physical Therapist Assistant                    Physical Therapy Education       Title: PT OT SLP Therapies (In Progress)       Topic: Physical Therapy (In Progress)       Point: Mobility training (Done)       Learning Progress Summary            Patient Acceptance, E,TB,D, VU,NR by  at 5/6/2025 1708    Comment: education re: purpose of PT/importance of activity, safety awareness w/ mobility; increase awareness of gait mechanics   Family Acceptance, E,TB,D, VU,NR by  at 5/6/2025 1708    Comment: education re: purpose of PT/importance of activity, safety awareness w/ mobility; increase awareness of gait mechanics                      Point: Home exercise program (Not Started)       Learner Progress:  Not documented in this visit.              Point: Precautions (Done)       Learning Progress Summary            Patient Acceptance, E,TB,D, VU,NR by  at 5/6/2025 1708    Comment: education re: purpose of PT/importance of activity, safety awareness w/ mobility; increase awareness of gait mechanics   Family Acceptance, E,TB,D, VU,NR by  at 5/6/2025 1708    Comment: education re: purpose of PT/importance of activity, safety awareness w/ mobility; increase awareness of gait mechanics                                      User Key       Initials Effective Dates Name Provider Type [facility] 08/02/18 -   Theresa Cuevas, PT Physical Therapist PT                  PT Recommendation and Plan         Outcome Measures       Row Name 05/10/25 1023 05/09/25 1400 05/08/25 1100       How much help from another person do you currently need...    Turning from your back to your side while in flat bed without using bedrails? 4  -CATY 4  -AH 4  -AH    Moving from lying on back to sitting on the side of a flat bed without bedrails? 4  -CATY 4  -AH 4  -AH    Moving to and from a bed to a chair (including a wheelchair)? 4  -CATY 4  -AH 4  -AH    Standing up from a chair using your arms (e.g., wheelchair, bedside chair)? 4  -CATY 4  -AH 4  -AH    Climbing 3-5 steps with a railing? 3  -CATY 3  -AH 3  -AH    To walk in hospital room? 3  -CATY 3  -AH 3  -AH    AM-PAC 6 Clicks Score (PT) 22  -CATY 22  -AH 22  -AH       Functional Assessment    Outcome Measure Options AM-PAC 6 Clicks Basic Mobility (PT)  -CATY AM-PAC 6 Clicks Basic Mobility (PT)  -AH AM-PAC 6 Clicks Basic Mobility (PT)  -AH              User Key  (r) = Recorded By, (t) = Taken By, (c) = Cosigned By      Initials Name Provider Type     Mary Thomas, RADU Physical Therapist Assistant    Faisal Rosales, RADU Physical Therapist Assistant                     Time Calculation:    PT Charges       Row Name 05/10/25 1023             Time Calculation    Start Time 1023  -CATY      Stop Time 1047  -CATY      Time Calculation (min) 24 min  -CATY      PT Received On 05/10/25  -CATY         Time Calculation- PT    Total Timed Code Minutes- PT 24 minute(s)  -CATY         Timed Charges    85128 - PT Therapeutic Exercise Minutes 10  -CATY      72110 - Gait Training Minutes  14  -CATY         Total Minutes    Timed Charges Total Minutes 24  -CATY       Total Minutes 24  -CATY                User Key  (r) = Recorded By, (t) = Taken By, (c) = Cosigned By      Initials Name Provider Type    Faisal Rosales, RADU Physical Therapist Assistant                  Therapy Charges for Today       Code Description  Service Date Service Provider Modifiers Qty    08647055460 HC GAIT TRAINING EA 15 MIN 5/10/2025 Faisal Moralez, PTA GP 1    29671074477 HC PT THER PROC EA 15 MIN 5/10/2025 Faisal Moralez, PTA GP 1            PT G-Codes  Outcome Measure Options: AM-PAC 6 Clicks Basic Mobility (PT)  AM-PAC 6 Clicks Score (PT): 22  AM-PAC 6 Clicks Score (OT): 19    Faisal Moralez PTA  5/10/2025

## 2025-05-10 NOTE — PROGRESS NOTES
[facility] Medicine Services  INPATIENT PROGRESS NOTE    Patient Name: Maikel Mathews  Date of Admission: 5/4/2025  Today's Date: 05/10/25  Length of Stay: 6  Primary Care Physician: David Barajas MD    Subjective   Chief Complaint: Follow-up  HPI   Therapist note reviewed: very cooperative and pleasant, did get emotional about missing his wife, pt amb community distances is hallway with rwx [facility]   Hemodynamically stable    Not required any geodon in the past few days    Long discussion with daughter and spouse.   They said they have the resources and daughter is aware they have the resources however she is limited by what they allow her to do     Patient talking to spouse over the phone  Not certain about the reliability of this spouse does well.  Feedback from nursing staff considered as well.  I tried to reach out daughter's phone number (Arabella) but to no avail.    Review of Systems   Limited information from the patient.  All pertinent negatives and positives are as above. All other systems have been reviewed and are negative unless otherwise stated.     Objective    Temp:  [97.4 °F (36.3 °C)-98.7 °F (37.1 °C)] 98 °F (36.7 °C)  Heart Rate:  [73-93] 93  Resp:  [16] 16  BP: (125-159)/(60-75) 139/75  Physical Exam    Laying comfortably in bed  No gross tremors    GEN: Seated comfortably on recliner speaking to spouse using hospital's phone  Spoke to wife over the phone.  She would like to take him back  I wish I could easily respect this but from discussion with daughter yesterday and spouse presence, safety is an issue.  I reached out to daughter but to no avail.    HEENT: Atraumatic, rachea midline  Lungs normal respiratory effort    ABD: soft, nt/nd, +BS, no guarding/rebound  Extremities: atraumatic, no cyanosis, no edema  Skin: no rashes or lesions    Psych: normal mood & affect    Results Review:  I have reviewed the labs, radiology results, and diagnostic  "studies.    Laboratory Data:   Results from last 7 days   Lab Units 05/06/25  0354 05/05/25  0850 05/04/25  1330   WBC 10*3/mm3 6.37 5.95 8.47   HEMOGLOBIN g/dL 9.5* 10.2* 10.7*   HEMATOCRIT % 29.3* 31.0* 33.0*   PLATELETS 10*3/mm3 206 213 241        Results from last 7 days   Lab Units 05/10/25  0445 05/09/25  1543 05/09/25  0319 05/08/25  0343 05/05/25  1559 05/05/25  0850 05/04/25  1330   SODIUM mmol/L 136 137  --  140   < > 139 135*   POTASSIUM mmol/L 4.1 4.2 4.0 4.2   < > 3.4* 3.9   CHLORIDE mmol/L 108* 108*  --  113*   < > 110* 104   CO2 mmol/L 19.0* 17.0*  --  17.0*   < > 16.0* 15.0*   BUN mg/dL 41* 44*  --  43*   < > 45* 52*   CREATININE mg/dL 3.04* 3.10*  --  3.19*   < > 3.17* 3.64*   CALCIUM mg/dL 7.8* 7.9* 8.0* 8.0*   < > 7.8* 7.8*   BILIRUBIN mg/dL  --   --   --  0.5  --  0.3 0.3   ALK PHOS U/L  --   --   --  66  --  75 84   ALT (SGPT) U/L  --   --   --  <5  --  6 6   AST (SGOT) U/L  --   --   --  18  --  14 16   GLUCOSE mg/dL 167* 195*  --  97   < > 191* 269*    < > = values in this interval not displayed.       Culture Data:   No results found for: \"BLOODCX\", \"URINECX\", \"WOUNDCX\", \"MRSACX\", \"RESPCX\", \"STOOLCX\"    Radiology Data:   Imaging Results (Last 24 Hours)       ** No results found for the last 24 hours. **            I have reviewed the patient's current medications.     Assessment/Plan   Assessment  Active Hospital Problems    Diagnosis     **Acute renal failure     Change in mental status            Medical Decision Making  Number and Complexity of problems:   Problem list  Confused  Lack of insight  ?  Dementia  Reportedly had aggressive behavior but none since admission  Hypomagnesemia resolved  Hypertension-stable  Hypokalemia-corrected  CKD stage IV with acute kidney injury  Anion gap metabolic acidosis likely from kidney injury  Diabetes mellitus type 2  Advanced age  B12 deficiency-started replacement 7 days.  Subsequent dosing will be decided at a later time.            Treatment " Plan  Continue supportive care  Renal ultrasound report reviewed  Continue antihypertensive medication  Continue sliding scale insulin with as needed hypoglycemic protocol  Social service to assist in discharge disposition    Nephrology following.  Appreciate assistance.    PT recommended skilled rehab placement.  Continue present management.    amLODIPine, 10 mg, Oral, Q24H  cholecalciferol, 2,000 Units, Oral, Daily  cyanocobalamin, 1,000 mcg, Intramuscular, Daily  heparin (porcine), 5,000 Units, Subcutaneous, Q12H  insulin lispro, 2-7 Units, Subcutaneous, 4x Daily AC & at Bedtime  QUEtiapine, 50 mg, Oral, Nightly  sodium bicarbonate, 650 mg, Oral, BID  sodium chloride, 10 mL, Intravenous, Q12H          Conditions and Status  Fair     [facility] Data  External documents reviewed: Reviewed epic record  Cardiac tracing (EKG, telemetry) interpretation: -  Radiology interpretation: y  Labs reviewed: y   Any tests that were considered but not ordered: None     Decision rules/scores evaluated (example [id number], Wells, etc):-     Discussed with: Patient , family and social service       Care Planning  Shared decision making: Patient  Code status and discussions: CODE STATUS indicates full support/CPR    Disposition  Social Determinants of Health that impact treatment or disposition: None identified at this time.  I expect the patient to be discharged to (?)          Electronically signed by Brayan Meehan MD, 05/10/25, 15:02 CDT.

## 2025-05-10 NOTE — PLAN OF CARE
Goal Outcome Evaluation:  Plan of Care Reviewed With: patient        Progress: improving  Outcome Evaluation: Alert to self and place, voiding, 2 loose BMs this shift, up w 1 and walker, bed check on, protective foam dressing changed to coccyx, no IV access, patients spouse was disoriented and unsteady this shift, DIL notified.

## 2025-05-10 NOTE — PLAN OF CARE
Problem: Adult Inpatient Plan of Care  Goal: Plan of Care Review  Outcome: Progressing  Flowsheets (Taken 5/10/2025 7108)  Outcome Evaluation: Patient RA. IV CDI, IID. No c/o pain. Alert x2. Up in the chair, alarm on. Tolerating diet. VSS, safety maintained.

## 2025-05-10 NOTE — PROGRESS NOTES
Nephrology ([facility]) Progress Note      Patient:  Maikel Mathews  YOB: 1929  Date of Service: 5/10/2025  MRN: [id number]   Acct: [id number]   Primary Care Physician: David Barajas MD  Advance Directive:   Code Status and Medical Interventions: CPR (Attempt to Resuscitate); Full Support   Ordered at: 05/04/25 1634     Code Status (Patient has no pulse and is not breathing):    CPR (Attempt to Resuscitate)     Medical Interventions (Patient has pulse or is breathing):    Full Support     Admit Date: 5/4/2025       Hospital Day: 6  Referring Provider: Perla Mireles MD      Patient personally seen and examined.  Complete chart including Consults, Notes, Operative Reports, Labs, Cardiology, and Radiology studies reviewed as able.        Subjective:  Maikel Mathews is a 89 y.o. male for whom we were consulted for evaluation and treatment of acute kidney injury.  He has a history of baseline chronic kidney disease stage 4 with creatinine 3.1 in January 2025.  He does not follow with nephrology.  He has a history of dementia, type 2 diabetes, hypertension.  He was brought to ER for change in mental status. Reportedly, he has been aggressive and making threatening statements toward his family. Per family, patient has not been compliant with medications and has not been eating or drinking well. Labs in ER revealed creatinine 3.64 and BUN 52.  He was admitted to medical floor. Patient was poor initial historian and not able to provide any detail or recent medical history. Denied nausea, vomiting, diarrhea.     Today, no overnight events.  Remains confused at times.  Seen without family.  Denied new complaints upon questioning.  Up in chair.     Allergies:  Patient has no known allergies.    Home Meds:  Medications Prior to Admission   Medication Sig Dispense Refill Last Dose/Taking    amLODIPine (NORVASC) 10 MG tablet Take 1 tablet by mouth Daily.  5 More than a month     glipiZIDE (GLUCOTROL) 5 MG ER tablet Take 1 tablet by mouth Daily.  2 More than a month       Medicines:  Current Facility-Administered Medications   Medication Dose Route Frequency Provider Last Rate Last Admin    acetaminophen (TYLENOL) tablet 650 mg  650 mg Oral Q4H PRN Perla Mireles MD   650 mg at 05/08/25 1231    Or    acetaminophen (TYLENOL) 160 MG/5ML oral solution 650 mg  650 mg Oral Q4H PRN Perla Mireles MD        Or    acetaminophen (TYLENOL) suppository 650 mg  650 mg Rectal Q4H PRN Perla Mireles MD        amLODIPine (NORVASC) tablet 10 mg  10 mg Oral Q24H Pablo Ortiz MD   10 mg at 05/10/25 0836    sennosides-docusate (PERICOLACE) 8.6-50 MG per tablet 2 tablet  2 tablet Oral BID PRN Perla Mireles MD        And    polyethylene glycol (MIRALAX) packet 17 g  17 g Oral Daily PRN Perla Mireles MD        And    bisacodyl (DULCOLAX) EC tablet 5 mg  5 mg Oral Daily PRN Perla Mireles MD        And    bisacodyl (DULCOLAX) suppository 10 mg  10 mg Rectal Daily PRN Perla Mireles MD        Calcium Replacement - Follow Nurse / BPA Driven Protocol   Not Applicable PRN Perla Mireles MD        cholecalciferol (VITAMIN D3) tablet 2,000 Units  2,000 Units Oral Daily Fahad Cool MD   2,000 Units at 05/10/25 0836    cyanocobalamin injection 1,000 mcg  1,000 mcg Intramuscular Daily Brayan Meehan MD   1,000 mcg at 05/10/25 0836    dextrose (D50W) (25 g/50 mL) IV injection 25 g  25 g Intravenous Q15 Min PRN Perla Mireles MD        dextrose (GLUTOSE) oral gel 15 g  15 g Oral Q15 Min PRN Perla Mireles MD        glucagon (GLUCAGEN) injection 1 mg  1 mg Intramuscular Q15 Min PRN Perla Mireles MD        heparin (porcine) 5000 UNIT/ML injection 5,000 Units  5,000 Units Subcutaneous Q12H Brayan Meehan MD   5,000 Units at 05/10/25 0836    hydrALAZINE (APRESOLINE) tablet 50 mg  50 mg Oral Q4H PRN Brayan Meehan MD        Insulin Lispro (humaLOG) injection 2-7 Units   2-7 Units Subcutaneous 4x Daily AC & at Bedtime Perla Mireles MD   2 Units at 05/10/25 1302    loperamide (IMODIUM) capsule 4 mg  4 mg Oral 4x Daily PRN Cuate Hurd MD        LORazepam (ATIVAN) injection 1 mg  1 mg Intravenous Q4H PRN Cuate Hurd MD        Magnesium Standard Dose Replacement - Follow Nurse / BPA Driven Protocol   Not Applicable PRN Perla Mireles MD        ondansetron (ZOFRAN) injection 4 mg  4 mg Intravenous Q6H PRN Perla Mireles MD        Phosphorus Replacement - Follow Nurse / BPA Driven Protocol   Not Applicable PRN Perla Mireles MD        Potassium Replacement - Follow Nurse / BPA Driven Protocol   Not Applicable PRN Perla Mireles MD        QUEtiapine (SEROquel) tablet 50 mg  50 mg Oral Nightly Brayan Meehan MD   50 mg at 05/09/25 2007    sodium bicarbonate tablet 650 mg  650 mg Oral BID Brayan Meehan MD   650 mg at 05/10/25 0836    sodium chloride 0.9 % flush 10 mL  10 mL Intravenous PRN Cabrera Robbins MD        sodium chloride 0.9 % flush 10 mL  10 mL Intravenous Q12H Perla Mireles MD   10 mL at 05/09/25 0845    sodium chloride 0.9 % flush 10 mL  10 mL Intravenous PRN Perla Mireles MD        sodium chloride 0.9 % infusion 40 mL  40 mL Intravenous PRN Perla Mireles MD        ziprasidone (GEODON) injection 20 mg  20 mg Intramuscular Q4H PRN Perla Mireles MD   20 mg at 05/06/25 1709       Past Medical History:  Past Medical History:   Diagnosis Date    Arthritis     AVM (arteriovenous malformation)     Cancer     PROSTATE    Cataract     Colon cancer     Colon polyp     Diabetes mellitus     Diverticulosis     Dizzinesses 10/20/2017    GERD (gastroesophageal reflux disease)     Hemorrhoids     History of transfusion     Hypertension     Inguinal hernia     Iron deficiency anemia due to chronic blood loss 7/26/2017    Prostate CA     Prostate hypertrophy     Rotator cuff syndrome        Past Surgical History:  Past  Surgical History:   Procedure Laterality Date    APPENDECTOMY      CHOLECYSTECTOMY      COLON RESECTION N/A 9/18/2017    Procedure: LAPAROSCOPIC ASSISTED RIGHT COLECTOMY;  Surgeon: Caroline Loomis MD;  Location: [facility] OR;  Service:     COLONOSCOPY  04/29/2010    COLONOSCOPY N/A 8/25/2017    Procedure: COLONOSCOPY WITH ANESTHESIA;  Surgeon: Joaquín Neff MD;  Location: [facility] ENDOSCOPY;  Service:     ENDOSCOPY  03/27/2013    ENDOSCOPY N/A 8/25/2017    Procedure: ESOPHAGOGASTRODUODENOSCOPY WITH ANESTHESIA;  Surgeon: Joaquín Neff MD;  Location: [facility] ENDOSCOPY;  Service:     EYE SURGERY Left     CATARACT    INGUINAL HERNIA REPAIR Bilateral 11/1/2019    Procedure: OPEN BILATERAL INGUINAL HERNIA REPAIR WITH MESH;  Surgeon: Caroline Loomis MD;  Location: [facility] OR;  Service: General    INTERVENTIONAL RADIOLOGY PROCEDURE N/A 9/18/2017    Procedure: URETHRA DILITATION with dominguez catheter insertion;  Surgeon: Mamadou Paez MD;  Location: [facility] OR;  Service:     PROSTATECTOMY      PROSTATECTOMY         Family History  Family History   Problem Relation Age of Onset    Colon cancer Neg Hx     Colon polyps Neg Hx        Social History  Social History     Socioeconomic History    Marital status:    Tobacco Use    Smoking status: Never    Smokeless tobacco: Never   Vaping Use    Vaping status: Never Used   Substance and Sexual Activity    Alcohol use: No    Drug use: No    Sexual activity: Defer       Review of Systems:  History obtained from chart review and the patient  General ROS: No fever or chills  Respiratory ROS: No cough, shortness of breath, wheezing  Cardiovascular ROS: No chest pain or palpitations  Gastrointestinal ROS: No abdominal pain or melena  Genito-Urinary ROS: No dysuria or hematuria  Psych ROS: No anxiety and depression  14 point ROS reviewed with the patient and negative except as noted above and in the HPI unless unable to obtain.    Objective:  Patient Vitals for the past 24  hrs:   BP Temp Temp src Pulse Resp SpO2   05/10/25 1310 139/75 98 °F (36.7 °C) Temporal 93 16 97 %   05/10/25 0812 159/74 97.9 °F (36.6 °C) Temporal 73 16 96 %   05/10/25 0500 132/60 98.2 °F (36.8 °C) Oral 86 16 98 %   05/09/25 2140 125/72 97.4 °F (36.3 °C) Oral 88 16 94 %   05/09/25 1525 140/70 98.7 °F (37.1 °C) Oral 83 16 94 %       Intake/Output Summary (Last 24 hours) at 5/10/2025 1338  Last data filed at 5/10/2025 0500  Gross per 24 hour   Intake 120 ml   Output --   Net 120 ml       General: awake / confused  Chest:  clear to auscultation bilaterally without respiratory distress  CVS: regular rate and rhythm  Abdominal: soft, nontender, positive bowel sounds  Extremities: no cyanosis or edema  Skin: warm and dry without rash      Labs:  Results from last 7 days   Lab Units 05/06/25  0354 05/05/25  0850 05/04/25  1330   WBC 10*3/mm3 6.37 5.95 8.47   HEMOGLOBIN g/dL 9.5* 10.2* 10.7*   HEMATOCRIT % 29.3* 31.0* 33.0*   PLATELETS 10*3/mm3 206 213 241         Results from last 7 days   Lab Units 05/10/25  0445 05/09/25  1543 05/09/25  0319 05/08/25  0343 05/05/25  1559 05/05/25  0850 05/04/25  1330   SODIUM mmol/L 136 137  --  140   < > 139 135*   POTASSIUM mmol/L 4.1 4.2 4.0 4.2   < > 3.4* 3.9   CHLORIDE mmol/L 108* 108*  --  113*   < > 110* 104   CO2 mmol/L 19.0* 17.0*  --  17.0*   < > 16.0* 15.0*   BUN mg/dL 41* 44*  --  43*   < > 45* 52*   CREATININE mg/dL 3.04* 3.10*  --  3.19*   < > 3.17* 3.64*   CALCIUM mg/dL 7.8* 7.9* 8.0* 8.0*   < > 7.8* 7.8*   EGFR mL/min/1.73 18.3* 17.8*  --  17.2*   < > 17.4* 14.7*   BILIRUBIN mg/dL  --   --   --  0.5  --  0.3 0.3   ALK PHOS U/L  --   --   --  66  --  75 84   ALT (SGPT) U/L  --   --   --  <5  --  6 6   AST (SGOT) U/L  --   --   --  18  --  14 16   GLUCOSE mg/dL 167* 195*  --  97   < > 191* 269*    < > = values in this interval not displayed.       Radiology:   Imaging Results (Last 72 Hours)       ** No results found for the last 72 hours. **            Culture:  No  "results found for: \"BLOODCX\", \"URINECX\", \"WOUNDCX\", \"MRSACX\", \"RESPCX\", \"STOOLCX\"      Assessment   Acute kidney injury  Chronic kidney disease stage IV  Hypertension  Diabetes type 2  Dementia  Metabolic acidosis  Anemia and chronic kidney disease  Hypokalemia  Secondary hyperparathyroidism  Vitamin D deficiency    Plan:  Discussed with patient, nursing  Workup reviewed today  Monitor labs  Ultrasound for further evaluation demonstrated no acute obstruction  Replace vitamin D  Continue to encourage oral intake off fluids  Potassium controlled        Fahad Cool MD  5/10/2025  13:38 CDT      "

## 2025-05-11 LAB
ANION GAP SERPL CALCULATED.3IONS-SCNC: 11 MMOL/L (ref 5–15)
BUN SERPL-MCNC: 41 MG/DL (ref 8–23)
BUN/CREAT SERPL: 13.1 (ref 7–25)
CALCIUM SPEC-SCNC: 7.8 MG/DL (ref 8.2–9.6)
CHLORIDE SERPL-SCNC: 106 MMOL/L (ref 98–107)
CO2 SERPL-SCNC: 21 MMOL/L (ref 22–29)
CREAT SERPL-MCNC: 3.12 MG/DL (ref 0.76–1.27)
EGFRCR SERPLBLD CKD-EPI 2021: 17.7 ML/MIN/1.73
GLUCOSE BLDC GLUCOMTR-MCNC: 137 MG/DL (ref 70–130)
GLUCOSE BLDC GLUCOMTR-MCNC: 141 MG/DL (ref 70–130)
GLUCOSE BLDC GLUCOMTR-MCNC: 170 MG/DL (ref 70–130)
GLUCOSE BLDC GLUCOMTR-MCNC: 249 MG/DL (ref 70–130)
GLUCOSE SERPL-MCNC: 137 MG/DL (ref 65–99)
POTASSIUM SERPL-SCNC: 4.6 MMOL/L (ref 3.5–5.2)
QT INTERVAL: 400 MS
QTC INTERVAL: 492 MS
SODIUM SERPL-SCNC: 138 MMOL/L (ref 136–145)

## 2025-05-11 PROCEDURE — 63710000001 INSULIN LISPRO (HUMAN) PER 5 UNITS: Performed by: HOSPITALIST

## 2025-05-11 PROCEDURE — 97116 GAIT TRAINING THERAPY: CPT

## 2025-05-11 PROCEDURE — 25010000002 CYANOCOBALAMIN PER 1000 MCG: Performed by: INTERNAL MEDICINE

## 2025-05-11 PROCEDURE — 25010000002 HEPARIN (PORCINE) PER 1000 UNITS: Performed by: INTERNAL MEDICINE

## 2025-05-11 PROCEDURE — 97110 THERAPEUTIC EXERCISES: CPT

## 2025-05-11 PROCEDURE — 80048 BASIC METABOLIC PNL TOTAL CA: CPT | Performed by: INTERNAL MEDICINE

## 2025-05-11 PROCEDURE — 25010000002 ZIPRASIDONE MESYLATE PER 10 MG: Performed by: HOSPITALIST

## 2025-05-11 PROCEDURE — 82948 REAGENT STRIP/BLOOD GLUCOSE: CPT

## 2025-05-11 RX ORDER — HYDROXYZINE HYDROCHLORIDE 25 MG/1
25 TABLET, FILM COATED ORAL 3 TIMES DAILY PRN
Status: DISCONTINUED | OUTPATIENT
Start: 2025-05-11 | End: 2025-05-13 | Stop reason: HOSPADM

## 2025-05-11 RX ORDER — QUETIAPINE FUMARATE 25 MG/1
25 TABLET, FILM COATED ORAL EVERY MORNING
Status: DISCONTINUED | OUTPATIENT
Start: 2025-05-11 | End: 2025-05-13 | Stop reason: HOSPADM

## 2025-05-11 RX ADMIN — AMLODIPINE BESYLATE 10 MG: 10 TABLET ORAL at 08:15

## 2025-05-11 RX ADMIN — INSULIN LISPRO 2 UNITS: 100 INJECTION, SOLUTION INTRAVENOUS; SUBCUTANEOUS at 20:01

## 2025-05-11 RX ADMIN — ZIPRASIDONE MESYLATE 20 MG: 20 INJECTION, POWDER, LYOPHILIZED, FOR SOLUTION INTRAMUSCULAR at 19:49

## 2025-05-11 RX ADMIN — QUETIAPINE FUMARATE 25 MG: 25 TABLET ORAL at 15:19

## 2025-05-11 RX ADMIN — Medication 2000 UNITS: at 08:16

## 2025-05-11 RX ADMIN — HEPARIN SODIUM 5000 UNITS: 5000 INJECTION, SOLUTION INTRAVENOUS; SUBCUTANEOUS at 19:49

## 2025-05-11 RX ADMIN — INSULIN LISPRO 3 UNITS: 100 INJECTION, SOLUTION INTRAVENOUS; SUBCUTANEOUS at 13:02

## 2025-05-11 RX ADMIN — QUETIAPINE FUMARATE 50 MG: 25 TABLET ORAL at 19:49

## 2025-05-11 RX ADMIN — HYDROXYZINE HYDROCHLORIDE 25 MG: 25 TABLET ORAL at 15:19

## 2025-05-11 RX ADMIN — SODIUM BICARBONATE 650 MG: 650 TABLET ORAL at 08:16

## 2025-05-11 RX ADMIN — CYANOCOBALAMIN 1000 MCG: 1000 INJECTION, SOLUTION INTRAMUSCULAR at 08:16

## 2025-05-11 RX ADMIN — SODIUM BICARBONATE 650 MG: 650 TABLET ORAL at 19:48

## 2025-05-11 RX ADMIN — HEPARIN SODIUM 5000 UNITS: 5000 INJECTION, SOLUTION INTRAVENOUS; SUBCUTANEOUS at 08:16

## 2025-05-11 NOTE — PLAN OF CARE
Problem: Adult Inpatient Plan of Care  Goal: Plan of Care Review  Outcome: Progressing  Flowsheets (Taken 5/11/2025 6990)  Outcome Evaluation: Patient RA. No IV, MD aware. No c/o pain. Up in chair, chair alarm. Alert and Oriented x1. Up x1 with a walker. Voiding per toilet. VSS, safety maintained.

## 2025-05-11 NOTE — PROGRESS NOTES
Nephrology ([facility]) Progress Note      Patient:  Maikel Mathews  YOB: 1929  Date of Service: 5/11/2025  MRN: [id number]   Acct: [id number]   Primary Care Physician: David Barajas MD  Advance Directive:   Code Status and Medical Interventions: CPR (Attempt to Resuscitate); Full Support   Ordered at: 05/04/25 1634     Code Status (Patient has no pulse and is not breathing):    CPR (Attempt to Resuscitate)     Medical Interventions (Patient has pulse or is breathing):    Full Support     Admit Date: 5/4/2025       Hospital Day: 7  Referring Provider: Perla Mireles MD      Patient personally seen and examined.  Complete chart including Consults, Notes, Operative Reports, Labs, Cardiology, and Radiology studies reviewed as able.        Subjective:  Maikel Mathews is a 89 y.o. male for whom we were consulted for evaluation and treatment of acute kidney injury.  He has a history of baseline chronic kidney disease stage 4 with creatinine 3.1 in January 2025.  He does not follow with nephrology.  He has a history of dementia, type 2 diabetes, hypertension.  He was brought to ER for change in mental status. Reportedly, he has been aggressive and making threatening statements toward his family. Per family, patient has not been compliant with medications and has not been eating or drinking well. Labs in ER revealed creatinine 3.64 and BUN 52.  He was admitted to medical floor. Patient was poor initial historian and not able to provide any detail or recent medical history. Denied nausea, vomiting, diarrhea.     Today, no overnight events.  Remains confused at times.  Seen without family.  Denied new complaints upon questioning.  Up in chair.  Seen with nursing.    Allergies:  Patient has no known allergies.    Home Meds:  Medications Prior to Admission   Medication Sig Dispense Refill Last Dose/Taking    amLODIPine (NORVASC) 10 MG tablet Take 1 tablet by mouth Daily.  5 More  than a month    glipiZIDE (GLUCOTROL) 5 MG ER tablet Take 1 tablet by mouth Daily.  2 More than a month       Medicines:  Current Facility-Administered Medications   Medication Dose Route Frequency Provider Last Rate Last Admin    acetaminophen (TYLENOL) tablet 650 mg  650 mg Oral Q4H PRN Perla Mireles MD   650 mg at 05/08/25 1231    Or    acetaminophen (TYLENOL) 160 MG/5ML oral solution 650 mg  650 mg Oral Q4H PRN Perla Mireles MD        Or    acetaminophen (TYLENOL) suppository 650 mg  650 mg Rectal Q4H PRN Perla Mireles MD        amLODIPine (NORVASC) tablet 10 mg  10 mg Oral Q24H Pablo Ortiz MD   10 mg at 05/11/25 0815    sennosides-docusate (PERICOLACE) 8.6-50 MG per tablet 2 tablet  2 tablet Oral BID PRN Perla Mireles MD        And    polyethylene glycol (MIRALAX) packet 17 g  17 g Oral Daily PRN Perla Mireles MD        And    bisacodyl (DULCOLAX) EC tablet 5 mg  5 mg Oral Daily PRN Perla Mireles MD        And    bisacodyl (DULCOLAX) suppository 10 mg  10 mg Rectal Daily PRN Perla Mireles MD        Calcium Replacement - Follow Nurse / BPA Driven Protocol   Not Applicable PRN Perla Mireles MD        cholecalciferol (VITAMIN D3) tablet 2,000 Units  2,000 Units Oral Daily Fahad Cool MD   2,000 Units at 05/11/25 0816    cyanocobalamin injection 1,000 mcg  1,000 mcg Intramuscular Daily Brayan Meehan MD   1,000 mcg at 05/11/25 0816    dextrose (D50W) (25 g/50 mL) IV injection 25 g  25 g Intravenous Q15 Min PRN Perla Mireles MD        dextrose (GLUTOSE) oral gel 15 g  15 g Oral Q15 Min PRN Perla Mireles MD        glucagon (GLUCAGEN) injection 1 mg  1 mg Intramuscular Q15 Min PRN Perla Mrieles MD        heparin (porcine) 5000 UNIT/ML injection 5,000 Units  5,000 Units Subcutaneous Q12H Brayan Meehan MD   5,000 Units at 05/11/25 0816    hydrALAZINE (APRESOLINE) tablet 50 mg  50 mg Oral Q4H PRN Brayan Meehan MD        Insulin Lispro (humaLOG)  injection 2-7 Units  2-7 Units Subcutaneous 4x Daily AC & at Bedtime Perla Mireles MD   2 Units at 05/10/25 2102    loperamide (IMODIUM) capsule 4 mg  4 mg Oral 4x Daily PRN Cuate Hurd MD        LORazepam (ATIVAN) injection 1 mg  1 mg Intravenous Q4H PRN Cuate Hurd MD        Magnesium Standard Dose Replacement - Follow Nurse / BPA Driven Protocol   Not Applicable PRN Perla Mireles MD        ondansetron (ZOFRAN) injection 4 mg  4 mg Intravenous Q6H PRN Perla Mireles MD        Phosphorus Replacement - Follow Nurse / BPA Driven Protocol   Not Applicable PRN Perla Mireles MD        Potassium Replacement - Follow Nurse / BPA Driven Protocol   Not Applicable PRN Perla Mireles MD        QUEtiapine (SEROquel) tablet 50 mg  50 mg Oral Nightly Brayan Meehan MD   50 mg at 05/10/25 2055    sodium bicarbonate tablet 650 mg  650 mg Oral BID Brayan Meehan MD   650 mg at 05/11/25 0816    sodium chloride 0.9 % flush 10 mL  10 mL Intravenous PRN Cabrera Robbins MD        sodium chloride 0.9 % flush 10 mL  10 mL Intravenous Q12H Perla Mireles MD   10 mL at 05/09/25 0845    sodium chloride 0.9 % flush 10 mL  10 mL Intravenous PRN Perla Mireles MD        sodium chloride 0.9 % infusion 40 mL  40 mL Intravenous PRN Perla Mireles MD        ziprasidone (GEODON) injection 20 mg  20 mg Intramuscular Q4H PRN Perla Mireles MD   20 mg at 05/06/25 1709       Past Medical History:  Past Medical History:   Diagnosis Date    Arthritis     AVM (arteriovenous malformation)     Cancer     PROSTATE    Cataract     Colon cancer     Colon polyp     Diabetes mellitus     Diverticulosis     Dizzinesses 10/20/2017    GERD (gastroesophageal reflux disease)     Hemorrhoids     History of transfusion     Hypertension     Inguinal hernia     Iron deficiency anemia due to chronic blood loss 7/26/2017    Prostate CA     Prostate hypertrophy     Rotator cuff syndrome        Past Surgical  History:  Past Surgical History:   Procedure Laterality Date    APPENDECTOMY      CHOLECYSTECTOMY      COLON RESECTION N/A 9/18/2017    Procedure: LAPAROSCOPIC ASSISTED RIGHT COLECTOMY;  Surgeon: Caroline Loomis MD;  Location: [facility] OR;  Service:     COLONOSCOPY  04/29/2010    COLONOSCOPY N/A 8/25/2017    Procedure: COLONOSCOPY WITH ANESTHESIA;  Surgeon: Joaquín Neff MD;  Location: [facility] ENDOSCOPY;  Service:     ENDOSCOPY  03/27/2013    ENDOSCOPY N/A 8/25/2017    Procedure: ESOPHAGOGASTRODUODENOSCOPY WITH ANESTHESIA;  Surgeon: Joaquín Neff MD;  Location: [facility] ENDOSCOPY;  Service:     EYE SURGERY Left     CATARACT    INGUINAL HERNIA REPAIR Bilateral 11/1/2019    Procedure: OPEN BILATERAL INGUINAL HERNIA REPAIR WITH MESH;  Surgeon: Caroline Loomis MD;  Location: [facility] OR;  Service: General    INTERVENTIONAL RADIOLOGY PROCEDURE N/A 9/18/2017    Procedure: URETHRA DILITATION with dominguez catheter insertion;  Surgeon: Mamadou Paez MD;  Location: [facility] OR;  Service:     PROSTATECTOMY      PROSTATECTOMY         Family History  Family History   Problem Relation Age of Onset    Colon cancer Neg Hx     Colon polyps Neg Hx        Social History  Social History     Socioeconomic History    Marital status:    Tobacco Use    Smoking status: Never    Smokeless tobacco: Never   Vaping Use    Vaping status: Never Used   Substance and Sexual Activity    Alcohol use: No    Drug use: No    Sexual activity: Defer       Review of Systems:  History obtained from chart review and the patient  General ROS: No fever or chills  Respiratory ROS: No cough, shortness of breath, wheezing  Cardiovascular ROS: No chest pain or palpitations  Gastrointestinal ROS: No abdominal pain or melena  Genito-Urinary ROS: No dysuria or hematuria  Psych ROS: No anxiety and depression  14 point ROS reviewed with the patient and negative except as noted above and in the HPI unless unable to obtain.    Objective:  Patient Vitals for  the past 24 hrs:   BP Temp Temp src Pulse Resp SpO2   05/11/25 1126 137/82 98.2 °F (36.8 °C) Oral 92 18 97 %   05/11/25 0804 159/75 98 °F (36.7 °C) Oral 80 18 97 %   05/11/25 0343 147/82 98.4 °F (36.9 °C) Oral 102 16 98 %   05/10/25 1921 142/62 98.2 °F (36.8 °C) Oral 83 16 96 %   05/10/25 1612 138/73 98.2 °F (36.8 °C) Oral 87 16 97 %   05/10/25 1310 139/75 98 °F (36.7 °C) Temporal 93 16 97 %       Intake/Output Summary (Last 24 hours) at 5/11/2025 1142  Last data filed at 5/11/2025 0343  Gross per 24 hour   Intake 0 ml   Output --   Net 0 ml       General: awake / confused  Chest:  clear to auscultation bilaterally without respiratory distress  CVS: regular rate and rhythm  Abdominal: soft, nontender, positive bowel sounds  Extremities: no cyanosis or edema  Skin: warm and dry without rash      Labs:  Results from last 7 days   Lab Units 05/06/25  0354 05/05/25  0850 05/04/25  1330   WBC 10*3/mm3 6.37 5.95 8.47   HEMOGLOBIN g/dL 9.5* 10.2* 10.7*   HEMATOCRIT % 29.3* 31.0* 33.0*   PLATELETS 10*3/mm3 206 213 241         Results from last 7 days   Lab Units 05/11/25  0515 05/10/25  0445 05/09/25  1543 05/09/25  0319 05/08/25  0343 05/05/25  1559 05/05/25  0850 05/04/25  1330   SODIUM mmol/L 138 136 137  --  140   < > 139 135*   POTASSIUM mmol/L 4.6 4.1 4.2   < > 4.2   < > 3.4* 3.9   CHLORIDE mmol/L 106 108* 108*  --  113*   < > 110* 104   CO2 mmol/L 21.0* 19.0* 17.0*  --  17.0*   < > 16.0* 15.0*   BUN mg/dL 41* 41* 44*  --  43*   < > 45* 52*   CREATININE mg/dL 3.12* 3.04* 3.10*  --  3.19*   < > 3.17* 3.64*   CALCIUM mg/dL 7.8* 7.8* 7.9*   < > 8.0*   < > 7.8* 7.8*   EGFR mL/min/1.73 17.7* 18.3* 17.8*  --  17.2*   < > 17.4* 14.7*   BILIRUBIN mg/dL  --   --   --   --  0.5  --  0.3 0.3   ALK PHOS U/L  --   --   --   --  66  --  75 84   ALT (SGPT) U/L  --   --   --   --  <5  --  6 6   AST (SGOT) U/L  --   --   --   --  18  --  14 16   GLUCOSE mg/dL 137* 167* 195*  --  97   < > 191* 269*    < > = values in this interval not  "displayed.       Radiology:   Imaging Results (Last 72 Hours)       ** No results found for the last 72 hours. **            Culture:  No results found for: \"BLOODCX\", \"URINECX\", \"WOUNDCX\", \"MRSACX\", \"RESPCX\", \"STOOLCX\"      Assessment   Acute kidney injury  Chronic kidney disease stage IV  Hypertension  Diabetes type 2  Dementia  Metabolic acidosis  Anemia and chronic kidney disease  Hypokalemia  Secondary hyperparathyroidism  Vitamin D deficiency    Plan:  Discussed with patient, nursing  Workup reviewed today  Monitor labs  Ultrasound for further evaluation demonstrated no acute obstruction  Replace vitamin D  Continue to encourage oral intake off fluids  Potassium controlled        Fahad Cool MD  5/11/2025  11:42 CDT      "

## 2025-05-11 NOTE — PLAN OF CARE
Goal Outcome Evaluation:  Plan of Care Reviewed With: patient        Progress: no change          Patient with no complaints of pain so far during shift. No IV access. Up with assist and walker. Patient slightly agitated this morning due to not being able to find his wallet; patient calmed down quickly. Safety maintained. Bed/chair alarm on.

## 2025-05-11 NOTE — THERAPY TREATMENT NOTE
Acute Care - Physical Therapy Treatment Note  [facility]     Patient Name: Maikel Mathews  : 1929  MRN: [id number]  Today's Date: 2025      Visit Dx:     ICD-10-CM ICD-9-CM   1. Acute renal failure, unspecified acute renal failure type  N17.9 584.9   2. Altered mental status, unspecified altered mental status type  R41.82 780.97   3. Aggressive behavior due to dementia  F03.918 294.21   4. Hypomagnesemia  E83.42 275.2   5. Dysphagia, unspecified type  R13.10 787.20   6. Impaired functional mobility and activity tolerance [Z74.09]  Z74.09 V49.89     Patient Active Problem List   Diagnosis    Anemia associated with nutritional deficiency    Iron deficiency anemia due to chronic blood loss    Cancer of right colon    Dizzinesses    Stage 3 chronic kidney disease    Encounter for hydration prior to CT scan    Low vitamin B12 level    Iron deficiency anemia    Cat bite    Cough    Low back pain    Pharyngitis    Spondylolisthesis, grade 1    Bilateral inguinal hernia    Type 2 diabetes mellitus with other specified complication    Acute renal failure    Change in mental status     Past Medical History:   Diagnosis Date    Arthritis     AVM (arteriovenous malformation)     Cancer     PROSTATE    Cataract     Colon cancer     Colon polyp     Diabetes mellitus     Diverticulosis     Dizzinesses 10/20/2017    GERD (gastroesophageal reflux disease)     Hemorrhoids     History of transfusion     Hypertension     Inguinal hernia     Iron deficiency anemia due to chronic blood loss 2017    Prostate CA     Prostate hypertrophy     Rotator cuff syndrome      Past Surgical History:   Procedure Laterality Date    APPENDECTOMY      CHOLECYSTECTOMY      COLON RESECTION N/A 2017    Procedure: LAPAROSCOPIC ASSISTED RIGHT COLECTOMY;  Surgeon: Caroline Loomis MD;  Location: [facility];  Service:     COLONOSCOPY  2010    COLONOSCOPY N/A 2017    Procedure: COLONOSCOPY WITH ANESTHESIA;  Surgeon: Joaquín KATHLEEN  MD Tawanda;  Location: [facility] ENDOSCOPY;  Service:     ENDOSCOPY  03/27/2013    ENDOSCOPY N/A 8/25/2017    Procedure: ESOPHAGOGASTRODUODENOSCOPY WITH ANESTHESIA;  Surgeon: Joaquín Neff MD;  Location: [facility] ENDOSCOPY;  Service:     EYE SURGERY Left     CATARACT    INGUINAL HERNIA REPAIR Bilateral 11/1/2019    Procedure: OPEN BILATERAL INGUINAL HERNIA REPAIR WITH MESH;  Surgeon: Caroline Loomis MD;  Location:  [facility] OR;  Service: General    INTERVENTIONAL RADIOLOGY PROCEDURE N/A 9/18/2017    Procedure: URETHRA DILITATION with dominguez catheter insertion;  Surgeon: Mamadou Paez MD;  Location:  [facility] OR;  Service:     PROSTATECTOMY      PROSTATECTOMY       PT Assessment (Last 12 Hours)       PT Evaluation and Treatment       Row Name 05/11/25 1032          Physical Therapy Time and Intention    Subjective Information no complaints  -     Document Type therapy note (daily note)  -     Mode of Treatment physical therapy  -       Row Name 05/11/25 1032          General Information    Existing Precautions/Restrictions fall  -     Limitations/Impairments safety/cognitive  -       Row Name 05/11/25 1032          Pain    Pretreatment Pain Rating 0/10 - no pain  -CATY     Posttreatment Pain Rating 0/10 - no pain  -CATY       Row Name 05/11/25 1032          Bed Mobility    Comment, (Bed Mobility) chair  -       Row Name 05/11/25 1032          Sit-Stand Transfer    Sit-Stand [place] (Transfers) verbal cues;contact guard  -CATY     Assistive Device (Sit-Stand Transfers) walker, front-wheeled  -CATY       Row Name 05/11/25 1032          Stand-Sit Transfer    Stand-Sit [place] (Transfers) verbal cues;contact guard  -CATY     Assistive Device (Stand-Sit Transfers) walker, front-wheeled  -CATY       Row Name 05/11/25 1032          Toilet Transfer    Type (Toilet Transfer) sit-stand;stand-sit  -CATY     [place] Level (Toilet Transfer) verbal cues;contact guard  -CATY     Assistive Device (Toilet Transfer)  commode;grab bars/safety frame  -       Row Name 05/11/25 1032          Gait/Stairs (Locomotion)    [place] Level (Gait) verbal cues;contact guard  -CATY     Assistive Device (Gait) walker, front-wheeled  -CATY     Distance in Feet (Gait) 100  x 2 with sitting rest  -CATY       Row Name 05/11/25 1032          Motor Skills    Comments, Therapeutic Exercise sitting AROM BLE X 20  -CATY     Additional Documentation Comments, Therapeutic Exercise (Row)  -       Row Name 05/11/25 1032          Positioning and Restraints    Pre-Treatment Position sitting in chair/recliner  -CATY     Post Treatment Position chair  -CATY     In Chair reclined;call light within reach;encouraged to call for assist  -CATY               User Key  (r) = Recorded By, (t) = Taken By, (c) = Cosigned By      Initials Name Provider Type    Faisal Rosales, PTA Physical Therapist Assistant                    Physical Therapy Education       Title: PT OT SLP Therapies (In Progress)       Topic: Physical Therapy (In Progress)       Point: Mobility training (Done)       Learning Progress Summary            Patient Acceptance, E,TB,D, VU,NR by  at 5/6/2025 1708    Comment: education re: purpose of PT/importance of activity, safety awareness w/ mobility; increase awareness of gait mechanics   Family Acceptance, E,TB,D, VU,NR by  at 5/6/2025 1708    Comment: education re: purpose of PT/importance of activity, safety awareness w/ mobility; increase awareness of gait mechanics                      Point: Home exercise program (Not Started)       Learner Progress:  Not documented in this visit.              Point: Precautions (Done)       Learning Progress Summary            Patient Acceptance, E,TB,D, VU,NR by  at 5/6/2025 1708    Comment: education re: purpose of PT/importance of activity, safety awareness w/ mobility; increase awareness of gait mechanics   Family Acceptance, E,TB,D, VU,NR by  at 5/6/2025 1708    Comment: education re: purpose of  PT/importance of activity, safety awareness w/ mobility; increase awareness of gait mechanics                                      User Key       Initials Effective Dates Name Provider Type Discipline     08/02/18 -  Theresa Cuevas, PT Physical Therapist PT                  PT Recommendation and Plan         Outcome Measures       Row Name 05/11/25 1032 05/10/25 1023 05/09/25 1400       How much help from another person do you currently need...    Turning from your back to your side while in flat bed without using bedrails? 4  -CATY 4  -CATY 4  -AH    Moving from lying on back to sitting on the side of a flat bed without bedrails? 4  -CATY 4  -CATY 4  -AH    Moving to and from a bed to a chair (including a wheelchair)? 4  -CATY 4  -CATY 4  -AH    Standing up from a chair using your arms (e.g., wheelchair, bedside chair)? 4  -CATY 4  -CATY 4  -AH    Climbing 3-5 steps with a railing? 3  -CATY 3  -CATY 3  -AH    To walk in hospital room? 3  -CATY 3  -CATY 3  -AH    AM-PAC 6 Clicks Score (PT) 22  -CATY 22  -CATY 22  -AH       Functional Assessment    Outcome Measure Options AM-PAC 6 Clicks Basic Mobility (PT)  -CATY AM-PAC 6 Clicks Basic Mobility (PT)  -CATY AM-PAC 6 Clicks Basic Mobility (PT)  -AH              User Key  (r) = Recorded By, (t) = Taken By, (c) = Cosigned By      Initials Name Provider Type     Mary Thomas, PTA Physical Therapist Assistant    Faisal Rosales, PTA Physical Therapist Assistant                     Time Calculation:    PT Charges       Row Name 05/11/25 1032             Time Calculation    Start Time 1032  -CATY      Stop Time 1100  -CATY      Time Calculation (min) 28 min  -CATY      PT Received On 05/11/25  -CATY         Time Calculation- PT    Total Timed Code Minutes- PT 28 minute(s)  -CATY         Timed Charges    08705 - PT Therapeutic Exercise Minutes 12  -CATY      22411 - Gait Training Minutes  16  -CATY         Total Minutes    Timed Charges Total Minutes 28  -CATY       Total Minutes 28  -CATY                User  Key  (r) = Recorded By, (t) = Taken By, (c) = Cosigned By      Initials Name Provider Type    Faisal Rosales PTA Physical Therapist Assistant                  Therapy Charges for Today       Code Description Service Date Service Provider Modifiers Qty    93623141804 HC GAIT TRAINING EA 15 MIN 5/10/2025 Faisal Moralez, PTA GP 1    80522962306 HC PT THER PROC EA 15 MIN 5/10/2025 Faisal Moralez, PTA GP 1    42792567177 HC GAIT TRAINING EA 15 MIN 5/11/2025 Faisal Moralez, PTA GP 1    09359404008 HC PT THER PROC EA 15 MIN 5/11/2025 Faisal Moralez, PTA GP 1            PT G-Codes  Outcome Measure Options: AM-PAC 6 Clicks Basic Mobility (PT)  AM-PAC 6 Clicks Score (PT): 22  AM-PAC 6 Clicks Score (OT): 19    Faisal Moralez PTA  5/11/2025

## 2025-05-11 NOTE — PROGRESS NOTES
[facility] Medicine Services  INPATIENT PROGRESS NOTE    Patient Name: Maikel Mathews  Date of Admission: 5/4/2025  Today's Date: 05/11/25  Length of Stay: 7  Primary Care Physician: David Barajas MD    Subjective   Chief Complaint: Follow-up  HPI   Therapist note reviewed: very cooperative and pleasant, did get emotional about missing his wife, pt amb community distances is hallway with rwx cga-sba   Hemodynamically stable    Not required any geodon in the past few days  Been on Seroquel at nighttime        Review of Systems   Limited information from the patient.  All pertinent negatives and positives are as above. All other systems have been reviewed and are negative unless otherwise stated.     Objective    Temp:  [98 °F (36.7 °C)-98.4 °F (36.9 °C)] 98.2 °F (36.8 °C)  Heart Rate:  [] 92  Resp:  [16-18] 18  BP: (137-159)/(62-82) 137/82  Physical Exam    Calm seated comfortably on recliner  He is wearing casual clothing  No gross tremors  GEN: stable   HEENT: Atraumatic, rachea midline  Lungs normal respiratory effort  ABD: soft, nt/nd, +BS, no guarding/rebound  Extremities: atraumatic, no cyanosis, no edema  Skin: no rashes or lesions  Psych: normal mood & affect    Results Review:  I have reviewed the labs, radiology results, and diagnostic studies.    Laboratory Data:   Results from last 7 days   Lab Units 05/06/25  0354 05/05/25  0850 05/04/25  1330   WBC 10*3/mm3 6.37 5.95 8.47   HEMOGLOBIN g/dL 9.5* 10.2* 10.7*   HEMATOCRIT % 29.3* 31.0* 33.0*   PLATELETS 10*3/mm3 206 213 241        Results from last 7 days   Lab Units 05/11/25  0515 05/10/25  0445 05/09/25  1543 05/09/25  0319 05/08/25  0343 05/05/25  1559 05/05/25  0850 05/04/25  1330   SODIUM mmol/L 138 136 137  --  140   < > 139 135*   POTASSIUM mmol/L 4.6 4.1 4.2   < > 4.2   < > 3.4* 3.9   CHLORIDE mmol/L 106 108* 108*  --  113*   < > 110* 104   CO2 mmol/L 21.0* 19.0* 17.0*  --  17.0*   < > 16.0* 15.0*  "  BUN mg/dL 41* 41* 44*  --  43*   < > 45* 52*   CREATININE mg/dL 3.12* 3.04* 3.10*  --  3.19*   < > 3.17* 3.64*   CALCIUM mg/dL 7.8* 7.8* 7.9*   < > 8.0*   < > 7.8* 7.8*   BILIRUBIN mg/dL  --   --   --   --  0.5  --  0.3 0.3   ALK PHOS U/L  --   --   --   --  66  --  75 84   ALT (SGPT) U/L  --   --   --   --  <5  --  6 6   AST (SGOT) U/L  --   --   --   --  18  --  14 16   GLUCOSE mg/dL 137* 167* 195*  --  97   < > 191* 269*    < > = values in this interval not displayed.       Culture Data:   No results found for: \"BLOODCX\", \"URINECX\", \"WOUNDCX\", \"MRSACX\", \"RESPCX\", \"STOOLCX\"    Radiology Data:   Imaging Results (Last 24 Hours)       ** No results found for the last 24 hours. **            I have reviewed the patient's current medications.     Assessment/Plan   Assessment  Active Hospital Problems    Diagnosis     **Acute renal failure     Change in mental status            Medical Decision Making  Number and Complexity of problems:   Problem list  Confused  Lack of insight  ?  Dementia  Reportedly had aggressive behavior but none since admission  Hypomagnesemia resolved  Hypertension-stable  Hypokalemia-corrected  CKD stage IV with acute kidney injury  Anion gap metabolic acidosis likely from kidney injury  Diabetes mellitus type 2  Advanced age  B12 deficiency-started replacement 7 days.  Subsequent dosing will be decided at a later time.            Treatment Plan  Continue supportive care  Renal ultrasound report reviewed  Continue antihypertensive medication  Continue sliding scale insulin with as needed hypoglycemic protocol  Social service to assist in discharge disposition  Nephrology following.  Appreciate assistance.  PT recommended skilled rehab placement.  Continue present management.  I added Seroquel every morning on top of the Seroquel last night    amLODIPine, 10 mg, Oral, Q24H  cholecalciferol, 2,000 Units, Oral, Daily  cyanocobalamin, 1,000 mcg, Intramuscular, Daily  heparin (porcine), 5,000 " Units, Subcutaneous, Q12H  insulin lispro, 2-7 Units, Subcutaneous, 4x Daily AC & at Bedtime  QUEtiapine, 50 mg, Oral, Nightly  sodium bicarbonate, 650 mg, Oral, BID  sodium chloride, 10 mL, Intravenous, Q12H          Conditions and Status  Fair     [facility] Data  External documents reviewed: Reviewed epic record  Cardiac tracing (EKG, telemetry) interpretation: -  Radiology interpretation: y  Labs reviewed: y   Any tests that were considered but not ordered: None     Decision rules/scores evaluated (example [id number], [place], etc):-     Discussed with: Patient , family and social service       Care Planning  Shared decision making: Patient  Code status and discussions: CODE STATUS indicates full support/CPR    Disposition  Social Determinants of Health that impact treatment or disposition: None identified at this time.  I expect the patient to be discharged to (?)          Electronically signed by Brayan Meehan MD, 05/11/25, 12:28 CDT.

## 2025-05-12 PROBLEM — E53.8 VITAMIN B12 DEFICIENCY: Status: ACTIVE | Noted: 2025-05-12

## 2025-05-12 PROBLEM — F03.918 DEMENTIA WITH BEHAVIORAL DISTURBANCE: Status: ACTIVE | Noted: 2025-05-12

## 2025-05-12 PROBLEM — R54 ADVANCED AGE: Status: ACTIVE | Noted: 2025-05-12

## 2025-05-12 PROBLEM — N18.4 CKD (CHRONIC KIDNEY DISEASE) STAGE 4, GFR 15-29 ML/MIN: Status: ACTIVE | Noted: 2025-05-12

## 2025-05-12 LAB
ANION GAP SERPL CALCULATED.3IONS-SCNC: 8 MMOL/L (ref 5–15)
BUN SERPL-MCNC: 42 MG/DL (ref 8–23)
BUN/CREAT SERPL: 14.1 (ref 7–25)
CALCIUM SPEC-SCNC: 8.1 MG/DL (ref 8.2–9.6)
CHLORIDE SERPL-SCNC: 107 MMOL/L (ref 98–107)
CO2 SERPL-SCNC: 23 MMOL/L (ref 22–29)
CREAT SERPL-MCNC: 2.98 MG/DL (ref 0.76–1.27)
EGFRCR SERPLBLD CKD-EPI 2021: 18.7 ML/MIN/1.73
GLUCOSE BLDC GLUCOMTR-MCNC: 123 MG/DL (ref 70–130)
GLUCOSE BLDC GLUCOMTR-MCNC: 294 MG/DL (ref 70–130)
GLUCOSE SERPL-MCNC: 106 MG/DL (ref 65–99)
POTASSIUM SERPL-SCNC: 4.6 MMOL/L (ref 3.5–5.2)
SODIUM SERPL-SCNC: 138 MMOL/L (ref 136–145)

## 2025-05-12 PROCEDURE — 97530 THERAPEUTIC ACTIVITIES: CPT

## 2025-05-12 PROCEDURE — 80048 BASIC METABOLIC PNL TOTAL CA: CPT | Performed by: INTERNAL MEDICINE

## 2025-05-12 PROCEDURE — 25010000002 CYANOCOBALAMIN PER 1000 MCG: Performed by: INTERNAL MEDICINE

## 2025-05-12 PROCEDURE — 97116 GAIT TRAINING THERAPY: CPT

## 2025-05-12 PROCEDURE — 97110 THERAPEUTIC EXERCISES: CPT

## 2025-05-12 PROCEDURE — 25010000002 ZIPRASIDONE MESYLATE PER 10 MG: Performed by: HOSPITALIST

## 2025-05-12 PROCEDURE — 63710000001 INSULIN LISPRO (HUMAN) PER 5 UNITS: Performed by: HOSPITALIST

## 2025-05-12 PROCEDURE — 25010000002 HEPARIN (PORCINE) PER 1000 UNITS: Performed by: INTERNAL MEDICINE

## 2025-05-12 PROCEDURE — 82948 REAGENT STRIP/BLOOD GLUCOSE: CPT

## 2025-05-12 RX ORDER — MULTIVITAMIN WITH IRON
1000 TABLET ORAL DAILY
Status: DISCONTINUED | OUTPATIENT
Start: 2025-05-12 | End: 2025-05-13 | Stop reason: HOSPADM

## 2025-05-12 RX ADMIN — Medication 2000 UNITS: at 08:27

## 2025-05-12 RX ADMIN — HEPARIN SODIUM 5000 UNITS: 5000 INJECTION, SOLUTION INTRAVENOUS; SUBCUTANEOUS at 20:23

## 2025-05-12 RX ADMIN — ZIPRASIDONE MESYLATE 20 MG: 20 INJECTION, POWDER, LYOPHILIZED, FOR SOLUTION INTRAMUSCULAR at 07:24

## 2025-05-12 RX ADMIN — HEPARIN SODIUM 5000 UNITS: 5000 INJECTION, SOLUTION INTRAVENOUS; SUBCUTANEOUS at 08:27

## 2025-05-12 RX ADMIN — SODIUM BICARBONATE 650 MG: 650 TABLET ORAL at 08:27

## 2025-05-12 RX ADMIN — QUETIAPINE FUMARATE 50 MG: 25 TABLET ORAL at 20:22

## 2025-05-12 RX ADMIN — SODIUM BICARBONATE 650 MG: 650 TABLET ORAL at 20:22

## 2025-05-12 RX ADMIN — CYANOCOBALAMIN 1000 MCG: 1000 INJECTION, SOLUTION INTRAMUSCULAR at 08:27

## 2025-05-12 RX ADMIN — ZIPRASIDONE MESYLATE 20 MG: 20 INJECTION, POWDER, LYOPHILIZED, FOR SOLUTION INTRAMUSCULAR at 20:23

## 2025-05-12 RX ADMIN — INSULIN LISPRO 4 UNITS: 100 INJECTION, SOLUTION INTRAVENOUS; SUBCUTANEOUS at 20:22

## 2025-05-12 RX ADMIN — CYANOCOBALAMIN TAB 500 MCG 1000 MCG: 500 TAB at 13:35

## 2025-05-12 RX ADMIN — QUETIAPINE FUMARATE 25 MG: 25 TABLET ORAL at 08:27

## 2025-05-12 RX ADMIN — AMLODIPINE BESYLATE 10 MG: 10 TABLET ORAL at 08:27

## 2025-05-12 NOTE — PLAN OF CARE
Goal Outcome Evaluation:           Progress: improving     VSS, on room air, alert to self.  Geodon given at beginning of shift for agitation and compulsiveness, see mar.  Pt has been calm and cooperative throughout shift.  Voiding regularly, using toilet.  Safety maintained.

## 2025-05-12 NOTE — CASE MANAGEMENT/SOCIAL WORK
Continued Stay Note   F[facility]     Patient Name: Maikel Mathews  MRN: [id number]  Today's Date: 5/12/2025    Admit Date: 5/4/2025    Plan: Undetermined   Discharge Plan       Row Name 05/12/25 1234       Plan    Plan Undetermined    Patient/Family in Agreement with Plan yes    Plan Comments Spoke with pt's daughter Arabella [phone]. At this time plan is for pt to go home with possible home health. Pt still having behaviors. Priti psych could possibly be an option so mentioned to Dr. Bose and he will see pt to determine if it would be beneficial.    Addendum: Spoke with Arabella about possible priti psych and she wants to talk with her family.                    Discharge Codes    No documentation.                 Expected Discharge Date and Time       Expected Discharge Date Expected Discharge Time    May 14, 2025               JAZMIN Gavin

## 2025-05-12 NOTE — THERAPY TREATMENT NOTE
Acute Care - Physical Therapy Treatment Note  [facility]     Patient Name: Maikel Mathews  : 1929  MRN: [id number]  Today's Date: 2025      Visit Dx:     ICD-10-CM ICD-9-CM   1. Acute renal failure, unspecified acute renal failure type  N17.9 584.9   2. Altered mental status, unspecified altered mental status type  R41.82 780.97   3. Aggressive behavior due to dementia  F03.918 294.21   4. Hypomagnesemia  E83.42 275.2   5. Dysphagia, unspecified type  R13.10 787.20   6. Impaired functional mobility and activity tolerance [Z74.09]  Z74.09 V49.89     Patient Active Problem List   Diagnosis    Anemia associated with nutritional deficiency    Iron deficiency anemia due to chronic blood loss    Cancer of right colon    Dizzinesses    Stage 3 chronic kidney disease    Encounter for hydration prior to CT scan    Low vitamin B12 level    Iron deficiency anemia    Cat bite    Cough    Low back pain    Pharyngitis    Spondylolisthesis, grade 1    Bilateral inguinal hernia    Type 2 diabetes mellitus with other specified complication    Acute renal failure    Change in mental status    Dementia with behavioral disturbance    CKD (chronic kidney disease) stage 4, GFR 15-29 ml/min    Vitamin B12 deficiency    Advanced age     Past Medical History:   Diagnosis Date    Arthritis     AVM (arteriovenous malformation)     Cancer     PROSTATE    Cataract     Colon cancer     Colon polyp     Diabetes mellitus     Diverticulosis     Dizzinesses 10/20/2017    GERD (gastroesophageal reflux disease)     Hemorrhoids     History of transfusion     Hypertension     Inguinal hernia     Iron deficiency anemia due to chronic blood loss 2017    Prostate CA     Prostate hypertrophy     Rotator cuff syndrome      Past Surgical History:   Procedure Laterality Date    APPENDECTOMY      CHOLECYSTECTOMY      COLON RESECTION N/A 2017    Procedure: LAPAROSCOPIC ASSISTED RIGHT COLECTOMY;  Surgeon: Caroline Loomis MD;  Location:   [facility] OR;  Service:     COLONOSCOPY  04/29/2010    COLONOSCOPY N/A 8/25/2017    Procedure: COLONOSCOPY WITH ANESTHESIA;  Surgeon: Joaquín Neff MD;  Location: [facility] ENDOSCOPY;  Service:     ENDOSCOPY  03/27/2013    ENDOSCOPY N/A 8/25/2017    Procedure: ESOPHAGOGASTRODUODENOSCOPY WITH ANESTHESIA;  Surgeon: Joaquín Neff MD;  Location: [facility] ENDOSCOPY;  Service:     EYE SURGERY Left     CATARACT    INGUINAL HERNIA REPAIR Bilateral 11/1/2019    Procedure: OPEN BILATERAL INGUINAL HERNIA REPAIR WITH MESH;  Surgeon: Caroline Loomis MD;  Location: [facility] OR;  Service: General    INTERVENTIONAL RADIOLOGY PROCEDURE N/A 9/18/2017    Procedure: URETHRA DILITATION with dominguez catheter insertion;  Surgeon: Mamadou Paez MD;  Location: [facility] OR;  Service:     PROSTATECTOMY      PROSTATECTOMY       PT Assessment (Last 12 Hours)       PT Evaluation and Treatment       [facility] Name 05/12/25 1335          Physical Therapy Time and Intention    Subjective Information no complaints  -     Document Type therapy note (daily note)  -     Mode of Treatment physical therapy  -[facility] Name 05/12/25 1335          General Information    Existing Precautions/Restrictions fall  -     Limitations/Impairments safety/cognitive  -[facility] Name 05/12/25 1335          Pain    Pretreatment Pain Rating 0/10 - no pain  -     Posttreatment Pain Rating 0/10 - no pain  -[facility] Name 05/12/25 1335          Bed Mobility    Comment, (Bed Mobility) chair  -[facility] Name 05/12/25 1335          Sit-Stand Transfer    Sit-Stand [place] (Transfers) verbal cues;contact guard;standby assist  [facility]     Assistive Device (Sit-Stand Transfers) walker, front-wheeled  -[facility] Name 05/12/25 1331          Stand-Sit Transfer    Stand-Sit [place] (Transfers) verbal cues;contact guard;standby assist  [facility]     Assistive Device (Stand-Sit Transfers) walker, front-wheeled  -[facility] Name 05/12/25 1335          Gait/Stairs  (Locomotion)    [place] Level (Gait) verbal cues;contact guard;standby assist  -     Assistive Device (Gait) walker, front-wheeled  -     Distance in Feet (Gait) 100  100 x 2  -AH       Row Name 05/12/25 1335          Positioning and Restraints    Pre-Treatment Position sitting in chair/recliner  -     Post Treatment Position chair  -AH     In Chair notified nsg;reclined;call light within reach;encouraged to call for assist;exit alarm on;waffle cushion  -               User Key  (r) = Recorded By, (t) = Taken By, (c) = Cosigned By      Initials Name Provider Type     Mary Thomas, PTA Physical Therapist Assistant                    Physical Therapy Education       Title: PT OT SLP Therapies (In Progress)       Topic: Physical Therapy (In Progress)       Point: Mobility training (Done)       Learning Progress Summary            Patient Acceptance, E,TB,D, VU,NR by  at 5/6/2025 1708    Comment: education re: purpose of PT/importance of activity, safety awareness w/ mobility; increase awareness of gait mechanics   Family Acceptance, E,TB,D, VU,NR by  at 5/6/2025 1708    Comment: education re: purpose of PT/importance of activity, safety awareness w/ mobility; increase awareness of gait mechanics                      Point: Home exercise program (Not Started)       Learner Progress:  Not documented in this visit.              Point: Precautions (Done)       Learning Progress Summary            Patient Acceptance, E,TB,D, VU,NR by  at 5/6/2025 1708    Comment: education re: purpose of PT/importance of activity, safety awareness w/ mobility; increase awareness of gait mechanics   Family Acceptance, E,TB,D, VU,NR by  at 5/6/2025 1708    Comment: education re: purpose of PT/importance of activity, safety awareness w/ mobility; increase awareness of gait mechanics                                      User Key       Initials Effective Dates Name Provider Type [facility] 08/02/18 -  Mason  Theresa VICTORIA, PT Physical Therapist PT                  PT Recommendation and Plan     Plan of Care Reviewed With: patient  Progress: improving  Outcome Evaluation: pt very cooperative and pleasant, did get emotional about missing his wife, pt amb community distances is hallway with rwx cga-sba, had pt try to rememeber his room and find his way back to his room, pt required assist to find his way back to his room, trans back to bed sba, pt would benefit from cont therapy   Outcome Measures       Row Name 05/12/25 1300 05/11/25 1032 05/10/25 1023       How much help from another person do you currently need...    Turning from your back to your side while in flat bed without using bedrails? 4  -AH 4  -CATY 4  -CATY    Moving from lying on back to sitting on the side of a flat bed without bedrails? 4  -AH 4  -CATY 4  -CATY    Moving to and from a bed to a chair (including a wheelchair)? 4  -AH 4  -CATY 4  -CATY    Standing up from a chair using your arms (e.g., wheelchair, bedside chair)? 4  -AH 4  -CATY 4  -CATY    Climbing 3-5 steps with a railing? 3  -AH 3  -CATY 3  -CATY    To walk in hospital room? 3  -AH 3  -CATY 3  -CATY    AM-PAC 6 Clicks Score (PT) 22  -AH 22  -CATY 22  -CATY       Functional Assessment    Outcome Measure Options AM-PAC 6 Clicks Basic Mobility (PT)  -AH AM-PAC 6 Clicks Basic Mobility (PT)  -CATY AM-PAC 6 Clicks Basic Mobility (PT)  -CATY      Row Name 05/09/25 1400             How much help from another person do you currently need...    Turning from your back to your side while in flat bed without using bedrails? 4  -AH      Moving from lying on back to sitting on the side of a flat bed without bedrails? 4  -AH      Moving to and from a bed to a chair (including a wheelchair)? 4  -AH      Standing up from a chair using your arms (e.g., wheelchair, bedside chair)? 4  -AH      Climbing 3-5 steps with a railing? 3  -AH      To walk in hospital room? 3  -AH      AM-PAC 6 Clicks Score (PT) 22  -AH         Functional Assessment     Outcome Measure Options AM-PAC 6 Clicks Basic Mobility (PT)  -                User Key  (r) = Recorded By, (t) = Taken By, (c) = Cosigned By      Initials Name Provider Type    Mary Jewell PTA Physical Therapist Assistant    Faisal Rosales PTA Physical Therapist Assistant                     Time Calculation:    PT Charges       Row Name 05/12/25 1350             Time Calculation    Start Time 1335  -      Stop Time 1350  -      Time Calculation (min) 15 min  -      PT Received On 05/12/25  -         Time Calculation- PT    Total Timed Code Minutes- PT 15 minute(s)  -         Timed Charges    64017 - Gait Training Minutes  15  -AH         Total Minutes    Timed Charges Total Minutes 15  -AH       Total Minutes 15  -AH                User Key  (r) = Recorded By, (t) = Taken By, (c) = Cosigned By      Initials Name Provider Type    Mary Jewell PTA Physical Therapist Assistant                  Therapy Charges for Today       Code Description Service Date Service Provider Modifiers Qty    11011244167 HC GAIT TRAINING EA 15 MIN 5/12/2025 Mary Thomas PTA GP 1            PT G-Codes  Outcome Measure Options: AM-PAC 6 Clicks Basic Mobility (PT)  AM-PAC 6 Clicks Score (PT): 22  AM-PAC 6 Clicks Score (OT): 19    Mary Thomas PTA  5/12/2025

## 2025-05-12 NOTE — PLAN OF CARE
Goal Outcome Evaluation:  Plan of Care Reviewed With: patient        Progress: no change        Patient agitated and restless at the beginning of the shift; see MAR. No IV access. Up with assist and walker. VSS. Safety maintained. Bed alarm on .

## 2025-05-12 NOTE — THERAPY TREATMENT NOTE
Acute Care - Occupational Therapy Treatment Note  [facility]     Patient Name: Maikel Mathews  : 1929  MRN: [id number]  Today's Date: 2025             Admit Date: 2025       ICD-10-CM ICD-9-CM   1. Acute renal failure, unspecified acute renal failure type  N17.9 584.9   2. Altered mental status, unspecified altered mental status type  R41.82 780.97   3. Aggressive behavior due to dementia  F03.918 294.21   4. Hypomagnesemia  E83.42 275.2   5. Dysphagia, unspecified type  R13.10 787.20   6. Impaired functional mobility and activity tolerance [Z74.09]  Z74.09 V49.89     Patient Active Problem List   Diagnosis    Anemia associated with nutritional deficiency    Iron deficiency anemia due to chronic blood loss    Cancer of right colon    Dizzinesses    Stage 3 chronic kidney disease    Encounter for hydration prior to CT scan    Low vitamin B12 level    Iron deficiency anemia    Cat bite    Cough    Low back pain    Pharyngitis    Spondylolisthesis, grade 1    Bilateral inguinal hernia    Type 2 diabetes mellitus with other specified complication    Acute renal failure    Change in mental status    Dementia with behavioral disturbance    CKD (chronic kidney disease) stage 4, GFR 15-29 ml/min    Vitamin B12 deficiency    Advanced age     Past Medical History:   Diagnosis Date    Arthritis     AVM (arteriovenous malformation)     Cancer     PROSTATE    Cataract     Colon cancer     Colon polyp     Diabetes mellitus     Diverticulosis     Dizzinesses 10/20/2017    GERD (gastroesophageal reflux disease)     Hemorrhoids     History of transfusion     Hypertension     Inguinal hernia     Iron deficiency anemia due to chronic blood loss 2017    Prostate CA     Prostate hypertrophy     Rotator cuff syndrome      Past Surgical History:   Procedure Laterality Date    APPENDECTOMY      CHOLECYSTECTOMY      COLON RESECTION N/A 2017    Procedure: LAPAROSCOPIC ASSISTED RIGHT COLECTOMY;  Surgeon: Caroline TRUJILLO  MD Vladislav;  Location: [facility] OR;  Service:     COLONOSCOPY  04/29/2010    COLONOSCOPY N/A 8/25/2017    Procedure: COLONOSCOPY WITH ANESTHESIA;  Surgeon: Joaquín Neff MD;  Location: [facility] ENDOSCOPY;  Service:     ENDOSCOPY  03/27/2013    ENDOSCOPY N/A 8/25/2017    Procedure: ESOPHAGOGASTRODUODENOSCOPY WITH ANESTHESIA;  Surgeon: Joaquín Neff MD;  Location: [facility] ENDOSCOPY;  Service:     EYE SURGERY Left     CATARACT    INGUINAL HERNIA REPAIR Bilateral 11/1/2019    Procedure: OPEN BILATERAL INGUINAL HERNIA REPAIR WITH MESH;  Surgeon: Caroline Loomis MD;  Location: [facility] OR;  Service: General    INTERVENTIONAL RADIOLOGY PROCEDURE N/A 9/18/2017    Procedure: URETHRA DILITATION with dominguez catheter insertion;  Surgeon: Mamadou Paez MD;  Location: [facility] OR;  Service:     PROSTATECTOMY      PROSTATECTOMY           OT ASSESSMENT FLOWSHEET (Last 12 Hours)       OT Evaluation and Treatment       Row Name 05/12/25 8311                   OT Time and Intention    Subjective Information no complaints  -EC        Document Type therapy note (daily note)  -EC        Mode of Treatment occupational therapy  -EC           General Information    Existing Precautions/Restrictions fall  -EC        Limitations/Impairments safety/cognitive  -EC           Pain Assessment    Pretreatment Pain Rating 0/10 - no pain  -EC        Posttreatment Pain Rating 0/10 - no pain  -EC           Bed Mobility    Comment, (Bed Mobility) chair  -EC           Functional Mobility    Functional Mobility- Ind. Level verbal cues required;contact guard assist  -EC        Functional Mobility- Device walker, front-wheeled  -EC        Functional Mobility- Comment in room and to dey  -EC           Transfer Assessment/Treatment    Transfers sit-stand transfer;stand-sit transfer  -EC           Sit-Stand Transfer    Sit-Stand [place] (Transfers) verbal cues;contact guard  -EC        Assistive Device (Sit-Stand Transfers) walker, front-wheeled   -EC           Stand-Sit Transfer    Stand-Sit [place] (Transfers) verbal cues;contact guard  -EC        Assistive Device (Stand-Sit Transfers) walker, front-wheeled  -EC           Motor Skills    Therapeutic Exercise elbow/forearm;shoulder  3 x 10 reps BUE AROM in sitting and standing  -EC           Balance    Balance Interventions standing;dynamic reaching;occupation based/functional task;moderate challenge  amb around room to retrieve items outside of ABRAM to faciliate balance for standing ADLs  -EC           Plan of Care Review    Plan of Care Reviewed With patient;daughter  -EC        Progress improving  -EC        Outcome Evaluation OT tx completed. Pt finishing walk with PCT upon arrival but agreeable to more activity. He amb around the room with CGA, occasional assist with rwx mgmt at times due to decreased safety awareness. He retrieved items suctioned to wall outside of ABRAM to faciliate dynamic balance needed for standing ADLs. He also retrieved items numerically to address executive functioning. Once in chair, he performed several reps of BUE AROM in sitting and standing for improved fxnal endurance. OT to cont POC  -EC           Positioning and Restraints    Pre-Treatment Position sitting in chair/recliner  -EC        Post Treatment Position chair  -EC        In Chair notified nsg;sitting;call light within reach;encouraged to call for assist;with family/caregiver;waffle cushion  -EC                  User Key  (r) = Recorded By, (t) = Taken By, (c) = Cosigned By      Initials Name Effective Dates    Yuridia Estrada OTR/L 10/13/23 -                      Occupational Therapy Education       Title: PT OT SLP Therapies (In Progress)       Topic: Occupational Therapy (Done)       Point: ADL training (Done)       Learning Progress Summary            Patient Acceptance, E, VU,NR by EC at 5/12/2025 1536    Acceptance, E, VU,NR by MM at 5/9/2025 1503   Family Acceptance, E, VU,NR by MM at 5/9/2025 1503                       Point: Home exercise program (Done)       Learning Progress Summary            Patient Acceptance, E, VU,NR by EC at 5/12/2025 1536                      Point: Precautions (Done)       Learning Progress Summary            Patient Acceptance, E, VU,NR by EC at 5/12/2025 1536                      Point: Body mechanics (Done)       Learning Progress Summary            Patient Acceptance, E, VU,NR by EC at 5/12/2025 1536                                      User Key       Initials Effective Dates Name Provider Type Discipline     07/11/23 -  Dominick Tay, OTR/L Occupational Therapist OT    EC 10/13/23 -  Yuridia Augutsine OTR/L Occupational Therapist OT                      OT Recommendation and Plan     Plan of Care Review  Plan of Care Reviewed With: patient, daughter  Progress: improving  Outcome Evaluation: OT tx completed. Pt finishing walk with PCT upon arrival but agreeable to more activity. He amb around the room with CGA, occasional assist with rwx mgmt at times due to decreased safety awareness. He retrieved items suctioned to wall outside of ABRAM to faciliate dynamic balance needed for standing ADLs. He also retrieved items numerically to address executive functioning. Once in chair, he performed several reps of BUE AROM in sitting and standing for improved fxnal endurance. OT to cont POC  Plan of Care Reviewed With: patient, daughter  Outcome Evaluation: OT tx completed. Pt finishing walk with PCT upon arrival but agreeable to more activity. He amb around the room with CGA, occasional assist with rwx mgmt at times due to decreased safety awareness. He retrieved items suctioned to wall outside of ABRAM to faciliate dynamic balance needed for standing ADLs. He also retrieved items numerically to address executive functioning. Once in chair, he performed several reps of BUE AROM in sitting and standing for improved fxnal endurance. OT to cont POC     Outcome Measures       Row Name 05/12/25  1500 05/12/25 1300 05/11/25 1032       How much help from another person do you currently need...    Turning from your back to your side while in flat bed without using bedrails? -- 4  - 4  -CATY    Moving from lying on back to sitting on the side of a flat bed without bedrails? -- 4  - 4  -CATY    Moving to and from a bed to a chair (including a wheelchair)? -- 4  - 4  -CATY    Standing up from a chair using your arms (e.g., wheelchair, bedside chair)? -- 4  - 4  -CATY    Climbing 3-5 steps with a railing? -- 3  -AH 3  -CATY    To walk in hospital room? -- 3  - 3  -CATY    AM-PAC 6 Clicks Score (PT) -- 22  - 22  -CATY       How much help from another is currently needed...    Putting on and taking off regular lower body clothing? 3  -EC -- --    Bathing (including washing, rinsing, and drying) 3  -EC -- --    Toileting (which includes using toilet bed pan or urinal) 3  -EC -- --    Putting on and taking off regular upper body clothing 3  -EC -- --    Taking care of personal grooming (such as brushing teeth) 3  -EC -- --    Eating meals 4  -EC -- --    AM-PAC 6 Clicks Score (OT) 19  -EC -- --       Functional Assessment    Outcome Measure Options AM-PAC 6 Clicks Daily Activity (OT)  -[facility]-PAC 6 Clicks Basic Mobility (PT)  -[facility]-PAC 6 Clicks Basic Mobility (PT)  -      Row Name 05/10/25 1023             How much help from another person do you currently need...    Turning from your back to your side while in flat bed without using bedrails? 4  -CATY      Moving from lying on back to sitting on the side of a flat bed without bedrails? 4  -CATY      Moving to and from a bed to a chair (including a wheelchair)? 4  -CATY      Standing up from a chair using your arms (e.g., wheelchair, bedside chair)? 4  -CATY      Climbing 3-5 steps with a railing? 3  -CATY      To walk in hospital room? 3  -CATY      AM-PAC 6 Clicks Score (PT) 22  -CATY         Functional Assessment    Outcome Measure Options AM-PAC 6 Clicks Basic Mobility (PT)   -CATY                User Key  (r) = Recorded By, (t) = Taken By, (c) = Cosigned By      Initials Name Provider Type     Mary Thomas, PTA Physical Therapist Assistant    Faisal Rosales, PTA Physical Therapist Assistant     Yuridia Augustine, OTR/L Occupational Therapist                    Time Calculation:    Time Calculation- OT       Row Name 05/12/25 1350 05/12/25 1157          Time Calculation- OT    OT Start Time -- 1130  -EC     OT Stop Time -- 1155  -EC     OT Time Calculation (min) -- 25 min  -EC     Total Timed Code Minutes- OT -- 25 minute(s)  -EC     OT Received On -- 05/12/25  -EC        Timed Charges    72554 - OT Therapeutic Exercise Minutes -- 10  -EC     55481 - Gait Training Minutes  15  -AH --     78548 - OT Therapeutic Activity Minutes -- 15  -EC        Total Minutes    Timed Charges Total Minutes 15  -AH 25  -EC      Total Minutes 15  -AH 25  -EC               User Key  (r) = Recorded By, (t) = Taken By, (c) = Cosigned By      Initials Name Provider Type     Mary Thomas, RADU Physical Therapist Assistant     Yuridia Augustine, OTR/L Occupational Therapist                  Therapy Charges for Today       Code Description Service Date Service Provider Modifiers Qty    76858623245 HC OT THER PROC EA 15 MIN 5/12/2025 Yuridia Augustine, OTR/L GO 1    37179947595 HC OT THERAPEUTIC ACT EA 15 MIN 5/12/2025 Yuridia Augustine, OTR/L GO 1                 Yuridia Augustine, OTR/L  5/12/2025

## 2025-05-12 NOTE — PROGRESS NOTES
Nephrology ([facility]) Progress Note      Patient:  Maikel Mathews  YOB: 1929  Date of Service: 5/12/2025  MRN: [id number]   Acct: [id number]   Primary Care Physician: Dvaid Barajas MD  Advance Directive:   Code Status and Medical Interventions: CPR (Attempt to Resuscitate); Full Support   Ordered at: 05/04/25 1634     Code Status (Patient has no pulse and is not breathing):    CPR (Attempt to Resuscitate)     Medical Interventions (Patient has pulse or is breathing):    Full Support     Admit Date: 5/4/2025       Hospital Day: 8  Referring Provider: Perla Mireles MD      Patient personally seen and examined.  Complete chart including Consults, Notes, Operative Reports, Labs, Cardiology, and Radiology studies reviewed as able.        Subjective:  Maikel Mathews is a 89 y.o. male for whom we were consulted for evaluation and treatment of acute kidney injury. Baseline chronic kidney disease stage 4, creatinine 3.1 in January 2025. Does not follow with nephrology. History of dementia, type 2 diabetes, hypertension. Brought to ER for change in mental status. Reportedly has been aggressive and making threatening statements toward his family. Per family, patient has not been compliant with medications and has not been eating or drinking well. Labs in ER revealed creatinine 3.64, BUN 52. Admitted to medical floor. Patient was poor historian and not able to provide any detail or recent medical history. Denied n/v/d.    Today is awake and oriented X 2. No new complaints. Overnight did receive Geodon for agitation. Urine output nonoliguric    Allergies:  Patient has no known allergies.    Home Meds:  Medications Prior to Admission   Medication Sig Dispense Refill Last Dose/Taking    amLODIPine (NORVASC) 10 MG tablet Take 1 tablet by mouth Daily.  5 More than a month    glipiZIDE (GLUCOTROL) 5 MG ER tablet Take 1 tablet by mouth Daily.  2 More than a month        Medicines:  Current Facility-Administered Medications   Medication Dose Route Frequency Provider Last Rate Last Admin    acetaminophen (TYLENOL) tablet 650 mg  650 mg Oral Q4H PRN Perla Mireles MD   650 mg at 05/08/25 1231    Or    acetaminophen (TYLENOL) 160 MG/5ML oral solution 650 mg  650 mg Oral Q4H PRN Perla Mireles MD        Or    acetaminophen (TYLENOL) suppository 650 mg  650 mg Rectal Q4H PRN Perla Mireles MD        amLODIPine (NORVASC) tablet 10 mg  10 mg Oral Q24H Pablo Ortiz MD   10 mg at 05/12/25 0827    sennosides-docusate (PERICOLACE) 8.6-50 MG per tablet 2 tablet  2 tablet Oral BID PRN Perla Mireles MD        And    polyethylene glycol (MIRALAX) packet 17 g  17 g Oral Daily PRN Perla Mireles MD        And    bisacodyl (DULCOLAX) EC tablet 5 mg  5 mg Oral Daily PRN Perla Mireles MD        And    bisacodyl (DULCOLAX) suppository 10 mg  10 mg Rectal Daily PRN Perla Mireles MD        Calcium Replacement - Follow Nurse / BPA Driven Protocol   Not Applicable PRN Perla Mireles MD        cholecalciferol (VITAMIN D3) tablet 2,000 Units  2,000 Units Oral Daily Fahad Cool MD   2,000 Units at 05/12/25 0827    cyanocobalamin injection 1,000 mcg  1,000 mcg Intramuscular Daily Brayan Meehan MD   1,000 mcg at 05/12/25 0827    dextrose (D50W) (25 g/50 mL) IV injection 25 g  25 g Intravenous Q15 Min PRN Perla Mireles MD        dextrose (GLUTOSE) oral gel 15 g  15 g Oral Q15 Min PRN Perla Mireles MD        glucagon (GLUCAGEN) injection 1 mg  1 mg Intramuscular Q15 Min PRN Perla Mireles MD        heparin (porcine) 5000 UNIT/ML injection 5,000 Units  5,000 Units Subcutaneous Q12H Brayan Meehan MD   5,000 Units at 05/12/25 0827    hydrALAZINE (APRESOLINE) tablet 50 mg  50 mg Oral Q4H PRN Brayan Meehan MD        hydrOXYzine (ATARAX) tablet 25 mg  25 mg Oral TID PRN Brayan Meehan MD   25 mg at 05/11/25 5746    Insulin Lispro  (humaLOG) injection 2-7 Units  2-7 Units Subcutaneous 4x Daily AC & at Bedtime Perla Mireles MD   2 Units at 05/11/25 2001    loperamide (IMODIUM) capsule 4 mg  4 mg Oral 4x Daily PRN Cuate Hurd MD        Magnesium Standard Dose Replacement - Follow Nurse / BPA Driven Protocol   Not Applicable PRN Perla Mireles MD        ondansetron (ZOFRAN) injection 4 mg  4 mg Intravenous Q6H PRN Perla Mireles MD        Phosphorus Replacement - Follow Nurse / BPA Driven Protocol   Not Applicable PRN Perla Mireles MD        Potassium Replacement - Follow Nurse / BPA Driven Protocol   Not Applicable PRN Perla Mireles MD        QUEtiapine (SEROquel) tablet 25 mg  25 mg Oral QAM Brayan Meehan MD   25 mg at 05/12/25 0827    QUEtiapine (SEROquel) tablet 50 mg  50 mg Oral Nightly Brayan Meehan MD   50 mg at 05/11/25 1949    sodium bicarbonate tablet 650 mg  650 mg Oral BID Brayan Meehan MD   650 mg at 05/12/25 0827    sodium chloride 0.9 % flush 10 mL  10 mL Intravenous PRN Cabrera Robbins MD        sodium chloride 0.9 % flush 10 mL  10 mL Intravenous Q12H Perla Mireles MD   10 mL at 05/09/25 0845    sodium chloride 0.9 % flush 10 mL  10 mL Intravenous PRN Perla Mireles MD        sodium chloride 0.9 % infusion 40 mL  40 mL Intravenous PRN Perla Mireles MD        ziprasidone (GEODON) injection 20 mg  20 mg Intramuscular Q4H PRN Perla Mireles MD   20 mg at 05/12/25 0724       Past Medical History:  Past Medical History:   Diagnosis Date    Arthritis     AVM (arteriovenous malformation)     Cancer     PROSTATE    Cataract     Colon cancer     Colon polyp     Diabetes mellitus     Diverticulosis     Dizzinesses 10/20/2017    GERD (gastroesophageal reflux disease)     Hemorrhoids     History of transfusion     Hypertension     Inguinal hernia     Iron deficiency anemia due to chronic blood loss 7/26/2017    Prostate CA     Prostate hypertrophy     Rotator cuff syndrome         Past Surgical History:  Past Surgical History:   Procedure Laterality Date    APPENDECTOMY      CHOLECYSTECTOMY      COLON RESECTION N/A 9/18/2017    Procedure: LAPAROSCOPIC ASSISTED RIGHT COLECTOMY;  Surgeon: Caroline Loomis MD;  Location: [facility] OR;  Service:     COLONOSCOPY  04/29/2010    COLONOSCOPY N/A 8/25/2017    Procedure: COLONOSCOPY WITH ANESTHESIA;  Surgeon: Joaquín Neff MD;  Location: [facility] ENDOSCOPY;  Service:     ENDOSCOPY  03/27/2013    ENDOSCOPY N/A 8/25/2017    Procedure: ESOPHAGOGASTRODUODENOSCOPY WITH ANESTHESIA;  Surgeon: Joaquín Neff MD;  Location: [facility] ENDOSCOPY;  Service:     EYE SURGERY Left     CATARACT    INGUINAL HERNIA REPAIR Bilateral 11/1/2019    Procedure: OPEN BILATERAL INGUINAL HERNIA REPAIR WITH MESH;  Surgeon: Caroline Loomis MD;  Location: [facility] OR;  Service: General    INTERVENTIONAL RADIOLOGY PROCEDURE N/A 9/18/2017    Procedure: URETHRA DILITATION with dominguez catheter insertion;  Surgeon: Mamadou Paez MD;  Location: [facility] OR;  Service:     PROSTATECTOMY      PROSTATECTOMY         Family History  Family History   Problem Relation Age of Onset    Colon cancer Neg Hx     Colon polyps Neg Hx        Social History  Social History     Socioeconomic History    Marital status:    Tobacco Use    Smoking status: Never    Smokeless tobacco: Never   Vaping Use    Vaping status: Never Used   Substance and Sexual Activity    Alcohol use: No    Drug use: No    Sexual activity: Defer       Review of Systems:  History obtained from chart review and the patient  General ROS: No fever or chills  Respiratory ROS: No cough, shortness of breath, wheezing  Cardiovascular ROS: No chest pain or palpitations  Gastrointestinal ROS: No abdominal pain or melena  Genito-Urinary ROS: No dysuria or hematuria  Psych ROS: No anxiety and depression  14 point ROS reviewed with the patient and negative except as noted above and in the HPI unless unable to  "obtain.    Objective:  Patient Vitals for the past 24 hrs:   BP Temp Temp src Pulse Resp SpO2   05/12/25 0725 130/75 97.5 °F (36.4 °C) Temporal 96 16 95 %   05/12/25 0336 156/82 98.2 °F (36.8 °C) Oral 96 18 95 %   05/11/25 1946 142/67 98 °F (36.7 °C) Oral 88 18 97 %   05/11/25 1126 137/82 98.2 °F (36.8 °C) Oral 92 18 97 %     No intake or output data in the 24 hours ending 05/12/25 1005    General: awake/alert   Chest:  clear to auscultation bilaterally without respiratory distress  CVS: regular rate and rhythm  Abdominal: soft, nontender, positive bowel sounds  Extremities: no cyanosis or edema  Skin: warm and dry without rash      Labs:  Results from last 7 days   Lab Units 05/06/25  0354   WBC 10*3/mm3 6.37   HEMOGLOBIN g/dL 9.5*   HEMATOCRIT % 29.3*   PLATELETS 10*3/mm3 206         Results from last 7 days   Lab Units 05/12/25  0607 05/11/25  0515 05/10/25  0445 05/09/25  0319 05/08/25  0343   SODIUM mmol/L 138 138 136   < > 140   POTASSIUM mmol/L 4.6 4.6 4.1   < > 4.2   CHLORIDE mmol/L 107 106 108*   < > 113*   CO2 mmol/L 23.0 21.0* 19.0*   < > 17.0*   BUN mg/dL 42* 41* 41*   < > 43*   CREATININE mg/dL 2.98* 3.12* 3.04*   < > 3.19*   CALCIUM mg/dL 8.1* 7.8* 7.8*   < > 8.0*   EGFR mL/min/1.73 18.7* 17.7* 18.3*   < > 17.2*   BILIRUBIN mg/dL  --   --   --   --  0.5   ALK PHOS U/L  --   --   --   --  66   ALT (SGPT) U/L  --   --   --   --  <5   AST (SGOT) U/L  --   --   --   --  18   GLUCOSE mg/dL 106* 137* 167*   < > 97    < > = values in this interval not displayed.       Radiology:   Imaging Results (Last 72 Hours)       ** No results found for the last 72 hours. **            Culture:  No results found for: \"BLOODCX\", \"URINECX\", \"WOUNDCX\", \"MRSACX\", \"RESPCX\", \"STOOLCX\"      Assessment    Acute kidney injury, prerenal--resolving  Baseline chronic kidney disease stage 4  Type 2 diabetes  Hypertension  History of dementia  Metabolic acidosis--improved  Anemia of CKD  Hypokalemia--improved    Plan:  Renal function " has been stable off IV fluids and is at his baseline level  Continue to encourage/remind patient to maintain adequate PO intake  Monitor labs      Jona Mckeon, APRN  5/12/2025  10:05 CDT

## 2025-05-12 NOTE — PLAN OF CARE
Goal Outcome Evaluation:  Plan of Care Reviewed With: patient, daughter        Progress: improving  Outcome Evaluation: OT tx completed. Pt finishing walk with PCT upon arrival but agreeable to more activity. He amb around the room with CGA, occasional assist with rwx mgmt at times due to decreased safety awareness. He retrieved items suctioned to wall outside of ABRAM to faciliate dynamic balance needed for standing ADLs. He also retrieved items numerically to address executive functioning. Once in chair, he performed several reps of BUE AROM in sitting and standing for improved fxnal endurance. OT to cont POC

## 2025-05-12 NOTE — PROGRESS NOTES
"    [facility] Medicine Services  INPATIENT PROGRESS NOTE    Patient Name: Maikel Mathews  Date of Admission: 5/4/2025  Today's Date: 05/12/25  Length of Stay: 8  Primary Care Physician: David Barajas MD    Subjective   Chief Complaint: \"I want to go home\"  HPI   Patient reports that he wants to go home.  He indicated that he was feeling fine.  He denied any pain symptoms.  He reported that he wanted to get back home to his wife, and reports that he has a son who can assist him at home as well.  He does have episodes of getting intermittently agitated and restless.  He required a dose of Geodon last night, and then again this morning.  He was calm and cooperative throughout my assessment.    Review of Systems   All pertinent negatives and positives are as above. All other systems have been reviewed and are negative unless otherwise stated.     Objective    Temp:  [97.5 °F (36.4 °C)-98.2 °F (36.8 °C)] 97.6 °F (36.4 °C)  Heart Rate:  [76-96] 76  Resp:  [16-18] 16  BP: (130-156)/(67-82) 135/69  Physical Exam  Vitals reviewed.   Constitutional:       Comments: Sitting up in bedside chair; very calm and cooperative during my assessment   HENT:      Head: Normocephalic.      Ears:      Comments: Hard of hearing     Mouth/Throat:      Mouth: Mucous membranes are moist.   Pulmonary:      Effort: Pulmonary effort is normal. No respiratory distress.   Skin:     General: Skin is warm.   Neurological:      Mental Status: He is alert.      Comments: Answers simple yes/no questions well. Not the best historian and does have known history of dementia   Psychiatric:      Comments: Currently calm and cooperative       Results Review:  I have reviewed the labs, radiology results, and diagnostic studies.    Laboratory Data:   Results from last 7 days   Lab Units 05/06/25  0354   WBC 10*3/mm3 6.37   HEMOGLOBIN g/dL 9.5*   HEMATOCRIT % 29.3*   PLATELETS 10*3/mm3 206        Results from last 7 days " "  Lab Units 05/12/25  0607 05/11/25  0515 05/10/25  0445 05/09/25  0319 05/08/25  0343   SODIUM mmol/L 138 138 136   < > 140   POTASSIUM mmol/L 4.6 4.6 4.1   < > 4.2   CHLORIDE mmol/L 107 106 108*   < > 113*   CO2 mmol/L 23.0 21.0* 19.0*   < > 17.0*   BUN mg/dL 42* 41* 41*   < > 43*   CREATININE mg/dL 2.98* 3.12* 3.04*   < > 3.19*   CALCIUM mg/dL 8.1* 7.8* 7.8*   < > 8.0*   BILIRUBIN mg/dL  --   --   --   --  0.5   ALK PHOS U/L  --   --   --   --  66   ALT (SGPT) U/L  --   --   --   --  <5   AST (SGOT) U/L  --   --   --   --  18   GLUCOSE mg/dL 106* 137* 167*   < > 97    < > = values in this interval not displayed.       Culture Data:   No results found for: \"BLOODCX\", \"URINECX\", \"WOUNDCX\", \"MRSACX\", \"RESPCX\", \"STOOLCX\"    Radiology Data:   Imaging Results (Last 24 Hours)       ** No results found for the last 24 hours. **            I have reviewed the patient's current medications.     Assessment/Plan   Assessment  Active Hospital Problems    Diagnosis     **Acute renal failure     Dementia with behavioral disturbance     CKD (chronic kidney disease) stage 4, GFR 15-29 ml/min     Vitamin B12 deficiency     Advanced age     Change in mental status     Type 2 diabetes mellitus with other specified complication        Treatment Plan  Labs reviewed; stable  Nephrology recommendations reviewed  Continue Seroquel  Patient required dose of IM Geodon last PM and again this AM for symptoms of agitation and restlessness  IM vitamin B12 injections to transition over to PO moving forward  Vitamin D supplement  Lab holiday tomorrow  Continue PT - patient able to walk 300 feet  Dispo: ? Priti-psych evaluation vs. Home with family and home health.      Medical Decision Making  Number and Complexity of problems: Moderate complexity      Conditions and Status        Condition is unchanged.     MDM Data  External documents reviewed: none  Cardiac tracing (EKG, telemetry) interpretation: no new EKGs  Radiology interpretation: no new " radiology studies  Labs reviewed: as above  Any tests that were considered but not ordered: none     Decision rules/scores evaluated (example [id number], Wells, etc): none     Discussed with: patient; 3C unit nurse PRADEEP Breaux/case management (Harriett)     Care Planning  Shared decision making: discussed with patient  Code status and discussions: Full Code    Disposition  Social Determinants of Health that impact treatment or disposition: none apparent at this time  I expect the patient to be discharged to: home with  services and assistance with family; will look into [facility] placement another potential option.        Electronically signed by Ryan Bose MD, 05/12/25, 12:48 CDT.

## 2025-05-13 VITALS
TEMPERATURE: 97.8 F | DIASTOLIC BLOOD PRESSURE: 73 MMHG | HEART RATE: 90 BPM | OXYGEN SATURATION: 98 % | SYSTOLIC BLOOD PRESSURE: 135 MMHG | BODY MASS INDEX: 22.66 KG/M2 | RESPIRATION RATE: 16 BRPM | HEIGHT: 66 IN | WEIGHT: 141 LBS

## 2025-05-13 LAB
GLUCOSE BLDC GLUCOMTR-MCNC: 253 MG/DL (ref 70–130)
GLUCOSE BLDC GLUCOMTR-MCNC: 273 MG/DL (ref 70–130)

## 2025-05-13 PROCEDURE — 63710000001 INSULIN LISPRO (HUMAN) PER 5 UNITS: Performed by: HOSPITALIST

## 2025-05-13 PROCEDURE — 25010000002 HEPARIN (PORCINE) PER 1000 UNITS: Performed by: INTERNAL MEDICINE

## 2025-05-13 PROCEDURE — 97116 GAIT TRAINING THERAPY: CPT

## 2025-05-13 PROCEDURE — 97535 SELF CARE MNGMENT TRAINING: CPT

## 2025-05-13 PROCEDURE — 82948 REAGENT STRIP/BLOOD GLUCOSE: CPT

## 2025-05-13 PROCEDURE — 97530 THERAPEUTIC ACTIVITIES: CPT

## 2025-05-13 RX ORDER — LANOLIN ALCOHOL/MO/W.PET/CERES
1000 CREAM (GRAM) TOPICAL DAILY
Qty: 30 TABLET | Refills: 2 | Status: SHIPPED | OUTPATIENT
Start: 2025-05-13

## 2025-05-13 RX ORDER — SODIUM BICARBONATE 650 MG/1
650 TABLET ORAL 2 TIMES DAILY
Qty: 60 TABLET | Refills: 2 | Status: SHIPPED | OUTPATIENT
Start: 2025-05-13

## 2025-05-13 RX ORDER — QUETIAPINE FUMARATE 50 MG/1
50 TABLET, FILM COATED ORAL NIGHTLY
Qty: 30 TABLET | Refills: 2 | Status: SHIPPED | OUTPATIENT
Start: 2025-05-13

## 2025-05-13 RX ORDER — QUETIAPINE FUMARATE 25 MG/1
25 TABLET, FILM COATED ORAL EVERY MORNING
Qty: 30 TABLET | Refills: 2 | Status: SHIPPED | OUTPATIENT
Start: 2025-05-14

## 2025-05-13 RX ORDER — CHOLECALCIFEROL (VITAMIN D3) 25 MCG
2000 TABLET ORAL DAILY
Qty: 60 TABLET | Refills: 2 | Status: SHIPPED | OUTPATIENT
Start: 2025-05-14

## 2025-05-13 RX ORDER — ZIPRASIDONE HYDROCHLORIDE 20 MG/1
20 CAPSULE ORAL 2 TIMES DAILY PRN
Qty: 30 CAPSULE | Refills: 0 | Status: SHIPPED | OUTPATIENT
Start: 2025-05-13

## 2025-05-13 RX ORDER — INSULIN LISPRO 100 [IU]/ML
2-7 INJECTION, SOLUTION INTRAVENOUS; SUBCUTANEOUS
Qty: 840 UNITS | Refills: 0 | Status: SHIPPED | OUTPATIENT
Start: 2025-05-13 | End: 2025-06-12

## 2025-05-13 RX ADMIN — QUETIAPINE FUMARATE 25 MG: 25 TABLET ORAL at 08:11

## 2025-05-13 RX ADMIN — HEPARIN SODIUM 5000 UNITS: 5000 INJECTION, SOLUTION INTRAVENOUS; SUBCUTANEOUS at 08:21

## 2025-05-13 RX ADMIN — INSULIN LISPRO 4 UNITS: 100 INJECTION, SOLUTION INTRAVENOUS; SUBCUTANEOUS at 08:12

## 2025-05-13 RX ADMIN — SODIUM BICARBONATE 650 MG: 650 TABLET ORAL at 08:12

## 2025-05-13 RX ADMIN — AMLODIPINE BESYLATE 10 MG: 10 TABLET ORAL at 08:11

## 2025-05-13 RX ADMIN — INSULIN LISPRO 4 UNITS: 100 INJECTION, SOLUTION INTRAVENOUS; SUBCUTANEOUS at 11:32

## 2025-05-13 RX ADMIN — Medication 2000 UNITS: at 08:11

## 2025-05-13 RX ADMIN — CYANOCOBALAMIN TAB 500 MCG 1000 MCG: 500 TAB at 08:11

## 2025-05-13 NOTE — THERAPY DISCHARGE NOTE
Acute Care - Occupational Therapy Treatment Note/Discharge  [facility]     Patient Name: Maikel Mathews  : 1929  MRN: [id number]  Today's Date: 2025               Admit Date: 2025       ICD-10-CM ICD-9-CM   1. Acute renal failure, unspecified acute renal failure type  N17.9 584.9   2. Altered mental status, unspecified altered mental status type  R41.82 780.97   3. Aggressive behavior due to dementia  F03.918 294.21   4. Hypomagnesemia  E83.42 275.2   5. Dysphagia, unspecified type  R13.10 787.20   6. Impaired functional mobility and activity tolerance [Z74.09]  Z74.09 V49.89   7. Type 2 diabetes mellitus with other specified complication, unspecified whether long term insulin use  E11.69 250.80   8. Advanced age  R54 797   9. Decreased activities of daily living (ADL)  Z78.9 V49.89     Patient Active Problem List   Diagnosis    Anemia associated with nutritional deficiency    Iron deficiency anemia due to chronic blood loss    Cancer of right colon    Dizzinesses    Stage 3 chronic kidney disease    Encounter for hydration prior to CT scan    Low vitamin B12 level    Iron deficiency anemia    Cat bite    Cough    Low back pain    Pharyngitis    Spondylolisthesis, grade 1    Bilateral inguinal hernia    Type 2 diabetes mellitus with other specified complication    Acute renal failure    Change in mental status    Dementia with behavioral disturbance    CKD (chronic kidney disease) stage 4, GFR 15-29 ml/min    Vitamin B12 deficiency    Advanced age     Past Medical History:   Diagnosis Date    Arthritis     AVM (arteriovenous malformation)     Cancer     PROSTATE    Cataract     Colon cancer     Colon polyp     Diabetes mellitus     Diverticulosis     Dizzinesses 10/20/2017    GERD (gastroesophageal reflux disease)     Hemorrhoids     History of transfusion     Hypertension     Inguinal hernia     Iron deficiency anemia due to chronic blood loss 2017    Prostate CA     Prostate hypertrophy      Rotator cuff syndrome      Past Surgical History:   Procedure Laterality Date    APPENDECTOMY      CHOLECYSTECTOMY      COLON RESECTION N/A 9/18/2017    Procedure: LAPAROSCOPIC ASSISTED RIGHT COLECTOMY;  Surgeon: Caroline Loomis MD;  Location: [facility] OR;  Service:     COLONOSCOPY  04/29/2010    COLONOSCOPY N/A 8/25/2017    Procedure: COLONOSCOPY WITH ANESTHESIA;  Surgeon: Joaquín Neff MD;  Location: [facility] ENDOSCOPY;  Service:     ENDOSCOPY  03/27/2013    ENDOSCOPY N/A 8/25/2017    Procedure: ESOPHAGOGASTRODUODENOSCOPY WITH ANESTHESIA;  Surgeon: Joaquín Neff MD;  Location: [facility] ENDOSCOPY;  Service:     EYE SURGERY Left     CATARACT    INGUINAL HERNIA REPAIR Bilateral 11/1/2019    Procedure: OPEN BILATERAL INGUINAL HERNIA REPAIR WITH MESH;  Surgeon: Caroline Loomis MD;  Location: [facility] OR;  Service: General    INTERVENTIONAL RADIOLOGY PROCEDURE N/A 9/18/2017    Procedure: URETHRA DILITATION with dominguez catheter insertion;  Surgeon: Mamadou Paez MD;  Location: [facility] OR;  Service:     PROSTATECTOMY      PROSTATECTOMY         OT ASSESSMENT FLOWSHEET (Last 12 Hours)       OT Evaluation and Treatment       Row Name 05/13/25 1044                   OT Time and Intention    Subjective Information no complaints  -LS        Document Type therapy note (daily note)  -LS        Mode of Treatment occupational therapy  -LS        Patient Effort good  -LS           General Information    Patient Profile Reviewed yes  -LS        Existing Precautions/Restrictions fall  -LS        Limitations/Impairments safety/cognitive  -LS        Barriers to Rehab medically complex  -LS           Pain Assessment    Pretreatment Pain Rating 0/10 - no pain  -LS        Posttreatment Pain Rating 0/10 - no pain  -LS        Pain Side/Orientation generalized  -LS           Plan of Care Review    Plan of Care Reviewed With patient  -LS        Progress improving  -LS        Outcome Evaluation OT tx completed on this date. Pt up in  recliner awaiting family to discharge today. Pt agreeable to therapy and completed multiple sit to stands from various surface levels, functional mobility in room and hallway, toilet transfer and ambulatory level grooming tasks is bathroom with RW req SBA and CGA with transitional movements due to minimal unsteadiness. Pt is d/cing home with assistance and HH on this date.  -LS           Positioning and Restraints    Pre-Treatment Position sitting in chair/recliner  -LS        Post Treatment Position chair  -LS        In Chair sitting;call light within reach;encouraged to call for assist;exit alarm on  -LS           Therapy Plan Review/Discharge Plan (OT)    Anticipated Discharge Disposition (OT) home with assist;home with home health  -LS           Grooming Goal 1 (OT)    Activity/Device (Grooming Goal 1, OT) hair care;oral care;wash face, hands  -LS        [place] (Grooming Goal 1, OT) supervision required;minimum assist (75% or more patient effort)  -LS        Time Frame (Grooming Goal 1, OT) long term goal (LTG);10 days  -LS        Progress/Outcome (Grooming Goal 1, OT) goal met  -LS           Self-Feeding Goal 1 (OT)    Activity/Device (Self-Feeding Goal 1, OT) finger foods;liquids to mouth;scoop food and bring to mouth  -LS        [place] Level/Cues Needed (Self-Feeding Goal 1, OT) set-up required;supervision required  -LS        Time Frame (Self-Feeding Goal 1, OT) long term goal (LTG);by discharge  -LS        Progress/Outcomes (Self-Feeding Goal 1, OT) goal met  -LS           Problem Specific Goal 1 (OT)    Problem Specific Goal 1 (OT) Pt will follow one step commands to improve fxl participation in ADLs & reduce caregiver burden  -LS        Time Frame (Problem Specific Goal 1, OT) long term goal (LTG);by discharge  -LS        Progress/Outcome (Problem Specific Goal 1, OT) goal met  -LS                  User Key  (r) = Recorded By, (t) = Taken By, (c) = Cosigned By      Initials Name Effective  Dates    LS Sudha Prasad COTA 09/22/22 -                     Occupational Therapy Education       Title: PT OT SLP Therapies (In Progress)       Topic: Occupational Therapy (Done)       Point: ADL training (Done)       Learning Progress Summary            Patient Acceptance, E, VU,NR by  at 5/12/2025 1536                      Point: Home exercise program (Done)       Learning Progress Summary            Patient Acceptance, E, VU,NR by EC at 5/12/2025 1536                      Point: Precautions (Done)       Learning Progress Summary            Patient Acceptance, E, VU,NR by  at 5/12/2025 1536                      Point: Body mechanics (Done)       Learning Progress Summary            Patient Acceptance, E, VU,NR by  at 5/12/2025 1536                                      User Key       Initials Effective Dates Name Provider Type Discipline     10/13/23 -  Yuridia Augustine OTR/L Occupational Therapist OT                    OT Recommendation and Plan     Plan of Care Review  Plan of Care Reviewed With: patient  Progress: improving  Outcome Evaluation: OT tx completed on this date. Pt up in recliner awaiting family to discharge today. Pt agreeable to therapy and completed multiple sit to stands from various surface levels, functional mobility in room and hallway, toilet transfer and ambulatory level grooming tasks is bathroom with RW req SBA and CGA with transitional movements due to minimal unsteadiness. Pt is d/cing home with assistance and HH on this date.  Plan of Care Reviewed With: patient  Outcome Evaluation: OT tx completed on this date. Pt up in recliner awaiting family to discharge today. Pt agreeable to therapy and completed multiple sit to stands from various surface levels, functional mobility in room and hallway, toilet transfer and ambulatory level grooming tasks is bathroom with RW req SBA and CGA with transitional movements due to minimal unsteadiness. Pt is d/cing home with assistance and HH  on this date.      OT Rehab Goals       Row Name 05/13/25 1044             Grooming Goal 1 (OT)    Activity/Device (Grooming Goal 1, OT) hair care;oral care;wash face, hands  -LS      [place] (Grooming Goal 1, OT) supervision required;minimum assist (75% or more patient effort)  -LS      Time Frame (Grooming Goal 1, OT) long term goal (LTG);10 days  -LS      Progress/Outcome (Grooming Goal 1, OT) goal met  -LS         Self-Feeding Goal 1 (OT)    Activity/Device (Self-Feeding Goal 1, OT) finger foods;liquids to mouth;scoop food and bring to mouth  -LS      [place] Level/Cues Needed (Self-Feeding Goal 1, OT) set-up required;supervision required  -LS      Time Frame (Self-Feeding Goal 1, OT) long term goal (LTG);by discharge  -LS      Progress/Outcomes (Self-Feeding Goal 1, OT) goal met  -LS         Problem Specific Goal 1 (OT)    Problem Specific Goal 1 (OT) Pt will follow one step commands to improve fxl participation in ADLs & reduce caregiver burden  -LS      Time Frame (Problem Specific Goal 1, OT) long term goal (LTG);by discharge  -LS      Progress/Outcome (Problem Specific Goal 1, OT) goal met  -LS                User Key  (r) = Recorded By, (t) = Taken By, (c) = Cosigned By      Initials Name Provider Type Discipline    Sudha Nicholson COTA Occupational Therapist Assistant THERAPIES                     Outcome Measures       Row Name 05/13/25 1100 05/12/25 1500 05/12/25 1300       How much help from another person do you currently need...    Turning from your back to your side while in flat bed without using bedrails? -- -- 4  -AH    Moving from lying on back to sitting on the side of a flat bed without bedrails? -- -- 4  -AH    Moving to and from a bed to a chair (including a wheelchair)? -- -- 4  -AH    Standing up from a chair using your arms (e.g., wheelchair, bedside chair)? -- -- 4  -AH    Climbing 3-5 steps with a railing? -- -- 3  -AH    To walk in hospital room? -- -- 3  -AH    AM-PAC 6  Clicks Score (PT) -- -- 22  -       How much help from another is currently needed...    Putting on and taking off regular lower body clothing? 3  -LS 3  -EC --    Bathing (including washing, rinsing, and drying) 3  -LS 3  -EC --    Toileting (which includes using toilet bed pan or urinal) 3  -LS 3  -EC --    Putting on and taking off regular upper body clothing 3  -LS 3  -EC --    Taking care of personal grooming (such as brushing teeth) 4  -LS 3  -EC --    Eating meals 4  -LS 4  -EC --    AM-PAC 6 Clicks Score (OT) 20  -LS 19  -EC --       Functional Assessment    Outcome Measure Options AM-PAC 6 Clicks Daily Activity (OT)  -LS AM-PAC 6 Clicks Daily Activity (OT)  -EC AM-PAC 6 Clicks Basic Mobility (PT)  -      Row Name 05/11/25 1032             How much help from another person do you currently need...    Turning from your back to your side while in flat bed without using bedrails? 4  -CATY      Moving from lying on back to sitting on the side of a flat bed without bedrails? 4  -CATY      Moving to and from a bed to a chair (including a wheelchair)? 4  -CATY      Standing up from a chair using your arms (e.g., wheelchair, bedside chair)? 4  -CATY      Climbing 3-5 steps with a railing? 3  -CATY      To walk in hospital room? 3  -CATY      AM-PAC 6 Clicks Score (PT) 22  -CATY         Functional Assessment    Outcome Measure Options AM-PAC 6 Clicks Basic Mobility (PT)  -                User Key  (r) = Recorded By, (t) = Taken By, (c) = Cosigned By      Initials Name Provider Type     Mary Thomas, PTA Physical Therapist Assistant    CATY Faisal Moralez, PTA Physical Therapist Assistant    Sudha Nicholson COTA Occupational Therapist Assistant    Yuridia Estrada OTR/L Occupational Therapist                    Time Calculation:    Time Calculation- OT       Row Name 05/13/25 1104             Time Calculation- OT    OT Start Time 1044  -LS      OT Stop Time 1107  -LS      OT Time Calculation (min) 23 min  -       Total Timed Code Minutes- OT 23 minute(s)  -      OT Received On 05/13/25  -                User Key  (r) = Recorded By, (t) = Taken By, (c) = Cosigned By      Initials Name Provider Type    Sudha Nicholson COTA Occupational Therapist Assistant                    Therapy Charges for Today       Code Description Service Date Service Provider Modifiers Qty    61021315454 HC OT SELF CARE/MGMT/TRAIN EA 15 MIN 5/13/2025 Sudha Prasad COTA GO 1    37340441405 HC OT THERAPEUTIC ACT EA 15 MIN 5/13/2025 Sudha Prasad COTA GO 1                 OT Discharge Summary  Anticipated Discharge Disposition (OT): home with assist, home with home health  Reason for Discharge: Discharge from facility  Outcomes Achieved: Refer to plan of care for updates on goals achieved  Discharge Destination: Home with assist, Home with home health    PAN Henry  5/13/2025

## 2025-05-13 NOTE — CASE MANAGEMENT/SOCIAL WORK
Continued Stay Note  [facility]     Patient Name: Maikel Mathews  MRN: [id number]  Today's Date: 5/13/2025    Admit Date: 5/4/2025    Plan: Home Health   Discharge Plan       Row Name 05/13/25 0951       Plan    Plan Home Health    Patient/Family in Agreement with Plan yes    Provided Post Acute Provider List? Yes    Post Acute Provider List Home Health    Provided Post Acute Provider Quality & Resource List? Yes    Post Acute Provider Quality and Resource List Home Health    Delivered To Support Person    Method of Delivery Telephone    Final Discharge Disposition Code 06 - home with home health care    Final Note Pt is being d/c'ed home today with home health. Discussed options with con yesterday and she chose [facility]. Referral sent to them at [phone].    Addendum: Spoke with Marcy at [facility] [phone] and they did receive the referral. It is pending.    Addendum #2: According to Hattie from [facility], they have accepted the referral.                    Discharge Codes    No documentation.                 Expected Discharge Date and Time       Expected Discharge Date Expected Discharge Time    May 13, 2025               JAZMIN Gavin

## 2025-05-13 NOTE — PLAN OF CARE
Goal Outcome Evaluation:  Plan of Care Reviewed With: patient        Progress: improving  Outcome Evaluation: OT tx completed on this date. Pt up in recliner awaiting family to discharge today. Pt agreeable to therapy and completed multiple sit to stands from various surface levels, functional mobility in room and hallway, toilet transfer and ambulatory level grooming tasks is bathroom with RW req SBA and CGA with transitional movements due to minimal unsteadiness. Pt is d/cing home with assistance and HH on this date.    Anticipated Discharge Disposition (OT): home with assist, home with home health

## 2025-05-13 NOTE — THERAPY DISCHARGE NOTE
Acute Care - Physical Therapy Discharge Summary  [facility]       Patient Name: Maikel Mathews  : 1929  MRN: [id number]    Today's Date: 2025                 Admit Date: 2025      PT Recommendation and Plan    Visit Dx:    ICD-10-CM ICD-9-CM   1. Acute renal failure, unspecified acute renal failure type  N17.9 584.9   2. Altered mental status, unspecified altered mental status type  R41.82 780.97   3. Aggressive behavior due to dementia  F03.918 294.21   4. Hypomagnesemia  E83.42 275.2   5. Dysphagia, unspecified type  R13.10 787.20   6. Impaired functional mobility and activity tolerance [Z74.09]  Z74.09 V49.89   7. Type 2 diabetes mellitus with other specified complication, unspecified whether long term insulin use  E11.69 250.80   8. Advanced age  R54 797   9. Decreased activities of daily living (ADL)  Z78.9 V49.89        Outcome Measures       Row Name 25 1100 25 1500 25 1300       How much help from another person do you currently need...    Turning from your back to your side while in flat bed without using bedrails? -- -- 4  -AH    Moving from lying on back to sitting on the side of a flat bed without bedrails? -- -- 4  -AH    Moving to and from a bed to a chair (including a wheelchair)? -- -- 4  -AH    Standing up from a chair using your arms (e.g., wheelchair, bedside chair)? -- -- 4  -AH    Climbing 3-5 steps with a railing? -- -- 3  -AH    To walk in hospital room? -- -- 3  -AH    AM-PAC 6 Clicks Score (PT) -- -- 22  -AH       How much help from another is currently needed...    Putting on and taking off regular lower body clothing? 3  -LS 3  -EC --    Bathing (including washing, rinsing, and drying) 3  -LS 3  -EC --    Toileting (which includes using toilet bed pan or urinal) 3  -LS 3  -EC --    Putting on and taking off regular upper body clothing 3  -LS 3  -EC --    Taking care of personal grooming (such as brushing teeth) 4  -LS 3  -EC --    Eating meals 4  -LS 4   -EC --    AM-PAC 6 Clicks Score (OT) 20  -LS 19  -EC --       Functional Assessment    Outcome Measure Options AM-PAC 6 Clicks Daily Activity (OT)  -LS AM-PAC 6 Clicks Daily Activity (OT)  -EC AM-PAC 6 Clicks Basic Mobility (PT)  -      Row Name 05/11/25 1032             How much help from another person do you currently need...    Turning from your back to your side while in flat bed without using bedrails? 4  -CATY      Moving from lying on back to sitting on the side of a flat bed without bedrails? 4  -CATY      Moving to and from a bed to a chair (including a wheelchair)? 4  -CATY      Standing up from a chair using your arms (e.g., wheelchair, bedside chair)? 4  -CATY      Climbing 3-5 steps with a railing? 3  -CATY      To walk in hospital room? 3  -CATY      AM-PAC 6 Clicks Score (PT) 22  -CATY         Functional Assessment    Outcome Measure Options AM-PAC 6 Clicks Basic Mobility (PT)  -                User Key  (r) = Recorded By, (t) = Taken By, (c) = Cosigned By      Initials Name Provider Type    Mary Jewell, PTA Physical Therapist Assistant    Faisal Rosales, PTA Physical Therapist Assistant    Sudha Nicholson COTA Occupational Therapist Assistant    EC Yuridia Augustine, OTR/L Occupational Therapist                     PT Charges       Row Name 05/13/25 1142             Time Calculation    Start Time 1133  -      Stop Time 1143  -      Time Calculation (min) 10 min  -      PT Received On 05/13/25  -         Time Calculation- PT    Total Timed Code Minutes- PT 10 minute(s)  -         Timed Charges    75011 - Gait Training Minutes  10  -AH         Total Minutes    Timed Charges Total Minutes 10  -AH       Total Minutes 10  -AH                User Key  (r) = Recorded By, (t) = Taken By, (c) = Cosigned By      Initials Name Provider Type    Mary Jewell PTA Physical Therapist Assistant                     PT Rehab Goals       Row Name 05/13/25 8079             Bed Mobility Goal 1 (PT)     Activity/Assistive Device (Bed Mobility Goal 1, PT) bed mobility activities, all  -AE      [place] Level/Cues Needed (Bed Mobility Goal 1, PT) independent  -AE      Time Frame (Bed Mobility Goal 1, PT) long term goal (LTG);10 days  -AE      Progress/Outcomes (Bed Mobility Goal 1, PT) goal not met  -AE         Transfer Goal 1 (PT)    Activity/Assistive Device (Transfer Goal 1, PT) sit-to-stand/stand-to-sit;bed-to-chair/chair-to-bed  -AE      [place] Level/Cues Needed (Transfer Goal 1, PT) standby assist  -AE      Time Frame (Transfer Goal 1, PT) long term goal (LTG);10 days  -AE      Progress/Outcome (Transfer Goal 1, PT) goal not met  -AE         Gait Training Goal 1 (PT)    Activity/Assistive Device (Gait Training Goal 1, PT) gait (walking locomotion);decrease fall risk;diminish gait deviation;improve balance and speed;increase endurance/gait distance  -AE      [place] Level (Gait Training Goal 1, PT) contact guard required;standby assist  -AE      Distance (Gait Training Goal 1, PT) 100 ft  -AE      Time Frame (Gait Training Goal 1, PT) long term goal (LTG);10 days  -AE      Progress/Outcome (Gait Training Goal 1, PT) goal not met  -AE                User Key  (r) = Recorded By, (t) = Taken By, (c) = Cosigned By      Initials Name Provider Type Discipline    Keli Jalloh PTA Physical Therapist Assistant PT                        PT Discharge Summary  Anticipated Discharge Disposition (PT): skilled nursing facility  Reason for Discharge: Discharge from facility  Outcomes Achieved: Patient able to partially acheive established goals  Discharge Destination: SNF      Keli Mariee PTA   5/13/2025

## 2025-05-13 NOTE — DISCHARGE SUMMARY
[facility] Medicine Services  DISCHARGE SUMMARY       Date of Admission: 5/4/2025  Date of Discharge:  5/13/2025  Primary Care Physician: David Barajas MD    Presenting Problem/History of Present Illness:  Aggressive behavior    Final Discharge Diagnoses:  Active Hospital Problems    Diagnosis     **Acute renal failure     Dementia with behavioral disturbance     CKD (chronic kidney disease) stage 4, GFR 15-29 ml/min     Vitamin B12 deficiency     Advanced age     Change in mental status     Type 2 diabetes mellitus with other specified complication        Consults: Nephrology    Pertinent Test Results:       Imaging Results (All)       Procedure Component Value Units Date/Time    US Renal Bilateral [[id number]] Collected: 05/05/25 1646     Updated: 05/05/25 1650    Narrative:      US RENAL BILATERAL- 5/5/2025 2:53 PM     HISTORY: Chronic kidney disease; N17.9-Acute kidney failure,  unspecified; R41.82-Altered mental status, unspecified;  F03.918-Unspecified dementia, unspecified severity, with other  behavioral disturbance; E83.42-Hypomagnesemia     COMPARISON: None available.       TECHNIQUE: Multiple longitudinal and transverse real-time sonographic  images of the kidneys and urinary bladder are obtained. Report and  images stored per institutional and state regulations.           FINDINGS:     Visualized proximal abdominal aorta and IVC are unremarkable.        RIGHT KIDNEY: The right kidney measures 7.9 cm in length. Renal cortex  is echogenic. There is no hydronephrosis..     LEFT KIDNEY: Left kidney is 7.5 cm in length. Renal cortex is echogenic.  There is no left hydronephrosis. There is a simple anechoic cyst at the  left renal cortex lower pole measuring 1.3 cm.     PELVIS: Urinary bladder is grossly unremarkable.          Impression:         1. Small echogenic kidneys consistent with medical renal disease. There  is no hydronephrosis.  2. Simple left renal  cyst.           This report was signed and finalized on 5/5/2025 4:47 PM by Viral Mcfarland.       CT Head Without Contrast [[id number]] Collected: 05/04/25 1352     Updated: 05/04/25 1356    Narrative:      EXAMINATION: CT HEAD WO CONTRAST-      5/4/2025 12:43 PM     HISTORY: Altered mental status. Worsening dementia. Aggressive behavior.     In order to have a CT radiation dose as low as reasonably achievable  Automated Exposure Control was utilized for adjustment of the mA and/or  KV according to patient size.     CT Dose DLP = 680.97 mGy.cm.  (If there are multiple studies performed at the same time this  represents the total dose).     Images are stored in PACS per institutional protocol.        Noncontrast head CT.  Axial, sagittal, and coronal sequences.     The visualized paranasal sinuses are clear.     The brain and ventricles have an age appropriate appearance.   Moderate atrophy and small vessel disease.  There is no hemorrhage or mass-effect.   No acute infarction is seen.     No calvarial abnormality.       Impression:      1. Age-related chronic change.  2. No acute intracranial abnormality is seen.           This report was signed and finalized on 5/4/2025 1:53 PM by Dr. Yifan Salazar MD.       XR Chest 1 View [[id number]] Collected: 05/04/25 1340     Updated: 05/04/25 1344    Narrative:      EXAMINATION: XR CHEST 1 VW-     5/4/2025 12:31 PM     HISTORY: Altered mental status.     Images are stored in PACS per institutional protocol.     1 view chest x-ray.     COMPARISON:  3/31/2025.     Heart size is normal.  The mediastinum is within normal limits.     The lungs are mildly hyperexpanded with no pneumonia or pneumothorax.  Mild chronic appearing interstitial disease with a few calcified  granulomas.  No congestive failure changes.       Impression:      1. No acute disease.                 This report was signed and finalized on 5/4/2025 1:41 PM by Dr. Yifan Salazar MD.             LAB RESULTS:           Lab 05/12/25  0607 05/11/25  0515 05/10/25  0445 05/09/25  1543 05/09/25  0319 05/08/25  0343 05/07/25  0430 05/07/25  0430 05/06/25  2328   SODIUM 138 138 136 137  --  140   < > 138  --    POTASSIUM 4.6 4.6 4.1 4.2 4.0 4.2   < > 4.9  4.9  --    CHLORIDE 107 106 108* 108*  --  113*   < > 112*  --    CO2 23.0 21.0* 19.0* 17.0*  --  17.0*   < > 10.0*  --    ANION GAP 8.0 11.0 9.0 12.0  --  10.0   < > 16.0*  --    BUN 42* 41* 41* 44*  --  43*   < > 42*  --    CREATININE 2.98* 3.12* 3.04* 3.10*  --  3.19*   < > 2.73*  --    EGFR 18.7* 17.7* 18.3* 17.8*  --  17.2*   < > 20.8*  --    GLUCOSE 106* 137* 167* 195*  --  97   < > 114*  --    CALCIUM 8.1* 7.8* 7.8* 7.9* 8.0* 8.0*   < > 8.3  8.3  --    MAGNESIUM  --   --   --   --  1.7 1.8  --  1.8  --    PHOSPHORUS  --   --   --   --  3.5 3.9  --  3.7  --    HEMOGLOBIN A1C  --   --   --   --   --   --   --   --  6.60*    < > = values in this interval not displayed.         Lab 05/08/25  0343   TOTAL PROTEIN 6.0   ALBUMIN 3.4*   GLOBULIN 2.6   ALT (SGPT) <5   AST (SGOT) 18   BILIRUBIN 0.5   ALK PHOS 66                     Brief Urine Lab Results  (Last result in the past 365 days)        Color   Clarity   Blood   Leuk Est   Nitrite   Protein   CREAT   Urine HCG        05/04/25 1536             55.7         05/04/25 1536 Yellow   Clear   Negative   Negative   Negative   100 mg/dL (2+)                 Microbiology Results (last 10 days)       ** No results found for the last 240 hours. **            Hospital Course:   The patient is a 89-year-old  male with known history of Alzheimer's dementia, stage IV chronic kidney disease, hypertension, and type 2 diabetes, that presented to our emergency department on 5/4/2025 with worsening confusion in addition to aggressive behavior.  At home, it sounds like patient has started to refuse taking some of his medications.  He was becoming increasingly more aggressive, making threatening comments even towards his spouse, and  "was also having issues with elevated blood sugars.  On arrival he was found to have a creatinine of 3.5 which is pretty close to his baseline.  Patient was admitted to the hospitalist service for further workup and management.  Given his renal insufficiency, nephrology was consulted and followed along with us during this hospitalization as well.    No signs and symptoms suggestive of ongoing or evolving infection were identified, particularly as it pertains to his behavioral disturbance.    Patient was found to have both vitamin D and vitamin B12 deficiency during this hospitalization.  In fact, his vitamin B12 level resulted \"less than 150.\"  Patient was started on daily intramuscular cyanocobalamin injections.  Patient was also started on a vitamin D supplement.    Nephrology has been closely monitoring his renal function throughout this hospitalization.  On the day of admission his BUN was found to be 52 with a creatinine of 3.64.  He also had evidence of a metabolic acidosis with a bicarbonate level of 15.  Prior to discharge those values have stabilized.  His creatinine is currently 2.98, again plateauing at his baseline.  Nephrology does have him on sodium bicarbonate supplementation which we will continue at discharge.  Plan for close outpatient follow-up in the nephrology clinic with plans for repeat basic metabolic panel.    Patient's hemoglobin A1c was found to be 6.6.  Given his age, weight, and renal function feel that we should avoid glipizide moving forward.  Over the short-term, patient has been on sliding scale insulin and has been doing well.  Will make arrangements for sliding scale insulin coverage to continue in the outpatient setting, this was discussed with her stepdaughter who assists in his care (Arabella) at length this morning.    Efforts were pursued during this hospitalization for arrangements of placement.  Up to this point, those efforts have been unsuccessful.  Patient does have " "Alzheimer's dementia complicated by behavioral disturbance.  He has been started on Seroquel during this hospitalization.  He is currently on Seroquel at 25 mg every morning, and 50 mg nightly.  During this hospitalization he has required as needed IM injections of Geodon for times when he experiences acute agitation.  In my discussion with his family this morning (Arabella) we discussed a plan to continue his Seroquel in the outpatient setting, and I will also provide him a prescription for Geodon but only to use on an as-needed basis.    Patient has been doing well with physical therapy, able to walk 300 feet.  I will make arrangements for home health services to include nursing care in addition to physical therapy and Occupational Therapy.    Plans are in place for discharge home today.  At some point, patient may need evaluation by [facility] psych provider if ongoing dementia with behavioral disturbance continues to be poorly controlled.      Physical Exam on Discharge:  /73 (BP Location: Right arm, Patient Position: Sitting)   Pulse 90   Temp 97.8 °F (36.6 °C) (Temporal)   Resp 16   Ht 167.6 cm (66\")   Wt 64 kg (141 lb)   SpO2 98%   BMI 22.76 kg/m²   Physical Exam  Vitals reviewed.   Constitutional:       General: He is not in acute distress.     Appearance: He is not toxic-appearing.      Comments: Sitting up in bedside chair watching TV   HENT:      Head: Normocephalic.      Mouth/Throat:      Mouth: Mucous membranes are moist.   Pulmonary:      Effort: Pulmonary effort is normal. No respiratory distress.   Musculoskeletal:         General: No deformity.   Neurological:      Mental Status: He is alert. Mental status is at baseline.   Psychiatric:         Mood and Affect: Mood normal.      Comments: Currently calm and cooperative       Condition on Discharge: medically stable    Discharge Disposition:  Home or Self Care    Discharge Medications:     Discharge Medications        New Medications        " Instructions Start Date   cholecalciferol 25 MCG (1000 UT) tablet  Commonly known as: VITAMIN D3   2,000 Units, Oral, Daily   Start Date: May 14, 2025     Insulin Lispro 100 UNIT/ML injection  Commonly known as: humaLOG   2-7 Units, Subcutaneous, 4 Times Daily Before Meals & Nightly      QUEtiapine 50 MG tablet  Commonly known as: SEROquel   50 mg, Oral, Nightly      QUEtiapine 25 MG tablet  Commonly known as: SEROquel   25 mg, Oral, Every Morning   Start Date: May 14, 2025     sodium bicarbonate 650 MG tablet   650 mg, Oral, 2 Times Daily      vitamin B-12 1000 MCG tablet  Commonly known as: CYANOCOBALAMIN   1,000 mcg, Oral, Daily      ziprasidone 20 MG capsule  Commonly known as: Geodon   20 mg, Oral, 2 Times Daily PRN             Continue These Medications        Instructions Start Date   amLODIPine 10 MG tablet  Commonly known as: NORVASC   10 mg, Oral, Daily             Stop These Medications      glipizide 5 MG ER tablet  Commonly known as: GLUCOTROL XL              Discharge Diet: diabetic diet    Activity at Discharge: as tolerated    Follow-up Appointments:   Plan for 1 week follow-up with his primary care provider, Dr. Barajas, in addition to nephrology with plans for a repeat basic metabolic panel to confirm stability of his renal function.    Test Results Pending at Discharge: none    Electronically signed by Ryan Bose MD, 05/13/25, 08:28 CDT.    Time: 35 minutes.

## 2025-05-13 NOTE — THERAPY TREATMENT NOTE
Acute Care - Physical Therapy Treatment Note  [facility]     Patient Name: Maikel Mathews  : 1929  MRN: [id number]  Today's Date: 2025      Visit Dx:     ICD-10-CM ICD-9-CM   1. Acute renal failure, unspecified acute renal failure type  N17.9 584.9   2. Altered mental status, unspecified altered mental status type  R41.82 780.97   3. Aggressive behavior due to dementia  F03.918 294.21   4. Hypomagnesemia  E83.42 275.2   5. Dysphagia, unspecified type  R13.10 787.20   6. Impaired functional mobility and activity tolerance [Z74.09]  Z74.09 V49.89   7. Type 2 diabetes mellitus with other specified complication, unspecified whether long term insulin use  E11.69 250.80   8. Advanced age  R54 797   9. Decreased activities of daily living (ADL)  Z78.9 V49.89     Patient Active Problem List   Diagnosis    Anemia associated with nutritional deficiency    Iron deficiency anemia due to chronic blood loss    Cancer of right colon    Dizzinesses    Stage 3 chronic kidney disease    Encounter for hydration prior to CT scan    Low vitamin B12 level    Iron deficiency anemia    Cat bite    Cough    Low back pain    Pharyngitis    Spondylolisthesis, grade 1    Bilateral inguinal hernia    Type 2 diabetes mellitus with other specified complication    Acute renal failure    Change in mental status    Dementia with behavioral disturbance    CKD (chronic kidney disease) stage 4, GFR 15-29 ml/min    Vitamin B12 deficiency    Advanced age     Past Medical History:   Diagnosis Date    Arthritis     AVM (arteriovenous malformation)     Cancer     PROSTATE    Cataract     Colon cancer     Colon polyp     Diabetes mellitus     Diverticulosis     Dizzinesses 10/20/2017    GERD (gastroesophageal reflux disease)     Hemorrhoids     History of transfusion     Hypertension     Inguinal hernia     Iron deficiency anemia due to chronic blood loss 2017    Prostate CA     Prostate hypertrophy     Rotator cuff syndrome      Past  Surgical History:   Procedure Laterality Date    APPENDECTOMY      CHOLECYSTECTOMY      COLON RESECTION N/A 9/18/2017    Procedure: LAPAROSCOPIC ASSISTED RIGHT COLECTOMY;  Surgeon: Caroline Loomis MD;  Location: [facility] OR;  Service:     COLONOSCOPY  04/29/2010    COLONOSCOPY N/A 8/25/2017    Procedure: COLONOSCOPY WITH ANESTHESIA;  Surgeon: Joaquín Neff MD;  Location: [facility] ENDOSCOPY;  Service:     ENDOSCOPY  03/27/2013    ENDOSCOPY N/A 8/25/2017    Procedure: ESOPHAGOGASTRODUODENOSCOPY WITH ANESTHESIA;  Surgeon: Joaquín Neff MD;  Location: [facility] ENDOSCOPY;  Service:     EYE SURGERY Left     CATARACT    INGUINAL HERNIA REPAIR Bilateral 11/1/2019    Procedure: OPEN BILATERAL INGUINAL HERNIA REPAIR WITH MESH;  Surgeon: Caroline Loomis MD;  Location: [facility] OR;  Service: General    INTERVENTIONAL RADIOLOGY PROCEDURE N/A 9/18/2017    Procedure: URETHRA DILITATION with dominguez catheter insertion;  Surgeon: Mamadou Paez MD;  Location: [facility] OR;  Service:     PROSTATECTOMY      PROSTATECTOMY       PT Assessment (Last 12 Hours)       PT Evaluation and Treatment       [facility] Name 05/13/25 1133          Physical Therapy Time and Intention    Subjective Information no complaints  -     Document Type therapy note (daily note)  -     Mode of Treatment physical therapy  -[facility] Name 05/13/25 1133          General Information    Existing Precautions/Restrictions fall  -     Limitations/Impairments safety/cognitive  -[facility] Name 05/13/25 1133          Pain    Pretreatment Pain Rating 0/10 - no pain  -     Posttreatment Pain Rating 0/10 - no pain  -[facility] Name 05/13/25 1133          Bed Mobility    Comment, (Bed Mobility) chair  -[facility] Name 05/13/25 1133          Sit-Stand Transfer    Sit-Stand [place] (Transfers) verbal cues;contact guard;standby assist  -     Assistive Device (Sit-Stand Transfers) walker, front-wheeled  -       Row Name 05/13/25 1133          Stand-Sit  Transfer    Stand-Sit [place] (Transfers) verbal cues;contact guard;standby assist  -     Assistive Device (Stand-Sit Transfers) walker, front-wheeled  -AH       Row Name 05/13/25 1133          Gait/Stairs (Locomotion)    [place] Level (Gait) verbal cues;contact guard;standby assist  -AH     Assistive Device (Gait) walker, front-wheeled  -AH     Distance in Feet (Gait) 250  -AH       Row Name 05/13/25 1133          Positioning and Restraints    Pre-Treatment Position sitting in chair/recliner  -AH     Post Treatment Position chair  -AH     In Chair sitting;call light within reach;encouraged to call for assist;exit alarm on  -               User Key  (r) = Recorded By, (t) = Taken By, (c) = Cosigned By      Initials Name Provider Type    Mary Jewell, [facility] Physical Therapist Assistant                    Physical Therapy Education       Title: PT OT SLP Therapies (In Progress)       Topic: Physical Therapy (In Progress)       Point: Mobility training (Done)       Learning Progress Summary            Patient Acceptance, E,TB,D, VU,NR by  at 5/6/2025 1708    Comment: education re: purpose of PT/importance of activity, safety awareness w/ mobility; increase awareness of gait mechanics   Family Acceptance, E,TB,D, VU,NR by  at 5/6/2025 1708    Comment: education re: purpose of PT/importance of activity, safety awareness w/ mobility; increase awareness of gait mechanics                      Point: Home exercise program (Not Started)       Learner Progress:  Not documented in this visit.              Point: Precautions (Done)       Learning Progress Summary            Patient Acceptance, E,TB,D, VU,NR by  at 5/6/2025 1708    Comment: education re: purpose of PT/importance of activity, safety awareness w/ mobility; increase awareness of gait mechanics   Family Acceptance, E,TB,D, VU,NR by  at 5/6/2025 1708    Comment: education re: purpose of PT/importance of activity, safety awareness w/ mobility;  increase awareness of gait mechanics                                      User Key       Initials Effective Dates Name Provider Type Discipline     08/02/18 -  Theresa Cuevas, PT Physical Therapist PT                  PT Recommendation and Plan     Plan of Care Reviewed With: patient  Progress: improving  Outcome Evaluation: pt very cooperative and pleasant, did get emotional about missing his wife, pt amb community distances is hallway with rwx cga-sba, had pt try to rememeber his room and find his way back to his room, pt required assist to find his way back to his room, trans back to bed sba, pt would benefit from cont therapy   Outcome Measures       Row Name 05/13/25 1100 05/12/25 1500 05/12/25 1300       How much help from another person do you currently need...    Turning from your back to your side while in flat bed without using bedrails? -- -- 4  -AH    Moving from lying on back to sitting on the side of a flat bed without bedrails? -- -- 4  -AH    Moving to and from a bed to a chair (including a wheelchair)? -- -- 4  -AH    Standing up from a chair using your arms (e.g., wheelchair, bedside chair)? -- -- 4  -AH    Climbing 3-5 steps with a railing? -- -- 3  -AH    To walk in hospital room? -- -- 3  -AH    AM-PAC 6 Clicks Score (PT) -- -- 22  -AH       How much help from another is currently needed...    Putting on and taking off regular lower body clothing? 3  -LS 3  -EC --    Bathing (including washing, rinsing, and drying) 3  -LS 3  -EC --    Toileting (which includes using toilet bed pan or urinal) 3  -LS 3  -EC --    Putting on and taking off regular upper body clothing 3  -LS 3  -EC --    Taking care of personal grooming (such as brushing teeth) 4  -LS 3  -EC --    Eating meals 4  -LS 4  -EC --    AM-PAC 6 Clicks Score (OT) 20  -LS 19  -EC --       Functional Assessment    Outcome Measure Options AM-PAC 6 Clicks Daily Activity (OT)  -LS AM-PAC 6 Clicks Daily Activity (OT)  -EC AM-PAC 6 Clicks  Basic Mobility (PT)  -      Row Name 05/11/25 1032             How much help from another person do you currently need...    Turning from your back to your side while in flat bed without using bedrails? 4  -CATY      Moving from lying on back to sitting on the side of a flat bed without bedrails? 4  -CATY      Moving to and from a bed to a chair (including a wheelchair)? 4  -CATY      Standing up from a chair using your arms (e.g., wheelchair, bedside chair)? 4  -CATY      Climbing 3-5 steps with a railing? 3  -CATY      To walk in hospital room? 3  -CATY      AM-PAC 6 Clicks Score (PT) 22  -CATY         Functional Assessment    Outcome Measure Options AM-PAC 6 Clicks Basic Mobility (PT)  -CATY                User Key  (r) = Recorded By, (t) = Taken By, (c) = Cosigned By      Initials Name Provider Type     Mary Thomas PTA Physical Therapist Assistant    Faisal Rosales PTA Physical Therapist Assistant    Sudha Nicholson COTA Occupational Therapist Assistant    Yuridia Estrada, OTR/L Occupational Therapist                     Time Calculation:    PT Charges       Row Name 05/13/25 1142             Time Calculation    Start Time 1133  -      Stop Time 1143  -      Time Calculation (min) 10 min  -      PT Received On 05/13/25  -         Time Calculation- PT    Total Timed Code Minutes- PT 10 minute(s)  -         Timed Charges    38136 - Gait Training Minutes  10  -AH         Total Minutes    Timed Charges Total Minutes 10  -AH       Total Minutes 10  -AH                User Key  (r) = Recorded By, (t) = Taken By, (c) = Cosigned By      Initials Name Provider Type     Mary Thomas PTA Physical Therapist Assistant                  Therapy Charges for Today       Code Description Service Date Service Provider Modifiers Qty    86066583626 HC GAIT TRAINING EA 15 MIN 5/12/2025 Mary Thomas PTA GP 1    50227658478 HC GAIT TRAINING EA 15 MIN 5/13/2025 Mary Thomas PTA GP 1            PT  G-Codes  Outcome Measure Options: AM-PAC 6 Clicks Daily Activity (OT)  AM-PAC 6 Clicks Score (PT): 22  AM-PAC 6 Clicks Score (OT): 20    Mary Thomas, PTA  5/13/2025

## 2025-05-13 NOTE — PLAN OF CARE
Pt alert and oriented only to self.  normotensive/hypertensive. Pt agitated, restless, and frustrated. On the phone with family yelling and using inappropriate language. Family called the nurse station and asked that the phone be removed from pt room. Pt insists he is going home. PRN geodon administered x1. Sitter at bedside until approx. 1am.    Problem: Adult Inpatient Plan of Care  Goal: Plan of Care Review  Outcome: Progressing  Goal: Patient-Specific Goal (Individualized)  Outcome: Progressing  Goal: Absence of Hospital-Acquired Illness or Injury  Outcome: Progressing  Intervention: Identify and Manage Fall Risk  Recent Flowsheet Documentation  Taken 5/13/2025 0300 by Alexi Abernathy RN  Safety Promotion/Fall Prevention:   assistive device/personal items within reach   clutter free environment maintained   nonskid shoes/slippers when out of bed   room organization consistent   safety round/check completed  Taken 5/13/2025 0200 by Alexi Abernathy RN  Safety Promotion/Fall Prevention:   assistive device/personal items within reach   clutter free environment maintained   nonskid shoes/slippers when out of bed   room organization consistent   safety round/check completed  Taken 5/13/2025 0100 by Alexi Abernathy RN  Safety Promotion/Fall Prevention:   assistive device/personal items within reach   clutter free environment maintained   fall prevention program maintained   nonskid shoes/slippers when out of bed   room organization consistent   safety round/check completed  Taken 5/13/2025 0000 by Alexi Abernathy RN  Safety Promotion/Fall Prevention:   assistive device/personal items within reach   clutter free environment maintained   fall prevention program maintained   nonskid shoes/slippers when out of bed   room organization consistent   safety round/check completed  Taken 5/12/2025 2300 by Alexi Abernathy RN  Safety Promotion/Fall Prevention:   assistive device/personal items within reach   clutter free  environment maintained   fall prevention program maintained   nonskid shoes/slippers when out of bed   room organization consistent   safety round/check completed  Taken 5/12/2025 2200 by Alexi Abernathy RN  Safety Promotion/Fall Prevention:   assistive device/personal items within reach   clutter free environment maintained   fall prevention program maintained   nonskid shoes/slippers when out of bed   room organization consistent   safety round/check completed  Taken 5/12/2025 2100 by Alexi Abernathy RN  Safety Promotion/Fall Prevention:   assistive device/personal items within reach   clutter free environment maintained   fall prevention program maintained   nonskid shoes/slippers when out of bed   room organization consistent   safety round/check completed  Taken 5/12/2025 2000 by Alexi Abernathy RN  Safety Promotion/Fall Prevention:   assistive device/personal items within reach   clutter free environment maintained   fall prevention program maintained   nonskid shoes/slippers when out of bed   room organization consistent   safety round/check completed  Taken 5/12/2025 1900 by Alexi Abernathy RN  Safety Promotion/Fall Prevention:   assistive device/personal items within reach   clutter free environment maintained   fall prevention program maintained   nonskid shoes/slippers when out of bed   room organization consistent   safety round/check completed  Intervention: Prevent Skin Injury  Recent Flowsheet Documentation  Taken 5/13/2025 0300 by Alexi Abernathy RN  Body Position:   position changed independently   side-lying   right  Taken 5/13/2025 0100 by Alexi Abernathy RN  Body Position:   position changed independently   supine  Taken 5/12/2025 2300 by Alexi Abernathy RN  Body Position:   position changed independently   side-lying   right  Taken 5/12/2025 2100 by Alexi Abernathy RN  Body Position: position changed independently  Taken 5/12/2025 1900 by Alexi Abernathy RN  Body Position:   position  changed independently   tilted   left  Intervention: Prevent and Manage VTE (Venous Thromboembolism) Risk  Recent Flowsheet Documentation  Taken 5/12/2025 2000 by Alexi Abernathy RN  VTE Prevention/Management: (See MAR) other (see comments)  Intervention: Prevent Infection  Recent Flowsheet Documentation  Taken 5/13/2025 0300 by Alexi Abernathy RN  Infection Prevention:   rest/sleep promoted   single patient room provided   hand hygiene promoted  Taken 5/13/2025 0200 by Alexi Abernathy RN  Infection Prevention:   rest/sleep promoted   single patient room provided   hand hygiene promoted  Taken 5/13/2025 0100 by Alexi Abernathy RN  Infection Prevention:   single patient room provided   rest/sleep promoted   hand hygiene promoted  Taken 5/13/2025 0000 by Alexi Abernathy RN  Infection Prevention:   single patient room provided   rest/sleep promoted   hand hygiene promoted  Taken 5/12/2025 2300 by Alexi Abernathy RN  Infection Prevention:   single patient room provided   rest/sleep promoted   hand hygiene promoted  Taken 5/12/2025 2200 by Alexi Abernathy RN  Infection Prevention:   single patient room provided   rest/sleep promoted   hand hygiene promoted  Taken 5/12/2025 2100 by Alexi Abernathy RN  Infection Prevention:   single patient room provided   rest/sleep promoted   hand hygiene promoted  Taken 5/12/2025 2000 by Alexi Abernathy RN  Infection Prevention:   single patient room provided   rest/sleep promoted   hand hygiene promoted  Taken 5/12/2025 1900 by Alexi Abernathy RN  Infection Prevention:   single patient room provided   rest/sleep promoted   hand hygiene promoted  Goal: Optimal Comfort and Wellbeing  Outcome: Progressing  Intervention: Provide Person-Centered Care  Recent Flowsheet Documentation  Taken 5/12/2025 2000 by Alexi Abernathy RN  Trust Relationship/Rapport: care explained  Goal: Readiness for Transition of Care  Outcome: Progressing   Goal Outcome Evaluation:

## 2025-05-13 NOTE — PROGRESS NOTES
Nephrology ([facility]) Progress Note      Patient:  Maikel Mathews  YOB: 1929  Date of Service: 5/13/2025  MRN: [id number]   Acct: [id number]   Primary Care Physician: David Barajas MD  Advance Directive:   Code Status and Medical Interventions: CPR (Attempt to Resuscitate); Full Support   Ordered at: 05/04/25 1634     Code Status (Patient has no pulse and is not breathing):    CPR (Attempt to Resuscitate)     Medical Interventions (Patient has pulse or is breathing):    Full Support     Admit Date: 5/4/2025       Hospital Day: 9  Referring Provider: Perla Mireles MD      Patient personally seen and examined.  Complete chart including Consults, Notes, Operative Reports, Labs, Cardiology, and Radiology studies reviewed as able.        Subjective:  Maikel Mathews is a 89 y.o. male for whom we were consulted for evaluation and treatment of acute kidney injury. Baseline chronic kidney disease stage 4, creatinine 3.1 in January 2025. Does not follow with nephrology. History of dementia, type 2 diabetes, hypertension. Brought to ER for change in mental status. Reportedly has been aggressive and making threatening statements toward his family. Per family, patient has not been compliant with medications and has not been eating or drinking well. Labs in ER revealed creatinine 3.64, BUN 52. Admitted to medical floor. Patient was poor historian and not able to provide any detail or recent medical history. Denied n/v/d. Renal function improved with IV fluids and has been stable since then    Today is awake and oriented X 2. No new complaints. Overnight he again needed Geodon for agitation. Urine output nonoliguric    Allergies:  Patient has no known allergies.    Home Meds:  Medications Prior to Admission   Medication Sig Dispense Refill Last Dose/Taking    amLODIPine (NORVASC) 10 MG tablet Take 1 tablet by mouth Daily.  5 More than a month    glipiZIDE (GLUCOTROL) 5 MG ER  tablet Take 1 tablet by mouth Daily.  2 More than a month       Medicines:  Current Facility-Administered Medications   Medication Dose Route Frequency Provider Last Rate Last Admin    acetaminophen (TYLENOL) tablet 650 mg  650 mg Oral Q4H PRN Perla Mireles MD   650 mg at 05/08/25 1231    Or    acetaminophen (TYLENOL) 160 MG/5ML oral solution 650 mg  650 mg Oral Q4H PRN Perla Mireles MD        Or    acetaminophen (TYLENOL) suppository 650 mg  650 mg Rectal Q4H PRN Perla Mireles MD        amLODIPine (NORVASC) tablet 10 mg  10 mg Oral Q24H Pablo Ortiz MD   10 mg at 05/13/25 0811    sennosides-docusate (PERICOLACE) 8.6-50 MG per tablet 2 tablet  2 tablet Oral BID PRN Perla Mireles MD        And    polyethylene glycol (MIRALAX) packet 17 g  17 g Oral Daily PRN Perla Mireles MD        And    bisacodyl (DULCOLAX) EC tablet 5 mg  5 mg Oral Daily PRN Perla Mireles MD        And    bisacodyl (DULCOLAX) suppository 10 mg  10 mg Rectal Daily PRN Perla Mireles MD        Calcium Replacement - Follow Nurse / BPA Driven Protocol   Not Applicable PRN Perla Mireles MD        cholecalciferol (VITAMIN D3) tablet 2,000 Units  2,000 Units Oral Daily Fahad Cool MD   2,000 Units at 05/13/25 0811    dextrose (D50W) (25 g/50 mL) IV injection 25 g  25 g Intravenous Q15 Min PRN Peral Mireles MD        dextrose (GLUTOSE) oral gel 15 g  15 g Oral Q15 Min PRN Perla Mireles MD        glucagon (GLUCAGEN) injection 1 mg  1 mg Intramuscular Q15 Min PRN Perla Mireles MD        heparin (porcine) 5000 UNIT/ML injection 5,000 Units  5,000 Units Subcutaneous Q12H Brayan Meehan MD   5,000 Units at 05/13/25 0821    hydrALAZINE (APRESOLINE) tablet 50 mg  50 mg Oral Q4H PRN Brayan Meehan MD        hydrOXYzine (ATARAX) tablet 25 mg  25 mg Oral TID PRN Puertollano, Brayan Riego, MD   25 mg at 05/11/25 1519    Insulin Lispro (humaLOG) injection 2-7 Units  2-7 Units Subcutaneous 4x Daily AC & at  Bedtime Perla Mireles MD   4 Units at 05/13/25 0812    loperamide (IMODIUM) capsule 4 mg  4 mg Oral 4x Daily PRN Cuate Hurd MD        Magnesium Standard Dose Replacement - Follow Nurse / BPA Driven Protocol   Not Applicable PRN Perla Mireles MD        ondansetron (ZOFRAN) injection 4 mg  4 mg Intravenous Q6H PRN Perla Mireles MD        Phosphorus Replacement - Follow Nurse / BPA Driven Protocol   Not Applicable PRN Perla Mireles MD        Potassium Replacement - Follow Nurse / BPA Driven Protocol   Not Applicable PRN ePrla Mireles MD        QUEtiapine (SEROquel) tablet 25 mg  25 mg Oral QAM Brayan Meehan MD   25 mg at 05/13/25 0811    QUEtiapine (SEROquel) tablet 50 mg  50 mg Oral Nightly Brayan Meehan MD   50 mg at 05/12/25 2022    sodium bicarbonate tablet 650 mg  650 mg Oral BID Brayan Meehan MD   650 mg at 05/13/25 0812    sodium chloride 0.9 % flush 10 mL  10 mL Intravenous PRN Cabrera Robbins MD        sodium chloride 0.9 % flush 10 mL  10 mL Intravenous Q12H Perla Mireles MD   10 mL at 05/09/25 0845    sodium chloride 0.9 % flush 10 mL  10 mL Intravenous PRN Perla Mireles MD        sodium chloride 0.9 % infusion 40 mL  40 mL Intravenous PRN Perla Mireles MD        vitamin B-12 (CYANOCOBALAMIN) tablet 1,000 mcg  1,000 mcg Oral Daily Ryan Bose MD   1,000 mcg at 05/13/25 0811    ziprasidone (GEODON) injection 20 mg  20 mg Intramuscular Q4H PRN Perla Mireles MD   20 mg at 05/12/25 2023       Past Medical History:  Past Medical History:   Diagnosis Date    Arthritis     AVM (arteriovenous malformation)     Cancer     PROSTATE    Cataract     Colon cancer     Colon polyp     Diabetes mellitus     Diverticulosis     Dizzinesses 10/20/2017    GERD (gastroesophageal reflux disease)     Hemorrhoids     History of transfusion     Hypertension     Inguinal hernia     Iron deficiency anemia due to chronic blood loss 7/26/2017     Prostate CA     Prostate hypertrophy     Rotator cuff syndrome        Past Surgical History:  Past Surgical History:   Procedure Laterality Date    APPENDECTOMY      CHOLECYSTECTOMY      COLON RESECTION N/A 9/18/2017    Procedure: LAPAROSCOPIC ASSISTED RIGHT COLECTOMY;  Surgeon: Caroline Loomis MD;  Location: [facility] OR;  Service:     COLONOSCOPY  04/29/2010    COLONOSCOPY N/A 8/25/2017    Procedure: COLONOSCOPY WITH ANESTHESIA;  Surgeon: Joaquín Neff MD;  Location: [facility] ENDOSCOPY;  Service:     ENDOSCOPY  03/27/2013    ENDOSCOPY N/A 8/25/2017    Procedure: ESOPHAGOGASTRODUODENOSCOPY WITH ANESTHESIA;  Surgeon: Joaquín Neff MD;  Location: [facility] ENDOSCOPY;  Service:     EYE SURGERY Left     CATARACT    INGUINAL HERNIA REPAIR Bilateral 11/1/2019    Procedure: OPEN BILATERAL INGUINAL HERNIA REPAIR WITH MESH;  Surgeon: Caroline Loomis MD;  Location: [facility] OR;  Service: General    INTERVENTIONAL RADIOLOGY PROCEDURE N/A 9/18/2017    Procedure: URETHRA DILITATION with dominguez catheter insertion;  Surgeon: Mamadou Paez MD;  Location: [facility] OR;  Service:     PROSTATECTOMY      PROSTATECTOMY         Family History  Family History   Problem Relation Age of Onset    Colon cancer Neg Hx     Colon polyps Neg Hx        Social History  Social History     Socioeconomic History    Marital status:    Tobacco Use    Smoking status: Never    Smokeless tobacco: Never   Vaping Use    Vaping status: Never Used   Substance and Sexual Activity    Alcohol use: No    Drug use: No    Sexual activity: Defer       Review of Systems:  History obtained from chart review and the patient  General ROS: No fever or chills  Respiratory ROS: No cough, shortness of breath, wheezing  Cardiovascular ROS: No chest pain or palpitations  Gastrointestinal ROS: No abdominal pain or melena  Genito-Urinary ROS: No dysuria or hematuria  Psych ROS: No anxiety and depression  14 point ROS reviewed with the patient and negative except as  "noted above and in the HPI unless unable to obtain.    Objective:  Patient Vitals for the past 24 hrs:   BP Temp Temp src Pulse Resp SpO2   05/13/25 0710 135/73 97.8 °F (36.6 °C) Temporal 90 16 98 %   05/13/25 0455 150/82 98.5 °F (36.9 °C) Oral 82 16 96 %   05/13/25 0013 143/70 -- -- -- -- --   05/12/25 1900 146/74 97.9 °F (36.6 °C) Oral 93 18 98 %   05/12/25 1446 139/77 97.8 °F (36.6 °C) Temporal 86 16 97 %   05/12/25 1037 135/69 97.6 °F (36.4 °C) Temporal 76 16 96 %     No intake or output data in the 24 hours ending 05/13/25 1014    General: awake/alert   Chest:  clear to auscultation bilaterally without respiratory distress  CVS: regular rate and rhythm  Abdominal: soft, nontender, positive bowel sounds  Extremities: no cyanosis or edema  Skin: warm and dry without rash      Labs:            Results from last 7 days   Lab Units 05/12/25  0607 05/11/25  0515 05/10/25  0445 05/09/25  0319 05/08/25  0343   SODIUM mmol/L 138 138 136   < > 140   POTASSIUM mmol/L 4.6 4.6 4.1   < > 4.2   CHLORIDE mmol/L 107 106 108*   < > 113*   CO2 mmol/L 23.0 21.0* 19.0*   < > 17.0*   BUN mg/dL 42* 41* 41*   < > 43*   CREATININE mg/dL 2.98* 3.12* 3.04*   < > 3.19*   CALCIUM mg/dL 8.1* 7.8* 7.8*   < > 8.0*   EGFR mL/min/1.73 18.7* 17.7* 18.3*   < > 17.2*   BILIRUBIN mg/dL  --   --   --   --  0.5   ALK PHOS U/L  --   --   --   --  66   ALT (SGPT) U/L  --   --   --   --  <5   AST (SGOT) U/L  --   --   --   --  18   GLUCOSE mg/dL 106* 137* 167*   < > 97    < > = values in this interval not displayed.       Radiology:   Imaging Results (Last 72 Hours)       ** No results found for the last 72 hours. **            Culture:  No results found for: \"BLOODCX\", \"URINECX\", \"WOUNDCX\", \"MRSACX\", \"RESPCX\", \"STOOLCX\"      Assessment    Acute kidney injury, prerenal--resolved  Baseline chronic kidney disease stage 4  Type 2 diabetes  Hypertension  History of dementia  Metabolic acidosis--improved  Anemia of CKD  Hypokalemia--improved    Plan:  Renal " function has been stable off IV fluids and is at his baseline level  OK for discharge from renal standpoint. Follow up in our office in 2 weeks      Jona Mckeon, APRN  5/13/2025  10:14 CDT

## 2025-05-13 NOTE — NURSING NOTE
Pt agitated and frustrated. Repeatedly making phone calls to family, using inappropriate language, yelling. Family called nurse desk and asked that phone be removed from pt. Geodon administered for agitation without incident. Sitter at bedside, safety maintained

## 2025-05-14 ENCOUNTER — READMISSION MANAGEMENT (OUTPATIENT)
Dept: CALL CENTER | Facility: HOSPITAL | Age: OVER 89
End: 2025-05-14
Payer: MEDICARE

## 2025-05-14 NOTE — OUTREACH NOTE
Prep Survey      Flowsheet Row Responses   [facility] facility patient discharged from? [place]   Is LACE score < 7 ? No   Eligibility Readm Mgmt   Discharge diagnosis Acute renal failure   Does the patient have one of the following disease processes/diagnoses(primary or secondary)? Other   Does the patient have Home health ordered? Yes   What is the Home health agency?  [facility]   Is there a DME ordered? No   Prep survey completed? Yes            Martha GUTIERREZ - Registered Nurse

## 2025-05-20 LAB
ALBUMIN SERPL-MCNC: 3.4 G/DL (ref 3.5–5.2)
ALP SERPL-CCNC: 59 U/L (ref 40–129)
ALT SERPL-CCNC: 10 U/L (ref 10–50)
ANION GAP SERPL CALCULATED.3IONS-SCNC: 13 MMOL/L (ref 8–16)
AST SERPL-CCNC: 18 U/L (ref 10–50)
BASOPHILS # BLD: 0 K/UL (ref 0–0.2)
BASOPHILS NFR BLD: 0.7 % (ref 0–1)
BILIRUB SERPL-MCNC: 0.3 MG/DL (ref 0.2–1.2)
BUN SERPL-MCNC: 50 MG/DL (ref 8–23)
CALCIUM SERPL-MCNC: 8 MG/DL (ref 8.2–9.6)
CHLORIDE SERPL-SCNC: 105 MMOL/L (ref 98–107)
CO2 SERPL-SCNC: 22 MMOL/L (ref 22–29)
CREAT SERPL-MCNC: 3.1 MG/DL (ref 0.7–1.2)
EOSINOPHIL # BLD: 0.1 K/UL (ref 0–0.6)
EOSINOPHIL NFR BLD: 1.5 % (ref 0–5)
ERYTHROCYTE [DISTWIDTH] IN BLOOD BY AUTOMATED COUNT: 13.8 % (ref 11.5–14.5)
GLUCOSE SERPL-MCNC: 166 MG/DL (ref 70–99)
HCT VFR BLD AUTO: 27.9 % (ref 42–52)
HGB BLD-MCNC: 9.1 G/DL (ref 14–18)
IMM GRANULOCYTES # BLD: 0 K/UL
LYMPHOCYTES # BLD: 0.9 K/UL (ref 1.1–4.5)
LYMPHOCYTES NFR BLD: 16 % (ref 20–40)
MCH RBC QN AUTO: 32.9 PG (ref 27–31)
MCHC RBC AUTO-ENTMCNC: 32.6 G/DL (ref 33–37)
MCV RBC AUTO: 100.7 FL (ref 80–94)
MONOCYTES # BLD: 0.4 K/UL (ref 0–0.9)
MONOCYTES NFR BLD: 7.2 % (ref 0–10)
NEUTROPHILS # BLD: 4.3 K/UL (ref 1.5–7.5)
NEUTS SEG NFR BLD: 74.3 % (ref 50–65)
PLATELET # BLD AUTO: 242 K/UL (ref 130–400)
PMV BLD AUTO: 10 FL (ref 9.4–12.4)
POTASSIUM SERPL-SCNC: 3.8 MMOL/L (ref 3.5–5.1)
PROT SERPL-MCNC: 5.8 G/DL (ref 6.4–8.3)
RBC # BLD AUTO: 2.77 M/UL (ref 4.7–6.1)
SODIUM SERPL-SCNC: 140 MMOL/L (ref 136–145)
WBC # BLD AUTO: 5.8 K/UL (ref 4.8–10.8)

## 2025-05-23 ENCOUNTER — READMISSION MANAGEMENT (OUTPATIENT)
Dept: CALL CENTER | Facility: HOSPITAL | Age: OVER 89
End: 2025-05-23
Payer: MEDICARE

## 2025-05-23 NOTE — OUTREACH NOTE
Medical Week 2 Survey      Flowsheet Row Responses   [facility] facility patient discharged from? [place]   Does the patient have one of the following disease processes/diagnoses(primary or secondary)? Other   Week 2 attempt successful? No   Unsuccessful attempts Attempt 1            Josh KIM - Registered Nurse

## 2025-05-30 ENCOUNTER — APPOINTMENT (OUTPATIENT)
Dept: CT IMAGING | Facility: HOSPITAL | Age: OVER 89
End: 2025-05-30
Payer: MEDICARE

## 2025-05-30 ENCOUNTER — READMISSION MANAGEMENT (OUTPATIENT)
Dept: CALL CENTER | Facility: HOSPITAL | Age: OVER 89
End: 2025-05-30
Payer: MEDICARE

## 2025-05-30 ENCOUNTER — HOSPITAL ENCOUNTER (INPATIENT)
Facility: HOSPITAL | Age: OVER 89
LOS: 4 days | Discharge: HOME HEALTH | End: 2025-06-03
Attending: STUDENT IN AN ORGANIZED HEALTH CARE EDUCATION/TRAINING PROGRAM | Admitting: FAMILY MEDICINE
Payer: MEDICARE

## 2025-05-30 ENCOUNTER — APPOINTMENT (OUTPATIENT)
Dept: GENERAL RADIOLOGY | Facility: HOSPITAL | Age: OVER 89
End: 2025-05-30
Payer: MEDICARE

## 2025-05-30 DIAGNOSIS — N18.30 STAGE 3 CHRONIC KIDNEY DISEASE, UNSPECIFIED WHETHER STAGE 3A OR 3B CKD: ICD-10-CM

## 2025-05-30 DIAGNOSIS — R56.9 SEIZURE: Primary | ICD-10-CM

## 2025-05-30 DIAGNOSIS — E11.69 TYPE 2 DIABETES MELLITUS WITH OTHER SPECIFIED COMPLICATION, UNSPECIFIED WHETHER LONG TERM INSULIN USE: ICD-10-CM

## 2025-05-30 DIAGNOSIS — D53.9 ANEMIA ASSOCIATED WITH NUTRITIONAL DEFICIENCY: ICD-10-CM

## 2025-05-30 DIAGNOSIS — D50.0 IRON DEFICIENCY ANEMIA DUE TO CHRONIC BLOOD LOSS: ICD-10-CM

## 2025-05-30 DIAGNOSIS — Z74.09 IMPAIRED MOBILITY: ICD-10-CM
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
## 2025-05-30 DIAGNOSIS — R54 ADVANCED AGE: ICD-10-CM

## 2025-05-30 DIAGNOSIS — R41.82 ALTERED MENTAL STATUS, UNSPECIFIED ALTERED MENTAL STATUS TYPE: ICD-10-CM

## 2025-05-30 DIAGNOSIS — J02.9 PHARYNGITIS, UNSPECIFIED ETIOLOGY: ICD-10-CM

## 2025-05-30 DIAGNOSIS — F03.918 DEMENTIA WITH BEHAVIORAL DISTURBANCE: ICD-10-CM

## 2025-05-30 DIAGNOSIS — K40.20 BILATERAL INGUINAL HERNIA WITHOUT OBSTRUCTION OR GANGRENE, RECURRENCE NOT SPECIFIED: ICD-10-CM

## 2025-05-30 DIAGNOSIS — R05.9 COUGH, UNSPECIFIED TYPE: ICD-10-CM

## 2025-05-30 DIAGNOSIS — N17.9 ACUTE RENAL FAILURE, UNSPECIFIED ACUTE RENAL FAILURE TYPE: ICD-10-CM

## 2025-05-30 DIAGNOSIS — N18.4 CKD (CHRONIC KIDNEY DISEASE) STAGE 4, GFR 15-29 ML/MIN: ICD-10-CM

## 2025-05-30 DIAGNOSIS — R13.10 DYSPHAGIA, UNSPECIFIED TYPE: ICD-10-CM

## 2025-05-30 DIAGNOSIS — R79.89 LOW VITAMIN B12 LEVEL: ICD-10-CM

## 2025-05-30 DIAGNOSIS — Z29.89 ENCOUNTER FOR HYDRATION PRIOR TO CT SCAN: ICD-10-CM

## 2025-05-30 DIAGNOSIS — C18.2 CANCER OF RIGHT COLON: ICD-10-CM

## 2025-05-30 DIAGNOSIS — M54.50 LOW BACK PAIN, UNSPECIFIED BACK PAIN LATERALITY, UNSPECIFIED CHRONICITY, UNSPECIFIED WHETHER SCIATICA PRESENT: ICD-10-CM

## 2025-05-30 DIAGNOSIS — E53.8 VITAMIN B12 DEFICIENCY: ICD-10-CM

## 2025-05-30 DIAGNOSIS — M43.10 SPONDYLOLISTHESIS, GRADE 1: ICD-10-CM

## 2025-05-30 DIAGNOSIS — J69.0 ASPIRATION PNEUMONIA, UNSPECIFIED ASPIRATION PNEUMONIA TYPE, UNSPECIFIED LATERALITY, UNSPECIFIED PART OF LUNG: ICD-10-CM

## 2025-05-30 DIAGNOSIS — D50.9 IRON DEFICIENCY ANEMIA, UNSPECIFIED IRON DEFICIENCY ANEMIA TYPE: ICD-10-CM

## 2025-05-30 DIAGNOSIS — R42 DIZZINESSES: ICD-10-CM

## 2025-05-30 LAB
ALBUMIN SERPL-MCNC: 3.6 G/DL (ref 3.5–5.2)
ALBUMIN/GLOB SERPL: 1.2 G/DL
ALP SERPL-CCNC: 69 U/L (ref 39–117)
ALT SERPL W P-5'-P-CCNC: 5 U/L (ref 1–41)
AMMONIA BLD-SCNC: 26 UMOL/L (ref 16–60)
ANION GAP SERPL CALCULATED.3IONS-SCNC: 20 MMOL/L (ref 5–15)
APTT PPP: 26.6 SECONDS (ref 24.5–36)
AST SERPL-CCNC: 12 U/L (ref 1–40)
BASOPHILS # BLD AUTO: 0.05 10*3/MM3 (ref 0–0.2)
BASOPHILS NFR BLD AUTO: 0.6 % (ref 0–1.5)
BILIRUB SERPL-MCNC: 0.2 MG/DL (ref 0–1.2)
BUN SERPL-MCNC: 46.3 MG/DL (ref 8–23)
BUN/CREAT SERPL: 16.5 (ref 7–25)
CALCIUM SPEC-SCNC: 6.9 MG/DL (ref 8.2–9.6)
CHLORIDE SERPL-SCNC: 106 MMOL/L (ref 98–107)
CO2 SERPL-SCNC: 17 MMOL/L (ref 22–29)
CREAT SERPL-MCNC: 2.8 MG/DL (ref 0.76–1.27)
D-LACTATE SERPL-SCNC: 5.1 MMOL/L (ref 0.5–2)
DEPRECATED RDW RBC AUTO: 47.8 FL (ref 37–54)
EGFRCR SERPLBLD CKD-EPI 2021: 20.1 ML/MIN/1.73
EOSINOPHIL # BLD AUTO: 0.16 10*3/MM3 (ref 0–0.4)
EOSINOPHIL NFR BLD AUTO: 1.9 % (ref 0.3–6.2)
ERYTHROCYTE [DISTWIDTH] IN BLOOD BY AUTOMATED COUNT: 13.5 % (ref 12.3–15.4)
ETHANOL UR QL: <0.01 %
GEN 5 1HR TROPONIN T REFLEX: 79 NG/L
GLOBULIN UR ELPH-MCNC: 2.9 GM/DL
GLUCOSE BLDC GLUCOMTR-MCNC: 183 MG/DL (ref 70–130)
GLUCOSE SERPL-MCNC: 176 MG/DL (ref 65–99)
HCT VFR BLD AUTO: 29.3 % (ref 37.5–51)
HGB BLD-MCNC: 9.5 G/DL (ref 13–17.7)
IMM GRANULOCYTES # BLD AUTO: 0.02 10*3/MM3 (ref 0–0.05)
IMM GRANULOCYTES NFR BLD AUTO: 0.2 % (ref 0–0.5)
INR PPP: 1.08 (ref 0.91–1.09)
LYMPHOCYTES # BLD AUTO: 3.11 10*3/MM3 (ref 0.7–3.1)
LYMPHOCYTES NFR BLD AUTO: 36.7 % (ref 19.6–45.3)
MAGNESIUM SERPL-MCNC: 1.5 MG/DL (ref 1.7–2.3)
MCH RBC QN AUTO: 31.1 PG (ref 26.6–33)
MCHC RBC AUTO-ENTMCNC: 32.4 G/DL (ref 31.5–35.7)
MCV RBC AUTO: 96.1 FL (ref 79–97)
MONOCYTES # BLD AUTO: 0.63 10*3/MM3 (ref 0.1–0.9)
MONOCYTES NFR BLD AUTO: 7.4 % (ref 5–12)
NEUTROPHILS NFR BLD AUTO: 4.51 10*3/MM3 (ref 1.7–7)
NEUTROPHILS NFR BLD AUTO: 53.2 % (ref 42.7–76)
NRBC BLD AUTO-RTO: 0 /100 WBC (ref 0–0.2)
PHOSPHATE SERPL-MCNC: 4.5 MG/DL (ref 2.5–4.5)
PLATELET # BLD AUTO: 284 10*3/MM3 (ref 140–450)
PMV BLD AUTO: 10 FL (ref 6–12)
POTASSIUM SERPL-SCNC: 3.4 MMOL/L (ref 3.5–5.2)
PROT SERPL-MCNC: 6.5 G/DL (ref 6–8.5)
PROTHROMBIN TIME: 14.6 SECONDS (ref 11.8–14.8)
RBC # BLD AUTO: 3.05 10*6/MM3 (ref 4.14–5.8)
SODIUM SERPL-SCNC: 143 MMOL/L (ref 136–145)
TROPONIN T % DELTA: -7
TROPONIN T NUMERIC DELTA: -6 NG/L
TROPONIN T SERPL HS-MCNC: 85 NG/L
TSH SERPL DL<=0.05 MIU/L-ACNC: 2.23 UIU/ML (ref 0.27–4.2)
WBC NRBC COR # BLD AUTO: 8.48 10*3/MM3 (ref 3.4–10.8)

## 2025-05-30 PROCEDURE — 87040 BLOOD CULTURE FOR BACTERIA: CPT | Performed by: STUDENT IN AN ORGANIZED HEALTH CARE EDUCATION/TRAINING PROGRAM

## 2025-05-30 PROCEDURE — 82140 ASSAY OF AMMONIA: CPT | Performed by: STUDENT IN AN ORGANIZED HEALTH CARE EDUCATION/TRAINING PROGRAM

## 2025-05-30 PROCEDURE — 82948 REAGENT STRIP/BLOOD GLUCOSE: CPT

## 2025-05-30 PROCEDURE — 93010 ELECTROCARDIOGRAM REPORT: CPT | Performed by: INTERNAL MEDICINE

## 2025-05-30 PROCEDURE — 85610 PROTHROMBIN TIME: CPT | Performed by: STUDENT IN AN ORGANIZED HEALTH CARE EDUCATION/TRAINING PROGRAM

## 2025-05-30 PROCEDURE — 99285 EMERGENCY DEPT VISIT HI MDM: CPT | Performed by: STUDENT IN AN ORGANIZED HEALTH CARE EDUCATION/TRAINING PROGRAM

## 2025-05-30 PROCEDURE — 84100 ASSAY OF PHOSPHORUS: CPT | Performed by: STUDENT IN AN ORGANIZED HEALTH CARE EDUCATION/TRAINING PROGRAM

## 2025-05-30 PROCEDURE — 83735 ASSAY OF MAGNESIUM: CPT | Performed by: STUDENT IN AN ORGANIZED HEALTH CARE EDUCATION/TRAINING PROGRAM

## 2025-05-30 PROCEDURE — 71045 X-RAY EXAM CHEST 1 VIEW: CPT

## 2025-05-30 PROCEDURE — 85025 COMPLETE CBC W/AUTO DIFF WBC: CPT | Performed by: STUDENT IN AN ORGANIZED HEALTH CARE EDUCATION/TRAINING PROGRAM

## 2025-05-30 PROCEDURE — 36415 COLL VENOUS BLD VENIPUNCTURE: CPT

## 2025-05-30 PROCEDURE — 80053 COMPREHEN METABOLIC PANEL: CPT | Performed by: STUDENT IN AN ORGANIZED HEALTH CARE EDUCATION/TRAINING PROGRAM

## 2025-05-30 PROCEDURE — 83605 ASSAY OF LACTIC ACID: CPT | Performed by: STUDENT IN AN ORGANIZED HEALTH CARE EDUCATION/TRAINING PROGRAM

## 2025-05-30 PROCEDURE — 85730 THROMBOPLASTIN TIME PARTIAL: CPT | Performed by: STUDENT IN AN ORGANIZED HEALTH CARE EDUCATION/TRAINING PROGRAM

## 2025-05-30 PROCEDURE — 82077 ASSAY SPEC XCP UR&BREATH IA: CPT | Performed by: STUDENT IN AN ORGANIZED HEALTH CARE EDUCATION/TRAINING PROGRAM

## 2025-05-30 PROCEDURE — 84484 ASSAY OF TROPONIN QUANT: CPT | Performed by: STUDENT IN AN ORGANIZED HEALTH CARE EDUCATION/TRAINING PROGRAM

## 2025-05-30 PROCEDURE — 93005 ELECTROCARDIOGRAM TRACING: CPT | Performed by: STUDENT IN AN ORGANIZED HEALTH CARE EDUCATION/TRAINING PROGRAM

## 2025-05-30 PROCEDURE — 72125 CT NECK SPINE W/O DYE: CPT

## 2025-05-30 PROCEDURE — 84443 ASSAY THYROID STIM HORMONE: CPT | Performed by: STUDENT IN AN ORGANIZED HEALTH CARE EDUCATION/TRAINING PROGRAM

## 2025-05-30 PROCEDURE — 70450 CT HEAD/BRAIN W/O DYE: CPT

## 2025-05-30 NOTE — OUTREACH NOTE
Medical Week 2 Survey      Flowsheet Row Responses   [facility] patient discharged from? [place]   Does the patient have one of the following disease processes/diagnoses(primary or secondary)? Other   Week 2 attempt successful? Yes   Call start time 1533   Discharge diagnosis Acute renal failure   Call end time 1536   Person spoke with today (if not patient) and relationship Wife/Pat   Meds reviewed with patient/caregiver? Yes   Is the patient having any side effects they believe may be caused by any medication additions or changes? No   Does the patient have all medications ordered at discharge? Yes   Is the patient taking all medications as directed (includes completed medication regime)? Yes   Does the patient have a primary care provider?  Yes   Does the patient have an appointment with their PCP within 7 days of discharge? Greater than 7 days   Has the patient kept scheduled appointments due by today? N/A   Comments States daughter makes his appt.   What is the Home health agency?  [facility]   Has home health visited the patient within 72 hours of discharge? Yes   Home health comments dismissed due to improvement   Psychosocial issues? No   Did the patient receive a copy of their discharge instructions? Yes   Nursing interventions Reviewed instructions with patient   What is the patient's perception of their health status since discharge? Improving   Is the patient/caregiver able to teach back signs and symptoms related to disease process for when to call PCP? Yes   Is the patient/caregiver able to teach back signs and symptoms related to disease process for when to call 911? Yes   Is the patient/caregiver able to teach back the hierarchy of who to call/visit for symptoms/problems? PCP, Specialist, Home health nurse, Urgent Care, ED, 911 Yes   If the patient is a current smoker, are they able to teach back resources for cessation? Not a smoker   Additional teach back comments States he is  doing well.  Sleeping a lot and appetite has been good.   Week 2 Call Completed? Yes   Graduated Yes   Graduated/Revoked comments Denies questions or needs at this time.   Call end time [id number]            Ade HARTMAN - Licensed Nurse

## 2025-05-31 ENCOUNTER — APPOINTMENT (OUTPATIENT)
Dept: NEUROLOGY | Facility: HOSPITAL | Age: OVER 89
End: 2025-05-31
Payer: MEDICARE

## 2025-05-31 LAB
ALBUMIN SERPL-MCNC: 3.4 G/DL (ref 3.5–5.2)
ALBUMIN/GLOB SERPL: 1.3 G/DL
ALP SERPL-CCNC: 63 U/L (ref 39–117)
ALT SERPL W P-5'-P-CCNC: 5 U/L (ref 1–41)
ANION GAP SERPL CALCULATED.3IONS-SCNC: 15 MMOL/L (ref 5–15)
AST SERPL-CCNC: 11 U/L (ref 1–40)
BACTERIA UR QL AUTO: NORMAL /HPF
BILIRUB SERPL-MCNC: 0.2 MG/DL (ref 0–1.2)
BILIRUB UR QL STRIP: NEGATIVE
BUN SERPL-MCNC: 43.6 MG/DL (ref 8–23)
BUN/CREAT SERPL: 17 (ref 7–25)
CALCIUM SPEC-SCNC: 6.7 MG/DL (ref 8.2–9.6)
CHLORIDE SERPL-SCNC: 109 MMOL/L (ref 98–107)
CLARITY UR: CLEAR
CO2 SERPL-SCNC: 19 MMOL/L (ref 22–29)
COLOR UR: YELLOW
CREAT SERPL-MCNC: 2.57 MG/DL (ref 0.76–1.27)
D-LACTATE SERPL-SCNC: 1.4 MMOL/L (ref 0.5–2)
DEPRECATED RDW RBC AUTO: 48.3 FL (ref 37–54)
EGFRCR SERPLBLD CKD-EPI 2021: 22.3 ML/MIN/1.73
ERYTHROCYTE [DISTWIDTH] IN BLOOD BY AUTOMATED COUNT: 13.5 % (ref 12.3–15.4)
GLOBULIN UR ELPH-MCNC: 2.6 GM/DL
GLUCOSE BLDC GLUCOMTR-MCNC: 110 MG/DL (ref 70–130)
GLUCOSE BLDC GLUCOMTR-MCNC: 116 MG/DL (ref 70–130)
GLUCOSE BLDC GLUCOMTR-MCNC: 125 MG/DL (ref 70–130)
GLUCOSE BLDC GLUCOMTR-MCNC: 127 MG/DL (ref 70–130)
GLUCOSE BLDC GLUCOMTR-MCNC: 143 MG/DL (ref 70–130)
GLUCOSE SERPL-MCNC: 157 MG/DL (ref 65–99)
GLUCOSE UR STRIP-MCNC: NEGATIVE MG/DL
HCT VFR BLD AUTO: 26.2 % (ref 37.5–51)
HGB BLD-MCNC: 8.4 G/DL (ref 13–17.7)
HGB UR QL STRIP.AUTO: NEGATIVE
HYALINE CASTS UR QL AUTO: NORMAL /LPF
KETONES UR QL STRIP: NEGATIVE
LEUKOCYTE ESTERASE UR QL STRIP.AUTO: NEGATIVE
MAGNESIUM SERPL-MCNC: 1.4 MG/DL (ref 1.7–2.3)
MCH RBC QN AUTO: 31.3 PG (ref 26.6–33)
MCHC RBC AUTO-ENTMCNC: 32.1 G/DL (ref 31.5–35.7)
MCV RBC AUTO: 97.8 FL (ref 79–97)
NITRITE UR QL STRIP: NEGATIVE
PH UR STRIP.AUTO: 6 [PH] (ref 5–8)
PLATELET # BLD AUTO: 250 10*3/MM3 (ref 140–450)
PMV BLD AUTO: 9.7 FL (ref 6–12)
POTASSIUM SERPL-SCNC: 3.6 MMOL/L (ref 3.5–5.2)
PROT SERPL-MCNC: 6 G/DL (ref 6–8.5)
PROT UR QL STRIP: ABNORMAL
QT INTERVAL: 400 MS
QTC INTERVAL: 505 MS
RBC # BLD AUTO: 2.68 10*6/MM3 (ref 4.14–5.8)
RBC # UR STRIP: NORMAL /HPF
REF LAB TEST METHOD: NORMAL
SODIUM SERPL-SCNC: 143 MMOL/L (ref 136–145)
SP GR UR STRIP: 1.01 (ref 1–1.03)
SQUAMOUS #/AREA URNS HPF: NORMAL /HPF
UROBILINOGEN UR QL STRIP: ABNORMAL
WBC # UR STRIP: NORMAL /HPF
WBC NRBC COR # BLD AUTO: 6.99 10*3/MM3 (ref 3.4–10.8)

## 2025-05-31 PROCEDURE — 94799 UNLISTED PULMONARY SVC/PX: CPT

## 2025-05-31 PROCEDURE — 83735 ASSAY OF MAGNESIUM: CPT | Performed by: FAMILY MEDICINE

## 2025-05-31 PROCEDURE — 92610 EVALUATE SWALLOWING FUNCTION: CPT

## 2025-05-31 PROCEDURE — 25010000002 CEFTRIAXONE PER 250 MG: Performed by: STUDENT IN AN ORGANIZED HEALTH CARE EDUCATION/TRAINING PROGRAM

## 2025-05-31 PROCEDURE — 25010000002 MAGNESIUM SULFATE 2 GM/50ML SOLUTION: Performed by: FAMILY MEDICINE

## 2025-05-31 PROCEDURE — 25010000002 AMPICILLIN-SULBACTAM PER 1.5 G

## 2025-05-31 PROCEDURE — 81001 URINALYSIS AUTO W/SCOPE: CPT | Performed by: STUDENT IN AN ORGANIZED HEALTH CARE EDUCATION/TRAINING PROGRAM

## 2025-05-31 PROCEDURE — 25810000003 SODIUM CHLORIDE 0.9 % SOLUTION

## 2025-05-31 PROCEDURE — 95819 EEG AWAKE AND ASLEEP: CPT | Performed by: PSYCHIATRY & NEUROLOGY

## 2025-05-31 PROCEDURE — 95819 EEG AWAKE AND ASLEEP: CPT

## 2025-05-31 PROCEDURE — 25810000003 SODIUM CHLORIDE 0.9 % SOLUTION: Performed by: STUDENT IN AN ORGANIZED HEALTH CARE EDUCATION/TRAINING PROGRAM

## 2025-05-31 PROCEDURE — 83605 ASSAY OF LACTIC ACID: CPT | Performed by: STUDENT IN AN ORGANIZED HEALTH CARE EDUCATION/TRAINING PROGRAM

## 2025-05-31 PROCEDURE — 94640 AIRWAY INHALATION TREATMENT: CPT

## 2025-05-31 PROCEDURE — 82948 REAGENT STRIP/BLOOD GLUCOSE: CPT

## 2025-05-31 PROCEDURE — 25010000002 DOXYCYCLINE 100 MG RECONSTITUTED SOLUTION 1 EACH VIAL

## 2025-05-31 PROCEDURE — 85027 COMPLETE CBC AUTOMATED: CPT

## 2025-05-31 PROCEDURE — 80053 COMPREHEN METABOLIC PANEL: CPT

## 2025-05-31 PROCEDURE — 99221 1ST HOSP IP/OBS SF/LOW 40: CPT | Performed by: PSYCHIATRY & NEUROLOGY

## 2025-05-31 RX ORDER — BUDESONIDE 0.5 MG/2ML
0.5 INHALANT ORAL
Status: DISCONTINUED | OUTPATIENT
Start: 2025-05-31 | End: 2025-06-02

## 2025-05-31 RX ORDER — CHOLECALCIFEROL (VITAMIN D3) 25 MCG
2000 TABLET ORAL DAILY
Status: DISCONTINUED | OUTPATIENT
Start: 2025-05-31 | End: 2025-06-03 | Stop reason: HOSPADM

## 2025-05-31 RX ORDER — ZIPRASIDONE HYDROCHLORIDE 20 MG/1
20 CAPSULE ORAL 2 TIMES DAILY PRN
Status: DISCONTINUED | OUTPATIENT
Start: 2025-05-31 | End: 2025-06-03 | Stop reason: HOSPADM

## 2025-05-31 RX ORDER — SODIUM CHLORIDE 9 MG/ML
40 INJECTION, SOLUTION INTRAVENOUS AS NEEDED
Status: DISCONTINUED | OUTPATIENT
Start: 2025-05-31 | End: 2025-06-03 | Stop reason: HOSPADM

## 2025-05-31 RX ORDER — MULTIVITAMIN WITH IRON
1000 TABLET ORAL DAILY
Status: DISCONTINUED | OUTPATIENT
Start: 2025-05-31 | End: 2025-06-03 | Stop reason: HOSPADM

## 2025-05-31 RX ORDER — MAGNESIUM SULFATE HEPTAHYDRATE 40 MG/ML
2 INJECTION, SOLUTION INTRAVENOUS ONCE
Status: COMPLETED | OUTPATIENT
Start: 2025-05-31 | End: 2025-05-31

## 2025-05-31 RX ORDER — NICOTINE POLACRILEX 4 MG
15 LOZENGE BUCCAL
Status: DISCONTINUED | OUTPATIENT
Start: 2025-05-31 | End: 2025-06-03 | Stop reason: HOSPADM

## 2025-05-31 RX ORDER — POLYETHYLENE GLYCOL 3350 17 G/17G
17 POWDER, FOR SOLUTION ORAL DAILY PRN
Status: DISCONTINUED | OUTPATIENT
Start: 2025-05-31 | End: 2025-06-03 | Stop reason: HOSPADM

## 2025-05-31 RX ORDER — SODIUM CHLORIDE 9 MG/ML
100 INJECTION, SOLUTION INTRAVENOUS CONTINUOUS
Status: DISPENSED | OUTPATIENT
Start: 2025-05-31 | End: 2025-05-31

## 2025-05-31 RX ORDER — QUETIAPINE FUMARATE 25 MG/1
25 TABLET, FILM COATED ORAL DAILY
Status: DISCONTINUED | OUTPATIENT
Start: 2025-05-31 | End: 2025-06-03 | Stop reason: HOSPADM

## 2025-05-31 RX ORDER — ACETAMINOPHEN 650 MG/1
650 SUPPOSITORY RECTAL EVERY 4 HOURS PRN
Status: DISCONTINUED | OUTPATIENT
Start: 2025-05-31 | End: 2025-06-03 | Stop reason: HOSPADM

## 2025-05-31 RX ORDER — AMLODIPINE BESYLATE 5 MG/1
5 TABLET ORAL DAILY
Status: DISCONTINUED | OUTPATIENT
Start: 2025-05-31 | End: 2025-06-01

## 2025-05-31 RX ORDER — SODIUM CHLORIDE 0.9 % (FLUSH) 0.9 %
10 SYRINGE (ML) INJECTION EVERY 12 HOURS SCHEDULED
Status: DISCONTINUED | OUTPATIENT
Start: 2025-05-31 | End: 2025-06-03 | Stop reason: HOSPADM

## 2025-05-31 RX ORDER — IPRATROPIUM BROMIDE AND ALBUTEROL SULFATE 2.5; .5 MG/3ML; MG/3ML
3 SOLUTION RESPIRATORY (INHALATION) EVERY 4 HOURS PRN
Status: DISCONTINUED | OUTPATIENT
Start: 2025-05-31 | End: 2025-06-02

## 2025-05-31 RX ORDER — DEXTROSE MONOHYDRATE 25 G/50ML
25 INJECTION, SOLUTION INTRAVENOUS
Status: DISCONTINUED | OUTPATIENT
Start: 2025-05-31 | End: 2025-06-03 | Stop reason: HOSPADM

## 2025-05-31 RX ORDER — NITROGLYCERIN 0.4 MG/1
0.4 TABLET SUBLINGUAL
Status: DISCONTINUED | OUTPATIENT
Start: 2025-05-31 | End: 2025-06-03 | Stop reason: HOSPADM

## 2025-05-31 RX ORDER — AMOXICILLIN 250 MG
2 CAPSULE ORAL 2 TIMES DAILY PRN
Status: DISCONTINUED | OUTPATIENT
Start: 2025-05-31 | End: 2025-06-03 | Stop reason: HOSPADM

## 2025-05-31 RX ORDER — BISACODYL 5 MG/1
5 TABLET, DELAYED RELEASE ORAL DAILY PRN
Status: DISCONTINUED | OUTPATIENT
Start: 2025-05-31 | End: 2025-06-03 | Stop reason: HOSPADM

## 2025-05-31 RX ORDER — SODIUM BICARBONATE 650 MG/1
650 TABLET ORAL 2 TIMES DAILY
Status: DISCONTINUED | OUTPATIENT
Start: 2025-05-31 | End: 2025-06-03 | Stop reason: HOSPADM

## 2025-05-31 RX ORDER — IBUPROFEN 600 MG/1
1 TABLET ORAL
Status: DISCONTINUED | OUTPATIENT
Start: 2025-05-31 | End: 2025-06-03 | Stop reason: HOSPADM

## 2025-05-31 RX ORDER — ACETAMINOPHEN 160 MG/5ML
650 SOLUTION ORAL EVERY 4 HOURS PRN
Status: DISCONTINUED | OUTPATIENT
Start: 2025-05-31 | End: 2025-06-03 | Stop reason: HOSPADM

## 2025-05-31 RX ORDER — QUETIAPINE FUMARATE 25 MG/1
50 TABLET, FILM COATED ORAL NIGHTLY
Status: DISCONTINUED | OUTPATIENT
Start: 2025-05-31 | End: 2025-06-03 | Stop reason: HOSPADM

## 2025-05-31 RX ORDER — SODIUM CHLORIDE 0.9 % (FLUSH) 0.9 %
10 SYRINGE (ML) INJECTION AS NEEDED
Status: DISCONTINUED | OUTPATIENT
Start: 2025-05-31 | End: 2025-06-03 | Stop reason: HOSPADM

## 2025-05-31 RX ORDER — BISACODYL 10 MG
10 SUPPOSITORY, RECTAL RECTAL DAILY PRN
Status: DISCONTINUED | OUTPATIENT
Start: 2025-05-31 | End: 2025-06-03 | Stop reason: HOSPADM

## 2025-05-31 RX ORDER — ACETAMINOPHEN 325 MG/1
650 TABLET ORAL EVERY 4 HOURS PRN
Status: DISCONTINUED | OUTPATIENT
Start: 2025-05-31 | End: 2025-06-03 | Stop reason: HOSPADM

## 2025-05-31 RX ADMIN — AMPICILLIN SODIUM, SULBACTAM SODIUM 3 G: 2; 1 INJECTION, POWDER, FOR SOLUTION INTRAMUSCULAR; INTRAVENOUS at 01:09

## 2025-05-31 RX ADMIN — SODIUM CHLORIDE 100 ML/HR: 9 INJECTION, SOLUTION INTRAVENOUS at 00:43

## 2025-05-31 RX ADMIN — Medication 10 ML: at 20:14

## 2025-05-31 RX ADMIN — CYANOCOBALAMIN TAB 500 MCG 1000 MCG: 500 TAB at 10:17

## 2025-05-31 RX ADMIN — QUETIAPINE FUMARATE 50 MG: 25 TABLET ORAL at 20:14

## 2025-05-31 RX ADMIN — Medication 10 ML: at 00:47

## 2025-05-31 RX ADMIN — SODIUM CHLORIDE 1000 ML: 9 INJECTION, SOLUTION INTRAVENOUS at 00:01

## 2025-05-31 RX ADMIN — IPRATROPIUM BROMIDE 0.5 MG: 0.5 SOLUTION RESPIRATORY (INHALATION) at 15:39

## 2025-05-31 RX ADMIN — DOXYCYCLINE 100 MG: 100 INJECTION, POWDER, LYOPHILIZED, FOR SOLUTION INTRAVENOUS at 12:45

## 2025-05-31 RX ADMIN — QUETIAPINE FUMARATE 50 MG: 25 TABLET ORAL at 00:50

## 2025-05-31 RX ADMIN — Medication 10 ML: at 10:17

## 2025-05-31 RX ADMIN — DOXYCYCLINE 100 MG: 100 INJECTION, POWDER, LYOPHILIZED, FOR SOLUTION INTRAVENOUS at 01:34

## 2025-05-31 RX ADMIN — SODIUM BICARBONATE 650 MG: 650 TABLET ORAL at 00:50

## 2025-05-31 RX ADMIN — Medication 2000 UNITS: at 17:29

## 2025-05-31 RX ADMIN — MAGNESIUM SULFATE HEPTAHYDRATE 2 G: 40 INJECTION, SOLUTION INTRAVENOUS at 12:45

## 2025-05-31 RX ADMIN — QUETIAPINE FUMARATE 25 MG: 25 TABLET ORAL at 10:17

## 2025-05-31 RX ADMIN — AMLODIPINE BESYLATE 5 MG: 5 TABLET ORAL at 17:29

## 2025-05-31 RX ADMIN — CEFTRIAXONE SODIUM 1000 MG: 1 INJECTION, POWDER, FOR SOLUTION INTRAMUSCULAR; INTRAVENOUS at 00:02

## 2025-05-31 RX ADMIN — SODIUM BICARBONATE 650 MG: 650 TABLET ORAL at 20:14

## 2025-05-31 RX ADMIN — SODIUM BICARBONATE 650 MG: 650 TABLET ORAL at 10:17

## 2025-05-31 NOTE — PLAN OF CARE
Goal Outcome Evaluation:  Plan of Care Reviewed With: patient        Progress: no change  Outcome Evaluation: Pt arrived from ER at approximately 0030, A/Ox2, disoriented to time and situation. VSS. RA. Seizure precautions initiated. Blood glucose monitored. IVF infusing at 100 mL/hr. IV abx given as scheduled. NPO per orders. Pt impulsive at times. Bed alarm set. Safety maintained. Call light in reach.

## 2025-05-31 NOTE — CONSULTS
NEUROLOGY CONSULT    DOS: 2025  NAME: Maikel Mathews   : 1929  PCP: David Barajas MD  CC: Stroke  Referring MD: Tawana Castellano MD    Neurological Problem and Interval History:  89 y.o. male presents with chief complaint of seizure. Patient has underlying dementia, DM, hypertension, history of prostate cancer who presented after family witnessed what sounds consistent with a GTC seizure. His lab values are also consistent with such and there was incontinence. In the ED there was question of transient facial droop. Family later endorsed no facial droop. He is resolved to his neurological baseline and is neurologically non-focal. He has a PNA, which ay be due to aspiration, but unclear so may have proceeded this event.       Past Medical/Surgical Hx:  Past Medical History:   Diagnosis Date    Arthritis     AVM (arteriovenous malformation)     Cancer     PROSTATE    Cataract     Colon cancer     Colon polyp     Diabetes mellitus     Diverticulosis     Dizzinesses 10/20/2017    GERD (gastroesophageal reflux disease)     Hemorrhoids     History of transfusion     Hypertension     Inguinal hernia     Iron deficiency anemia due to chronic blood loss 2017    Prostate CA     Prostate hypertrophy     Rotator cuff syndrome      Past Surgical History:   Procedure Laterality Date    APPENDECTOMY      CHOLECYSTECTOMY      COLON RESECTION N/A 2017    Procedure: LAPAROSCOPIC ASSISTED RIGHT COLECTOMY;  Surgeon: Caroline Loomis MD;  Location: [facility] OR;  Service:     COLONOSCOPY  2010    COLONOSCOPY N/A 2017    Procedure: COLONOSCOPY WITH ANESTHESIA;  Surgeon: Joaquín Neff MD;  Location: [facility] ENDOSCOPY;  Service:     ENDOSCOPY  2013    ENDOSCOPY N/A 2017    Procedure: ESOPHAGOGASTRODUODENOSCOPY WITH ANESTHESIA;  Surgeon: Joaquín Neff MD;  Location: [facility] ENDOSCOPY;  Service:     EYE SURGERY Left     CATARACT    INGUINAL HERNIA REPAIR Bilateral 2019    Procedure:  OPEN BILATERAL INGUINAL HERNIA REPAIR WITH MESH;  Surgeon: Caroline Loomis MD;  Location:  [facility] OR;  Service: General    INTERVENTIONAL RADIOLOGY PROCEDURE N/A 9/18/2017    Procedure: URETHRA DILITATION with dominguez catheter insertion;  Surgeon: Mamadou Paez MD;  Location:  [facility] OR;  Service:     PROSTATECTOMY      PROSTATECTOMY         Review of Systems:  negative except as noted above.    Medications On Admission  Medications Prior to Admission   Medication Sig Dispense Refill Last Dose/Taking    amLODIPine (NORVASC) 10 MG tablet Take 1 tablet by mouth Daily.  5     cholecalciferol (VITAMIN D3) 25 MCG (1000 UT) tablet Take 2 tablets by mouth Daily. 60 tablet 2     Insulin Lispro (humaLOG) 100 UNIT/ML injection Inject 2-7 Units under the skin into the appropriate area as directed 4 (Four) Times a Day Before Meals & at Bedtime for 30 days. 840 Units 0     QUEtiapine (SEROquel) 25 MG tablet Take 1 tablet by mouth Every Morning. 30 tablet 2     QUEtiapine (SEROquel) 50 MG tablet Take 1 tablet by mouth Every Night. 30 tablet 2     sodium bicarbonate 650 MG tablet Take 1 tablet by mouth 2 (Two) Times a Day. 60 tablet 2     vitamin B-12 (CYANOCOBALAMIN) 1000 MCG tablet Take 1 tablet by mouth Daily. 30 tablet 2     ziprasidone (Geodon) 20 MG capsule Take 1 capsule by mouth 2 (Two) Times a Day As Needed (agitation). 30 capsule 0        Allergies:  No Known Allergies    Social Hx:  Social History     Socioeconomic History    Marital status:    Tobacco Use    Smoking status: Never    Smokeless tobacco: Never   Vaping Use    Vaping status: Never Used   Substance and Sexual Activity    Alcohol use: No    Drug use: No    Sexual activity: Defer       Family Hx:  Family History   Problem Relation Age of Onset    Colon cancer Neg Hx     Colon polyps Neg Hx        Review of Imaging (Interpretation of images not reports):  HCT: no acute intracranial process, questionable infection mastoids and B/L middle  "ears.    Laboratory Results:   Lab Results   Component Value Date    GLUCOSE 157 (H) 05/31/2025    CALCIUM 6.7 (L) 05/31/2025     05/31/2025    K 3.6 05/31/2025    CO2 19.0 (L) 05/31/2025     (H) 05/31/2025    BUN 43.6 (H) 05/31/2025    CREATININE 2.57 (H) 05/31/2025    EGFRIFNONA 32 (L) 10/21/2021    BCR 17.0 05/31/2025    ANIONGAP 15.0 05/31/2025     Lab Results   Component Value Date    WBC 6.99 05/31/2025    HGB 8.4 (L) 05/31/2025    HCT 26.2 (L) 05/31/2025    MCV 97.8 (H) 05/31/2025     05/31/2025     No results found for: \"CHOL\"  No results found for: \"HDL\"  No results found for: \"LDL\"  No results found for: \"TRIG\"  Lab Results   Component Value Date    HGBA1C 6.60 (H) 05/06/2025     Lab Results   Component Value Date    INR 1.08 05/30/2025    INR 1.07 05/04/2025    INR 1.04 07/26/2019    PROTIME 14.6 05/30/2025    PROTIME 14.4 05/04/2025    PROTIME 13.9 07/26/2019         Physical Examination:   /86 (BP Location: Right arm, Patient Position: Lying)   Pulse 102   Temp 97.9 °F (36.6 °C) (Oral)   Resp 18   Ht 167.6 cm (66\")   Wt 55 kg (121 lb 3.2 oz)   SpO2 96%   BMI 19.56 kg/m²     NIHSS:     0-->Alert: keenly responsive  0-->Answers both questions correctly and 1-->Answers one question correctly  0-->Performs both tasks correctly  0=normal  0=No visual loss  0=Normal symmetric movement  0-->No drift: limb holds 90 (or 45) degrees for full 10 secs  0-->No drift: limb holds 90 (or 45) degrees for full 10 secs  0-->No drift: limb holds 90 (or 45) degrees for full 10 secs  0-->No drift: limb holds 90 (or 45) degrees for full 10 secs  0=Absent  0=Normal; no sensory loss  0=No aphasia, normal  0=Normal  0=No abnormality    Total score: 1  ++++RIGHT NASOLABIAL FOLD FLATTENING, IS BASELINE PER FAMILY+++++      Diagnoses / Discussion:  89 y.o. who presents with Sx of first time seizure in the setting of PNA and possible middle ear infection.    Plan:  -HCT: no acute intracranial " process  -defer mastoiditis and middle ear findings on CT to primary team (being covered for PNA)  -MRI w/o given RF  -EEG  -if MRI and EEG are unremarkable, will not start an AED (also family's preference)  -f/u in neurology clinic in 6-8 weeks     I have discussed the above with the patient and family.  Time spent with patient: 60min    MDM  Reviewed: previous chart, nursing note and vitals  Reviewed previous: labs and CT scan  Interpretation: labs and CT scan  Total time providing critical care: < 30 minutes. This excludes time spent performing separately reportable procedures and services.  Consults: neurology        Adriana Funk MD  Neurology

## 2025-05-31 NOTE — ED PROVIDER NOTES
Subjective   History of Present Illness  The patient is a 89-year-old male with a history of advanced dementia, diabetes, kidney failure, hypertension, and hyperlipidemia who presented to the ED after experiencing a seizure-like episode. Per wife's report, the patient started convulsing, became incontinent, and had bleeding from the mouth. The episode lasted approximately 10 minutes. The patient has no prior history of seizures. Of note, the patient was recently admitted on May 13th for acute renal failure, and on May 20th his creatinine was 3.1. The patient's baseline mental status is unclear due to his advanced dementia with behavioral disturbance.        Review of Systems   All other systems reviewed and are negative.      Past Medical History:   Diagnosis Date    Arthritis     AVM (arteriovenous malformation)     Cancer     PROSTATE    Cataract     Colon cancer     Colon polyp     Diabetes mellitus     Diverticulosis     Dizzinesses 10/20/2017    GERD (gastroesophageal reflux disease)     Hemorrhoids     History of transfusion     Hypertension     Inguinal hernia     Iron deficiency anemia due to chronic blood loss 7/26/2017    Prostate CA     Prostate hypertrophy     Rotator cuff syndrome        No Known Allergies    Past Surgical History:   Procedure Laterality Date    APPENDECTOMY      CHOLECYSTECTOMY      COLON RESECTION N/A 9/18/2017    Procedure: LAPAROSCOPIC ASSISTED RIGHT COLECTOMY;  Surgeon: Caroline Loomis MD;  Location: [facility] OR;  Service:     COLONOSCOPY  04/29/2010    COLONOSCOPY N/A 8/25/2017    Procedure: COLONOSCOPY WITH ANESTHESIA;  Surgeon: Joaquín Neff MD;  Location: [facility] ENDOSCOPY;  Service:     ENDOSCOPY  03/27/2013    ENDOSCOPY N/A 8/25/2017    Procedure: ESOPHAGOGASTRODUODENOSCOPY WITH ANESTHESIA;  Surgeon: Joaquín Neff MD;  Location: [facility] ENDOSCOPY;  Service:     EYE SURGERY Left     CATARACT    INGUINAL HERNIA REPAIR Bilateral 11/1/2019    Procedure: OPEN BILATERAL  INGUINAL HERNIA REPAIR WITH MESH;  Surgeon: Caroline Loomis MD;  Location:  [facility] OR;  Service: General    INTERVENTIONAL RADIOLOGY PROCEDURE N/A 9/18/2017    Procedure: URETHRA DILITATION with dominguez catheter insertion;  Surgeon: Mamadou Paez MD;  Location:  [facility] OR;  Service:     PROSTATECTOMY      PROSTATECTOMY         Family History   Problem Relation Age of Onset    Colon cancer Neg Hx     Colon polyps Neg Hx        Social History     Socioeconomic History    Marital status:    Tobacco Use    Smoking status: Never    Smokeless tobacco: Never   Vaping Use    Vaping status: Never Used   Substance and Sexual Activity    Alcohol use: No    Drug use: No    Sexual activity: Defer           Objective   Physical Exam      Constitutional:  General: Patient is not in acute distress.  Appearance: Patient is not diaphoretic.    HENT:  Head: Normocephalic and atraumatic.  Right Ear: External ear normal.  Left Ear: External ear normal.  Nose: Nose normal    Neck: no tenderness, no signs of trauma. No clinical signs of meningitis.     Eyes:  General: No scleral icterus.  Conjunctiva/sclera: Conjunctivae normal.    Cardiovascular:  Rate and Rhythm: Normal rate and regular rhythm.  Heart sounds: No friction rub.    Abd soft and non tender.     Pulmonary:  Effort: Pulmonary effort is normal. No respiratory distress.  Breath sounds: No stridor. No wheezing.    Musculoskeletal:  General: No deformity.  **Full range of motion in all extremities**    Skin:  General: Skin is warm and dry. No rashes present. Normal color. Normal turgor.    Neurological:  General: **Questionable right-sided facial droop. Patient able to move all extremities. No slurred speech.**  Mental Status: **Initially combative, now less combative. Altered from known baseline.**  Procedures           ED Course  ED Course as of 06/01/25 0631   Fri May 30, 2025   2259 Initial EKG negative for concerns for STEMI. [SG]   9663 CT of the brain, neck  negative for acute findings.  CBC is reassuring.Will give the patient IV fluids.  Lactic acid is likely due to concerns of seizure activity.  Patient has a baseline CKD.  I discussed the case with Dr. Funk from neurology, PT to be admitted to Medicine for seizure work up at the age of 89. Will give IV fluids, will cover with antibiotics.  [SG]   2307 I spoke to the son, he is aware of the need for admission.  The patient is feeling much better.  The son is not the power of , the stepsister is. [SG]   2322 Spoke to Dr. Castellano who accepted admit.  [SG]      ED Course User Index  [SG] Larry Lim MD                                                       Medical Decision Making  89-year-old male with advanced dementia presents with new onset seizure and altered mental status, concerning for possible acute stroke versus other metabolic/infectious etiology.    Laboratory studies pending: CBC, electrolytes, cardiac and infectious workup ordered.    Imaging studies: CT brain without contrast ordered to rule out intracranial hemorrhage. Chest x-ray pending to evaluate for pneumonitis.    Case was discussed with Dr. Funk regarding stroke evaluation. Given patient's age, advanced dementia, and severe DELLA/CKD, patient is not a candidate for thrombolysis (NIH stroke scale of 1 for questionable facial droop). CTA was deferred due to kidney disease. MRI brain will be considered during admission as PT is also not a candidate for thrombectomy with only a facial droop    Patient has strong urinary smell and is incontinent, concerning for possible UTI as contributing factor to altered mental status.    Blood glucose noted to be above 200, which may be contributing to current presentation.    Problems Addressed:  Seizure: complicated acute illness or injury    Amount and/or Complexity of Data Reviewed  Labs: ordered.  Radiology: ordered.  ECG/medicine tests: ordered.    Risk  Decision regarding  hospitalization.        Final diagnoses:   Seizure       ED Disposition  ED Disposition       ED Disposition   Decision to Admit    Condition   --    Comment   Level of Care: Remote Telemetry [26]   Diagnosis: Seizure [[id number]]   Admitting Physician: FLAKITO TAYLOR [[id number]]   Attending Physician: FLAKITO TAYLOR [[id number]]   Certification: I Certify That Inpatient Hospital Services Are Medically Necessary For Greater Than 2 Midnights                 No follow-up provider specified.       Medication List      No changes were made to your prescriptions during this visit.            Larry Lim MD  06/01/25 0631

## 2025-05-31 NOTE — THERAPY DISCHARGE NOTE
Acute Care - Speech Language Pathology   Swallow Initial Evaluation/Discharge [facility]     Patient Name: Maikel Mathews  : 1929  MRN: [id number]  Today's Date: 2025               Admit Date: 2025    SPEECH-LANGUAGE PATHOLOGY EVALUATION - SWALLOW  Subjective: The patient was seen on this date for a Clinical Swallow evaluation.  Patient was alert and cooperative.  Significant history: Seizure like activity and facial droop, CXR: patchy RUL infiltrate. Hx dementia, DM, HTN, GERD, diverticulosis.   Objective: Textures given included thin liquid, puree consistency, and regular consistency.  Assessment: Difficulties were noted with none of the above consistencies.  Observations:  Audible swallows with thin, but no overt s/s of aspiration observed. Good mastication and clearance of oral residue with the regular solid.   SLP Findings:  Patient presents with functional swallow, without esophageal component.   Recommendations: Diet Textures: thin liquid, regular consistency food.  Medications should be taken whole with thin liquids.   Recommended Strategies: Upright for PO, small bites and sips, and alternate liquids and solids. Oral care before breakfast, after all meals and PRN.  Dysphagia therapy is not recommended.   Juan Royal, CCC-SLP 2025 08:51 CDT     Visit Dx:    ICD-10-CM ICD-9-CM   1. Seizure  R56.9 780.39   2. Dysphagia, unspecified type  R13.10 787.20     Patient Active Problem List   Diagnosis    Anemia associated with nutritional deficiency    Iron deficiency anemia due to chronic blood loss    Cancer of right colon    Dizzinesses    Stage 3 chronic kidney disease    Encounter for hydration prior to CT scan    Low vitamin B12 level    Iron deficiency anemia    Cat bite    Cough    Low back pain    Pharyngitis    Spondylolisthesis, grade 1    Bilateral inguinal hernia    Type 2 diabetes mellitus with other specified complication    Acute renal failure    Change in mental status     Dementia with behavioral disturbance    CKD (chronic kidney disease) stage 4, GFR 15-29 ml/min    Vitamin B12 deficiency    Advanced age    Seizure     Past Medical History:   Diagnosis Date    Arthritis     AVM (arteriovenous malformation)     Cancer     PROSTATE    Cataract     Colon cancer     Colon polyp     Diabetes mellitus     Diverticulosis     Dizzinesses 10/20/2017    GERD (gastroesophageal reflux disease)     Hemorrhoids     History of transfusion     Hypertension     Inguinal hernia     Iron deficiency anemia due to chronic blood loss 7/26/2017    Prostate CA     Prostate hypertrophy     Rotator cuff syndrome      Past Surgical History:   Procedure Laterality Date    APPENDECTOMY      CHOLECYSTECTOMY      COLON RESECTION N/A 9/18/2017    Procedure: LAPAROSCOPIC ASSISTED RIGHT COLECTOMY;  Surgeon: Caroline Loomis MD;  Location: [facility] OR;  Service:     COLONOSCOPY  04/29/2010    COLONOSCOPY N/A 8/25/2017    Procedure: COLONOSCOPY WITH ANESTHESIA;  Surgeon: Joaquín Neff MD;  Location: [facility] ENDOSCOPY;  Service:     ENDOSCOPY  03/27/2013    ENDOSCOPY N/A 8/25/2017    Procedure: ESOPHAGOGASTRODUODENOSCOPY WITH ANESTHESIA;  Surgeon: Jaoquín Neff MD;  Location: [facility] ENDOSCOPY;  Service:     EYE SURGERY Left     CATARACT    INGUINAL HERNIA REPAIR Bilateral 11/1/2019    Procedure: OPEN BILATERAL INGUINAL HERNIA REPAIR WITH MESH;  Surgeon: Caroline Loomis MD;  Location: [facility] OR;  Service: General    INTERVENTIONAL RADIOLOGY PROCEDURE N/A 9/18/2017    Procedure: URETHRA DILITATION with dominguez catheter insertion;  Surgeon: Mamadou Paez MD;  Location: [facility] OR;  Service:     PROSTATECTOMY      PROSTATECTOMY         SLP Recommendation and Plan  SLP Swallowing Diagnosis: swallow WFL/no suspected pharyngeal impairment (05/31/25 0818)  SLP Diet Recommendation: regular textures, thin liquids (05/31/25 0818)     Monitor for Signs of Aspiration: yes, cough, gurgly voice, throat clearing  (05/31/25 0818)     Swallow Criteria for Skilled Therapeutic Interventions Met: no problems identified which require skilled intervention (05/31/25 0818)  Anticipated Discharge Disposition (SLP): home with assist (05/31/25 0851)     Therapy Frequency (Swallow): evaluation only (05/31/25 0818)              Anticipated Discharge Disposition (SLP): home with assist (05/31/25 0851)           Reason for Discharge: other (see comments) (WFL) (05/31/25 0851)                Outcome Evaluation: See note (05/31/25 0848)    SWALLOW EVALUATION (Last 72 Hours)       SLP Adult Swallow Evaluation       Row Name 05/31/25 0818                   Rehab Evaluation    Document Type discharge evaluation/summary  -MB        Subjective Information no complaints  -MB        Patient Observations alert;cooperative  -MB        Patient/Family/Caregiver Comments/Observations Male family member present  -MB           General Information    Patient Profile Reviewed yes  -MB        Pertinent History Of Current Problem Seizure like activity and facial droop, CXR: patchy RUL infiltrate. Hx dementia, DM, HTN, GERD, diverticulosis.  -MB        Current Method of Nutrition NPO  -MB        Precautions/Limitations, Vision WFL;for purposes of eval  -MB        Precautions/Limitations, Hearing hearing impairment, bilaterally  -MB        Prior Level of Function-Communication cognitive-linguistic impairment;other (see comments)  dementia  -MB        Prior Level of Function-Swallowing no diet consistency restrictions  -MB        Plans/Goals Discussed with patient and family  -MB        Barriers to Rehab none identified  -MB        Patient's Goals for Discharge patient did not state  -MB        Family Goals for Discharge family did not state  -MB           Pain    Additional Documentation Pain Scale: FACES Pre/Post-Treatment (Group)  -MB           Pain Scale: FACES Pre/Post-Treatment    Pain: FACES Scale, Pretreatment 0-->no hurt  -MB           Oral Motor  Structure and Function    Dentition Assessment missing teeth;teeth are in poor condition  -MB        Secretion Management WNL/WFL  -MB        Mucosal Quality moist, healthy  -MB           Oral Musculature and Cranial Nerve Assessment    Oral Motor General Assessment oral labial or buccal impairment  -MB        Oral Labial or Buccal Impairment, Detail, Cranial Nerve VII (Facial): right labial droop  -MB           General Eating/Swallowing Observations    Respiratory Support Currently in Use room air  -MB        Eating/Swallowing Skills fed by SLP;self-fed  -MB        Positioning During Eating upright in bed  -MB        Utensils Used spoon;straw  -MB        Consistencies Trialed regular textures;pureed;thin liquids  -MB           Clinical Swallow Eval    Oral Prep Phase WFL  -MB        Oral Transit WFL  -MB        Oral Residue WFL  -MB        Pharyngeal Phase no overt signs/symptoms of pharyngeal impairment  -MB        Esophageal Phase unremarkable  -MB        Clinical Swallow Evaluation Summary See note  -MB           SLP Evaluation Clinical Impression    SLP Swallowing Diagnosis swallow WFL/no suspected pharyngeal impairment  -MB        Functional Impact no impact on function  -MB        Swallow Criteria for Skilled Therapeutic Interventions Met no problems identified which require skilled intervention  -MB           Recommendations    Therapy Frequency (Swallow) evaluation only  -MB        SLP Diet Recommendation regular textures;thin liquids  -MB        Recommended Precautions and Strategies upright posture during/after eating;small bites of food and sips of liquid;alternate between small bites of food and sips of liquid;general aspiration precautions  -MB        Oral Care Recommendations Oral Care BID/PRN  -MB        SLP Rec. for Method of Medication Administration meds whole;with thin liquids  -MB        Monitor for Signs of Aspiration yes;cough;gurgly voice;throat clearing  -MB        Anticipated Discharge  Disposition (SLP) home with assist  -MB                  User Key  (r) = Recorded By, (t) = Taken By, (c) = Cosigned By      Initials Name Effective Dates    Juan Ragsdale CCC-SLP 02/03/23 -                     EDUCATION  The patient has been educated in the following areas:   Dysphagia (Swallowing Impairment).                   Time Calculation:    Time Calculation- SLP       Row Name 05/31/25 0851             Time Calculation- SLP    SLP Start Time 0818  -MB      SLP Stop Time 0851  -MB      SLP Time Calculation (min) 33 min  -MB      SLP Received On 05/31/25  -MB         Untimed Charges    [id number] Eval Oral Pharyng Swallow Minutes 33  -MB         Total Minutes    Untimed Charges Total Minutes 33  -MB       Total Minutes 33  -MB                User Key  (r) = Recorded By, (t) = Taken By, (c) = Cosigned By      Initials Name Provider Type    Juan Ragsdale CCC-SLP Speech and Language Pathologist                    Therapy Charges for Today       Code Description Service Date Service Provider Modifiers Qty    [id number] HC ST EVAL ORAL PHARYNG SWALLOW 2 5/31/2025 Juan Royal CCC-SLP GN 1                 SLP Discharge Summary  Anticipated Discharge Disposition (SLP): home with assist  Reason for Discharge: other (see comments) (WFL)  Progress Toward Achieving Short/long Term Goals: other (see comments) (WFL)  Discharge Destination: other (see comments) (Still in acute care)    LBANCO Prakash  5/31/2025

## 2025-05-31 NOTE — PLAN OF CARE
Goal Outcome Evaluation:  Plan of Care Reviewed With: patient, family           Outcome Evaluation: See note    Anticipated Discharge Disposition (SLP): home with assist          SLP Swallowing Diagnosis: swallow WFL/no suspected pharyngeal impairment (05/31/25 7689)

## 2025-05-31 NOTE — CONSULTS
"Pharmacy Dosing Service  Antimicrobial Dosing  Unasyn    Assessment/Action/Plan:  Based on indication and renal function, initiated renally adjusted Unasyn 3 gm IV every 24 hours for patient with CrCl < 15 ml/min. Pharmacy will continue to monitor daily and make further adjustment(s) accordingly.     Subjective:  Maikel Mathews is a 89 y.o. male with a  \"Pharmacy to Dose Unasyn\" consult for the treatment of Aspiration Pneumonia, day 1 of 7 of treatment.    Objective:  Ht: 167.6 cm (66\"); Wt: 55.5 kg (122 lb 4.8 oz)  Estimated Creatinine Clearance: 12.4 mL/min (A) (by C-G formula based on SCr of 2.8 mg/dL (H)).   Creatinine   Date Value Ref Range Status   05/30/2025 2.80 (H) 0.76 - 1.27 mg/dL Final      Lab Results   Component Value Date    WBC 8.48 05/30/2025      Baseline culture results:  Microbiology Results (last 10 days)       ** No results found for the last 240 hours. **            Reddy Younger, PharmD  05/31/25 00:52 CDT    "

## 2025-05-31 NOTE — H&P
[facility] Medicine Services  HISTORY AND PHYSICAL    Date of Admission: 5/30/2025  Primary Care Physician: David Barajas MD    Subjective   Primary Historian: son at bedside    Chief Complaint: seizure    History of Present Illness  Pt history is limited as pt has dementia. Hx is provided by his son at bedside, Mr Edmar Mathews.  As reported and according to collateral data, this is a 89 year-old male pt with a medical hx of dementia, DM, hypertension, history of prostate cancer, recent admission with DELLA, presenting to the ED for the evaluation of seizure like activity.  As reported, patient had a 10-minute of convulsive seizure that was witnessed by his wife at home, associated with incontinence, and bleeding from his mouth.  It was also reported that patient had an episode of facial droop.  There was no reported weaknesses.  There was no report of chest pain, abdominal pain, fever, chills, nausea vomiting or diarrhea.  In the ED, patient was back to his baseline.      Review of Systems   Could not be fully assessed as patient is demented.    Past Medical History:   Past Medical History:   Diagnosis Date    Arthritis     AVM (arteriovenous malformation)     Cancer     PROSTATE    Cataract     Colon cancer     Colon polyp     Diabetes mellitus     Diverticulosis     Dizzinesses 10/20/2017    GERD (gastroesophageal reflux disease)     Hemorrhoids     History of transfusion     Hypertension     Inguinal hernia     Iron deficiency anemia due to chronic blood loss 7/26/2017    Prostate CA     Prostate hypertrophy     Rotator cuff syndrome      Past Surgical History:  Past Surgical History:   Procedure Laterality Date    APPENDECTOMY      CHOLECYSTECTOMY      COLON RESECTION N/A 9/18/2017    Procedure: LAPAROSCOPIC ASSISTED RIGHT COLECTOMY;  Surgeon: Caroline Loomis MD;  Location: [facility];  Service:     COLONOSCOPY  04/29/2010    COLONOSCOPY N/A 8/25/2017    Procedure:  COLONOSCOPY WITH ANESTHESIA;  Surgeon: Joaquín Neff MD;  Location: [facility] ENDOSCOPY;  Service:     ENDOSCOPY  03/27/2013    ENDOSCOPY N/A 8/25/2017    Procedure: ESOPHAGOGASTRODUODENOSCOPY WITH ANESTHESIA;  Surgeon: Joaquín Neff MD;  Location: [facility] ENDOSCOPY;  Service:     EYE SURGERY Left     CATARACT    INGUINAL HERNIA REPAIR Bilateral 11/1/2019    Procedure: OPEN BILATERAL INGUINAL HERNIA REPAIR WITH MESH;  Surgeon: Caroline Loomis MD;  Location: [facility] OR;  Service: General    INTERVENTIONAL RADIOLOGY PROCEDURE N/A 9/18/2017    Procedure: URETHRA DILITATION with dominguez catheter insertion;  Surgeon: Mamadou Paez MD;  Location:  [facility] OR;  Service:     PROSTATECTOMY      PROSTATECTOMY       Social History:  reports that he has never smoked. He has never used smokeless tobacco. He reports that he does not drink alcohol and does not use drugs.    Family History: family history is not on file.       Allergies:  No Known Allergies    Medications:  Prior to Admission medications    Medication Sig Start Date End Date Taking? Authorizing Provider   amLODIPine (NORVASC) 10 MG tablet Take 1 tablet by mouth Daily. 3/20/17   Provider, MD Roney   cholecalciferol (VITAMIN D3) 25 MCG (1000 UT) tablet Take 2 tablets by mouth Daily. 5/14/25   Ryan Bose MD   Insulin Lispro (humaLOG) 100 UNIT/ML injection Inject 2-7 Units under the skin into the appropriate area as directed 4 (Four) Times a Day Before Meals & at Bedtime for 30 days. 5/13/25 6/12/25  Ryan Bose MD   QUEtiapine (SEROquel) 25 MG tablet Take 1 tablet by mouth Every Morning. 5/14/25   Ryan Bose MD   QUEtiapine (SEROquel) 50 MG tablet Take 1 tablet by mouth Every Night. 5/13/25   Ryan Bose MD   sodium bicarbonate 650 MG tablet Take 1 tablet by mouth 2 (Two) Times a Day. 5/13/25   Ryan Bose MD   vitamin B-12 (CYANOCOBALAMIN) 1000 MCG tablet Take 1 tablet by mouth Daily. 5/13/25    "Ryan Bose MD   ziprasidone (Geodon) 20 MG capsule Take 1 capsule by mouth 2 (Two) Times a Day As Needed (agitation). 5/13/25   Ryan Bose MD     I have utilized all available immediate resources to obtain, update, or review the patient's current medications (including all prescriptions, over-the-counter products, herbals, cannabis/cannabidiol products, and vitamin/mineral/dietary (nutritional) supplements).    Objective     Vital Signs: /80   Pulse 91   Temp 98.8 °F (37.1 °C) (Oral)   Resp 18   Ht 167.6 cm (66\")   Wt 55.5 kg (122 lb 4.8 oz)   SpO2 94%   BMI 19.74 kg/m²   Physical Exam  Constitutional:       Appearance: He is ill-appearing.   Cardiovascular:      Rate and Rhythm: Normal rate and regular rhythm.      Pulses: Normal pulses.      Heart sounds: Normal heart sounds. No murmur heard.  Pulmonary:      Effort: Pulmonary effort is normal. No respiratory distress.      Breath sounds: Normal breath sounds. No wheezing or rales.   Abdominal:      General: Bowel sounds are normal. There is no distension.      Palpations: Abdomen is soft.      Tenderness: There is no abdominal tenderness. There is no guarding.   Musculoskeletal:      Right lower leg: No edema.      Left lower leg: No edema.   Skin:     General: Skin is warm.   Neurological:      Mental Status: He is alert and oriented to person, place, and time. Mental status is at baseline.      Cranial Nerves: No cranial nerve deficit.      Sensory: No sensory deficit.      Motor: No weakness.              Results Reviewed:  Lab Results (last 24 hours)       Procedure Component Value Units Date/Time    STAT Lactic Acid, Reflex [[id number]] Collected: 05/31/25 0014    Specimen: Blood Updated: 05/31/25 0018    High Sensitivity Troponin T 1Hr [[id number]]  (Abnormal) Collected: 05/30/25 2144    Specimen: Blood Updated: 05/30/25 2216     HS Troponin T 79 ng/L      Troponin T Numeric Delta -6 ng/L      Troponin T % Delta -7    " Narrative:      High Sensitive Troponin T Reference Range:  <14.0 ng/L- Negative Female for AMI  <22.0 ng/L- Negative Male for AMI  >=14 - Abnormal Female indicating possible myocardial injury.  >=22 - Abnormal Male indicating possible myocardial injury.   Clinicians would have to utilize clinical acumen, EKG, Troponin, and serial changes to determine if it is an Acute Myocardial Infarction or myocardial injury due to an underlying chronic condition.         Lactic Acid, Plasma [[id number]]  (Abnormal) Collected: 05/30/25 2029    Specimen: Blood Updated: 05/30/25 2115     Lactate 5.1 mmol/L     TSH Rfx On Abnormal To Free T4 [[id number]]  (Normal) Collected: 05/30/25 2029    Specimen: Blood Updated: 05/30/25 2115     TSH 2.230 uIU/mL     High Sensitivity Troponin T [[id number]]  (Abnormal) Collected: 05/30/25 2029    Specimen: Blood Updated: 05/30/25 2115     HS Troponin T 85 ng/L     Narrative:      High Sensitive Troponin T Reference Range:  <14.0 ng/L- Negative Female for AMI  <22.0 ng/L- Negative Male for AMI  >=14 - Abnormal Female indicating possible myocardial injury.  >=22 - Abnormal Male indicating possible myocardial injury.   Clinicians would have to utilize clinical acumen, EKG, Troponin, and serial changes to determine if it is an Acute Myocardial Infarction or myocardial injury due to an underlying chronic condition.         Ammonia [[id number]]  (Normal) Collected: 05/30/25 2029    Specimen: Blood Updated: 05/30/25 2111     Ammonia 26 umol/L     Blood Culture - Blood, Arm, Left [[id number]] Collected: 05/30/25 2055    Specimen: Blood from Arm, Left Updated: 05/30/25 2111    Comprehensive Metabolic Panel [[id number]]  (Abnormal) Collected: 05/30/25 2029    Specimen: Blood Updated: 05/30/25 2110     Glucose 176 mg/dL      BUN 46.3 mg/dL      Creatinine 2.80 mg/dL      Sodium 143 mmol/L      Potassium 3.4 mmol/L      Chloride 106 mmol/L      CO2 17.0 mmol/L      Calcium 6.9 mg/dL      Total Protein 6.5 g/dL       Albumin 3.6 g/dL      ALT (SGPT) 5 U/L      AST (SGOT) 12 U/L      Alkaline Phosphatase 69 U/L      Total Bilirubin 0.2 mg/dL      Globulin 2.9 gm/dL      A/G Ratio 1.2 g/dL      BUN/Creatinine Ratio 16.5     Anion Gap 20.0 mmol/L      eGFR 20.1 mL/min/1.73     Narrative:      GFR Categories in Chronic Kidney Disease (CKD)              GFR Category          GFR (mL/min/1.73)    Interpretation  G1                    90 or greater        Normal or high (1)  G2                    60-89                Mild decrease (1)  G3a                   45-59                Mild to moderate decrease  G3b                   30-44                Moderate to severe decrease  G4                    15-29                Severe decrease  G5                    14 or less           Kidney failure    (1)In the absence of evidence of kidney disease, neither GFR category G1 or G2 fulfill the criteria for CKD.    eGFR calculation 2021 CKD-EPI creatinine equation, which does not include race as a factor    Phosphorus [[id number]]  (Normal) Collected: 05/30/25 2029    Specimen: Blood Updated: 05/30/25 2107     Phosphorus 4.5 mg/dL     Ethanol [[id number]] Collected: 05/30/25 2029    Specimen: Blood Updated: 05/30/25 2105     Ethanol % <0.010 %     Narrative:      Not for legal purposes. Chain of Custody not followed.     Magnesium [[id number]]  (Abnormal) Collected: 05/30/25 2029    Specimen: Blood Updated: 05/30/25 2105     Magnesium 1.5 mg/dL     Protime-INR [[id number]]  (Normal) Collected: 05/30/25 2029    Specimen: Blood Updated: 05/30/25 2101     Protime 14.6 Seconds      INR 1.08    aPTT [[id number]]  (Normal) Collected: 05/30/25 2029    Specimen: Blood Updated: 05/30/25 2101     PTT 26.6 seconds     Narrative:      PTT = The equivalent PTT values for the therapeutic range of heparin levels at 0.3 to 0.7 U/ml are 77 - 99 seconds.    Blood Culture - Blood, Arm, Right [[id number]] Collected: 05/30/25 2029    Specimen: Blood from Arm, Right  Updated: 05/30/25 2052    CBC & Differential [[id number]]  (Abnormal) Collected: 05/30/25 2029    Specimen: Blood Updated: 05/30/25 2050    Narrative:      The following orders were created for panel order CBC & Differential.  Procedure                               Abnormality         Status                     ---------                               -----------         ------                     CBC Auto Differential[[id number]]        Abnormal            Final result                 Please view results for these tests on the individual orders.    CBC Auto Differential [[id number]]  (Abnormal) Collected: 05/30/25 2029    Specimen: Blood Updated: 05/30/25 2050     WBC 8.48 10*3/mm3      RBC 3.05 10*6/mm3      Hemoglobin 9.5 g/dL      Hematocrit 29.3 %      MCV 96.1 fL      MCH 31.1 pg      MCHC 32.4 g/dL      RDW 13.5 %      RDW-SD 47.8 fl      MPV 10.0 fL      Platelets 284 10*3/mm3      Neutrophil % 53.2 %      Lymphocyte % 36.7 %      Monocyte % 7.4 %      Eosinophil % 1.9 %      Basophil % 0.6 %      Immature Grans % 0.2 %      Neutrophils, Absolute 4.51 10*3/mm3      Lymphocytes, Absolute 3.11 10*3/mm3      Monocytes, Absolute 0.63 10*3/mm3      Eosinophils, Absolute 0.16 10*3/mm3      Basophils, Absolute 0.05 10*3/mm3      Immature Grans, Absolute 0.02 10*3/mm3      nRBC 0.0 /100 WBC     POC Glucose Once [[id number]]  (Abnormal) Collected: 05/30/25 2018    Specimen: Blood Updated: 05/30/25 2029     Glucose 183 mg/dL      Comment: : [id number] Benjie AmandaMeter ID: [id number]             Imaging Results (Last 24 Hours)       Procedure Component Value Units Date/Time    CT Head Without Contrast - In process [[id number]] Resulted: 05/30/25 2100     Updated: 05/30/25 2215    This result has not been signed. Information might be incomplete.      CT Cervical Spine Without Contrast - In process [[id number]] Resulted: 05/30/25 2100     Updated: 05/30/25 2215    This result has not been signed. Information might be  incomplete.      XR Chest 1 View [[id number]] Collected: 05/30/25 2044     Updated: 05/30/25 2048    Narrative:      EXAMINATION: XR CHEST 1 VW-  5/30/2025 8:44 PM     HISTORY: Evaluate for pneumonia.     FINDINGS: Upright frontal projection of the chest is compared to prior  exam of 5/4/2025. There is patchy airspace disease abutting the major  fissure suggesting a right upper lobe infiltrate. The lungs are  otherwise clear. No effusion or free air present. The thoracic aorta and  great vessels are ectatic.       Impression:      1.. Patchy right upper lobe infiltrate.     This report was signed and finalized on 5/30/2025 8:45 PM by Dr. Felix Ying MD.             I have personally reviewed and interpreted the radiology studies and ECG obtained at time of admission.     Assessment / Plan   Assessment:   Active Hospital Problems    Diagnosis     **Seizure        Treatment Plan  The patient will be admitted to my service here at [facility].    Assessment and plan:  #Seizure.  #Episode of facial droop?.  #Right lung infiltrate, rule out aspiration pneumonia  #Lactic acidosis  #Dementia.  #Recent admission with DELLA.  #CKD    - Admitting to floor.  - ED contacted neurology who agreed to consult.  Also recommended doing MRI in the morning.  I will order MRI without contrast given the patient's kidney function day team to follow.  - Chest x-ray was showing right upper lobe infiltrates.  I suspect this could be an aspiration during the seizure.  Lactic acidosis was high, expected in the setting of seizure.  ED started empiric antibiotics.  I will continue with Unasyn and Doxy covering aspiration pneumonia.  Day team may reassess during the day and may consider DC antibiotics.  - On my assessment, I did not notice that the patient is having facial droop.  Family at bedside denied facial droop as well.  - DVT prophylaxis with SCDs for now.  - Neurochecks.  - Seizure precautions and fall precautions.  - IV  fluids and will recheck lactic acid.  - PT, OT, speech ordered.      Medical Decision Making  Number and Complexity of problems: 2 acute on moderate  Differential Diagnosis: As above    Conditions and Status        Condition is worsening.     [facility] Data  External documents reviewed: Yes  Cardiac tracing (EKG, telemetry) interpretation: Yes  Radiology interpretation: Yes  Labs reviewed: Yes  Any tests that were considered but not ordered: No     Decision rules/scores evaluated (example [id number], Wells, etc): No     Discussed with: Family at bedside     Care Planning  Shared decision making: Discussed the plan with his son Mr. Edmar Mathews and he was agreeable with the plan.  Code status and discussions: Discussed in depth the CODE STATUS of the patient, with Mr. Edmar Mathews, patient's son.  He confirmed with patient's POAArabella DNI DNR status.    Disposition  Social Determinants of Health that impact treatment or disposition: Not at the moment  Estimated length of stay is 2 to 3 days.     I confirmed that the patient's advanced care plan is present, code status is documented, and a surrogate decision maker is listed in the patient's medical record.       The patient was seen and examined by me on 5/30 at 11:30 PM.    Electronically signed by Tawana Castellano MD, 05/31/25, 00:28 CDT.

## 2025-05-31 NOTE — PROGRESS NOTES
[facility] Medicine Services  INPATIENT PROGRESS NOTE    Patient Name: Maikel Mathews  Date of Admission: 5/30/2025  Today's Date: 05/31/25  Length of Stay: 1  Primary Care Physician: David Barajas MD    Subjective   Chief Complaint: Crisper seizure.  Aspiration pneumonia.  HPI   Blood pressure is high, start back on Norvasc at 5 mg.  Afebrile.  Patient seems agitated when awake.  Metabolic acidosis, start bicarb p.o . creatinine stable close to baseline.  Magnesium is low 2 g magnesium, magnesium in AM.  Change her MRI of the head to stat.    Review of Systems   Constitutional:  Positive for activity change, appetite change and fatigue. Negative for chills and fever.   HENT:  Negative for hearing loss, nosebleeds, tinnitus and trouble swallowing.    Eyes:  Negative for visual disturbance.   Respiratory:  Negative for cough, chest tightness, shortness of breath and wheezing.    Cardiovascular:  Negative for chest pain, palpitations and leg swelling.   Gastrointestinal:  Negative for abdominal distention, abdominal pain, blood in stool, constipation, diarrhea, nausea and vomiting.   Endocrine: Negative for cold intolerance, heat intolerance, polydipsia, polyphagia and polyuria.   Genitourinary:  Negative for decreased urine volume, difficulty urinating, dysuria, flank pain, frequency and hematuria.   Musculoskeletal:  Positive for arthralgias, gait problem and myalgias. Negative for joint swelling.   Skin:  Negative for rash.   Allergic/Immunologic: Negative for immunocompromised state.   Neurological:  Positive for weakness. Negative for dizziness, syncope, light-headedness and headaches.   Hematological:  Negative for adenopathy. Does not bruise/bleed easily.   Psychiatric/Behavioral:  Negative for confusion and sleep disturbance. The patient is not nervous/anxious.         All pertinent negatives and positives are as above. All other systems have been reviewed and are  negative unless otherwise stated.     Objective    Temp:  [97.9 °F (36.6 °C)-98.8 °F (37.1 °C)] 98.2 °F (36.8 °C)  Heart Rate:  [] 98  Resp:  [16-18] 16  BP: (130-155)/(66-86) 153/77  Physical Exam  Vitals and nursing note reviewed.   Constitutional:       General: He is not in acute distress.     Appearance: He is not toxic-appearing.      Comments: Advanced age, do not like to be aroused.  Cachectic.  Chronically ill.   HENT:      Head: Normocephalic.      Mouth/Throat:      Mouth: Mucous membranes are moist.      Pharynx: Oropharynx is clear.   Eyes:      Extraocular Movements: Extraocular movements intact.      Conjunctiva/sclera: Conjunctivae normal.      Pupils: Pupils are equal, round, and reactive to light.   Cardiovascular:      Rate and Rhythm: Normal rate and regular rhythm.      Pulses: Normal pulses.      Heart sounds: No murmur heard.  Pulmonary:      Effort: Pulmonary effort is normal. No respiratory distress.      Breath sounds: Normal breath sounds. No wheezing or rhonchi.      Comments: Patient is on room air  Abdominal:      General: Bowel sounds are normal. There is no distension.      Palpations: Abdomen is soft.      Tenderness: There is no abdominal tenderness.   Musculoskeletal:         General: No swelling or tenderness.      Cervical back: Normal range of motion and neck supple. No muscular tenderness.   Skin:     General: Skin is warm and dry.      Capillary Refill: Capillary refill takes 2 to 3 seconds.      Findings: No erythema or rash.   Neurological:      Mental Status: He is alert.      Cranial Nerves: No cranial nerve deficit.      Motor: Weakness present.      Coordination: Coordination abnormal.      Gait: Gait abnormal.   Psychiatric:         Mood and Affect: Mood normal.         Behavior: Behavior normal.       Results Review:  I have reviewed the labs, radiology results, and diagnostic studies.    Laboratory Data:   Results from last 7 days   Lab Units 05/31/25  8923  "05/30/25 2029   WBC 10*3/mm3 6.99 8.48   HEMOGLOBIN g/dL 8.4* 9.5*   HEMATOCRIT % 26.2* 29.3*   PLATELETS 10*3/mm3 250 284        Results from last 7 days   Lab Units 05/31/25  0220 05/30/25 2029   SODIUM mmol/L 143 143   POTASSIUM mmol/L 3.6 3.4*   CHLORIDE mmol/L 109* 106   CO2 mmol/L 19.0* 17.0*   BUN mg/dL 43.6* 46.3*   CREATININE mg/dL 2.57* 2.80*   CALCIUM mg/dL 6.7* 6.9*   BILIRUBIN mg/dL 0.2 0.2   ALK PHOS U/L 63 69   ALT (SGPT) U/L 5 5   AST (SGOT) U/L 11 12   GLUCOSE mg/dL 157* 176*       Culture Data:   No results found for: \"BLOODCX\", \"URINECX\", \"WOUNDCX\", \"MRSACX\", \"RESPCX\", \"STOOLCX\"    Radiology Data:   Imaging Results (Last 24 Hours)       Procedure Component Value Units Date/Time    CT Cervical Spine Without Contrast [[id number]] Collected: 05/31/25 1025     Updated: 05/31/25 1034    Narrative:      EXAMINATION:  CT CERVICAL SPINE WO CONTRAST-  5/30/2025 7:59 PM     HISTORY: Unwitnessed seizure.     COMPARISON: Chest CT 6/26/2019.     DOSE LENGTH PRODUCT: 1644.48 mGy.cm Automated exposure control was also  utilized to decrease patient radiation dose.     TECHNIQUE: Serial helical tomographic images of the cervical spine were  obtained without the use of intravenous contrast. Sagittal and coronal  reformatted images were also provided.     FINDINGS:     ALIGNMENT AND ADDITIONAL FINDINGS: There is carotid artery calcification  in the neck bilaterally. Left thyroid nodularity extending into the  upper mediastinum is stable compared to chest CT on 6/26/2019. There is  straightening of the cervical spine on the sagittal images, likely  positional.     BONES: There is no evidence of fracture. Vertebral body heights are  maintained.     DISC SPACES:     C2-3: Moderate to severe disc narrowing. Partial fusion of the disc. The  posterior elements are fused. There is uncinate spurring. There is  hypertrophic change of the facet joints right greater than left. There  is moderate to severe right-sided " foraminal narrowing.     C3-4: Severe disc narrowing with spondylitic and uncinate spurring  producing dural sac compression. Mild narrowing of the central  canal-dural sac. The facet joints demonstrate mild degenerative change.  There is severe bilateral foraminal narrowing left greater than right.  Anterior syndesmophyte formation.     C4-5: Severe disc narrowing with spondylitic and uncinate spurring  producing dural sac compression. Mild to moderate narrowing of the  central canal-dural sac. Facet joints are maintained. Severe foraminal  narrowing bilaterally. Anterior syndesmophyte formation.     C5-6: Moderate disc narrowing. Spondylitic and uncinate spurring  producing dural sac compression. Mild to moderate narrowing of the  central canal-dural sac. Severe foraminal narrowing bilaterally. The  facet joints are maintained. Mild anterior syndesmophyte formation.     C6-7: Severe disc narrowing with spondylitic and uncinate spurring  producing dural sac compression. There is moderate narrowing of the  central canal-dural sac. There is severe foraminal narrowing  bilaterally. The facet joints are maintained.     C7-T1: Severe disc narrowing. Mild spondylitic and uncinate spurring. No  central spinal canal narrowing. Moderate to severe facet arthropathy on  the right. There is moderate to severe right and mild to moderate  left-sided foraminal narrowing.          Impression:      1. No evidence of cervical spine fracture.  2. Degenerative changes of the cervical spine, as described.  3. Carotid artery calcification in the neck bilaterally.  4. Left thyroid nodularity extending into the upper mediastinum appears  relatively stable compared to CT chest on 6/26/2019.           This report was signed and finalized on 5/31/2025 10:30 AM by Dr. Ellis Oh MD.       CT Head Without Contrast [[id number]] Collected: 05/31/25 1020     Updated: 05/31/25 1028    Narrative:      EXAMINATION:  CT HEAD WO CONTRAST-   5/30/2025 7:59 PM     HISTORY: Possible new onset seizure.     TECHNIQUE: Multiple axial images were obtained through the brain without  contrast infusion. Multiplanar images were reconstructed.     DLP: 1644.48 mGy.cm Automated dosage reduction technique was utilized to  reduce patient dosage.     COMPARISON: 5/4/2025.     FINDINGS: There are no hemorrhage, edema or mass effect. There is mild  to moderate atrophy with associated prominence of the lateral and third  ventricles. There is low-density in the hemispheric white matter. There  has been prior bilateral lens replacement. The paranasal sinuses and  mastoid air cells are clear. There is fluid or inflammatory changes in  the middle ear cavities bilaterally. This is increased on the left side  compared to the prior study. There is opacification of mastoid air cells  bilaterally. This is new on the left side compared to the prior study.  No calvarial fracture is seen.          Impression:      1. No hemorrhage, edema or mass effect.  2. Mild to moderate atrophy with associated prominence of the lateral  and third ventricles.  3. Low-density in the hemispheric white matter is nonspecific and likely  related to chronic small vessel disease.  4. Fluid or inflammatory changes in the middle ear cavities bilaterally,  stable on the right and increased on the left. These findings may be  related to infection or inflammation.  5. Opacification of mastoid air cells bilaterally, stable on the right  and new on the left. These findings may be related to infection or  inflammation.        This report was signed and finalized on 5/31/2025 10:24 AM by Dr. Ellis Oh MD.       XR Chest 1 View [[id number]] Collected: 05/30/25 2044     Updated: 05/30/25 2048    Narrative:      EXAMINATION: XR CHEST 1 VW-  5/30/2025 8:44 PM     HISTORY: Evaluate for pneumonia.     FINDINGS: Upright frontal projection of the chest is compared to prior  exam of 5/4/2025. There is patchy  airspace disease abutting the major  fissure suggesting a right upper lobe infiltrate. The lungs are  otherwise clear. No effusion or free air present. The thoracic aorta and  great vessels are ectatic.       Impression:      1.. Patchy right upper lobe infiltrate.     This report was signed and finalized on 5/30/2025 8:45 PM by Dr. Felix Ying MD.               I have reviewed the patient's current medications.     Assessment/Plan   Assessment  Active Hospital Problems    Diagnosis     **Seizure     Dementia with behavioral disturbance     CKD (chronic kidney disease) stage 4, GFR 15-29 ml/min     Advanced age     Acute renal failure     Type 2 diabetes mellitus with other specified complication     Spondylolisthesis, grade 1     Anemia associated with nutritional deficiency        Treatment Plan    Seizure . seizure-like activity for 10 minutes witnessed by wife at home with incontinence and bleeding from the mouth.  Patient also reported has a episode of facial drooping and reported weakness.  Lactose acidosis.  No recheck.  Seizure precaution.  CT scan of the head-No hemorrhage or edema or mass effect, Mild to moderate atrophy with associated prominence of the lateral and third ventricles, Low-density in the hemispheric white matter is nonspecific and likely related to chronic small vessel disease, Fluid or inflammatory changes in the middle ear cavities bilaterally, stable on the right and increased on the left. These findings may be   related to infection or inflammation,  Opacification of mastoid air cells bilaterally- stable on the right   and new on the left- may be related to infection or ...  CT scan of cervical spine-No evidence of cervical spine fracture, Degenerative changes of the cervical spine,  Carotid artery calcification in the neck bilaterally, Left thyroid nodularity extending into the upper mediastinum appears relatively stable compared to CT chest on 6/26/2019.  MRI of the head .  EEG  pending.    Aspiration pneumonia.  Unasyn and doxycycline started in ER.  Unasyn/doxycycline antibiotics.  Atrovent . Pulmicort.  Incentive spirometer.  Chest x-ray-Patchy right upper lobe infiltrate.   Patient is on room air    Acute on chronic renal failure stage IV.  Creatinine 2.8.  Creatinine 3/31/2025 3.35.  Bicarb .    Hypomagnesia.  2 g magnesium.  Magnesium in AM.    Anemia.  No sign of acute bleed.    Hypertension.  Nitro as needed.  Start back on Norvasc low-dose.    Diabetes.  Low sliding scale.    Depression/anxiety.  Seroquel nightly and daily.  Hold Geodon.    Prostate cancer.    History of dementia.    Advanced age . 89 years old.    SCD .    Nutrition.  Speech evaluated.  B12 . n.p.o. for now.    Deconditioning.  Fall precaution.  PT and OT consult    Blood cultures pending.    DNR . DNI.  Patient lives at home with spouse.  Spouse seems like she has dementia per son.     Medical Decision Making  Number and Complexity of problems: Questionable seizures/aspiration pneumonia/stage IV renal failure/hyper magnesium/anemia  Differential Diagnosis: None    Conditions and Status        Condition is unchanged.     [facility] Data  External documents reviewed: Previous note .  Cardiac tracing (EKG, telemetry) interpretation: Sinus .  Radiology interpretation: CT scan  Labs reviewed: Laboratory  Any tests that were considered but not ordered: Lab in a.m.     Decision rules/scores evaluated (example [id number], Wells, etc): None     Discussed with: Patient     Care Planning  Shared decision making: Patient  Code status and discussions: DNR . DNI    Disposition  Social Determinants of Health that impact treatment or disposition: Unknown  1 to 3 days .        Electronically signed by Luis Enrique Hutchinson MD, 05/31/25, 14:39 CDT.

## 2025-05-31 NOTE — SIGNIFICANT NOTE
I have met with patient's son, Edmar Mathews, and discussed code status of the patient. Mr. Edmar Mathews has already discussed with patient's POA, Arabella Reid, and both agreed on DNI DNR.  I asked Mr Hyatt to call and confirm the DNI/DNR with the POA and he did and confirms.

## 2025-06-01 ENCOUNTER — APPOINTMENT (OUTPATIENT)
Dept: MRI IMAGING | Facility: HOSPITAL | Age: OVER 89
End: 2025-06-01
Payer: MEDICARE

## 2025-06-01 PROBLEM — J69.0 ASPIRATION PNEUMONIA: Status: ACTIVE | Noted: 2025-06-01

## 2025-06-01 LAB
ALBUMIN SERPL-MCNC: 3.2 G/DL (ref 3.5–5.2)
ALBUMIN/GLOB SERPL: 1.3 G/DL
ALP SERPL-CCNC: 63 U/L (ref 39–117)
ALT SERPL W P-5'-P-CCNC: <5 U/L (ref 1–41)
ANION GAP SERPL CALCULATED.3IONS-SCNC: 13 MMOL/L (ref 5–15)
AST SERPL-CCNC: 11 U/L (ref 1–40)
BASOPHILS # BLD AUTO: 0.04 10*3/MM3 (ref 0–0.2)
BASOPHILS NFR BLD AUTO: 0.7 % (ref 0–1.5)
BILIRUB SERPL-MCNC: 0.3 MG/DL (ref 0–1.2)
BUN SERPL-MCNC: 38.2 MG/DL (ref 8–23)
BUN/CREAT SERPL: 15.8 (ref 7–25)
CALCIUM SPEC-SCNC: 7.4 MG/DL (ref 8.2–9.6)
CHLORIDE SERPL-SCNC: 111 MMOL/L (ref 98–107)
CHOLEST SERPL-MCNC: 125 MG/DL (ref 0–200)
CO2 SERPL-SCNC: 21 MMOL/L (ref 22–29)
CREAT SERPL-MCNC: 2.42 MG/DL (ref 0.76–1.27)
DEPRECATED RDW RBC AUTO: 49.7 FL (ref 37–54)
EGFRCR SERPLBLD CKD-EPI 2021: 24 ML/MIN/1.73
EOSINOPHIL # BLD AUTO: 0.14 10*3/MM3 (ref 0–0.4)
EOSINOPHIL NFR BLD AUTO: 2.4 % (ref 0.3–6.2)
ERYTHROCYTE [DISTWIDTH] IN BLOOD BY AUTOMATED COUNT: 13.6 % (ref 12.3–15.4)
GLOBULIN UR ELPH-MCNC: 2.5 GM/DL
GLUCOSE BLDC GLUCOMTR-MCNC: 119 MG/DL (ref 70–130)
GLUCOSE BLDC GLUCOMTR-MCNC: 179 MG/DL (ref 70–130)
GLUCOSE BLDC GLUCOMTR-MCNC: 215 MG/DL (ref 70–130)
GLUCOSE SERPL-MCNC: 103 MG/DL (ref 65–99)
HBA1C MFR BLD: 6.3 % (ref 4.8–5.6)
HCT VFR BLD AUTO: 27.2 % (ref 37.5–51)
HDLC SERPL-MCNC: 42 MG/DL (ref 40–60)
HGB BLD-MCNC: 8.5 G/DL (ref 13–17.7)
IMM GRANULOCYTES # BLD AUTO: 0.02 10*3/MM3 (ref 0–0.05)
IMM GRANULOCYTES NFR BLD AUTO: 0.3 % (ref 0–0.5)
LDLC SERPL CALC-MCNC: 74 MG/DL (ref 0–100)
LDLC/HDLC SERPL: 1.82 {RATIO}
LYMPHOCYTES # BLD AUTO: 1.21 10*3/MM3 (ref 0.7–3.1)
LYMPHOCYTES NFR BLD AUTO: 20.8 % (ref 19.6–45.3)
MAGNESIUM SERPL-MCNC: 1.8 MG/DL (ref 1.7–2.3)
MCH RBC QN AUTO: 31 PG (ref 26.6–33)
MCHC RBC AUTO-ENTMCNC: 31.3 G/DL (ref 31.5–35.7)
MCV RBC AUTO: 99.3 FL (ref 79–97)
MONOCYTES # BLD AUTO: 0.38 10*3/MM3 (ref 0.1–0.9)
MONOCYTES NFR BLD AUTO: 6.5 % (ref 5–12)
NEUTROPHILS NFR BLD AUTO: 4.04 10*3/MM3 (ref 1.7–7)
NEUTROPHILS NFR BLD AUTO: 69.3 % (ref 42.7–76)
NRBC BLD AUTO-RTO: 0 /100 WBC (ref 0–0.2)
PLATELET # BLD AUTO: 244 10*3/MM3 (ref 140–450)
PMV BLD AUTO: 10.1 FL (ref 6–12)
POTASSIUM SERPL-SCNC: 4.1 MMOL/L (ref 3.5–5.2)
PROT SERPL-MCNC: 5.7 G/DL (ref 6–8.5)
RBC # BLD AUTO: 2.74 10*6/MM3 (ref 4.14–5.8)
SODIUM SERPL-SCNC: 145 MMOL/L (ref 136–145)
TRIGL SERPL-MCNC: 33 MG/DL (ref 0–150)
TSH SERPL DL<=0.05 MIU/L-ACNC: 0.91 UIU/ML (ref 0.27–4.2)
VLDLC SERPL-MCNC: 9 MG/DL (ref 5–40)
WBC NRBC COR # BLD AUTO: 5.83 10*3/MM3 (ref 3.4–10.8)

## 2025-06-01 PROCEDURE — 25010000002 AMPICILLIN-SULBACTAM PER 1.5 G

## 2025-06-01 PROCEDURE — 97161 PT EVAL LOW COMPLEX 20 MIN: CPT

## 2025-06-01 PROCEDURE — 85025 COMPLETE CBC W/AUTO DIFF WBC: CPT | Performed by: FAMILY MEDICINE

## 2025-06-01 PROCEDURE — 83036 HEMOGLOBIN GLYCOSYLATED A1C: CPT | Performed by: FAMILY MEDICINE

## 2025-06-01 PROCEDURE — 82948 REAGENT STRIP/BLOOD GLUCOSE: CPT

## 2025-06-01 PROCEDURE — 80061 LIPID PANEL: CPT | Performed by: FAMILY MEDICINE

## 2025-06-01 PROCEDURE — 25010000002 DOXYCYCLINE 100 MG RECONSTITUTED SOLUTION 1 EACH VIAL

## 2025-06-01 PROCEDURE — 63710000001 INSULIN REGULAR HUMAN PER 5 UNITS

## 2025-06-01 PROCEDURE — 94799 UNLISTED PULMONARY SVC/PX: CPT

## 2025-06-01 PROCEDURE — 97165 OT EVAL LOW COMPLEX 30 MIN: CPT | Performed by: OCCUPATIONAL THERAPIST

## 2025-06-01 PROCEDURE — 84443 ASSAY THYROID STIM HORMONE: CPT | Performed by: FAMILY MEDICINE

## 2025-06-01 PROCEDURE — 70551 MRI BRAIN STEM W/O DYE: CPT

## 2025-06-01 PROCEDURE — 80053 COMPREHEN METABOLIC PANEL: CPT | Performed by: FAMILY MEDICINE

## 2025-06-01 PROCEDURE — 83735 ASSAY OF MAGNESIUM: CPT | Performed by: FAMILY MEDICINE

## 2025-06-01 PROCEDURE — 99233 SBSQ HOSP IP/OBS HIGH 50: CPT | Performed by: STUDENT IN AN ORGANIZED HEALTH CARE EDUCATION/TRAINING PROGRAM

## 2025-06-01 RX ORDER — AMLODIPINE BESYLATE 10 MG/1
10 TABLET ORAL DAILY
Status: DISCONTINUED | OUTPATIENT
Start: 2025-06-02 | End: 2025-06-03 | Stop reason: HOSPADM

## 2025-06-01 RX ORDER — LORAZEPAM 0.5 MG/1
0.5 TABLET ORAL ONCE AS NEEDED
Status: COMPLETED | OUTPATIENT
Start: 2025-06-01 | End: 2025-06-01

## 2025-06-01 RX ADMIN — SODIUM BICARBONATE 650 MG: 650 TABLET ORAL at 11:32

## 2025-06-01 RX ADMIN — SODIUM BICARBONATE 650 MG: 650 TABLET ORAL at 20:03

## 2025-06-01 RX ADMIN — QUETIAPINE FUMARATE 25 MG: 25 TABLET ORAL at 10:17

## 2025-06-01 RX ADMIN — BUDESONIDE 0.5 MG: 0.5 INHALANT RESPIRATORY (INHALATION) at 18:23

## 2025-06-01 RX ADMIN — IPRATROPIUM BROMIDE 0.5 MG: 0.5 SOLUTION RESPIRATORY (INHALATION) at 06:20

## 2025-06-01 RX ADMIN — QUETIAPINE FUMARATE 50 MG: 25 TABLET ORAL at 20:02

## 2025-06-01 RX ADMIN — DOXYCYCLINE 100 MG: 100 INJECTION, POWDER, LYOPHILIZED, FOR SOLUTION INTRAVENOUS at 01:01

## 2025-06-01 RX ADMIN — Medication 10 ML: at 20:30

## 2025-06-01 RX ADMIN — AMLODIPINE BESYLATE 5 MG: 5 TABLET ORAL at 10:17

## 2025-06-01 RX ADMIN — AMPICILLIN SODIUM, SULBACTAM SODIUM 3 G: 2; 1 INJECTION, POWDER, FOR SOLUTION INTRAMUSCULAR; INTRAVENOUS at 00:20

## 2025-06-01 RX ADMIN — IPRATROPIUM BROMIDE 0.5 MG: 0.5 SOLUTION RESPIRATORY (INHALATION) at 14:30

## 2025-06-01 RX ADMIN — DOXYCYCLINE 100 MG: 100 INJECTION, POWDER, LYOPHILIZED, FOR SOLUTION INTRAVENOUS at 15:33

## 2025-06-01 RX ADMIN — IPRATROPIUM BROMIDE 0.5 MG: 0.5 SOLUTION RESPIRATORY (INHALATION) at 10:30

## 2025-06-01 RX ADMIN — Medication 10 ML: at 10:17

## 2025-06-01 RX ADMIN — LORAZEPAM 0.5 MG: 0.5 TABLET ORAL at 16:10

## 2025-06-01 RX ADMIN — CYANOCOBALAMIN TAB 500 MCG 1000 MCG: 500 TAB at 10:17

## 2025-06-01 RX ADMIN — IPRATROPIUM BROMIDE 0.5 MG: 0.5 SOLUTION RESPIRATORY (INHALATION) at 18:23

## 2025-06-01 RX ADMIN — INSULIN HUMAN 3 UNITS: 100 INJECTION, SOLUTION PARENTERAL at 18:28

## 2025-06-01 RX ADMIN — BUDESONIDE 0.5 MG: 0.5 INHALANT RESPIRATORY (INHALATION) at 06:23

## 2025-06-01 RX ADMIN — Medication 2000 UNITS: at 10:17

## 2025-06-01 NOTE — THERAPY DISCHARGE NOTE
Patient Name: Maikel Mathews  : 1929    MRN: [id number]                              Today's Date: 2025       Admit Date: 2025    Visit Dx:     ICD-10-CM ICD-9-CM   1. Seizure  R56.9 780.39   2. Dysphagia, unspecified type  R13.10 787.20   3. Impaired mobility [Z74.09]  Z74.09 799.89     Patient Active Problem List   Diagnosis    Anemia associated with nutritional deficiency    Iron deficiency anemia due to chronic blood loss    Cancer of right colon    Dizzinesses    Stage 3 chronic kidney disease    Encounter for hydration prior to CT scan    Low vitamin B12 level    Iron deficiency anemia    Cat bite    Cough    Low back pain    Pharyngitis    Spondylolisthesis, grade 1    Bilateral inguinal hernia    Type 2 diabetes mellitus with other specified complication    Acute renal failure    Change in mental status    Dementia with behavioral disturbance    CKD (chronic kidney disease) stage 4, GFR 15-29 ml/min    Vitamin B12 deficiency    Advanced age    Seizure     Past Medical History:   Diagnosis Date    Arthritis     AVM (arteriovenous malformation)     Cancer     PROSTATE    Cataract     Colon cancer     Colon polyp     Diabetes mellitus     Diverticulosis     Dizzinesses 10/20/2017    GERD (gastroesophageal reflux disease)     Hemorrhoids     History of transfusion     Hypertension     Inguinal hernia     Iron deficiency anemia due to chronic blood loss 2017    Prostate CA     Prostate hypertrophy     Rotator cuff syndrome      Past Surgical History:   Procedure Laterality Date    APPENDECTOMY      CHOLECYSTECTOMY      COLON RESECTION N/A 2017    Procedure: LAPAROSCOPIC ASSISTED RIGHT COLECTOMY;  Surgeon: Caroline Loomis MD;  Location: [facility] OR;  Service:     COLONOSCOPY  2010    COLONOSCOPY N/A 2017    Procedure: COLONOSCOPY WITH ANESTHESIA;  Surgeon: Joaquín Neff MD;  Location: [facility] ENDOSCOPY;  Service:     ENDOSCOPY  2013    ENDOSCOPY N/A 2017     Procedure: ESOPHAGOGASTRODUODENOSCOPY WITH ANESTHESIA;  Surgeon: Joaquín Neff MD;  Location:  [facility] ENDOSCOPY;  Service:     EYE SURGERY Left     CATARACT    INGUINAL HERNIA REPAIR Bilateral 11/1/2019    Procedure: OPEN BILATERAL INGUINAL HERNIA REPAIR WITH MESH;  Surgeon: Caroline Loomis MD;  Location: [facility] OR;  Service: General    INTERVENTIONAL RADIOLOGY PROCEDURE N/A 9/18/2017    Procedure: URETHRA DILITATION with dominguez catheter insertion;  Surgeon: Mamadou Paez MD;  Location:  [facility] OR;  Service:     PROSTATECTOMY      PROSTATECTOMY        General Information       Row Name 06/01/25 0941          Physical Therapy Time and Intention    Document Type evaluation  Pt.with advanced dementia presents with new onset seizure and altered mental status, concerning for possible acute stroke versus other metabolic/infectious etiolog  -     Mode of Treatment physical therapy  -       Row Name 06/01/25 0941          General Information    Patient Profile Reviewed yes  -AJ     Prior Level of Function independent:;all household mobility;community mobility;gait;edema care;home management;bed mobility  pt has baseline dementia and no fmaily members at bedside. As pt performed mobility today, appears he is indep  -AJ     Existing Precautions/Restrictions fall  -AJ     Barriers to Rehab medically complex;cognitive status  -       Row Name 06/01/25 0941          Living Environment    Current Living Arrangements home  -     People in Home spouse  -       Row Name 06/01/25 0941          Cognition    Orientation Status (Cognition) oriented to;person;place;disoriented to;situation;time;verbal cues/prompts needed for orientation  -       Row Name 06/01/25 0941          Safety Issues/Impairments Affecting Functional Mobility    Safety Issues Affecting Function (Mobility) impulsivity  -AJ               User Key  (r) = Recorded By, (t) = Taken By, (c) = Cosigned By      Initials Name Provider Type    TORREY Hall  Albaro, PT DPT Physical Therapist                   Mobility       Row Name 06/01/25 0941          Bed Mobility    Bed Mobility rolling left;supine-sit;sit-supine  -AJ     Rolling Left [place] (Bed Mobility) modified independence  -     Supine-Sit [place] (Bed Mobility) modified independence  -AJ     Sit-Supine [place] (Bed Mobility) modified independence  -     Assistive Device (Bed Mobility) head of bed elevated;bed rails  -       Row Name 06/01/25 0941          Sit-Stand Transfer    Sit-Stand [place] (Transfers) contact guard  -       Row Name 06/01/25 0941          Gait/Stairs (Locomotion)    [place]cal[place] Level (Gait) contact guard;supervision  progressed to sp  -     Distance in Feet (Gait) 250  -AJ     Deviations/Abnormal Patterns (Gait) bilateral deviations;base of support, narrow  -AJ     Bilateral Gait Deviations forward flexed posture  -               User Key  (r) = Recorded By, (t) = Taken By, (c) = Cosigned By      Initials Name Provider Type    Albaro Toure, PT DPT Physical Therapist                   Obj/Interventions       Row Name 06/01/25 0941          Range of Motion Comprehensive    General Range of Motion bilateral lower extremity ROM WFL  -     Comment, General Range of Motion no formal testing performed as pt has baseline dementia. Per RN pt moves very quickly and did perform tasks quickly with therapist  -       Row Name 06/01/25 0941          Strength Comprehensive (MMT)    General Manual Muscle Testing (MMT) Assessment no strength deficits identified  -     Comment, General Manual Muscle Testing (MMT) Assessment no formal testing performed as pt has baseline dementia. Per RN pt moves very quickly and did perform tasks quickly with therapist, remained WFL  -       Row Name 06/01/25 0941          Balance    Balance Assessment sitting static balance;sitting dynamic balance;standing static balance;standing dynamic balance  -     Static Sitting  Balance independent  -AJ     Dynamic Sitting Balance independent  -AJ     Position, Sitting Balance unsupported;sitting edge of bed  -AJ     Static Standing Balance standby assist;contact guard  -AJ     Dynamic Standing Balance contact guard  -AJ     Position/Device Used, Standing Balance supported;unsupported  -AJ     Balance Interventions sitting;standing;sit to stand;supported;static;dynamic  -AJ               User Key  (r) = Recorded By, (t) = Taken By, (c) = Cosigned By      Initials Name Provider Type    AJ Albaro Hall, PT DPT Physical Therapist                   Goals/Plan    No documentation.                  Clinical Impression       Row Name 06/01/25 0941          Pain    Pain Management Interventions nursing notified  -     Response to Pain Interventions no change per patient report  -     Additional Documentation Pain Scale: FACES Pre/Post-Treatment (Group)  -       Row Name 06/01/25 0941          Pain Scale: FACES Pre/Post-Treatment    Pain: FACES Scale, Pretreatment 0-->no hurt  -     Posttreatment Pain Rating 0-->no hurt  -       Row Name 06/01/25 0941          Plan of Care Review    Plan of Care Reviewed With patient  -     Outcome Evaluation PT eval complete. Pt in fowlers position eating breakfast upon therapist arrival. Pt appears AOx2 being self and place and states he feels great. Pt did have seizure precautions/bed rail gaurds up but once taken off, performed bed mobility with Mod I. Pt reports he was indep with his spouse at baseline, but no family in room to verify. Pt stood and ambulated in dey 250' with SBA/CGA and HHA as needed and overall pt tolerated well. Pt is likely at baseline for mobility and PT to sign off at this time. Anticipate pt will return home with assist from family when medically stable. Please reconsult with change in status or need regarding this pt care.  -       Row Name 06/01/25 0941          Therapy Assessment/Plan (PT)    Criteria for Skilled  Interventions Met (PT) no;does not meet criteria for skilled intervention;no problems identified which require skilled intervention  -AJ     Therapy Frequency (PT) evaluation only  -AJ       Row Name 06/01/25 0941          Positioning and Restraints    Pre-Treatment Position in bed  -AJ     Post Treatment Position bed  -AJ     In Bed notified nsg;fowlers;call light within reach;encouraged to call for assist;exit alarm on;side rails up x2  -AJ               User Key  (r) = Recorded By, (t) = Taken By, (c) = Cosigned By      Initials Name Provider Type    Albaro Toure, PT DPT Physical Therapist                   Outcome Measures       Row Name 06/01/25 1000 06/01/25 0941       How much help from another person do you currently need...    Turning from your back to your side while in flat bed without using bedrails? 4  -SD 4  -AJ    Moving from lying on back to sitting on the side of a flat bed without bedrails? 4  -SD 4  -AJ    Moving to and from a bed to a chair (including a wheelchair)? 4  -SD 4  -AJ    Standing up from a chair using your arms (e.g., wheelchair, bedside chair)? 4  -SD 4  -AJ    Climbing 3-5 steps with a railing? 3  -SD 3  -AJ    To walk in hospital room? 4  -SD 4  -AJ    AM-PAC 6 Clicks Score (PT) 23  -SD 23  -AJ    Highest Level of Mobility Goal Walk 25 Feet or More-7  -SD Walk 25 Feet or More-7  -AJ      Row Name 06/01/25 0941 06/01/25 0940       Functional Assessment    Outcome Measure Options AM-PAC 6 Clicks Basic Mobility (PT)  -AJ AM-PAC 6 Clicks Daily Activity (OT) (P)   -LB              User Key  (r) = Recorded By, (t) = Taken By, (c) = Cosigned By      Initials Name Provider Type    Roberta Christopher LPN Licensed Nurse    Albaro Toure, PT DPT Physical Therapist    Gaby Arambula, OT Student OT Student                  Physical Therapy Education       Title: PT OT SLP Therapies (In Progress)       Topic: Physical Therapy (Done)       Point: Mobility training (Done)       Learning  Progress Summary            Patient Acceptance, E, VU by TORREY at 6/1/2025 1105    Comment: role of PT, call for assist, d/c planning                      Point: Home exercise program (Done)       Learning Progress Summary            Patient Acceptance, E, VU by TORREY at 6/1/2025 1105    Comment: role of PT, call for assist, d/c planning                      Point: Body mechanics (Done)       Learning Progress Summary            Patient Acceptance, E, VU by TORREY at 6/1/2025 1105    Comment: role of PT, call for assist, d/c planning                      Point: Precautions (Done)       Learning Progress Summary            Patient Acceptance, E, VU by TORREY at 6/1/2025 1105    Comment: role of PT, call for assist, d/c planning                                      User Key       Initials Effective Dates Name Provider Type Discipline     08/15/24 -  Albaro Hall, PT DPT Physical Therapist PT                  PT Recommendation and Plan     Outcome Evaluation: PT eval complete. Pt in fowlers position eating breakfast upon therapist arrival. Pt appears AOx2 being self and place and states he feels great. Pt did have seizure precautions/bed rail gaurds up but once taken off, performed bed mobility with Mod I. Pt reports he was indep with his spouse at baseline, but no family in room to verify. Pt stood and ambulated in dey 250' with SBA/CGA and HHA as needed and overall pt tolerated well. Pt is likely at baseline for mobility and PT to sign off at this time. Anticipate pt will return home with assist from family when medically stable. Please reconsult with change in status or need regarding this pt care.     Time Calculation:         PT Charges       Row Name 06/01/25 0941             Time Calculation    Start Time 0940  -      Stop Time 0956  +8 for chart review and communication with staff  -      Time Calculation (min) 16 min  -      PT Received On 06/01/25  -         Untimed Charges    PT Eval/Re-eval Minutes 24  -AJ          Total Minutes    Untimed Charges Total Minutes 24  -AJ       Total Minutes 24  -AJ                User Key  (r) = Recorded By, (t) = Taken By, (c) = Cosigned By      Initials Name Provider Type    Albaro Toure, PT DPT Physical Therapist                  Therapy Charges for Today       Code Description Service Date Service Provider Modifiers Qty    98869298869 HC PT EVAL LOW COMPLEXITY 2 6/1/2025 Albaro Hall PT DPT GP 1            PT G-Codes  Outcome Measure Options: AM-PAC 6 Clicks Basic Mobility (PT)  AM-PAC 6 Clicks Score (PT): 23  AM-PAC 6 Clicks Score (OT): (P) 24    PT Discharge Summary  Anticipated Discharge Disposition (PT): home with assist    Albaro Hall PT DPANGELTIA  6/1/2025

## 2025-06-01 NOTE — PLAN OF CARE
Goal Outcome Evaluation:  Plan of Care Reviewed With: patient           Outcome Evaluation: PT eval complete. Pt in fowlers position eating breakfast upon therapist arrival. Pt appears AOx2 being self and place and states he feels great. Pt did have seizure precautions/bed rail gaurds up but once taken off, performed bed mobility with Mod I. Pt reports he was indep with his spouse at baseline, but no family in room to verify. Pt stood and ambulated in dey 250' with SBA/CGA and HHA as needed and overall pt tolerated well. Pt is likely at baseline for mobility and PT to sign off at this time. Anticipate pt will return home with assist from family when medically stable. Please reconsult with change in status or need regarding this pt care.    Anticipated Discharge Disposition (PT): home with assist

## 2025-06-01 NOTE — PROGRESS NOTES
Stroke Progress Note       Chief Complaint:  seizure    Subjective    Subjective     Subjective:   No acute symptoms   No seizures     Review of Systems   Constitutional: No fatigue  HENT: Negative for nosebleeds and rhinorrhea.    Eyes: Negative for redness.   Respiratory: Negative for cough.    Gastrointestinal: Negative for anal bleeding.   Endocrine: Negative for polydipsia.   Genitourinary: Negative for enuresis and urgency.   Musculoskeletal: Negative for joint swelling.   Neurological: Negative for tremors.   Psychiatric/Behavioral: Negative for hallucinations.      Objective      Temp:  [98.2 °F (36.8 °C)-98.7 °F (37.1 °C)] 98.3 °F (36.8 °C)  Heart Rate:  [] 76  Resp:  [16-18] 16  BP: (144-156)/(76-81) 150/76          NEURO( Exam is performed with help of hospital staff at bed side and observed by me via audio-video interface)    MENTAL STATUS: AAOx3, memory intact, fund of knowledge appropriate    LANG/SPEECH: Naming and repetition intact, fluent, follows 3-step commands    CRANIAL NERVES:    Pupils equal and reactive, EOMI intact, no gaze deviation, no nystagmus  No facial droop, cough and gag intact, shoulder shrug intact, tongue midline     MOTOR:  Moves all extremities equally    SENSORY: Normal to light touch all throughout     COORDINATION: Normal finger to nose and heel to shin, no tremor, no dysmetria    STATION: Not assessed due to patient condition    GAIT: Not assessed due to patient condition       Results Review:    I reviewed the patient's new clinical results.    Lab Results (last 24 hours)       Procedure Component Value Units Date/Time    Magnesium [[id number]]  (Normal) Collected: 06/01/25 0500    Specimen: Blood Updated: 06/01/25 0623     Magnesium 1.8 mg/dL     Comprehensive Metabolic Panel [[id number]]  (Abnormal) Collected: 06/01/25 0500    Specimen: Blood Updated: 06/01/25 0617     Glucose 103 mg/dL      BUN 38.2 mg/dL      Creatinine 2.42 mg/dL      Sodium 145 mmol/L       Potassium 4.1 mmol/L      Chloride 111 mmol/L      CO2 21.0 mmol/L      Calcium 7.4 mg/dL      Total Protein 5.7 g/dL      Albumin 3.2 g/dL      ALT (SGPT) <5 U/L      AST (SGOT) 11 U/L      Alkaline Phosphatase 63 U/L      Total Bilirubin 0.3 mg/dL      Globulin 2.5 gm/dL      A/G Ratio 1.3 g/dL      BUN/Creatinine Ratio 15.8     Anion Gap 13.0 mmol/L      eGFR 24.0 mL/min/1.73     Narrative:      GFR Categories in Chronic Kidney Disease (CKD)              GFR Category          GFR (mL/min/1.73)    Interpretation  G1                    90 or greater        Normal or high (1)  G2                    60-89                Mild decrease (1)  G3a                   45-59                Mild to moderate decrease  G3b                   30-44                Moderate to severe decrease  G4                    15-29                Severe decrease  G5                    14 or less           Kidney failure    (1)In the absence of evidence of kidney disease, neither GFR category G1 or G2 fulfill the criteria for CKD.    eGFR calculation 2021 CKD-EPI creatinine equation, which does not include race as a factor    TSH [[id number]]  (Normal) Collected: 06/01/25 0500    Specimen: Blood Updated: 06/01/25 0617     TSH 0.912 uIU/mL     Lipid Panel [[id number]] Collected: 06/01/25 0500    Specimen: Blood Updated: 06/01/25 0617     Total Cholesterol 125 mg/dL      Triglycerides 33 mg/dL      HDL Cholesterol 42 mg/dL      LDL Cholesterol  74 mg/dL      VLDL Cholesterol 9 mg/dL      LDL/HDL Ratio 1.82    Narrative:      Cholesterol Reference Ranges  (U.S. Department of Health and Human Services ATP III Classifications)    Desirable          <200 mg/dL  Borderline High    200-239 mg/dL  High Risk          >240 mg/dL      Triglyceride Reference Ranges  (U.S. Department of Health and Human Services ATP III Classifications)    Normal           <150 mg/dL  Borderline High  150-199 mg/dL  High             200-499 mg/dL  Very High        >500  mg/dL    HDL Reference Ranges  (U.S. Department of Health and Human Services ATP III Classifications)    Low     <40 mg/dl (major risk factor for CHD)  High    >60 mg/dl ('negative' risk factor for CHD)        LDL Reference Ranges  (U.S. Department of Health and Human Services ATP III Classifications)    Optimal          <100 mg/dL  Near Optimal     100-129 mg/dL  Borderline High  130-159 mg/dL  High             160-189 mg/dL  Very High        >189 mg/dL    LDL is calculated using the NIH LDL-C calculation.      POC Glucose Once [[id number]]  (Normal) Collected: 06/01/25 0602    Specimen: Blood Updated: 06/01/25 0614     Glucose 119 mg/dL      Comment: : [id number] Frank MariaMeter ID: [id number]       Hemoglobin A1c [[id number]]  (Abnormal) Collected: 06/01/25 0500    Specimen: Blood Updated: 06/01/25 0603     Hemoglobin A1C 6.30 %     Narrative:      Hemoglobin A1C Ranges:    Increased Risk for Diabetes  5.7% to 6.4%  Diabetes                     >= 6.5%  Diabetic Goal                < 7.0%    CBC & Differential [[id number]]  (Abnormal) Collected: 06/01/25 0500    Specimen: Blood Updated: 06/01/25 0550    Narrative:      The following orders were created for panel order CBC & Differential.  Procedure                               Abnormality         Status                     ---------                               -----------         ------                     CBC Auto Differential[[id number]]        Abnormal            Final result                 Please view results for these tests on the individual orders.    CBC Auto Differential [[id number]]  (Abnormal) Collected: 06/01/25 0500    Specimen: Blood Updated: 06/01/25 0550     WBC 5.83 10*3/mm3      RBC 2.74 10*6/mm3      Hemoglobin 8.5 g/dL      Hematocrit 27.2 %      MCV 99.3 fL      MCH 31.0 pg      MCHC 31.3 g/dL      RDW 13.6 %      RDW-SD 49.7 fl      MPV 10.1 fL      Platelets 244 10*3/mm3      Neutrophil % 69.3 %      Lymphocyte % 20.8 %      Monocyte %  6.5 %      Eosinophil % 2.4 %      Basophil % 0.7 %      Immature Grans % 0.3 %      Neutrophils, Absolute 4.04 10*3/mm3      Lymphocytes, Absolute 1.21 10*3/mm3      Monocytes, Absolute 0.38 10*3/mm3      Eosinophils, Absolute 0.14 10*3/mm3      Basophils, Absolute 0.04 10*3/mm3      Immature Grans, Absolute 0.02 10*3/mm3      nRBC 0.0 /100 WBC     POC Glucose Once [[id number]]  (Normal) Collected: 05/31/25 2326    Specimen: Blood Updated: 05/31/25 2339     Glucose 125 mg/dL      Comment: : [id number] Frank MariaMeter ID: [id number]       Blood Culture - Blood, Arm, Left [[id number]]  (Normal) Collected: 05/30/25 2055    Specimen: Blood from Arm, Left Updated: 05/31/25 2115     Blood Culture No growth at 24 hours    Blood Culture - Blood, Arm, Right [[id number]]  (Normal) Collected: 05/30/25 2029    Specimen: Blood from Arm, Right Updated: 05/31/25 2100     Blood Culture No growth at 24 hours    POC Glucose Once [[id number]]  (Abnormal) Collected: 05/31/25 1714    Specimen: Blood Updated: 05/31/25 1726     Glucose 143 mg/dL      Comment: : chetna SalMeter ID: [id number]       POC Glucose Once [[id number]]  (Normal) Collected: 05/31/25 1242    Specimen: Blood Updated: 05/31/25 1253     Glucose 110 mg/dL      Comment: : chetna SalMeter ID: [id number]             EEG  Result Date: 5/31/2025  Diffuse cerebral dysfunction mild degree, nonspecific.  This is most commonly seen due to toxic/metabolic or hypoxemic cause but may also be seen with neurodegenerative illnesses such as dementia as well No evidence for epilepsy is present This report is transcribed using the Dragon dictation system.      CT Cervical Spine Without Contrast  Result Date: 5/31/2025  1. No evidence of cervical spine fracture. 2. Degenerative changes of the cervical spine, as described. 3. Carotid artery calcification in the neck bilaterally. 4. Left thyroid nodularity extending into the upper mediastinum appears  relatively stable compared to CT chest on 6/26/2019.    This report was signed and finalized on 5/31/2025 10:30 AM by Dr. Ellis Oh MD.      CT Head Without Contrast  Result Date: 5/31/2025  1. No hemorrhage, edema or mass effect. 2. Mild to moderate atrophy with associated prominence of the lateral and third ventricles. 3. Low-density in the hemispheric white matter is nonspecific and likely related to chronic small vessel disease. 4. Fluid or inflammatory changes in the middle ear cavities bilaterally, stable on the right and increased on the left. These findings may be related to infection or inflammation. 5. Opacification of mastoid air cells bilaterally, stable on the right and new on the left. These findings may be related to infection or inflammation.   This report was signed and finalized on 5/31/2025 10:24 AM by Dr. Ellis Oh MD.      XR Chest 1 View  Result Date: 5/30/2025  1.. Patchy right upper lobe infiltrate.  This report was signed and finalized on 5/30/2025 8:45 PM by Dr. Felix Ying MD.                Assessment/Plan     Assessment/Plan:    89 y.o. who presents with Sx of first time seizure in the setting of PNA and possible middle ear infection.    Neurological exam - at his baseline.        Diagnostics   -HCT: no acute intracranial process  -EEG - no seizures      Plan  -no indication of anti-seizure medications- family preference, could have been a provoked event in the setting of infection. Continue to treat for infection.   -MRI brain WO- has been ordered.   -family looking for placement.    If MRI is normal,   no further work up from neurology stand point.     -f/u in neurology clinic in 6-8 weeks            Christiano Nowak MD  06/01/25  11:23 CDT     The consult was conducted in real time using interactive audio and video technology. Patient/Family was informed of the technology being used for this visit and agreed to proceed. Patient located in hospital and I am the provider  located at home/office based setting.

## 2025-06-01 NOTE — CASE MANAGEMENT/SOCIAL WORK
Continued Stay Note  DEV Rossi     Patient Name: Maikel Mathews  MRN: [id number]  Today's Date: 6/1/2025    Admit Date: 5/30/2025    Plan: Home   Discharge Plan       Row Name 06/01/25 1452       Plan    Plan Home    Patient/Family in Agreement with Plan yes    Plan Comments Rec'd a message that pt's son, Edmar, was concerned about pt's living situation. Apparently pt lives at home with his spouse who has dementia. Therapy has signed off due to pt being at his baseline. Message left for pt's son Edmar [phone] to discuss concerns.                   Discharge Codes    No documentation.                       JAZMIN Gavin

## 2025-06-01 NOTE — THERAPY EVALUATION
Patient Name: Maikel Mathews  : 1929    MRN: [id number]                              Today's Date: 2025       Admit Date: 2025    Visit Dx:     ICD-10-CM ICD-9-CM   1. Seizure  R56.9 780.39   2. Dysphagia, unspecified type  R13.10 787.20   3. Impaired mobility [Z74.09]  Z74.09 799.89     Patient Active Problem List   Diagnosis    Anemia associated with nutritional deficiency    Iron deficiency anemia due to chronic blood loss    Cancer of right colon    Dizzinesses    Stage 3 chronic kidney disease    Encounter for hydration prior to CT scan    Low vitamin B12 level    Iron deficiency anemia    Cat bite    Cough    Low back pain    Pharyngitis    Spondylolisthesis, grade 1    Bilateral inguinal hernia    Type 2 diabetes mellitus with other specified complication    Acute renal failure    Change in mental status    Dementia with behavioral disturbance    CKD (chronic kidney disease) stage 4, GFR 15-29 ml/min    Vitamin B12 deficiency    Advanced age    Seizure     Past Medical History:   Diagnosis Date    Arthritis     AVM (arteriovenous malformation)     Cancer     PROSTATE    Cataract     Colon cancer     Colon polyp     Diabetes mellitus     Diverticulosis     Dizzinesses 10/20/2017    GERD (gastroesophageal reflux disease)     Hemorrhoids     History of transfusion     Hypertension     Inguinal hernia     Iron deficiency anemia due to chronic blood loss 2017    Prostate CA     Prostate hypertrophy     Rotator cuff syndrome      Past Surgical History:   Procedure Laterality Date    APPENDECTOMY      CHOLECYSTECTOMY      COLON RESECTION N/A 2017    Procedure: LAPAROSCOPIC ASSISTED RIGHT COLECTOMY;  Surgeon: Caroline Loomis MD;  Location: [facility] OR;  Service:     COLONOSCOPY  2010    COLONOSCOPY N/A 2017    Procedure: COLONOSCOPY WITH ANESTHESIA;  Surgeon: Joaquín Neff MD;  Location: [facility] ENDOSCOPY;  Service:     ENDOSCOPY  2013    ENDOSCOPY N/A 2017     Procedure: ESOPHAGOGASTRODUODENOSCOPY WITH ANESTHESIA;  Surgeon: Joaquín Neff MD;  Location:  [facility] ENDOSCOPY;  Service:     EYE SURGERY Left     CATARACT    INGUINAL HERNIA REPAIR Bilateral 11/1/2019    Procedure: OPEN BILATERAL INGUINAL HERNIA REPAIR WITH MESH;  Surgeon: Caroline Loomis MD;  Location:  [facility] OR;  Service: General    INTERVENTIONAL RADIOLOGY PROCEDURE N/A 9/18/2017    Procedure: URETHRA DILITATION with dominguez catheter insertion;  Surgeon: Mamadou Paez MD;  Location:  [facility] OR;  Service:     PROSTATECTOMY      PROSTATECTOMY        General Information       Row Name 06/01/25 1719          OT Time and Intention    Subjective Information no complaints  -JW (r) LB (t) JW (c)     Document Type evaluation  Pt. with advanced dementia presents with new onset seizure and altered mental status, concerning for possible acute stroke versus other metabolic/infectious etiology.  -JW (r) LB (t) JW (c)     Mode of Treatment occupational therapy  -JW (r) LB (t) JW (c)     Patient Effort good  -JW (r) LB (t) JW (c)     Symptoms Noted During/After Treatment none  -JW (r) LB (t) JW (c)       Row Name 06/01/25 4561          General Information    Patient Profile Reviewed yes  -JW (r) LB (t) JW (c)     Prior Level of Function independent:;all household mobility;ADL's;home management  -JW (r) LB (t) JW (c)     Existing Precautions/Restrictions fall  -JW (r) LB (t) JW (c)     Barriers to Rehab medically complex;hearing deficit  -JW (r) LB (t) JW (c)       Row Name 06/01/25 6736          Occupational Profile    Environmental Supports and Barriers (Occupational Profile) lives at home with spouse, has tub/walk-in  -JW (r) LB (t) JW (c)       Row Name 06/01/25 8025          Living Environment    Current Living Arrangements home  -JW (r) LB (t) JW (c)     People in Home spouse  -JW (r) LB (t) JW (c)       Row Name 06/01/25 1176          Home Main Entrance    Number of Stairs, Main Entrance three  -JW (r) LB (t) JW  (c)     Stair Railings, Main Entrance none  -JW (r) LB (t) JW (c)       Row Name 06/01/25 0940          Stairs Within Home, Primary    Number of Stairs, Within Home, Primary none  -JW (r) LB (t) JW (c)     Stair Railings, Within Home, Primary none  -JW (r) LB (t) JW (c)       Row Name 06/01/25 0940          Cognition    Orientation Status (Cognition) oriented to;person;place;disoriented to;situation;time  -JW (r) LB (t) JW (c)       Row Name 06/01/25 0940          Safety Issues/Impairments Affecting Functional Mobility    Safety Issues Affecting Function (Mobility) at risk behavior observed;awareness of need for assistance;impulsivity;insight into deficits/self-awareness;judgment;safety precaution awareness;safety precautions follow-through/compliance;problem-solving  -JW (r) LB (t) JW (c)     Impairments Affecting Function (Mobility) balance;endurance/activity tolerance;strength  -JW (r) LB (t) JW (c)               User Key  (r) = Recorded By, (t) = Taken By, (c) = Cosigned By      Initials Name Provider Type    Catalina Ashraf, OTR/L, CSRS Occupational Therapist    LB Gaby Bailey, OT Student OT Student                     Mobility/ADL's       Row Name 06/01/25 0940          Bed Mobility    Bed Mobility supine-sit  -JW (r) LB (t) JW (c)     Supine-Sit [place] (Bed Mobility) modified independence  -JW (r) LB (t) JW (c)     Assistive Device (Bed Mobility) bed rails;head of bed elevated  -JW (r) LB (t) JW (c)       Row Name 06/01/25 0940          Transfers    Transfers sit-stand transfer;stand-sit transfer  -JW (r) LB (t) JW (c)       Row Name 06/01/25 0940          Sit-Stand Transfer    Sit-Stand [place] (Transfers) contact guard;1 person assist  CGA for safety  -JW (r) LB (t) JW (c)       Row Name 06/01/25 0940          Stand-Sit Transfer    Stand-Sit [place] (Transfers) supervision;1 person assist  -JW (r) LB (t) JW (c)       Row Name 06/01/25 0940          Functional Mobility    Functional  Mobility- Ind. Level contact guard assist;1 person  walked well holding onto hand of therapist for stability  -JW (r) LB (t) JW (c)     Functional Mobility- Safety Issues other (see comments)  -JW (r) LB (t) JW (c)     Patient was able to Ambulate yes  -JW (r) LB (t) JW (c)       Row Name 06/01/25 0940          Activities of Daily Living    BADL Assessment/Intervention lower body dressing;grooming;upper body dressing  -JW (r) LB (t) JW (c)       Row Name 06/01/25 0940          Lower Body Dressing Assessment/Training    [place] Level (Lower Body Dressing) don;socks;independent  -JW (r) LB (t) JW (c)     Position (Lower Body Dressing) edge of bed sitting  -JW (r) LB (t) JW (c)       Row Name 06/01/25 0940          Grooming Assessment/Training    [place] Level (Grooming) wash face, hands;standby assist  -JW (r) LB (t) JW (c)     Position (Grooming) sink side  -JW (r) LB (t) JW (c)       Row Name 06/01/25 0940          Upper Body Dressing Assessment/Training    [place] Level (Upper Body Dressing) don;other (see comments)  [facility] with [facility] up only  -JW (r) LB (t) JW (c)     Position (Upper Body Dressing) sitting up in bed  -JW (r) LB (t) JW (c)               User Key  (r) = Recorded By, (t) = Taken By, (c) = Cosigned By      Initials Name Provider Type    Catalina Ashraf, OTR/L, CSRS Occupational Therapist    Gaby Arambula, OT Student OT Student                   Obj/Interventions       Row Name 06/01/25 0940          Sensory Assessment (Somatosensory)    Sensory Assessment (Somatosensory) UE sensation intact  -JW (r) LB (t) JW (c)       Row Name 06/01/25 0940          Vision Assessment/Intervention    Visual Impairment/Limitations WFL  -JW (r) LB (t) JW (c)       Row Name 06/01/25 0940          Range of Motion Comprehensive    General Range of Motion bilateral upper extremity ROM WFL  -JW (r) LB (t) JW (c)     Comment, General Range of Motion did not formally screen, was able to  self feed and wash face demonstratin B UE ROM WFL.  -JW (r) LB (t) JW (c)       Row Name 06/01/25 0940          Strength Comprehensive (MMT)    Comment, General Manual Muscle Testing (MMT) Assessment B UE strength not formally assessed, no strengths identified based off ADL tasks  -JW (r) LB (t) JW (c)       Row Name 06/01/25 0940          Balance    Balance Assessment sitting static balance;sitting dynamic balance;standing static balance;standing dynamic balance;sit to stand dynamic balance  -JW (r) LB (t) JW (c)     Static Sitting Balance independent  -JW (r) LB (t) JW (c)     Dynamic Sitting Balance standby assist;1-person assist  -JW (r) LB (t) JW (c)     Position, Sitting Balance unsupported;sitting edge of bed  -JW (r) LB (t) JW (c)     Sit to Stand Dynamic Balance independent  -JW (r) LB (t) JW (c)     Static Standing Balance standby assist;1-person assist  -JW (r) LB (t) JW (c)     Dynamic Standing Balance contact guard;1-person assist  CGA for safety  -JW (r) LB (t) JW (c)     Position/Device Used, Standing Balance unsupported  -JW (r) LB (t) JW (c)     Balance Interventions static;dynamic;sitting;standing;sit to stand;dynamic reaching;occupation based/functional task  -JW (r) LB (t) JW (c)               User Key  (r) = Recorded By, (t) = Taken By, (c) = Cosigned By      Initials Name Provider Type    Catalina Ashraf, DEBBIE/L, CSRS Occupational Therapist    Gaby Arambula, OT Student OT Student                   Goals/Plan    No documentation.                  Clinical Impression       Row Name 06/01/25 0940          Pain Assessment    Pretreatment Pain Rating 0/10 - no pain  -JW (r) LB (t) JW (c)     Posttreatment Pain Rating 0/10 - no pain  -JW (r) LB (t) JW (c)       Row Name 06/01/25 0940          Pain Scale: FACES Pre/Post-Treatment    Pain: FACES Scale, Pretreatment 0-->no hurt  -JW (r) LB (t) JW (c)       Row Name 06/01/25 0940          Plan of Care Review    Plan of Care Reviewed With  patient  -JW (r) LB (t) JW (c)     Progress no change  -JW (r) LB (t) JW (c)     Outcome Evaluation OT eval completed. Pt presents sitting up in bed and eating breakfast independently. Pt. was A&Ox2 and demonstrated difficulty hearing. Pt. was provided with a clean hospital gown and donned independently while sitting up in bed. Pt. also sat EOB with independence. Pt. donned B LE socks independently while sitting EOB. Mr. Mathews transitioned from sit<>stand with CGA x1 for safety purposes. Pt. walked in the hallway with CGA x1 and support of therapist hand, demonstrating good dynamic balance. Pt. stood sinkside for ~3 minutes to wash his face once returned to the room, demonstrating B UE ROM WFL in functional tasks. Pt. walked back to bed quickly, requiring supervision only. Pt. independently got back into supine Mr. Mathews did not exhibit any s/s or have any complaints during this session. OT will sign off due to patient being able to achieve independence in functional and occupation-based tasks. Recommend d/c to home with assist.  -JW (r) LB (t) JW (c)       Row Name 06/01/25 0911          Therapy Assessment/Plan (OT)    Rehab Potential (OT) good  -JW (r) LB (t) JW (c)     Criteria for Skilled Therapeutic Interventions Met (OT) no;no problems identified which require skilled intervention  -JW (r) LB (t) JW (c)     Therapy Frequency (OT) evaluation only  -JW       Row Name 06/01/25 0980          Therapy Plan Review/Discharge Plan (OT)    Equipment Needs Upon Discharge (OT) bathing equipment;raised toilet seat;shower chair  equipment to promote safety and energy conservation  -JW (r) LB (t) JW (c)     Anticipated Discharge Disposition (OT) home with assist  -JW (r) LB (t) JW (c)       Row Name 06/01/25 0942          Vital Signs    O2 Delivery Pre Treatment room air  -JW (r) LB (t) JW (c)     O2 Delivery Intra Treatment room air  -JW (r) LB (t) JW (c)     O2 Delivery Post Treatment room air  -JW (r) LB (t) JW (c)      Pre Patient Position Supine  -JW (r) LB (t) JW (c)     Intra Patient Position Standing  -JW (r) LB (t) JW (c)     Post Patient Position Supine  -JW (r) LB (t) JW (c)       Row Name 06/01/25 0940          Positioning and Restraints    Pre-Treatment Position in bed  -JW (r) LB (t) JW (c)     Post Treatment Position bed  -JW (r) LB (t) JW (c)     In Bed notified nsg;fowlers;call light within reach;encouraged to call for assist;exit alarm on;side rails up x2  seizure mats replaced  -JW (r) LB (t) JW (c)               User Key  (r) = Recorded By, (t) = Taken By, (c) = Cosigned By      Initials Name Provider Type    Catalina Ashraf, OTR/L, CSRS Occupational Therapist    Gaby Arambula, OT Student OT Student                   Outcome Measures       Row Name 06/01/25 0940          How much help from another is currently needed...    Putting on and taking off regular lower body clothing? 4  -JW (r) LB (t) JW (c)     Bathing (including washing, rinsing, and drying) 4  -JW (r) LB (t) JW (c)     Toileting (which includes using toilet bed pan or urinal) 4  -JW (r) LB (t) JW (c)     Putting on and taking off regular upper body clothing 4  -JW (r) LB (t) JW (c)     Taking care of personal grooming (such as brushing teeth) 4  -JW (r) LB (t) JW (c)     Eating meals 4  -JW (r) LB (t) JW (c)     AM-PAC 6 Clicks Score (OT) 24  -JW (r) LB (t)       Row Name 06/01/25 1000 06/01/25 0941       How much help from another person do you currently need...    Turning from your back to your side while in flat bed without using bedrails? 4  -SD 4  -AJ    Moving from lying on back to sitting on the side of a flat bed without bedrails? 4  -SD 4  -AJ    Moving to and from a bed to a chair (including a wheelchair)? 4  -SD 4  -AJ    Standing up from a chair using your arms (e.g., wheelchair, bedside chair)? 4  -SD 4  -AJ    Climbing 3-5 steps with a railing? 3  -SD 3  -AJ    To walk in hospital room? 4  -SD 4  -AJ    AM-PAC 6 Clicks Score  (PT) 23  -SD 23  -AJ    Highest Level of Mobility Goal Walk 25 Feet or More-7  -SD Walk 25 Feet or More-7  -AJ      Row Name 06/01/25 0941 06/01/25 0940       Functional Assessment    Outcome Measure Options AM-PAC 6 Clicks Basic Mobility (PT)  -AJ AM-PAC 6 Clicks Daily Activity (OT)  -JW (r) LB (t) JW (c)              User Key  (r) = Recorded By, (t) = Taken By, (c) = Cosigned By      Initials Name Provider Type    JW Catalina Arceo, OTR/L, CSRS Occupational Therapist    Roberta Christopher LPN Licensed Nurse    Albaro Toure, PT DPT Physical Therapist    Gaby Arambula, OT Student OT Student                    Occupational Therapy Education       Title: PT OT SLP Therapies (In Progress)       Topic: Occupational Therapy (In Progress)       Point: ADL training (In Progress)       Learning Progress Summary            Patient Acceptance, E, NR by LB at 6/1/2025 0940                      Point: Precautions (In Progress)       Learning Progress Summary            Patient Acceptance, E, NR by LB at 6/1/2025 0940                                      User Key       Initials Effective Dates Name Provider Type Discipline     05/12/25 -  Gaby Bailey, OT Student OT Student OT                  OT Recommendation and Plan     Plan of Care Review  Plan of Care Reviewed With: patient  Progress: no change  Outcome Evaluation: OT eval completed. Pt presents sitting up in bed and eating breakfast independently. Pt. was A&Ox2 and demonstrated difficulty hearing. Pt. was provided with a clean hospital gown and donned independently while sitting up in bed. Pt. also sat EOB with independence. Pt. donned B LE socks independently while sitting EOB. Mr. Mathews transitioned from sit<>stand with CGA x1 for safety purposes. Pt. walked in the hallway with CGA x1 and support of therapist hand, demonstrating good dynamic balance. Pt. stood sinkside for ~3 minutes to wash his face once returned to the room, demonstrating B UE ROM  WFL in functional tasks. Pt. walked back to bed quickly, requiring supervision only. Pt. independently got back into supine Mr. Mathews did not exhibit any s/s or have any complaints during this session. OT will sign off due to patient being able to achieve independence in functional and occupation-based tasks. Recommend d/c to home with assist.     Time Calculation:         Time Calculation- OT       Row Name 06/01/25 0954             Time Calculation- OT    OT Start Time 0940  Add 15 minutes for chart review and attempted eval  -JW (r) LB (t) JW (c)      OT Stop Time 0952  -JW (r) LB (t) JW (c)      OT Time Calculation (min) 12 min  -JW (r) LB (t)      Total Timed Code Minutes- OT 27 minute(s)  -JW (r) LB (t) JW (c)      OT Received On 06/01/25  -JW (r) LB (t) JW (c)      OT Goal Re-Cert Due Date --  -JW         Untimed Charges    OT Eval/Re-eval Minutes 27  -JW (r) LB (t) JW (c)         Total Minutes    Untimed Charges Total Minutes 27  -JW (r) LB (t)       Total Minutes 27  -JW (r) LB (t)                User Key  (r) = Recorded By, (t) = Taken By, (c) = Cosigned By      Initials Name Provider Type    Catalina Ashraf OTR/L, CSRS Occupational Therapist    Gaby Arambula, OT Student OT Student                           Gaby Bailey, OT Student  6/1/2025

## 2025-06-01 NOTE — PROGRESS NOTES
[facility] Medicine Services  INPATIENT PROGRESS NOTE    Patient Name: Maikel Mathews  Date of Admission: 5/30/2025  Today's Date: 06/01/25  Length of Stay: 2  Primary Care Physician: David Barajas MD    Subjective   Chief Complaint: Follow-up seizures  HPI   Patient was up walking in the room when I arrived; bedside nurse was also present.  Patient did indicate to me that he thought he had had a seizure, and stated that he had never had a seizure in the past.  He was agreeable to an MRI of the brain.  No family at bedside.  He denied any respiratory complaints.  He reported that he was eager to get back home.    Review of Systems   All pertinent negatives and positives are as above. All other systems have been reviewed and are negative unless otherwise stated.     Objective    Temp:  [98.2 °F (36.8 °C)-98.7 °F (37.1 °C)] 98.2 °F (36.8 °C)  Heart Rate:  [] 98  Resp:  [16-18] 16  BP: (144-156)/(68-81) 152/68  Physical Exam  Vitals reviewed.   Constitutional:       Appearance: He is not ill-appearing or toxic-appearing.      Comments: Ambulating in his room (bedside nursing staff at bedside)   HENT:      Head: Normocephalic.   Pulmonary:      Effort: Pulmonary effort is normal. No respiratory distress.      Comments: On room air  Skin:     General: Skin is warm.   Neurological:      Mental Status: He is alert. Mental status is at baseline.      Comments: Known history of dementia; appears at baseline; no focal deficits appreciated; answers yes/no questions relatively well   Psychiatric:      Comments: Currently pleasant and cooperative         Results Review:  I have reviewed the labs, radiology results, and diagnostic studies.    Laboratory Data:   Results from last 7 days   Lab Units 06/01/25  0500 05/31/25  0220 05/30/25 2029   WBC 10*3/mm3 5.83 6.99 8.48   HEMOGLOBIN g/dL 8.5* 8.4* 9.5*   HEMATOCRIT % 27.2* 26.2* 29.3*   PLATELETS 10*3/mm3 244 250 284         Results from last 7 days   Lab Units 06/01/25  0500 05/31/25  0220 05/30/25 2029   SODIUM mmol/L 145 143 143   POTASSIUM mmol/L 4.1 3.6 3.4*   CHLORIDE mmol/L 111* 109* 106   CO2 mmol/L 21.0* 19.0* 17.0*   BUN mg/dL 38.2* 43.6* 46.3*   CREATININE mg/dL 2.42* 2.57* 2.80*   CALCIUM mg/dL 7.4* 6.7* 6.9*   BILIRUBIN mg/dL 0.3 0.2 0.2   ALK PHOS U/L 63 63 69   ALT (SGPT) U/L <5 5 5   AST (SGOT) U/L 11 11 12   GLUCOSE mg/dL 103* 157* 176*       Culture Data:   Blood Culture   Date Value Ref Range Status   05/30/2025 No growth at 24 hours  Preliminary   05/30/2025 No growth at 24 hours  Preliminary       Radiology Data:   Imaging Results (Last 24 Hours)       ** No results found for the last 24 hours. **            I have reviewed the patient's current medications.     Assessment/Plan   Assessment  Active Hospital Problems    Diagnosis     **Seizure     Aspiration pneumonia     Dementia with behavioral disturbance     CKD (chronic kidney disease) stage 4, GFR 15-29 ml/min     Advanced age     Acute renal failure     Type 2 diabetes mellitus with other specified complication     Spondylolisthesis, grade 1     Anemia associated with nutritional deficiency        Treatment Plan  Continue IV Unasyn  On room air  Neurology recommendations reviewed  No anti-epileptic medications at this time  Seizure precautions  MRI Brain is pending  Renal function is at his baseline  PT recommendations reviewed - at his baseline and anticipate return home with assist.  Therapy services has signed off.  Titrate Norvasc to 10mg - outpatient dose  Consult SW - per discussion with bedside nursing staff the family has requested that we look into placement options      Medical Decision Making  Number and Complexity of problems: Moderate complexity      Conditions and Status        Condition is unchanged.     [facility] Data  External documents reviewed: none  Cardiac tracing (EKG, telemetry) interpretation: no new EKGs  Radiology interpretation:  patchy  RUL infiltrate on admission CXR  Labs reviewed: as above  Any tests that were considered but not ordered: none     Decision rules/scores evaluated (example [id number], [place], etc): none     Discussed with: patient and bedside nurse (Roberta)     Care Planning  Shared decision making: Discussed with patient  Code status and discussions: DO NOT RESUSCITATE    Disposition  Social Determinants of Health that impact treatment or disposition: None apparent at this time  I expect the patient to be discharged to: TBD.  Home with assist and family vs. Nursing facility placement        Electronically signed by Ryan Bose MD, 06/01/25, 14:00 CDT.

## 2025-06-01 NOTE — PLAN OF CARE
Goal Outcome Evaluation:  Plan of Care Reviewed With: patient        Progress: no change  Outcome Evaluation: OT eval completed. Pt presents sitting up in bed and eating breakfast independently. Pt. was A&Ox2 and demonstrated difficulty hearing. Pt. was provided with a clean hospital gown and donned independently while sitting up in bed. Pt. also sat EOB with independence. Pt. donned B LE socks independently while sitting EOB. Mr. Mathews transitioned from sit<>stand with CGA x1 for safety purposes. Pt. walked in the hallway with CGA x1 and support of therapist hand, demonstrating good dynamic balance. Pt. stood sinkside for ~3 minutes to wash his face once returned to the room, demonstrating B UE ROM WFL in functional tasks. Pt. walked back to bed quickly, requiring supervision only. Pt. independently got back into supine Mr. Mathews did not exhibit any s/s or have any complaints during this session. OT will sign off due to patient being able to achieve independence in functional and occupation-based tasks. Recommend d/c to home with assist.    Anticipated Discharge Disposition (OT): home with assist

## 2025-06-01 NOTE — PLAN OF CARE
Goal Outcome Evaluation:  Plan of Care Reviewed With: patient        Progress: no change  Outcome Evaluation: . Alert and oriented x 2. Disoriented to time and situation. Pt forgetful and confused. Pt has not had any instances of agitation or combativeness this shift. Pt has been very pleasant and cooperative. Pt is impulsive when it comes to getting up to go to the restroom and he is very fast. Bed alarm is set and safety has been maintained. ABX given per orders. Call light within reach.

## 2025-06-01 NOTE — PLAN OF CARE
Goal Outcome Evaluation:           Progress: no change  Outcome Evaluation: A&Ox2, disoriented to time and situation. Baseline dementia. Room air. Up with standby assistance to BR. Voiding. No c/o pain. Bed/chair alarms in use. Call light within reach. MRI completed this evening. Blood glucose monitored and treated per orders.

## 2025-06-02 LAB
GLUCOSE BLDC GLUCOMTR-MCNC: 122 MG/DL (ref 70–130)
GLUCOSE BLDC GLUCOMTR-MCNC: 126 MG/DL (ref 70–130)

## 2025-06-02 PROCEDURE — 25010000002 DOXYCYCLINE 100 MG RECONSTITUTED SOLUTION 1 EACH VIAL

## 2025-06-02 PROCEDURE — 82948 REAGENT STRIP/BLOOD GLUCOSE: CPT

## 2025-06-02 PROCEDURE — 94664 DEMO&/EVAL PT USE INHALER: CPT

## 2025-06-02 PROCEDURE — 94799 UNLISTED PULMONARY SVC/PX: CPT

## 2025-06-02 PROCEDURE — 25010000002 AMPICILLIN-SULBACTAM PER 1.5 G

## 2025-06-02 PROCEDURE — 99233 SBSQ HOSP IP/OBS HIGH 50: CPT | Performed by: PSYCHIATRY & NEUROLOGY

## 2025-06-02 RX ORDER — AMOXICILLIN AND CLAVULANATE POTASSIUM 500; 125 MG/1; MG/1
1 TABLET, FILM COATED ORAL 2 TIMES DAILY
Status: DISCONTINUED | OUTPATIENT
Start: 2025-06-02 | End: 2025-06-03 | Stop reason: HOSPADM

## 2025-06-02 RX ADMIN — AMLODIPINE BESYLATE 10 MG: 10 TABLET ORAL at 09:45

## 2025-06-02 RX ADMIN — CYANOCOBALAMIN TAB 500 MCG 1000 MCG: 500 TAB at 09:45

## 2025-06-02 RX ADMIN — DOXYCYCLINE 100 MG: 100 INJECTION, POWDER, LYOPHILIZED, FOR SOLUTION INTRAVENOUS at 14:57

## 2025-06-02 RX ADMIN — Medication 10 ML: at 10:24

## 2025-06-02 RX ADMIN — IPRATROPIUM BROMIDE 0.5 MG: 0.5 SOLUTION RESPIRATORY (INHALATION) at 06:19

## 2025-06-02 RX ADMIN — AMPICILLIN SODIUM, SULBACTAM SODIUM 3 G: 2; 1 INJECTION, POWDER, FOR SOLUTION INTRAMUSCULAR; INTRAVENOUS at 00:54

## 2025-06-02 RX ADMIN — Medication 10 ML: at 20:56

## 2025-06-02 RX ADMIN — SODIUM BICARBONATE 650 MG: 650 TABLET ORAL at 20:56

## 2025-06-02 RX ADMIN — AMOXICILLIN AND CLAVULANATE POTASSIUM 500 MG: 500; 125 TABLET, FILM COATED ORAL at 20:56

## 2025-06-02 RX ADMIN — BUDESONIDE 0.5 MG: 0.5 INHALANT RESPIRATORY (INHALATION) at 06:19

## 2025-06-02 RX ADMIN — Medication 2000 UNITS: at 09:45

## 2025-06-02 RX ADMIN — DOXYCYCLINE 100 MG: 100 INJECTION, POWDER, LYOPHILIZED, FOR SOLUTION INTRAVENOUS at 01:28

## 2025-06-02 RX ADMIN — QUETIAPINE FUMARATE 25 MG: 25 TABLET ORAL at 09:45

## 2025-06-02 RX ADMIN — QUETIAPINE FUMARATE 50 MG: 25 TABLET ORAL at 20:56

## 2025-06-02 RX ADMIN — SODIUM BICARBONATE 650 MG: 650 TABLET ORAL at 09:45

## 2025-06-02 NOTE — CASE MANAGEMENT/SOCIAL WORK
Continued Stay Note  [facility]     Patient Name: Maikel Mathews  MRN: [id number]  Today's Date: 6/2/2025    Admit Date: 5/30/2025    Plan: Pending   Discharge Plan       Row Name 06/02/25 1607       Plan    Plan Pending    Plan Comments Patient's daughter, Arabella, had requested to MD the private pay cost of [facility], [facility] and [facility].  Those rates are: [facility] $375 for semi private and $400 for private.  [facility] are $268 per day for a semiprivate room.  SW contacted patient's daughter and received voicemail.  SW did leave this information on voicemail and requested return call to let staff know if we were to proceed with referrals to these facilities, awaiting response.                   Discharge Codes    No documentation.                 Expected Discharge Date and Time       Expected Discharge Date Expected Discharge Time    Jun 4, 2025               MARY GRACE Dexter

## 2025-06-02 NOTE — PROGRESS NOTES
"    [facility]  INPATIENT PROGRESS NOTE    Patient Name: Maikel Mathews  Date of Admission: 5/30/2025  Today's Date: 06/02/25  Length of Stay: 3  Primary Care Physician: David Barajas MD    Subjective   Chief Complaint: Follow-up seizures  HPI   Patient sitting up in the bedside chair.  Reports that he feels good.  States that he is eager to go home.  He voices no new complaints.  No additional seizures.    Review of Systems   All pertinent negatives and positives are as above. All other systems have been reviewed and are negative unless otherwise stated.     Objective    Temp:  [97.4 °F (36.3 °C)-98 °F (36.7 °C)] 97.6 °F (36.4 °C)  Heart Rate:  [] 86  Resp:  [16-18] 16  BP: (162-167)/(63-94) 166/85  Physical Exam  Vitals reviewed.   Constitutional:       Appearance: He is not ill-appearing or toxic-appearing.      Comments: Sitting up in bedside chair - \"Can I go home?\"   HENT:      Head: Normocephalic.   Pulmonary:      Effort: Pulmonary effort is normal. No respiratory distress.      Comments: On room air  Skin:     General: Skin is warm.   Neurological:      Mental Status: He is alert. Mental status is at baseline.      Comments: Known history of dementia; appears at baseline; no focal deficits appreciated   Psychiatric:      Comments: Calm and cooperative         Results Review:  I have reviewed the labs, radiology results, and diagnostic studies.    Laboratory Data:   Results from last 7 days   Lab Units 06/01/25  0500 05/31/25 0220 05/30/25 2029   WBC 10*3/mm3 5.83 6.99 8.48   HEMOGLOBIN g/dL 8.5* 8.4* 9.5*   HEMATOCRIT % 27.2* 26.2* 29.3*   PLATELETS 10*3/mm3 244 250 284        Results from last 7 days   Lab Units 06/01/25  0500 05/31/25 0220 05/30/25 2029   SODIUM mmol/L 145 143 143   POTASSIUM mmol/L 4.1 3.6 3.4*   CHLORIDE mmol/L 111* 109* 106   CO2 mmol/L 21.0* 19.0* 17.0*   BUN mg/dL 38.2* 43.6* 46.3*   CREATININE mg/dL 2.42* 2.57* " 2.80*   CALCIUM mg/dL 7.4* 6.7* 6.9*   BILIRUBIN mg/dL 0.3 0.2 0.2   ALK PHOS U/L 63 63 69   ALT (SGPT) U/L <5 5 5   AST (SGOT) U/L 11 11 12   GLUCOSE mg/dL 103* 157* 176*       Culture Data:   Blood Culture   Date Value Ref Range Status   05/30/2025 No growth at 24 hours  Preliminary   05/30/2025 No growth at 24 hours  Preliminary       Radiology Data:   Imaging Results (Last 24 Hours)       Procedure Component Value Units Date/Time    MRI Brain Without Contrast [[id number]] Collected: 06/01/25 1703     Updated: 06/01/25 1719    Narrative:      EXAMINATION: MRI BRAIN WO CONTRAST-  6/1/2025 5:03 PM     HISTORY: Seizures.     FINDINGS: Multiplanar fast spin echo sequences were obtained of the  brain on a high-field magnet without gadolinium enhancement.     There is diffuse atrophy of the brain with prominence of the  subarachnoid spaces and ventricular enlargement. Moderate to severe  small vessel ischemic changes are noted involving the periventricular  and higher white matter tracts.     There is a focus of diffusion restriction within the right more  posterior frontal centrum semiovale. There is equivocal signal  alteration at this site on ADC mapping. No associated blooming on  gradient sequencing. This could represent an area of subacute infarction  within the right frontal centrum semiovale. There is no associated mass  effect or surrounding vasogenic edema. It may be worthwhile to follow-up  with postcontrast imaging to assure there is no associated abnormal  enhancement.     SWI and MINIP sequencing demonstrates multiple areas of blooming  including within the right cerebellar hemisphere, both temporal lobes,  the left thalamus and the left frontal cortex suggesting sites of  previous hemorrhage. This could represent changes of amyloid angiopathy.  No abnormal blooming associated with the focal of diffusion restriction.  There are foci of previous ischemic infarction involving the right  frontal centrum  semiovale with encephalomalacia and surrounding gliosis.     There is no mass effect or shift of the midline.     The orbits are unremarkable. There are bilateral mastoid effusions. No  fluid in the middle ear cavity. The visualized paranasal sinuses are  normally aerated.       Impression:      1. Diffuse atrophy with moderate to severe small vessel ischemic  disease. Evidence of remote infarcts involving the right frontal centrum  semiovale with focal encephalomalacia and gliosis.  2. There is a site of diffusion restriction within the more posterior  aspect of the right frontal centrum semiovale with equivocal signal  alteration on ADC mapping. This could represent an area of subacute  ischemic infarction. No definite mass effect or surrounding vasogenic  edema. Follow-up with postcontrast imaging could be obtained to more  entirely exclude an enhancing lesion.  3. Several foci of blooming noted within both hemispheres as well as the  left thalamus and right cerebellum suggesting previous sites of  hemorrhage. This could represent changes of amyloid angiopathy.  4. Bilateral mastoid effusions. The visualized paranasal sinuses and  mastoid air cells are otherwise normally aerated.     This report was signed and finalized on 6/1/2025 5:16 PM by Dr. Felix Ying MD.               I have reviewed the patient's current medications.     Assessment/Plan   Assessment  Active Hospital Problems    Diagnosis     **Seizure     Aspiration pneumonia     Dementia with behavioral disturbance     CKD (chronic kidney disease) stage 4, GFR 15-29 ml/min     Advanced age     Acute renal failure     Type 2 diabetes mellitus with other specified complication     Spondylolisthesis, grade 1     Anemia associated with nutritional deficiency        Treatment Plan  Neurology recommendations reviewed  Transition IV Unasyn to PO Augmentin today (renally dose)  Remains on room air  No anti-epileptic medications at this time  Seizure  "precautions  6.   Patient has been on Seroquel - in fact, daughter reports that his PCP recently increased him to 50mg BID.  Need to be mindful with this class of medication and impact on seizures.  7.   MRI Brain results reviewed with patient's daughter, Arabella, during phone call this afternoon  8.   Renal function has been at his baseline  9.   PT and OT services have signed off  10. Titrate Norvasc to 10mg - outpatient dose.  Monitor BP trend  11.  Case discussed with daughter (Arabella) - she clearly indicated \"we can no longer take care of him at home.\"  She requested SW reach out to her with quotes on costs for private pay at facilities such as [facility], [facility], and [facility].      Medical Decision Making  Number and Complexity of problems: Moderate complexity      Conditions and Status       Condition is improving     MDM Data  External documents reviewed: none  Cardiac tracing (EKG, telemetry) interpretation: no new EKGs  Radiology interpretation:  MRI Brain results reviewed with patient's daughter via telephone conversation  Labs reviewed: no new labs  Any tests that were considered but not ordered: none     Decision rules/scores evaluated (example [id number], Wells, etc): none     Discussed with: patient and daughter (Arabella)     Care Planning  Shared decision making: Discussed with patient  Code status and discussions: DO NOT RESUSCITATE    Disposition  Social Determinants of Health that impact treatment or disposition: None apparent at this time  I expect the patient to be discharged to: TBD.  Daughter clearly indicated \"we can no longer take care of him at home.\"  She requested SW reach out to her with quotes on costs for private pay at facilities such as [facility], [facility], and [facility].      Electronically signed by Ryan Bose MD, 06/02/25, 15:36 CDT.   "

## 2025-06-02 NOTE — PLAN OF CARE
Goal Outcome Evaluation:           Progress: no change  Outcome Evaluation: A&Ox2, disoriented to time and situation, baseline dementia. Room air. Impulsive, angry at times, wants to go home. Up standby to BR. Voiding. IV abx given per orders. Bed/chair alarms in use. Safety maintained this shift. Call light within reach.

## 2025-06-02 NOTE — PLAN OF CARE
"Goal Outcome Evaluation:  Plan of Care Reviewed With: patient        Progress: no change  Outcome Evaluation: Pt oriented to person and place only. Frequently requesting to go home. Agitated and restless at times. Impulsive. Bed alarm set. IV abx given as scheduled. Pt refused morning glucose check. Pt states he is going to \"leave here walking\". Safety maintained. Call light in reach.                             "

## 2025-06-02 NOTE — CASE MANAGEMENT/SOCIAL WORK
Discharge Planning Assessment   [facility]     Patient Name: Maikel Mathews  MRN: [id number]  Today's Date: 6/2/2025    Admit Date: 5/30/2025    Plan: Home   Discharge Needs Assessment       Row Name 06/02/25 1159       Living Environment    People in Home spouse    Name(s) of People in Home Priyanka Mathews    Current Living Arrangements home    Primary Care Provided by self;spouse/significant other;child(vikki)    Provides Primary Care For no one    Family Caregiver if Needed child(vikki), adult;spouse    Quality of Family Relationships supportive;helpful;involved    Able to Return to Prior Arrangements yes       Resource/Environmental Concerns    Resource/Environmental Concerns none    Transportation Concerns none       Transportation Needs    In the past 12 months, has lack of transportation kept you from medical appointments or from getting medications? no    In the past 12 months, has lack of transportation kept you from meetings, work, or from getting things needed for daily living? No       Food Insecurity    Within the past 12 months, you worried that your food would run out before you got the money to buy more. Never true    Within the past 12 months, the food you bought just didn't last and you didn't have money to get more. Never true       Transition Planning    Patient/Family Anticipates Transition to home with family;home with help/services    Patient/Family Anticipated Services at Transition home health care    Transportation Anticipated family or friend will provide       Discharge Needs Assessment    Readmission Within the Last 30 Days other (see comments)    Current Outpatient/Agency/Support Group homecare agency    Equipment Currently Used at Home none    Do you want help finding or keeping work or a job? I do not need or want help    Do you want help with school or training? For example, starting or completing job training or getting a high school diploma, GED or equivalent No    Anticipated Changes  Related to Illness none    Equipment Needed After Discharge none    Outpatient/Agency/Support Group Needs homecare agency    Discharge Facility/Level of Care Needs home with home health    Current Discharge Risk chronically ill    Discharge Coordination/Progress Patient resides at home with his spouse.  Recent admit from 5/4/25 - 5/13/25 and SNF placement was attempted during this admit but patient ended up ambulating 300 feet and insurance will not approve SNF placement when no need for physical therapy.  Patient has been evaluated by therapy during this admit and they have already signed off due to patient being at baseline, therefore, insurance will not cover placement in SNF.  Call placed to patient's son and message left requesting return call, no response thus far.  No family present in room this am.  Plan is home at discharge with continued  services from [facility].  Patient is current with [facility], notified Liudmila Avery of patient's admit.  [facility] will need to be notified of patient's discharge and will need resumption of care orders.                   Discharge Plan       Row Name 06/02/25 1154       Plan    Plan Home                  Selected Continued Care - Prior Encounters Includes continued care and service providers with selected services from prior encounters from 3/1/2025 to 6/2/2025      Discharged on 5/13/2025 Admission date: 5/4/2025 - Discharge disposition: Home-Health Care [facility]      Home Medical Care       Service Provider Services Address Phone Fax Patient Preferred    [facility] , Home Rehabilitation, Home Nursing [address] [place] [address] [address], [facility] [address] [phone] --                             Demographic Summary    No documentation.                  Functional Status    No documentation.                  Psychosocial    No documentation.                  Abuse/Neglect    No documentation.                  Legal    No  documentation.                  Substance Abuse    No documentation.                  Patient Forms    No documentation.                     AMANDA DexterW

## 2025-06-02 NOTE — PROGRESS NOTES
Assessment tool to be used for patients with existing breathing treatments ordered by hospitalist                               Respiratory Therapist Driven Protocol - RT to Assess and Treat Algorithm    Item 0 Points 1 Point 2 Points 3 Points 4 Points Subtotal   Mental Status Alert, orientated, cooperative Lethargic, follows commands Confused, not following commands Obtunded or Somnolent Comatose 0   Respiratory Pattern Regular RR  8-16 breaths/minute Increased RR  18-25 breaths/minute Dyspnea on exertion, irregular RR  26-30/minute Shortness of breath,  RR 31-35 breaths/minute Accessory muscle use, severe SOB  RR > 35 breath/minute 0   Breath Sounds Clear Decreased unilaterally Decreased bilaterally Basilar crackles Wheezing and/or rhonchi 1   Cough Strong, spontaneous non-productive Strong productive Weak, non-productive Weak productive or weak with rhonchi Absent or may require suctioning 0   Pulmonary Status Nonsmoker or no previous history > 1 year quit < 1 PPD  < 1 year quit >  or = 1 PPD Diagnosed pulmonary disease (severe or chronic) Severe or chronic pulmonary disease with exacerbation 0   Surgical Status None General surgery (non-abdominal or non-thoracic) Lower abdominal Thoracic or upper abdominal Thoracic with pulmonary disease 0   Chest X-ray Clear Chronic changes Infiltrates, atelectasis or pleural effusion Infiltrates > 1 lobe Diffuse infiltrates and atelectasis and/or effusions 2   Activity level Ambulatory Ambulatory with assistance Non-ambulatory Paraplegic Quadriplegic 0                     Total Score 3   Score    Drug Therapy Frequency  20 or >    Q4 Duoneb & Q3 Albuterol PRN 15 - 19     Q6 Duoneb & Q4 Albuterol PRN 10 - 14    QID Duoneb & Q4 Albuterol PRN 5 - 9    TID Duoneb & Q6 Albuterol PRN 0 - 4    Q4 PRN Duoneb or Q4 PRN Albuterol    Incentive Spirometry - Initial RT instruct    Lung Expansion Therapy (PEP) Bronchopulmonary Hygiene (CPT)   Q4 & PRN - Severe atelectasis,  poor oxygenation Q4 - copious secretions, dyspnea, unable to sleep, mucus plugging   QID - High risk for persistent atelectasis, existence of atelectasis QID & Q4 PRN - Moderate secretion production   TID - At risk for developing atelectasis TID - small amounts of secretions with poor cough   BID - prevention of atelectasis BID - unable to breathe deeply and cough spontaneously   *RT Protocol patients will be re-assessed/re-evaluated every 48 hours.    *Patients who are home nebulizer treatments will be protocoled to no less than their home regimen and will remain     on their home regimen with re-evaluations as needed with changes in patient condition.    RT Comments/Recommendations: Patient has been assessed. Atrovent will be changed to PRN,

## 2025-06-02 NOTE — PROGRESS NOTES
Stroke Progress Note       Chief Complaint:  seizure    Subjective    Subjective     Subjective:  No seizures overnight.  Wants to go home.      Objective      Temp:  [97.4 °F (36.3 °C)-98.2 °F (36.8 °C)] 97.7 °F (36.5 °C)  Heart Rate:  [] 88  Resp:  [16-18] 18  BP: (152-167)/(63-94) 162/77          NEURO(    MENTAL STATUS: AAOx3, memory intact, fund of knowledge appropriate    LANG/SPEECH: Naming and repetition intact, fluent, follows 3-step commands    CRANIAL NERVES:    Pupils equal and reactive, EOMI intact, no gaze deviation, no nystagmus  No facial droop, cough and gag intact, shoulder shrug intact, tongue midline   -decreased hearing b/l    MOTOR:  Moves all extremities equally    SENSORY: Normal to light touch all throughout     COORDINATION: Normal finger to nose and heel to shin, no tremor, no dysmetria    STATION: Not assessed due to patient condition    GAIT: Not assessed due to patient condition       Results Review:    I reviewed the patient's new clinical results.    Lab Results (last 24 hours)       Procedure Component Value Units Date/Time    POC Glucose Once [[id number]]  (Normal) Collected: 06/02/25 0010    Specimen: Blood Updated: 06/02/25 0021     Glucose 122 mg/dL      Comment: : [id number] Karson HernandezyMeter ID: [id number]       Blood Culture - Blood, Arm, Left [[id number]]  (Normal) Collected: 05/30/25 2055    Specimen: Blood from Arm, Left Updated: 06/01/25 2115     Blood Culture No growth at 2 days    Blood Culture - Blood, Arm, Right [[id number]]  (Normal) Collected: 05/30/25 2029    Specimen: Blood from Arm, Right Updated: 06/01/25 2100     Blood Culture No growth at 2 days    POC Glucose Once [[id number]]  (Abnormal) Collected: 06/01/25 1827    Specimen: Blood Updated: 06/01/25 1837     Glucose 215 mg/dL      Comment: : [id number] Ry MurciabyMeter ID: [id number]       POC Glucose Once [[id number]]  (Abnormal) Collected: 06/01/25 1113    Specimen: Blood Updated: 06/01/25 1124      Glucose 179 mg/dL      Comment: : [id number] Ry GranadosMeter ID: [id number]             MRI Brain Without Contrast  Result Date: 6/1/2025  1. Diffuse atrophy with moderate to severe small vessel ischemic disease. Evidence of remote infarcts involving the right frontal centrum semiovale with focal encephalomalacia and gliosis. 2. There is a site of diffusion restriction within the more posterior aspect of the right frontal centrum semiovale with equivocal signal alteration on ADC mapping. This could represent an area of subacute ischemic infarction. No definite mass effect or surrounding vasogenic edema. Follow-up with postcontrast imaging could be obtained to more entirely exclude an enhancing lesion. 3. Several foci of blooming noted within both hemispheres as well as the left thalamus and right cerebellum suggesting previous sites of hemorrhage. This could represent changes of amyloid angiopathy. 4. Bilateral mastoid effusions. The visualized paranasal sinuses and mastoid air cells are otherwise normally aerated.  This report was signed and finalized on 6/1/2025 5:16 PM by Dr. Felix Ying MD.      EEG  Result Date: 5/31/2025  Diffuse cerebral dysfunction mild degree, nonspecific.  This is most commonly seen due to toxic/metabolic or hypoxemic cause but may also be seen with neurodegenerative illnesses such as dementia as well No evidence for epilepsy is present This report is transcribed using the Dragon dictation system.         Diagnostics   -HCT: no acute intracranial process  -EEG - no seizures        Assessment/Plan     Assessment/Plan:    New onset likely provoked seizure from underlying metabolic disturbance.  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 Continue to treat for infection.   -MRI brain WO-  shows signs of multiple subacute and chronic strokes along with multiple microhemorrhages with concern of cerebral amyloid angiopathy.  -Avoidance of anticoagulation and antiplatelets if possible  -family looking for placement.  -neuro signing off    -f/u in neurology clinic in 6-8 weeks where his diagnosis of possible CAA can be discussed further    Seizure precaution and patient education discussions:  1. The patient was informed that it is a state law requirement for the patient not not drive for 3 months. Reevaluate ability to drive after 3 months seizure free .   2. Remove objects that are a risk for trauma, particularly objects with sharp corners or edges.   3. Do NOT place objects (including fingers) in patient's mouth.   4. Turn patient on his side to avoid aspiration in the event that they should vomit.   5. Educated the patient about epilepsy emergency: family or friend should call 911:      If the convulsion is longer than 4 minutes       If clustered seizures occur without returning to baseline.       If patient becomes cyanotic (bluish around mouth or fingertips)       If you have any other concerns for the patients health   6. Other general precautions: avoid climbing high, using bath tub or swimming unsupervised, and other common sense precautions.  7. It is very important to be compliant with your medications without abrupt discontinuation, discuss with your physician about possible side effects of medications.   8. Apply regular healthy sleep hygiene, and keeping a heathy life style like avoid alcohol and drug abuse, is also important part of your seizure management.        Jesus Tate MD  06/02/25  11:18 CDT

## 2025-06-03 ENCOUNTER — READMISSION MANAGEMENT (OUTPATIENT)
Dept: CALL CENTER | Facility: HOSPITAL | Age: OVER 89
End: 2025-06-03
Payer: MEDICARE

## 2025-06-03 VITALS
TEMPERATURE: 98.6 F | HEIGHT: 66 IN | HEART RATE: 86 BPM | SYSTOLIC BLOOD PRESSURE: 143 MMHG | DIASTOLIC BLOOD PRESSURE: 75 MMHG | BODY MASS INDEX: 19.48 KG/M2 | WEIGHT: 121.2 LBS | RESPIRATION RATE: 16 BRPM | OXYGEN SATURATION: 97 %

## 2025-06-03 LAB
GLUCOSE BLDC GLUCOMTR-MCNC: 102 MG/DL (ref 70–130)
GLUCOSE BLDC GLUCOMTR-MCNC: 106 MG/DL (ref 70–130)
GLUCOSE BLDC GLUCOMTR-MCNC: 126 MG/DL (ref 70–130)
GLUCOSE BLDC GLUCOMTR-MCNC: 167 MG/DL (ref 70–130)

## 2025-06-03 PROCEDURE — 82948 REAGENT STRIP/BLOOD GLUCOSE: CPT

## 2025-06-03 PROCEDURE — 63710000001 INSULIN REGULAR HUMAN PER 5 UNITS

## 2025-06-03 RX ORDER — AMOXICILLIN AND CLAVULANATE POTASSIUM 500; 125 MG/1; MG/1
1 TABLET, FILM COATED ORAL 2 TIMES DAILY
Qty: 8 TABLET | Refills: 0 | Status: SHIPPED | OUTPATIENT
Start: 2025-06-03 | End: 2025-06-07

## 2025-06-03 RX ADMIN — AMOXICILLIN AND CLAVULANATE POTASSIUM 500 MG: 500; 125 TABLET, FILM COATED ORAL at 09:08

## 2025-06-03 RX ADMIN — QUETIAPINE FUMARATE 25 MG: 25 TABLET ORAL at 09:08

## 2025-06-03 RX ADMIN — AMLODIPINE BESYLATE 10 MG: 10 TABLET ORAL at 09:08

## 2025-06-03 RX ADMIN — CYANOCOBALAMIN TAB 500 MCG 1000 MCG: 500 TAB at 09:08

## 2025-06-03 RX ADMIN — Medication 2000 UNITS: at 09:08

## 2025-06-03 RX ADMIN — SODIUM BICARBONATE 650 MG: 650 TABLET ORAL at 09:08

## 2025-06-03 RX ADMIN — Medication 10 ML: at 09:08

## 2025-06-03 RX ADMIN — INSULIN HUMAN 2 UNITS: 100 INJECTION, SOLUTION PARENTERAL at 12:21

## 2025-06-03 NOTE — DISCHARGE PLACEMENT REQUEST
"To: Dipika RAJAN    From: Krupa SAXENA [phone]          Itzel Lee (89 y.o. Male)       Date of Birth   08/17/1929    Social Security Number       Address   [address] [id number]    Home Phone   [phone]    MRN   [id number]       [facility]    Marital Status                               Admission Date   5/30/2025    Admission Type   Emergency    Admitting Provider   Luis Enrique Hutchinson MD    Attending Provider   Luis Enrique Hutchinson MD    Department, Room/Bed   [facility] 3A, 355/1       Discharge Date       Discharge Disposition   Home-Health Care [facility]    Discharge Destination                                 Attending Provider: Luis Enrique Hutchinson MD    Allergies: No Known Allergies    Isolation: None   Infection: None   Code Status: No CPR    Ht: 167.6 cm (66\")   Wt: 55 kg (121 lb 3.2 oz)    Admission Cmt: None   Principal Problem: Seizure [R56.9]                   Active Insurance as of 5/30/2025       Primary Coverage       Payor Plan Insurance Group Employer/Plan Group    [facility] MEDICARE REPLACEMENT AARP MEDICARE ADVANTAGE PPO [id number]       Payor Plan Address Payor Plan Phone Number Payor Plan Fax Number Effective Dates    [address]   1/1/2023 - None Entered    [facility] [id number]         Subscriber Name Subscriber Birth Date Member ID       ITZEL LEE 8/17/1929 [id number]                     Emergency Contacts        (Rel.) Home Phone Work Phone Mobile Phone    Priyanka Lee (Spouse) [phone] -- [phone]    JOLANTA EDWARDS (Daughter) -- -- [phone]    ROSAMARY (Son) -- -- [phone]    Jessica Lee (Relative) -- -- [phone]                 Discharge Summary        Luis Enrique Hutchinson MD at 06/03/25 1321                [facility] Medicine Services  DISCHARGE SUMMARY       Date of Admission: 5/30/2025  Date of Discharge:  6/3/2025  Primary Care Physician: David Barajas, " MD    Presenting Problem/History of Present Illness:      Final Discharge Diagnoses:  Active Hospital Problems    Diagnosis     **Seizure     Aspiration pneumonia     Dementia with behavioral disturbance     CKD (chronic kidney disease) stage 4, GFR 15-29 ml/min     Advanced age     Acute renal failure     Type 2 diabetes mellitus with other specified complication     Spondylolisthesis, grade 1     Anemia associated with nutritional deficiency        Consults: Neurology    Pertinent Test Results:   No results found for this or any previous visit.      Imaging Results (All)       Procedure Component Value Units Date/Time    MRI Brain Without Contrast [[id number]] Collected: 06/01/25 1703     Updated: 06/01/25 1719    Narrative:      EXAMINATION: MRI BRAIN WO CONTRAST-  6/1/2025 5:03 PM     HISTORY: Seizures.     FINDINGS: Multiplanar fast spin echo sequences were obtained of the  brain on a high-field magnet without gadolinium enhancement.     There is diffuse atrophy of the brain with prominence of the  subarachnoid spaces and ventricular enlargement. Moderate to severe  small vessel ischemic changes are noted involving the periventricular  and higher white matter tracts.     There is a focus of diffusion restriction within the right more  posterior frontal centrum semiovale. There is equivocal signal  alteration at this site on ADC mapping. No associated blooming on  gradient sequencing. This could represent an area of subacute infarction  within the right frontal centrum semiovale. There is no associated mass  effect or surrounding vasogenic edema. It may be worthwhile to follow-up  with postcontrast imaging to assure there is no associated abnormal  enhancement.     SWI and MINIP sequencing demonstrates multiple areas of blooming  including within the right cerebellar hemisphere, both temporal lobes,  the left thalamus and the left frontal cortex suggesting sites of  previous hemorrhage. This could represent  changes of amyloid angiopathy.  No abnormal blooming associated with the focal of diffusion restriction.  There are foci of previous ischemic infarction involving the right  frontal centrum semiovale with encephalomalacia and surrounding gliosis.     There is no mass effect or shift of the midline.     The orbits are unremarkable. There are bilateral mastoid effusions. No  fluid in the middle ear cavity. The visualized paranasal sinuses are  normally aerated.       Impression:      1. Diffuse atrophy with moderate to severe small vessel ischemic  disease. Evidence of remote infarcts involving the right frontal centrum  semiovale with focal encephalomalacia and gliosis.  2. There is a site of diffusion restriction within the more posterior  aspect of the right frontal centrum semiovale with equivocal signal  alteration on ADC mapping. This could represent an area of subacute  ischemic infarction. No definite mass effect or surrounding vasogenic  edema. Follow-up with postcontrast imaging could be obtained to more  entirely exclude an enhancing lesion.  3. Several foci of blooming noted within both hemispheres as well as the  left thalamus and right cerebellum suggesting previous sites of  hemorrhage. This could represent changes of amyloid angiopathy.  4. Bilateral mastoid effusions. The visualized paranasal sinuses and  mastoid air cells are otherwise normally aerated.     This report was signed and finalized on 6/1/2025 5:16 PM by Dr. Felix Ying MD.       CT Cervical Spine Without Contrast [[id number]] Collected: 05/31/25 1025     Updated: 05/31/25 1034    Narrative:      EXAMINATION:  CT CERVICAL SPINE WO CONTRAST-  5/30/2025 7:59 PM     HISTORY: Unwitnessed seizure.     COMPARISON: Chest CT 6/26/2019.     DOSE LENGTH PRODUCT: 1644.48 mGy.cm Automated exposure control was also  utilized to decrease patient radiation dose.     TECHNIQUE: Serial helical tomographic images of the cervical spine were  obtained  without the use of intravenous contrast. Sagittal and coronal  reformatted images were also provided.     FINDINGS:     ALIGNMENT AND ADDITIONAL FINDINGS: There is carotid artery calcification  in the neck bilaterally. Left thyroid nodularity extending into the  upper mediastinum is stable compared to chest CT on 6/26/2019. There is  straightening of the cervical spine on the sagittal images, likely  positional.     BONES: There is no evidence of fracture. Vertebral body heights are  maintained.     DISC SPACES:     C2-3: Moderate to severe disc narrowing. Partial fusion of the disc. The  posterior elements are fused. There is uncinate spurring. There is  hypertrophic change of the facet joints right greater than left. There  is moderate to severe right-sided foraminal narrowing.     C3-4: Severe disc narrowing with spondylitic and uncinate spurring  producing dural sac compression. Mild narrowing of the central  canal-dural sac. The facet joints demonstrate mild degenerative change.  There is severe bilateral foraminal narrowing left greater than right.  Anterior syndesmophyte formation.     C4-5: Severe disc narrowing with spondylitic and uncinate spurring  producing dural sac compression. Mild to moderate narrowing of the  central canal-dural sac. Facet joints are maintained. Severe foraminal  narrowing bilaterally. Anterior syndesmophyte formation.     C5-6: Moderate disc narrowing. Spondylitic and uncinate spurring  producing dural sac compression. Mild to moderate narrowing of the  central canal-dural sac. Severe foraminal narrowing bilaterally. The  facet joints are maintained. Mild anterior syndesmophyte formation.     C6-7: Severe disc narrowing with spondylitic and uncinate spurring  producing dural sac compression. There is moderate narrowing of the  central canal-dural sac. There is severe foraminal narrowing  bilaterally. The facet joints are maintained.     C7-T1: Severe disc narrowing. Mild  spondylitic and uncinate spurring. No  central spinal canal narrowing. Moderate to severe facet arthropathy on  the right. There is moderate to severe right and mild to moderate  left-sided foraminal narrowing.          Impression:      1. No evidence of cervical spine fracture.  2. Degenerative changes of the cervical spine, as described.  3. Carotid artery calcification in the neck bilaterally.  4. Left thyroid nodularity extending into the upper mediastinum appears  relatively stable compared to CT chest on 6/26/2019.           This report was signed and finalized on 5/31/2025 10:30 AM by Dr. Ellis Oh MD.       CT Head Without Contrast [[id number]] Collected: 05/31/25 1020     Updated: 05/31/25 1028    Narrative:      EXAMINATION:  CT HEAD WO CONTRAST-  5/30/2025 7:59 PM     HISTORY: Possible new onset seizure.     TECHNIQUE: Multiple axial images were obtained through the brain without  contrast infusion. Multiplanar images were reconstructed.     DLP: 1644.48 mGy.cm Automated dosage reduction technique was utilized to  reduce patient dosage.     COMPARISON: 5/4/2025.     FINDINGS: There are no hemorrhage, edema or mass effect. There is mild  to moderate atrophy with associated prominence of the lateral and third  ventricles. There is low-density in the hemispheric white matter. There  has been prior bilateral lens replacement. The paranasal sinuses and  mastoid air cells are clear. There is fluid or inflammatory changes in  the middle ear cavities bilaterally. This is increased on the left side  compared to the prior study. There is opacification of mastoid air cells  bilaterally. This is new on the left side compared to the prior study.  No calvarial fracture is seen.          Impression:      1. No hemorrhage, edema or mass effect.  2. Mild to moderate atrophy with associated prominence of the lateral  and third ventricles.  3. Low-density in the hemispheric white matter is nonspecific and  likely  related to chronic small vessel disease.  4. Fluid or inflammatory changes in the middle ear cavities bilaterally,  stable on the right and increased on the left. These findings may be  related to infection or inflammation.  5. Opacification of mastoid air cells bilaterally, stable on the right  and new on the left. These findings may be related to infection or  inflammation.        This report was signed and finalized on 5/31/2025 10:24 AM by Dr. Ellis Oh MD.       XR Chest 1 View [[id number]] Collected: 05/30/25 2044     Updated: 05/30/25 2048    Narrative:      EXAMINATION: XR CHEST 1 VW-  5/30/2025 8:44 PM     HISTORY: Evaluate for pneumonia.     FINDINGS: Upright frontal projection of the chest is compared to prior  exam of 5/4/2025. There is patchy airspace disease abutting the major  fissure suggesting a right upper lobe infiltrate. The lungs are  otherwise clear. No effusion or free air present. The thoracic aorta and  great vessels are ectatic.       Impression:      1.. Patchy right upper lobe infiltrate.     This report was signed and finalized on 5/30/2025 8:45 PM by Dr. Felix Ying MD.             LAB RESULTS:      Lab 06/01/25  0500 05/31/25 0220 05/31/25  0014 05/30/25 2029   WBC 5.83 6.99  --  8.48   HEMOGLOBIN 8.5* 8.4*  --  9.5*   HEMATOCRIT 27.2* 26.2*  --  29.3*   PLATELETS 244 250  --  284   NEUTROS ABS 4.04  --   --  4.51   IMMATURE GRANS (ABS) 0.02  --   --  0.02   LYMPHS ABS 1.21  --   --  3.11*   MONOS ABS 0.38  --   --  0.63   EOS ABS 0.14  --   --  0.16   MCV 99.3* 97.8*  --  96.1   LACTATE  --   --  1.4 5.1*   PROTIME  --   --   --  14.6   APTT  --   --   --  26.6         Lab 06/01/25  0500 05/31/25  0220 05/30/25 2029   SODIUM 145 143 143   POTASSIUM 4.1 3.6 3.4*   CHLORIDE 111* 109* 106   CO2 21.0* 19.0* 17.0*   ANION GAP 13.0 15.0 20.0*   BUN 38.2* 43.6* 46.3*   CREATININE 2.42* 2.57* 2.80*   EGFR 24.0* 22.3* 20.1*   GLUCOSE 103* 157* 176*   CALCIUM 7.4* 6.7*  6.9*   MAGNESIUM 1.8 1.4* 1.5*   PHOSPHORUS  --   --  4.5   HEMOGLOBIN A1C 6.30*  --   --    TSH 0.912  --  2.230         Lab 06/01/25  0500 05/31/25  0220 05/30/25 2029   TOTAL PROTEIN 5.7* 6.0 6.5   ALBUMIN 3.2* 3.4* 3.6   GLOBULIN 2.5 2.6 2.9   ALT (SGPT) <5 5 5   AST (SGOT) 11 11 12   BILIRUBIN 0.3 0.2 0.2   ALK PHOS 63 63 69         Lab 05/30/25  2144 05/30/25 2029   HSTROP T 79* 85*   PROTIME  --  14.6   INR  --  1.08         Lab 06/01/25  0500   CHOLESTEROL 125   LDL CHOL 74   HDL CHOL 42   TRIGLYCERIDES 33             Brief Urine Lab Results  (Last result in the past 365 days)        Color   Clarity   Blood   Leuk Est   Nitrite   Protein   CREAT   Urine HCG        05/31/25 0116 Yellow   Clear   Negative   Negative   Negative   100 mg/dL (2+)                 Microbiology Results (last 10 days)       Procedure Component Value - Date/Time    Blood Culture - Blood, Arm, Left [[id number]]  (Normal) Collected: 05/30/25 2055    Lab Status: Preliminary result Specimen: Blood from Arm, Left Updated: 06/02/25 2115     Blood Culture No growth at 3 days    Blood Culture - Blood, Arm, Right [[id number]]  (Normal) Collected: 05/30/25 2029    Lab Status: Preliminary result Specimen: Blood from Arm, Right Updated: 06/02/25 2100     Blood Culture No growth at 3 days            Hospital Course:   Seizure . seizure-like activity for 10 minutes witnessed by wife at home with incontinence and bleeding from the mouth.  Patient also reported has a episode of facial drooping and reported weakness.  Lactose acidosis.  No recheck.  Seizure precaution.  CT scan of the head-No hemorrhage or edema or mass effect, Mild to moderate atrophy with associated prominence of the lateral and third ventricles, Low-density in the hemispheric white matter is nonspecific and likely related to chronic small vessel disease, Fluid or inflammatory changes in the middle ear cavities bilaterally, stable on the right and increased on the left. These  findings may be   related to infection or inflammation,  Opacification of mastoid air cells bilaterally- stable on the right   and new on the left- may be related to infection or ...  CT scan of cervical spine-No evidence of cervical spine fracture, Degenerative changes of the cervical spine,  Carotid artery calcification in the neck bilaterally, Left thyroid nodularity extending into the upper mediastinum appears relatively stable compared to CT chest on 6/26/2019.  MRI of the head-Diffuse atrophy with moderate to severe small vessel ischemic disease- Evidence of remote infarcts involving the right frontal centrum semiovale with focal encephalomalacia and gliosis, a site of diffusion restriction within the more posterior aspect of the right frontal centrum semiovale with equivocal signal  alteration on ADC mapping-could represent an area of subacute ischemic infarction- No definite mass effect or surrounding vasogenic edema, Several foci of blooming noted within both hemispheres as well as the  left thalamus and right cerebellum suggesting previous sites of hemorrhag-could represent changes of amyloid angiopathy, Bilateral mastoid effusions-visualized paranasal sinuses and mastoid air cells are otherwise normally aerated.  EEG-Diffuse cerebral dysfunction mild degree, nonspecific-most commonly seen due to toxic/metabolic or hypoxemic cause but may also be seen with neurodegenerative illnesses such as dementia as well, No evidence for epilepsy.  Neurology recommendation-no driving for 3 months, supervised bathing or swimming.  Follow-up with neurology in 6 to 8 weeks.     Aspiration pneumonia.  Unasyn and doxycycline started in ER.  Unasyn/doxycycline antibiotics-transition to Augmentin.  Atrovent . Pulmicort.  Incentive spirometer.  Chest x-ray-Patchy right upper lobe infiltrate.   Patient is on room air     Acute on chronic renal failure stage IV.  Creatinine 2.8.  Creatinine 3/31/2025 3.35.  Bicarb .    "  Hypomagnesia.  2 g magnesium.  Magnesium in AM.     Anemia.  No sign of acute bleed.     Hypertension.  Nitro as needed.  Start back on Norvasc low-dose.     Diabetes.  Low sliding scale.     Depression/anxiety.  Seroquel nightly and daily.  Hold Geodon.     Prostate cancer.     History of dementia.     Advanced age . 89 years old.     SCD .     Nutrition.  Speech evaluated.  B12 . n.p.o. for now.     Deconditioning.  Fall precaution.  PT and OT consult     Blood cultures-no growth in 3 days     DNR . DNI.  Patient lives at home with spouse.  Spouse seems like she has dementia per son.  Vital signs stable, plan to discharge with home health today.  Follow-up with neurology in 4 to 6 weeks.  Follow with PCP in 1 week.    Physical Exam on Discharge:  /75 (BP Location: Right arm, Patient Position: Lying)   Pulse 86   Temp 98.6 °F (37 °C) (Oral)   Resp 16   Ht 167.6 cm (66\")   Wt 55 kg (121 lb 3.2 oz)   SpO2 97%   BMI 19.56 kg/m²   Physical Exam  Vitals and nursing note reviewed.   Constitutional:       General: He is not in acute distress.     Appearance: He is not toxic-appearing.      Comments: Advanced age . Dementia..  Cachectic.  Chronically ill.   HENT:      Head: Normocephalic.      Mouth/Throat:      Mouth: Mucous membranes are moist.      Pharynx: Oropharynx is clear.   Eyes:      Extraocular Movements: Extraocular movements intact.      Conjunctiva/sclera: Conjunctivae normal.      Pupils: Pupils are equal, round, and reactive to light.   Cardiovascular:      Rate and Rhythm: Normal rate and regular rhythm.      Pulses: Normal pulses.      Heart sounds: No murmur heard.  Pulmonary:      Effort: Pulmonary effort is normal. No respiratory distress.      Breath sounds: Normal breath sounds. No wheezing or rhonchi.      Comments: Patient is on room air  Abdominal:      General: Bowel sounds are normal. There is no distension.      Palpations: Abdomen is soft.      Tenderness: There is no abdominal " tenderness.   Musculoskeletal:         General: No swelling or tenderness.      Cervical back: Normal range of motion and neck supple. No muscular tenderness.   Skin:     General: Skin is warm and dry.      Capillary Refill: Capillary refill takes 2 to 3 seconds.      Findings: No erythema or rash.   Neurological:      Mental Status: He is alert.      Cranial Nerves: No cranial nerve deficit.      Motor: Weakness present.      Coordination: Coordination abnormal.      Gait: Gait abnormal.   Psychiatric:         Mood and Affect: Mood normal.         Behavior: Behavior normal.           Condition on Discharge: Stable.    Discharge Disposition: Discharge home with home health.  Home-Health Care [facility]    Discharge Medications:     Discharge Medications        New Medications        Instructions Start Date   amoxicillin-clavulanate 500-125 MG per tablet  Commonly known as: AUGMENTIN   500 mg, Oral, 2 Times Daily             Continue These Medications        Instructions Start Date   amLODIPine 10 MG tablet  Commonly known as: NORVASC   10 mg, Daily      cholecalciferol 25 MCG (1000 UT) tablet  Commonly known as: VITAMIN D3   2,000 Units, Oral, Daily      Insulin Lispro 100 UNIT/ML injection  Commonly known as: humaLOG   2-7 Units, Subcutaneous, 4 Times Daily Before Meals & Nightly      QUEtiapine 50 MG tablet  Commonly known as: SEROquel   50 mg, Oral, Nightly      QUEtiapine 25 MG tablet  Commonly known as: SEROquel   25 mg, Oral, Every Morning      sodium bicarbonate 650 MG tablet   650 mg, Oral, 2 Times Daily      vitamin B-12 1000 MCG tablet  Commonly known as: CYANOCOBALAMIN   1,000 mcg, Oral, Daily             Stop These Medications      ziprasidone 20 MG capsule  Commonly known as: Geodon                Discharge Diet:   Diet Instructions       Advance Diet As Tolerated -Target Diet: Diabetes      Target Diet: Diabetes            Activity at Discharge:   Activity Instructions       Activity as Tolerated      Driving  Restrictions      Type of Restriction: Driving    Driving Restrictions: No Driving    Supervised bath and around water.            Follow-up Appointments:   Follow-up with PCP in 1 week.  Follow with neurology in 4 to 6 weeks.    Electronically signed by Luis Enrique Hutchinson MD, 06/03/25, 13:33 CDT.    Time: Greater than 30 minutes.         Electronically signed by Luis Enrique Hutchinson MD at 06/03/25 1333       Ambulatory Referral to Home Health [[id number]] (Order [id number])  Order  Date: 6/3/2025 Department: [address] Ordering/Authorizing: Luis Enrique Hutchinson MD     Order History  Outpatient  Date/Time Action Taken User Additional Information   06/03/25 1330 Sign Luis Enrique Hutchinson MD      Order Details    Frequency Duration Priority Order Class   None None Routine Outgoing Referral     Start Date/Time    Start Date   06/03/25     Order Information    Order Date Service Start Date Start Time   06/03/25 Medicine 06/03/25      Reference Links    Associated Reports   View Encounter     Order Questions    Question Answer   Face to Face Visit Date: 6/3/2025   Follow-up provider for Plan of Care? I treated the patient in an acute care facility and will not continue treatment after discharge.   Follow-up provider: NADIRA BERG   Reason/Clinical Findings Weakness/dementia   Describe mobility limitations that make leaving home difficult: Weakness/dementia   Nursing/Therapeutic Services Requested Skilled Nursing    Physical Therapy    Occupational Therapy   Skilled nursing orders: Cardiopulmonary assessments    Neurovascular assessments   PT orders: Total joint pathway    Therapeutic exercise    Gait Training    Transfer training    Strengthening    Home safety assessment   Weight Bearing Status As Tolerated   Occupational orders: Activities of daily living    Energy conservation    Strengthening    Cognition    Fine motor    Home safety assessment   Frequency: 1 Week 1            Source Order Set / Preference  List    Order Set   [facility] GEN EXPRESS DISCHARGE [[id number]]           Clinical Indications     ICD-10-CM ICD-9-CM   Seizure  - Primary    R56.9 780.39   Dysphagia, unspecified type    R13.10 787.20   Impaired mobility [Z74.09]    Z74.09 799.89   Aspiration pneumonia, unspecified aspiration pneumonia type, unspecified laterality, unspecified part of lung    J69.0 507.0   Advanced age    R54 797   Vitamin B12 deficiency    E53.8 266.2   CKD (chronic kidney disease) stage 4, GFR 15-29 ml/min    N18.4 585.4   Dementia with behavioral disturbance    F03.918 294.21   Altered mental status, unspecified altered mental status type    R41.82 780.97   Acute renal failure, unspecified acute renal failure type    N17.9 584.9   Type 2 diabetes mellitus with other specified complication, unspecified whether long term insulin use    E11.69 250.80   Bilateral inguinal hernia without obstruction or gangrene, recurrence not specified    K40.20 550.92   Spondylolisthesis, grade 1    M43.10 738.4   Pharyngitis, unspecified etiology    J02.9 462   Low back pain, unspecified back pain laterality, unspecified chronicity, unspecified whether sciatica present    M54.50 724.2   Cough, unspecified type    R05.9 786.2   Cat bite, subsequent encounter    W55.01XD V58.89  879.8   Iron deficiency anemia, unspecified iron deficiency anemia type    D50.9 280.9   Low vitamin B12 level    R79.89 790.6   Encounter for hydration prior to CT scan    Z29.89 V07.8   Stage 3 chronic kidney disease, unspecified whether stage 3a or 3b CKD    N18.30 585.3   Dizzinesses    R42 780.4   Cancer of right colon    C18.2 153.6   Iron deficiency anemia due to chronic blood loss    D50.0 280.0   Anemia associated with nutritional deficiency    D53.9 281.9                             Reprint Order Requisition    Ambulatory Referral to Home Health (Order #[id number]) on 6/3/25         Encounter    View Encounter                Order Provider Info        Phone Pager  E-mail   Ordering User  Luis Enrique Hutchinson MD  [phone] (Office Phone) -- --   Authorizing Provider  Luis Enrique Hutchinson MD  [phone] (Office Phone) -- --   Attending Provider  Luis Enrique Hutchinson MD  [phone] (Office Phone) -- --     Tracking Reports    Cosign Tracking Order Transmittal Tracking     Authorized by:  Luis Enrique Hutchinson MD  (NPI: [id number])                Lab Component SmartPhrase Guide    Ambulatory Referral to Home Health (Order #[id number]) on 6/3/25

## 2025-06-03 NOTE — CASE MANAGEMENT/SOCIAL WORK
Continued Stay Note  [facility]     Patient Name: Maikel Mathews  MRN: [id number]  Today's Date: 6/3/2025    Admit Date: 5/30/2025    Plan: Home with    Discharge Plan       Row Name 06/03/25 1023       Plan    Plan Home with     Plan Comments Patient's daughter has returned call and advised family plans for patient to return home at this time.  Provider informed of family's decision.  Patient is current with [facility] and they will need to be notified of patient's dc as well as resumption orders.    1430: DC summary and resumption of care orders faxed to [facility] [place] .  PRADEEP notified Liudmila Avery of dc for today.    Final Discharge Disposition Code 06 - home with home health care      Row Name 06/03/25 0954       Plan    Plan SNF    Plan Comments [facility] has declined admit for patient.  SW has informed patient's daughter and she wants to speak with other family members to discuss how to proceed.  Have requested patient's daughter contact PRADEEP when a decision is made as to how to proceed.      Row Name 06/03/25 0903       Plan    Plan SNF referrals pending    Plan Comments PRADEEP spoke with patient's daughter, Arabella, regarding SNF placement (private pay).  Daughter is requesting referrals to [facility] and [facility], referrals made, awaiting response.                   Discharge Codes    No documentation.                 Expected Discharge Date and Time       Expected Discharge Date Expected Discharge Time    Jun 4, 2025               MARY GRACE Dexter

## 2025-06-03 NOTE — DISCHARGE SUMMARY
[facility] Medicine Services  DISCHARGE SUMMARY       Date of Admission: 5/30/2025  Date of Discharge:  6/3/2025  Primary Care Physician: David Barajas MD    Presenting Problem/History of Present Illness:      Final Discharge Diagnoses:  Active Hospital Problems    Diagnosis     **Seizure     Aspiration pneumonia     Dementia with behavioral disturbance     CKD (chronic kidney disease) stage 4, GFR 15-29 ml/min     Advanced age     Acute renal failure     Type 2 diabetes mellitus with other specified complication     Spondylolisthesis, grade 1     Anemia associated with nutritional deficiency        Consults: Neurology    Pertinent Test Results:   No results found for this or any previous visit.      Imaging Results (All)       Procedure Component Value Units Date/Time    MRI Brain Without Contrast [[id number]] Collected: 06/01/25 1703     Updated: 06/01/25 1719    Narrative:      EXAMINATION: MRI BRAIN WO CONTRAST-  6/1/2025 5:03 PM     HISTORY: Seizures.     FINDINGS: Multiplanar fast spin echo sequences were obtained of the  brain on a high-field magnet without gadolinium enhancement.     There is diffuse atrophy of the brain with prominence of the  subarachnoid spaces and ventricular enlargement. Moderate to severe  small vessel ischemic changes are noted involving the periventricular  and higher white matter tracts.     There is a focus of diffusion restriction within the right more  posterior frontal centrum semiovale. There is equivocal signal  alteration at this site on ADC mapping. No associated blooming on  gradient sequencing. This could represent an area of subacute infarction  within the right frontal centrum semiovale. There is no associated mass  effect or surrounding vasogenic edema. It may be worthwhile to follow-up  with postcontrast imaging to assure there is no associated abnormal  enhancement.     SWI and MINIP sequencing demonstrates multiple areas  of blooming  including within the right cerebellar hemisphere, both temporal lobes,  the left thalamus and the left frontal cortex suggesting sites of  previous hemorrhage. This could represent changes of amyloid angiopathy.  No abnormal blooming associated with the focal of diffusion restriction.  There are foci of previous ischemic infarction involving the right  frontal centrum semiovale with encephalomalacia and surrounding gliosis.     There is no mass effect or shift of the midline.     The orbits are unremarkable. There are bilateral mastoid effusions. No  fluid in the middle ear cavity. The visualized paranasal sinuses are  normally aerated.       Impression:      1. Diffuse atrophy with moderate to severe small vessel ischemic  disease. Evidence of remote infarcts involving the right frontal centrum  semiovale with focal encephalomalacia and gliosis.  2. There is a site of diffusion restriction within the more posterior  aspect of the right frontal centrum semiovale with equivocal signal  alteration on ADC mapping. This could represent an area of subacute  ischemic infarction. No definite mass effect or surrounding vasogenic  edema. Follow-up with postcontrast imaging could be obtained to more  entirely exclude an enhancing lesion.  3. Several foci of blooming noted within both hemispheres as well as the  left thalamus and right cerebellum suggesting previous sites of  hemorrhage. This could represent changes of amyloid angiopathy.  4. Bilateral mastoid effusions. The visualized paranasal sinuses and  mastoid air cells are otherwise normally aerated.     This report was signed and finalized on 6/1/2025 5:16 PM by Dr. Felix Ying MD.       CT Cervical Spine Without Contrast [[id number]] Collected: 05/31/25 1025     Updated: 05/31/25 1034    Narrative:      EXAMINATION:  CT CERVICAL SPINE WO CONTRAST-  5/30/2025 7:59 PM     HISTORY: Unwitnessed seizure.     COMPARISON: Chest CT 6/26/2019.     DOSE LENGTH  PRODUCT: 1644.48 mGy.cm Automated exposure control was also  utilized to decrease patient radiation dose.     TECHNIQUE: Serial helical tomographic images of the cervical spine were  obtained without the use of intravenous contrast. Sagittal and coronal  reformatted images were also provided.     FINDINGS:     ALIGNMENT AND ADDITIONAL FINDINGS: There is carotid artery calcification  in the neck bilaterally. Left thyroid nodularity extending into the  upper mediastinum is stable compared to chest CT on 6/26/2019. There is  straightening of the cervical spine on the sagittal images, likely  positional.     BONES: There is no evidence of fracture. Vertebral body heights are  maintained.     DISC SPACES:     C2-3: Moderate to severe disc narrowing. Partial fusion of the disc. The  posterior elements are fused. There is uncinate spurring. There is  hypertrophic change of the facet joints right greater than left. There  is moderate to severe right-sided foraminal narrowing.     C3-4: Severe disc narrowing with spondylitic and uncinate spurring  producing dural sac compression. Mild narrowing of the central  canal-dural sac. The facet joints demonstrate mild degenerative change.  There is severe bilateral foraminal narrowing left greater than right.  Anterior syndesmophyte formation.     C4-5: Severe disc narrowing with spondylitic and uncinate spurring  producing dural sac compression. Mild to moderate narrowing of the  central canal-dural sac. Facet joints are maintained. Severe foraminal  narrowing bilaterally. Anterior syndesmophyte formation.     C5-6: Moderate disc narrowing. Spondylitic and uncinate spurring  producing dural sac compression. Mild to moderate narrowing of the  central canal-dural sac. Severe foraminal narrowing bilaterally. The  facet joints are maintained. Mild anterior syndesmophyte formation.     C6-7: Severe disc narrowing with spondylitic and uncinate spurring  producing dural sac compression.  There is moderate narrowing of the  central canal-dural sac. There is severe foraminal narrowing  bilaterally. The facet joints are maintained.     C7-T1: Severe disc narrowing. Mild spondylitic and uncinate spurring. No  central spinal canal narrowing. Moderate to severe facet arthropathy on  the right. There is moderate to severe right and mild to moderate  left-sided foraminal narrowing.          Impression:      1. No evidence of cervical spine fracture.  2. Degenerative changes of the cervical spine, as described.  3. Carotid artery calcification in the neck bilaterally.  4. Left thyroid nodularity extending into the upper mediastinum appears  relatively stable compared to CT chest on 6/26/2019.           This report was signed and finalized on 5/31/2025 10:30 AM by Dr. Ellis Oh MD.       CT Head Without Contrast [[id number]] Collected: 05/31/25 1020     Updated: 05/31/25 1028    Narrative:      EXAMINATION:  CT HEAD WO CONTRAST-  5/30/2025 7:59 PM     HISTORY: Possible new onset seizure.     TECHNIQUE: Multiple axial images were obtained through the brain without  contrast infusion. Multiplanar images were reconstructed.     DLP: 1644.48 mGy.cm Automated dosage reduction technique was utilized to  reduce patient dosage.     COMPARISON: 5/4/2025.     FINDINGS: There are no hemorrhage, edema or mass effect. There is mild  to moderate atrophy with associated prominence of the lateral and third  ventricles. There is low-density in the hemispheric white matter. There  has been prior bilateral lens replacement. The paranasal sinuses and  mastoid air cells are clear. There is fluid or inflammatory changes in  the middle ear cavities bilaterally. This is increased on the left side  compared to the prior study. There is opacification of mastoid air cells  bilaterally. This is new on the left side compared to the prior study.  No calvarial fracture is seen.          Impression:      1. No hemorrhage, edema or mass  effect.  2. Mild to moderate atrophy with associated prominence of the lateral  and third ventricles.  3. Low-density in the hemispheric white matter is nonspecific and likely  related to chronic small vessel disease.  4. Fluid or inflammatory changes in the middle ear cavities bilaterally,  stable on the right and increased on the left. These findings may be  related to infection or inflammation.  5. Opacification of mastoid air cells bilaterally, stable on the right  and new on the left. These findings may be related to infection or  inflammation.        This report was signed and finalized on 5/31/2025 10:24 AM by Dr. Ellis Oh MD.       XR Chest 1 View [[id number]] Collected: 05/30/25 2044     Updated: 05/30/25 2048    Narrative:      EXAMINATION: XR CHEST 1 VW-  5/30/2025 8:44 PM     HISTORY: Evaluate for pneumonia.     FINDINGS: Upright frontal projection of the chest is compared to prior  exam of 5/4/2025. There is patchy airspace disease abutting the major  fissure suggesting a right upper lobe infiltrate. The lungs are  otherwise clear. No effusion or free air present. The thoracic aorta and  great vessels are ectatic.       Impression:      1.. Patchy right upper lobe infiltrate.     This report was signed and finalized on 5/30/2025 8:45 PM by Dr. Felix Ying MD.             LAB RESULTS:      Lab 06/01/25  0500 05/31/25 0220 05/31/25 0014 05/30/25 2029   WBC 5.83 6.99  --  8.48   HEMOGLOBIN 8.5* 8.4*  --  9.5*   HEMATOCRIT 27.2* 26.2*  --  29.3*   PLATELETS 244 250  --  284   NEUTROS ABS 4.04  --   --  4.51   IMMATURE GRANS (ABS) 0.02  --   --  0.02   LYMPHS ABS 1.21  --   --  3.11*   MONOS ABS 0.38  --   --  0.63   EOS ABS 0.14  --   --  0.16   MCV 99.3* 97.8*  --  96.1   LACTATE  --   --  1.4 5.1*   PROTIME  --   --   --  14.6   APTT  --   --   --  26.6         Lab 06/01/25  0500 05/31/25  0220 05/30/25 2029   SODIUM 145 143 143   POTASSIUM 4.1 3.6 3.4*   CHLORIDE 111* 109* 106   CO2 21.0*  19.0* 17.0*   ANION GAP 13.0 15.0 20.0*   BUN 38.2* 43.6* 46.3*   CREATININE 2.42* 2.57* 2.80*   EGFR 24.0* 22.3* 20.1*   GLUCOSE 103* 157* 176*   CALCIUM 7.4* 6.7* 6.9*   MAGNESIUM 1.8 1.4* 1.5*   PHOSPHORUS  --   --  4.5   HEMOGLOBIN A1C 6.30*  --   --    TSH 0.912  --  2.230         Lab 06/01/25  0500 05/31/25  0220 05/30/25 2029   TOTAL PROTEIN 5.7* 6.0 6.5   ALBUMIN 3.2* 3.4* 3.6   GLOBULIN 2.5 2.6 2.9   ALT (SGPT) <5 5 5   AST (SGOT) 11 11 12   BILIRUBIN 0.3 0.2 0.2   ALK PHOS 63 63 69         Lab 05/30/25  2144 05/30/25 2029   HSTROP T 79* 85*   PROTIME  --  14.6   INR  --  1.08         Lab 06/01/25  0500   CHOLESTEROL 125   LDL CHOL 74   HDL CHOL 42   TRIGLYCERIDES 33             Brief Urine Lab Results  (Last result in the past 365 days)        Color   Clarity   Blood   Leuk Est   Nitrite   Protein   CREAT   Urine HCG        05/31/25 0116 Yellow   Clear   Negative   Negative   Negative   100 mg/dL (2+)                 Microbiology Results (last 10 days)       Procedure Component Value - Date/Time    Blood Culture - Blood, Arm, Left [[id number]]  (Normal) Collected: 05/30/25 2055    Lab Status: Preliminary result Specimen: Blood from Arm, Left Updated: 06/02/25 2115     Blood Culture No growth at 3 days    Blood Culture - Blood, Arm, Right [[id number]]  (Normal) Collected: 05/30/25 2029    Lab Status: Preliminary result Specimen: Blood from Arm, Right Updated: 06/02/25 2100     Blood Culture No growth at 3 days            Hospital Course:   Seizure . seizure-like activity for 10 minutes witnessed by wife at home with incontinence and bleeding from the mouth.  Patient also reported has a episode of facial drooping and reported weakness.  Lactose acidosis.  No recheck.  Seizure precaution.  CT scan of the head-No hemorrhage or edema or mass effect, Mild to moderate atrophy with associated prominence of the lateral and third ventricles, Low-density in the hemispheric white matter is nonspecific and likely  related to chronic small vessel disease, Fluid or inflammatory changes in the middle ear cavities bilaterally, stable on the right and increased on the left. These findings may be   related to infection or inflammation,  Opacification of mastoid air cells bilaterally- stable on the right   and new on the left- may be related to infection or ...  CT scan of cervical spine-No evidence of cervical spine fracture, Degenerative changes of the cervical spine,  Carotid artery calcification in the neck bilaterally, Left thyroid nodularity extending into the upper mediastinum appears relatively stable compared to CT chest on 6/26/2019.  MRI of the head-Diffuse atrophy with moderate to severe small vessel ischemic disease- Evidence of remote infarcts involving the right frontal centrum semiovale with focal encephalomalacia and gliosis, a site of diffusion restriction within the more posterior aspect of the right frontal centrum semiovale with equivocal signal  alteration on ADC mapping-could represent an area of subacute ischemic infarction- No definite mass effect or surrounding vasogenic edema, Several foci of blooming noted within both hemispheres as well as the  left thalamus and right cerebellum suggesting previous sites of hemorrhag-could represent changes of amyloid angiopathy, Bilateral mastoid effusions-visualized paranasal sinuses and mastoid air cells are otherwise normally aerated.  EEG-Diffuse cerebral dysfunction mild degree, nonspecific-most commonly seen due to toxic/metabolic or hypoxemic cause but may also be seen with neurodegenerative illnesses such as dementia as well, No evidence for epilepsy.  Neurology recommendation-no driving for 3 months, supervised bathing or swimming.  Follow-up with neurology in 6 to 8 weeks.     Aspiration pneumonia.  Unasyn and doxycycline started in ER.  Unasyn/doxycycline antibiotics-transition to Augmentin.  Atrovent . Pulmicort.  Incentive spirometer.  Chest x-ray-Patchy  "right upper lobe infiltrate.   Patient is on room air     Acute on chronic renal failure stage IV.  Creatinine 2.8.  Creatinine 3/31/2025 3.35.  Bicarb .     Hypomagnesia.  2 g magnesium.  Magnesium in AM.     Anemia.  No sign of acute bleed.     Hypertension.  Nitro as needed.  Start back on Norvasc low-dose.     Diabetes.  Low sliding scale.     Depression/anxiety.  Seroquel nightly and daily.  Hold Geodon.     Prostate cancer.     History of dementia.     Advanced age . 89 years old.     SCD .     Nutrition.  Speech evaluated.  B12 . n.p.o. for now.     Deconditioning.  Fall precaution.  PT and OT consult     Blood cultures-no growth in 3 days     DNR . DNI.  Patient lives at home with spouse.  Spouse seems like she has dementia per son.  Vital signs stable, plan to discharge with home health today.  Follow-up with neurology in 4 to 6 weeks.  Follow with PCP in 1 week.    Physical Exam on Discharge:  /75 (BP Location: Right arm, Patient Position: Lying)   Pulse 86   Temp 98.6 °F (37 °C) (Oral)   Resp 16   Ht 167.6 cm (66\")   Wt 55 kg (121 lb 3.2 oz)   SpO2 97%   BMI 19.56 kg/m²   Physical Exam  Vitals and nursing note reviewed.   Constitutional:       General: He is not in acute distress.     Appearance: He is not toxic-appearing.      Comments: Advanced age . Dementia..  Cachectic.  Chronically ill.   HENT:      Head: Normocephalic.      Mouth/Throat:      Mouth: Mucous membranes are moist.      Pharynx: Oropharynx is clear.   Eyes:      Extraocular Movements: Extraocular movements intact.      Conjunctiva/sclera: Conjunctivae normal.      Pupils: Pupils are equal, round, and reactive to light.   Cardiovascular:      Rate and Rhythm: Normal rate and regular rhythm.      Pulses: Normal pulses.      Heart sounds: No murmur heard.  Pulmonary:      Effort: Pulmonary effort is normal. No respiratory distress.      Breath sounds: Normal breath sounds. No wheezing or rhonchi.      Comments: Patient is on " room air  Abdominal:      General: Bowel sounds are normal. There is no distension.      Palpations: Abdomen is soft.      Tenderness: There is no abdominal tenderness.   Musculoskeletal:         General: No swelling or tenderness.      Cervical back: Normal range of motion and neck supple. No muscular tenderness.   Skin:     General: Skin is warm and dry.      Capillary Refill: Capillary refill takes 2 to 3 seconds.      Findings: No erythema or rash.   Neurological:      Mental Status: He is alert.      Cranial Nerves: No cranial nerve deficit.      Motor: Weakness present.      Coordination: Coordination abnormal.      Gait: Gait abnormal.   Psychiatric:         Mood and Affect: Mood normal.         Behavior: Behavior normal.           Condition on Discharge: Stable.    Discharge Disposition: Discharge home with home health.  Home-Health Care [facility]    Discharge Medications:     Discharge Medications        New Medications        Instructions Start Date   amoxicillin-clavulanate 500-125 MG per tablet  Commonly known as: AUGMENTIN   500 mg, Oral, 2 Times Daily             Continue These Medications        Instructions Start Date   amLODIPine 10 MG tablet  Commonly known as: NORVASC   10 mg, Daily      cholecalciferol 25 MCG (1000 UT) tablet  Commonly known as: VITAMIN D3   2,000 Units, Oral, Daily      Insulin Lispro 100 UNIT/ML injection  Commonly known as: humaLOG   2-7 Units, Subcutaneous, 4 Times Daily Before Meals & Nightly      QUEtiapine 50 MG tablet  Commonly known as: SEROquel   50 mg, Oral, Nightly      QUEtiapine 25 MG tablet  Commonly known as: SEROquel   25 mg, Oral, Every Morning      sodium bicarbonate 650 MG tablet   650 mg, Oral, 2 Times Daily      vitamin B-12 1000 MCG tablet  Commonly known as: CYANOCOBALAMIN   1,000 mcg, Oral, Daily             Stop These Medications      ziprasidone 20 MG capsule  Commonly known as: Geodon                Discharge Diet:   Diet Instructions       Advance Diet As  Tolerated -Target Diet: Diabetes      Target Diet: Diabetes            Activity at Discharge:   Activity Instructions       Activity as Tolerated      Driving Restrictions      Type of Restriction: Driving    Driving Restrictions: No Driving    Supervised bath and around water.            Follow-up Appointments:   Follow-up with PCP in 1 week.  Follow with neurology in 4 to 6 weeks.    Electronically signed by Luis Enrique Hutchinson MD, 06/03/25, 13:33 CDT.    Time: Greater than 30 minutes.

## 2025-06-03 NOTE — CASE MANAGEMENT/SOCIAL WORK
Continued Stay Note  [facility]     Patient Name: Maikel Mathews  MRN: [id number]  Today's Date: 6/3/2025    Admit Date: 5/30/2025    Plan: SNF referrals pending   Discharge Plan       Row Name 06/03/25 0903       Plan    Plan SNF referrals pending    Plan Comments SW spoke with patient's daughter, Arabella, regarding SNF placement (private pay).  Daughter is requesting referrals to [facility], referrals made, awaiting response.                   Discharge Codes    No documentation.                 Expected Discharge Date and Time       Expected Discharge Date Expected Discharge Time    Jun 4, 2025               MARY GRACE Dexter

## 2025-06-03 NOTE — PLAN OF CARE
Goal Outcome Evaluation:  Plan of Care Reviewed With: patient        Progress: no change  Outcome Evaluation: . A&Ox2. Disoriented to time and situation. VSS on RA. BG monitored, no coverage needed this shift. Up SBA to bathroom. Pt very impulsive and jumps out of bed frequently. Pt has been easily redirected this shift with no episodes of aggressiveness/ combativeness. Pt has been pleasant throughout the night. Bed alarm set, call light within reach, safety maintained.

## 2025-06-03 NOTE — CASE MANAGEMENT/SOCIAL WORK
Continued Stay Note  [facility]     Patient Name: Maikel Mathews  MRN: [id number]  Today's Date: 6/3/2025    Admit Date: 5/30/2025    Plan: SNF   Discharge Plan       Row Name 06/03/25 0954       Plan    Plan SNF    Plan Comments [facility].  SW has informed patient's daughter and she wants to speak with other family members to discuss how to proceed.  Have requested patient's daughter contact SW when a decision is made as to how to proceed.      Row Name 06/03/25 0903       Plan    Plan SNF referrals pending    Plan Comments SW spoke with patient's daughter, Arabella, regarding SNF placement (private pay).  Daughter is requesting referrals to [facility] and [facility], referrals made, awaiting response.                   Discharge Codes    No documentation.                 Expected Discharge Date and Time       Expected Discharge Date Expected Discharge Time    Jun 4, 2025               MARY GRACE Dexter

## 2025-06-04 LAB
BACTERIA SPEC AEROBE CULT: NORMAL
BACTERIA SPEC AEROBE CULT: NORMAL

## 2025-06-04 NOTE — OUTREACH NOTE
Prep Survey      Flowsheet Row Responses   [facility] facility patient discharged from? [facility]   Is LACE score < 7 ? No   Eligibility Readm Mgmt   Discharge diagnosis seizure   Does the patient have one of the following disease processes/diagnoses(primary or secondary)? Other   Does the patient have Home health ordered? Yes   What is the Home health agency?  [facility]   Is there a DME ordered? No   Prep survey completed? Yes            LEONARD GOLDSTEIN - Registered Nurse

## 2025-06-05 ENCOUNTER — TRANSCRIBE ORDERS (OUTPATIENT)
Dept: ADMINISTRATIVE | Facility: HOSPITAL | Age: OVER 89
End: 2025-06-05
Payer: MEDICARE

## 2025-06-05 DIAGNOSIS — J69.0 ASPIRATION PNEUMONIA, UNSPECIFIED ASPIRATION PNEUMONIA TYPE, UNSPECIFIED LATERALITY, UNSPECIFIED PART OF LUNG: Primary | ICD-10-CM

## 2025-06-11 ENCOUNTER — OFFICE VISIT (OUTPATIENT)
Dept: PALLATIVE CARE | Age: 89
End: 2025-06-11
Payer: MEDICARE

## 2025-06-11 ENCOUNTER — NURSE TRIAGE (OUTPATIENT)
Dept: CALL CENTER | Facility: HOSPITAL | Age: OVER 89
End: 2025-06-11
Payer: MEDICARE

## 2025-06-11 DIAGNOSIS — Z71.89 GOALS OF CARE, COUNSELING/DISCUSSION: ICD-10-CM

## 2025-06-11 DIAGNOSIS — E44.0 MODERATE PROTEIN-CALORIE MALNUTRITION: ICD-10-CM

## 2025-06-11 DIAGNOSIS — F03.918 DEMENTIA WITH BEHAVIORAL DISTURBANCE (HCC): Primary | ICD-10-CM

## 2025-06-11 DIAGNOSIS — Z51.5 ENCOUNTER FOR PALLIATIVE CARE: ICD-10-CM

## 2025-06-11 PROCEDURE — 1123F ACP DISCUSS/DSCN MKR DOCD: CPT

## 2025-06-11 PROCEDURE — 99345 HOME/RES VST NEW HIGH MDM 75: CPT

## 2025-06-11 NOTE — TELEPHONE ENCOUNTER
[facility] Jacqueline   calling with update on home visit today:    - Discussed placement in SNF, Hospice and private care in home. Family declined all.  - Daughter declined any further f/u visits with  Jacqueline  .    MSW - states does not need call back but if Dr. Barajas/staff have any questions, may call [place]y ( Jacqueline  MSW) at [phone].  Reason for Disposition   [1] Other NON-URGENT information for PCP AND [2] does not require PCP response    Additional Information   Negative: Lab calling with strep throat test results and triager can call in prescription   Negative: Lab calling with urinalysis test results and triager can call in prescription   Negative: Medication questions   Negative: Medication renewal and refill questions   Negative: Pre-operative or pre-procedural questions   Negative: ED call to PCP (i.e., primary care provider; doctor, NP, or PA)   Negative: Doctor (or NP/PA) call to PCP   Negative: Call about patient who is currently hospitalized   Negative: Lab or radiology calling with CRITICAL test results   Negative: [1] Follow-up call from patient regarding patient's clinical status AND [2] information urgent   Negative: [1] Caller requests to speak ONLY to PCP AND [2] URGENT question   Negative: [1] Caller requests to speak to PCP now AND [2] won't tell us reason for call  (Exception: If 10 pm to 6 am, caller must first discuss reason for the call.)   Negative: Notification of hospital admission   Negative: Notification of death   Negative: Caller requesting lab results  (Exception: Routine or non-urgent lab result.)   Negative: Lab or radiology calling with test results   Negative: [1] Follow-up call from patient regarding patient's clinical status AND [2] information NON-URGENT   Negative: [1] Caller requests to speak ONLY to PCP AND [2] NON-URGENT question   Negative: Caller requesting an appointment, triage offered and declined   Negative: Caller requesting  "routine or non-urgent lab result    Answer Assessment - Initial Assessment Questions  1. REASON FOR CALL or QUESTION: \"What is your reason for calling today?\" or \"How can I best  help you?\" or \"What question do you have that I can help answer?\"       [facility]ACQUELINE [facility] [place] calling to inform of last visit  discussions with family  2. CALLER: Document the source of call. (e.g., laboratory, patient).      LAURIE Burns      [facility]      [phone]    Protocols used: PCP Call - No Triage-ADULT-    "

## 2025-06-11 NOTE — PROGRESS NOTES
Supportive Care/Community Based Palliative Care  Initial Consultation Note      Patient Name:  Skip Baltazar  Medical Record Number:  [id number]  YOB: 1929    Date of Visit: 6/11/2025  Location of Visit: home    Referring Provider: Florentino Rabago MD  Patient Care Team:  Florentino Rabago MD as PCP - General (Internal Medicine)  Florentino Rabago MD as PCP - Empaneled Provider  Ave Ferrer APRN - CNP as Nurse Practitioner (Hospice and Palliative Medicine)    Reason for Consult:   Goals of care   Symptom Management    ACP  Family Support  Patient Support    History obtained from:  Patient, Spouse, electronic medical record    CHIEF CONCERN:     Chief Complaint   Patient presents with    Establish Care     CLINICAL SUMMARY AND HISTORY     Skip Baltazar is a 89 y.o. male with PMH of advanced dementia with behavioral disturbance, CVA, seizure, DMII, HTN, CKD 4, MAXX, protein-calorie malnutrition, dysphagia, diverticulosis, hemorrhoids, inguinal hernia, macular degeneration, DJD, osteoarthritis, remote hx of colon and prostate cancer. He lives at home with spouse who provides assistance with ADLs. Palliative care has been consulted for goals of care discussions, symptom management, and patient and family support.    Past Medical History:        Diagnosis Date    Chronic kidney disease     stage 4    Cobalamin deficiency     Dementia (HCC)     Dementia with behavioral disturbance (HCC)     Hypertensive renal disease     MAXX (iron deficiency anemia)     Insulin-treated type 2 diabetes mellitus (HCC)     Macular degeneration     Osteoarthritis     Personal history of colon cancer     Personal history of prostate cancer     Protein calorie malnutrition     Requires assistance with activities of daily living (ADL)     Type 2 diabetes mellitus without complication (HCC)     Vitamin D deficiency        Past Surgical History:    Past Surgical History:   Procedure Laterality Date    CATARACT EXTRACTION

## 2025-06-13 ENCOUNTER — READMISSION MANAGEMENT (OUTPATIENT)
Dept: CALL CENTER | Facility: HOSPITAL | Age: OVER 89
End: 2025-06-13
Payer: MEDICARE

## 2025-06-13 PROBLEM — K40.20 BILATERAL INGUINAL HERNIA: Status: ACTIVE | Noted: 2019-11-01

## 2025-06-13 PROBLEM — R56.9 SEIZURE (HCC): Status: ACTIVE | Noted: 2025-05-30

## 2025-06-13 PROBLEM — I10 ESSENTIAL HYPERTENSION: Status: ACTIVE | Noted: 2018-10-18

## 2025-06-13 PROBLEM — J69.0 ASPIRATION PNEUMONIA (HCC): Status: ACTIVE | Noted: 2025-06-01

## 2025-06-13 PROBLEM — E78.01 FAMILIAL HYPERCHOLESTEROLEMIA: Status: ACTIVE | Noted: 2017-09-24

## 2025-06-13 PROBLEM — M43.10 SPONDYLOLISTHESIS, GRADE 1: Status: ACTIVE | Noted: 2019-10-11

## 2025-06-13 RX ORDER — ZIPRASIDONE HYDROCHLORIDE 20 MG/1
20 CAPSULE ORAL 2 TIMES DAILY PRN
COMMUNITY
Start: 2025-05-13

## 2025-06-13 RX ORDER — QUETIAPINE FUMARATE 50 MG/1
50 TABLET, FILM COATED ORAL 2 TIMES DAILY
COMMUNITY
Start: 2025-05-13

## 2025-06-13 RX ORDER — CHOLECALCIFEROL (VITAMIN D3) 25 MCG
2000 TABLET ORAL DAILY
COMMUNITY
Start: 2025-05-14

## 2025-06-13 RX ORDER — INSULIN LISPRO 100 [IU]/ML
4 INJECTION, SOLUTION INTRAVENOUS; SUBCUTANEOUS
COMMUNITY
Start: 2025-05-13

## 2025-06-13 RX ORDER — AMLODIPINE BESYLATE 10 MG/1
10 TABLET ORAL DAILY
COMMUNITY
Start: 2025-05-16

## 2025-06-13 RX ORDER — MEGESTROL ACETATE 40 MG/ML
800 SUSPENSION ORAL DAILY
COMMUNITY
Start: 2025-04-04

## 2025-06-13 RX ORDER — DM/PE/ACETAMINOPHEN/CHLORPHENR 10-5-325-2
1 TABLET, SEQUENTIAL ORAL DAILY
COMMUNITY
Start: 2025-05-13

## 2025-06-13 RX ORDER — SODIUM BICARBONATE 650 MG/1
650 TABLET ORAL 2 TIMES DAILY
COMMUNITY
Start: 2025-05-13

## 2025-06-13 NOTE — OUTREACH NOTE
Medical Week 2 Survey      Flowsheet Row Responses   [facility] patient discharged from? [facility]   Does the patient have one of the following disease processes/diagnoses(primary or secondary)? Other   Week 2 attempt successful? Yes   Call start time 1427   Discharge diagnosis seizure   Call end time 1430   Person spoke with today (if not patient) and relationship Pat (wife)   Meds reviewed with patient/caregiver? Yes   Is the patient having any side effects they believe may be caused by any medication additions or changes? No   Does the patient have all medications ordered at discharge? Yes   Is the patient taking all medications as directed (includes completed medication regime)? Yes   Does the patient have a primary care provider?  Yes   Comments regarding PCP Patient has seen PCP   What is the Home health agency?  [facility]   Has home health visited the patient within 72 hours of discharge? Yes   Psychosocial issues? No   What is the patient's perception of their health status since discharge? Improving   Is the patient/caregiver able to teach back signs and symptoms related to disease process for when to call PCP? Yes   Is the patient/caregiver able to teach back signs and symptoms related to disease process for when to call 911? Yes   Is the patient/caregiver able to teach back the hierarchy of who to call/visit for symptoms/problems? PCP, Specialist, Home health nurse, Urgent Care, ED, 911 Yes   Week 2 Call Completed? Yes   Graduated Yes   Did the patient feel the follow up calls were helpful during their recovery period? Yes   Was the number of calls appropriate? Yes   Is the patient interested in additional calls from an ambulatory ? No   Would this patient benefit from a Referral to Amb Social Work? No   Wrap up additional comments Spouse reports patient is doing better. No questions or concerns at this time.   Call end time 1430            JEWELS VICTORIA - Registered Nurse

## 2025-08-14 ENCOUNTER — OFFICE VISIT (OUTPATIENT)
Dept: PALLATIVE CARE | Age: 89
End: 2025-08-14
Payer: MEDICARE

## 2025-08-14 DIAGNOSIS — Z91.81 AT HIGH RISK FOR FALLS: ICD-10-CM

## 2025-08-14 DIAGNOSIS — Z51.5 ENCOUNTER FOR PALLIATIVE CARE: ICD-10-CM

## 2025-08-14 DIAGNOSIS — Z71.89 GOALS OF CARE, COUNSELING/DISCUSSION: ICD-10-CM

## 2025-08-14 DIAGNOSIS — F03.918 DEMENTIA WITH BEHAVIORAL DISTURBANCE (HCC): Primary | ICD-10-CM

## 2025-08-14 PROCEDURE — 99350 HOME/RES VST EST HIGH MDM 60: CPT

## 2025-08-14 PROCEDURE — 1123F ACP DISCUSS/DSCN MKR DOCD: CPT

## 2025-08-26 ENCOUNTER — CLINICAL DOCUMENTATION (OUTPATIENT)
Dept: PALLATIVE CARE | Age: 89
End: 2025-08-26

## 2025-08-28 ENCOUNTER — OFFICE VISIT (OUTPATIENT)
Dept: PALLATIVE CARE | Age: 89
End: 2025-08-28

## 2025-08-28 DIAGNOSIS — Z51.5 ENCOUNTER FOR PALLIATIVE CARE: Primary | ICD-10-CM

## 2025-09-04 ENCOUNTER — OFFICE VISIT (OUTPATIENT)
Dept: PALLATIVE CARE | Age: 89
End: 2025-09-04
Payer: MEDICARE

## 2025-09-04 DIAGNOSIS — Z71.89 ENCOUNTER FOR SUPPORT TO CAREGIVER: ICD-10-CM

## 2025-09-04 DIAGNOSIS — F03.918 DEMENTIA WITH BEHAVIORAL DISTURBANCE (HCC): Primary | ICD-10-CM

## 2025-09-04 DIAGNOSIS — Z63.6 CAREGIVER BURDEN: ICD-10-CM

## 2025-09-04 DIAGNOSIS — Z91.81 AT HIGH RISK FOR FALLS: ICD-10-CM

## 2025-09-04 DIAGNOSIS — Z51.5 ENCOUNTER FOR PALLIATIVE CARE: ICD-10-CM

## 2025-09-04 DIAGNOSIS — Z71.89 GOALS OF CARE, COUNSELING/DISCUSSION: ICD-10-CM

## 2025-09-04 PROCEDURE — 99349 HOME/RES VST EST MOD MDM 40: CPT

## 2025-09-04 PROCEDURE — 1123F ACP DISCUSS/DSCN MKR DOCD: CPT

## 2025-09-04 SDOH — SOCIAL STABILITY - SOCIAL INSECURITY: DEPENDENT RELATIVE NEEDING CARE AT HOME: Z63.6

## (undated) DEVICE — ANTIBACTERIAL UNDYED BRAIDED (POLYGLACTIN 910), SYNTHETIC ABSORBABLE SUTURE: Brand: COATED VICRYL

## (undated) DEVICE — YANKAUER,BULB TIP WITH VENT: Brand: ARGYLE

## (undated) DEVICE — ENDOPATH XCEL WITH OPTIVIEW TECHNOLOGY DILATING TIP TROCARS WITH STABILITY SLEEVES: Brand: ENDOPATH XCEL OPTIVIEW

## (undated) DEVICE — PAD MINOR UNIVERSAL: Brand: MEDLINE INDUSTRIES, INC.

## (undated) DEVICE — FRCP BX RADJAW4 NDL 2.8 240 STD OG

## (undated) DEVICE — TBG SMPL FLTR LINE NASL 02/C02 A/ BX/100

## (undated) DEVICE — SUT PROLN 1 CT1 30IN 8425H

## (undated) DEVICE — SPNG GZ STRL 2S 4X4 12PLY

## (undated) DEVICE — PK TURNOVER RM ADV

## (undated) DEVICE — BARRIER, ABSORBABLE, ADHESION: Brand: SEPRAFILM®

## (undated) DEVICE — TRY PREP SCRB VAG PVP

## (undated) DEVICE — MARKR SKIN W/RULR AND LBL

## (undated) DEVICE — IRRIGATOR BULB ASEPTO 60CC STRL

## (undated) DEVICE — SUT PROLN 2/0 CT2 30IN 8411H

## (undated) DEVICE — CONMED SCOPE SAVER BITE BLOCK, 20X27 MM: Brand: SCOPE SAVER

## (undated) DEVICE — GLV SURG BIOGEL M LTX PF 7 1/2

## (undated) DEVICE — ENDOPATH XCEL WITH OPTIVIEW TECHNOLOGY UNIVERSAL TROCAR STABILITY SLEEVES: Brand: ENDOPATH XCEL OPTIVIEW

## (undated) DEVICE — SUT SILK 4/0 SUTUPAK TIES 24IN SA73H

## (undated) DEVICE — Device: Brand: DEFENDO AIR/WATER/SUCTION AND BIOPSY VALVE

## (undated) DEVICE — CUFF,BP,DISP,1 TUBE,ADULT,HP: Brand: MEDLINE

## (undated) DEVICE — MSK O2 MD CONCENTR A/ LF 7FT 1P/U

## (undated) DEVICE — SUT SILK 2/0 SUTUPAK TIES 24IN SA75H

## (undated) DEVICE — TUBING, SUCTION, 1/4" X 12', STRAIGHT: Brand: MEDLINE

## (undated) DEVICE — SUT SILK 2/0 SH CR8 18IN CR8 C012D

## (undated) DEVICE — APPL CHLORAPREP W/TINT 26ML ORNG

## (undated) DEVICE — SUT SILK 2/0 SH 30IN K833H

## (undated) DEVICE — PAD LAP CHOLE: Brand: MEDLINE INDUSTRIES, INC.

## (undated) DEVICE — ADHS LIQ MASTISOL 2/3ML

## (undated) DEVICE — DRSNG SURESITE WNDW 4X4.5

## (undated) DEVICE — SYR LUERLOK 20CC BX/50

## (undated) DEVICE — TOTAL TRAY, 16FR 10ML SIL FOLEY, URN: Brand: MEDLINE

## (undated) DEVICE — WIPE THERAWASH SLV SPEC CARE 2PK

## (undated) DEVICE — KT EXP CATH ONQ SILVERSOAKER 5IN

## (undated) DEVICE — ENDOGATOR AUXILIARY WATER JET CONNECTOR: Brand: ENDOGATOR

## (undated) DEVICE — SUT SILK 3/0 SUTUPAK TIES 24IN SA74H

## (undated) DEVICE — ENSEAL 20 CM SHAFT, LARGE JAW: Brand: ENSEAL X1

## (undated) DEVICE — SENSR O2 OXIMAX FNGR A/ 18IN NONSTR

## (undated) DEVICE — PROXIMATE RH ROTATING HEAD SKIN STAPLERS (35 REGULAR) CONTAINS 35 STAINLESS STEEL STAPLES: Brand: PROXIMATE

## (undated) DEVICE — LAPAROSCOPIC MONOPOLAR CORD: Brand: VALLEYLAB

## (undated) DEVICE — NDL HYPO PRECISIONGLIDE REG 22G 1 1/2

## (undated) DEVICE — THE CHANNEL CLEANING BRUSH IS A NYLON FLEXI BRUSH ATTACHED TO A FLEXIBLE PLASTIC SHEATH DESIGNED TO SAFELY REMOVE DEBRIS FROM FLEXIBLE ENDOSCOPES.

## (undated) DEVICE — TUNNELER W/SHEATH 16G 12IN

## (undated) DEVICE — POOLE SUCTION INSTRUMENT WITH REMOVABLE SHEATH: Brand: POOLE

## (undated) DEVICE — DRN PENRS 5/8X18IN LTX

## (undated) DEVICE — 2, DISPOSABLE SUCTION/IRRIGATOR WITHOUT DISPOSABLE TIP: Brand: STRYKEFLOW

## (undated) DEVICE — 3M™ STERI-STRIP™ REINFORCED ADHESIVE SKIN CLOSURES, R1546, 1/4 IN X 4 IN (6 MM X 100 MM), 10 STRIPS/ENVELOPE: Brand: 3M™ STERI-STRIP™

## (undated) DEVICE — SUT PROLN 0 MO6 30IN 8418H

## (undated) DEVICE — ENDOPATH PNEUMONEEDLE INSUFFLATION NEEDLES WITH LUER LOCK CONNECTORS 120MM: Brand: ENDOPATH

## (undated) DEVICE — MINI ENDOCUT SCISSOR TIP, DISPOSABLE: Brand: RENEW